# Patient Record
Sex: MALE | Race: WHITE | Employment: OTHER | URBAN - NONMETROPOLITAN AREA
[De-identification: names, ages, dates, MRNs, and addresses within clinical notes are randomized per-mention and may not be internally consistent; named-entity substitution may affect disease eponyms.]

---

## 2020-10-08 RX ORDER — BUMETANIDE 2 MG/1
2 TABLET ORAL DAILY
Status: ON HOLD | COMMUNITY
End: 2021-08-14 | Stop reason: SDUPTHER

## 2020-10-08 RX ORDER — ASPIRIN 81 MG/1
81 TABLET ORAL DAILY
COMMUNITY
End: 2020-10-21

## 2020-10-08 RX ORDER — PANTOPRAZOLE SODIUM 40 MG/1
40 TABLET, DELAYED RELEASE ORAL DAILY
COMMUNITY

## 2020-10-08 RX ORDER — METOPROLOL SUCCINATE 25 MG/1
25 TABLET, EXTENDED RELEASE ORAL DAILY
Status: ON HOLD | COMMUNITY
End: 2021-08-14 | Stop reason: HOSPADM

## 2020-10-08 RX ORDER — IBUPROFEN 400 MG/1
400 TABLET ORAL EVERY 6 HOURS PRN
Status: ON HOLD | COMMUNITY
End: 2021-08-15 | Stop reason: HOSPADM

## 2020-10-08 RX ORDER — TIOTROPIUM BROMIDE 18 UG/1
18 CAPSULE ORAL; RESPIRATORY (INHALATION) DAILY
COMMUNITY
End: 2021-08-25

## 2020-10-08 RX ORDER — BUDESONIDE AND FORMOTEROL FUMARATE DIHYDRATE 160; 4.5 UG/1; UG/1
2 AEROSOL RESPIRATORY (INHALATION) 2 TIMES DAILY
COMMUNITY

## 2020-10-08 RX ORDER — LORATADINE 10 MG/1
10 TABLET ORAL DAILY
COMMUNITY

## 2020-10-08 RX ORDER — POTASSIUM CHLORIDE 750 MG/1
10 TABLET, EXTENDED RELEASE ORAL 2 TIMES DAILY
Status: ON HOLD | COMMUNITY
End: 2021-08-15 | Stop reason: HOSPADM

## 2020-10-08 RX ORDER — MULTIVITAMIN,THERAPEUTIC
TABLET ORAL
COMMUNITY

## 2020-10-08 RX ORDER — CYANOCOBALAMIN (VITAMIN B-12) 500 MCG
TABLET ORAL
COMMUNITY

## 2020-10-08 RX ORDER — SENNA AND DOCUSATE SODIUM 50; 8.6 MG/1; MG/1
1 TABLET, FILM COATED ORAL DAILY
COMMUNITY

## 2020-10-08 RX ORDER — MAGNESIUM OXIDE 400 MG/1
400 TABLET ORAL DAILY
COMMUNITY
End: 2020-11-04 | Stop reason: SDUPTHER

## 2020-10-08 RX ORDER — NICOTINE 21 MG/24HR
1 PATCH, TRANSDERMAL 24 HOURS TRANSDERMAL EVERY 24 HOURS
COMMUNITY
End: 2020-10-21

## 2020-10-08 RX ORDER — GABAPENTIN 300 MG/1
300 CAPSULE ORAL 3 TIMES DAILY
COMMUNITY
End: 2020-10-21

## 2020-10-21 ENCOUNTER — TELEPHONE (OUTPATIENT)
Dept: PAIN MANAGEMENT | Age: 70
End: 2020-10-21

## 2020-10-21 ENCOUNTER — OFFICE VISIT (OUTPATIENT)
Dept: PAIN MANAGEMENT | Age: 70
End: 2020-10-21
Payer: MEDICARE

## 2020-10-21 VITALS
DIASTOLIC BLOOD PRESSURE: 68 MMHG | HEIGHT: 70 IN | BODY MASS INDEX: 29.92 KG/M2 | WEIGHT: 209 LBS | SYSTOLIC BLOOD PRESSURE: 100 MMHG | HEART RATE: 96 BPM

## 2020-10-21 PROBLEM — M79.2 NEUROPATHIC PAIN: Status: ACTIVE | Noted: 2020-10-21

## 2020-10-21 PROBLEM — M54.50 CHRONIC BILATERAL LOW BACK PAIN WITHOUT SCIATICA: Status: ACTIVE | Noted: 2020-10-21

## 2020-10-21 PROBLEM — G89.29 CHRONIC BILATERAL LOW BACK PAIN WITHOUT SCIATICA: Status: ACTIVE | Noted: 2020-10-21

## 2020-10-21 PROCEDURE — G8484 FLU IMMUNIZE NO ADMIN: HCPCS | Performed by: NURSE PRACTITIONER

## 2020-10-21 PROCEDURE — 4040F PNEUMOC VAC/ADMIN/RCVD: CPT | Performed by: NURSE PRACTITIONER

## 2020-10-21 PROCEDURE — 3017F COLORECTAL CA SCREEN DOC REV: CPT | Performed by: NURSE PRACTITIONER

## 2020-10-21 PROCEDURE — 4004F PT TOBACCO SCREEN RCVD TLK: CPT | Performed by: NURSE PRACTITIONER

## 2020-10-21 PROCEDURE — 99204 OFFICE O/P NEW MOD 45 MIN: CPT | Performed by: NURSE PRACTITIONER

## 2020-10-21 PROCEDURE — 1123F ACP DISCUSS/DSCN MKR DOCD: CPT | Performed by: NURSE PRACTITIONER

## 2020-10-21 PROCEDURE — G8417 CALC BMI ABV UP PARAM F/U: HCPCS | Performed by: NURSE PRACTITIONER

## 2020-10-21 PROCEDURE — G8427 DOCREV CUR MEDS BY ELIG CLIN: HCPCS | Performed by: NURSE PRACTITIONER

## 2020-10-21 RX ORDER — SPIRONOLACTONE 25 MG/1
25 TABLET ORAL DAILY
Status: ON HOLD | COMMUNITY
End: 2021-08-14 | Stop reason: SDUPTHER

## 2020-10-21 RX ORDER — LIDOCAINE 50 MG/G
OINTMENT TOPICAL
Qty: 240 G | Refills: 5 | Status: SHIPPED | OUTPATIENT
Start: 2020-10-21

## 2020-10-21 RX ORDER — PREGABALIN 75 MG/1
75 CAPSULE ORAL 2 TIMES DAILY
Qty: 60 CAPSULE | Refills: 0 | Status: SHIPPED | OUTPATIENT
Start: 2020-10-21 | End: 2020-11-04

## 2020-10-21 RX ORDER — ALBUTEROL SULFATE 90 UG/1
2 AEROSOL, METERED RESPIRATORY (INHALATION) EVERY 6 HOURS PRN
COMMUNITY

## 2020-10-21 RX ORDER — ACETAMINOPHEN 325 MG/1
650 TABLET ORAL EVERY 6 HOURS PRN
COMMUNITY

## 2020-10-21 RX ORDER — GABAPENTIN 600 MG/1
600 TABLET ORAL DAILY
Qty: 90 TABLET | Refills: 3 | Status: SHIPPED | OUTPATIENT
Start: 2020-10-21 | End: 2020-11-04

## 2020-10-21 SDOH — HEALTH STABILITY: MENTAL HEALTH: HOW OFTEN DO YOU HAVE A DRINK CONTAINING ALCOHOL?: 2-4 TIMES A MONTH

## 2020-10-21 SDOH — HEALTH STABILITY: MENTAL HEALTH: HOW MANY STANDARD DRINKS CONTAINING ALCOHOL DO YOU HAVE ON A TYPICAL DAY?: 1 OR 2

## 2020-10-21 ASSESSMENT — ENCOUNTER SYMPTOMS
CONSTIPATION: 0
BACK PAIN: 1
SHORTNESS OF BREATH: 1
DIARRHEA: 0
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
## 2020-10-21 NOTE — PATIENT INSTRUCTIONS
Pain Management Plan:  1. Start Lyrica twice daily, stop gabapentin. 2. Lidocaine ointment to feet as needed      Pain clinic phone numbers  Diego Zepeda  - Call [phone]. Please leave your name, a working phone number, date of birth, the name, dose, quantity, and last refill date of the prescription, and the pharmacy.  Appointment or General Questions - Call  [phone]. This is the direct line the  [facility] .  Amirah Askew You can also use [employer] [facility]. Is your MyChart Activated? How to dispose of unused pain medications safely:  · Flush all unused pain medications. This is the FDA's recommended way of disposing these medications. · All other medications can be disposed in the trash mixed in undesirable contents (used coffee grounds, used [employer], etc).

## 2020-10-23 ENCOUNTER — TELEPHONE (OUTPATIENT)
Dept: PAIN MANAGEMENT | Age: 70
End: 2020-10-23

## 2020-11-04 ENCOUNTER — OFFICE VISIT (OUTPATIENT)
Dept: PAIN MANAGEMENT | Age: 70
End: 2020-11-04
Payer: MEDICARE

## 2020-11-04 VITALS
HEIGHT: 70 IN | SYSTOLIC BLOOD PRESSURE: 110 MMHG | DIASTOLIC BLOOD PRESSURE: 64 MMHG | WEIGHT: 213 LBS | BODY MASS INDEX: 30.49 KG/M2

## 2020-11-04 PROBLEM — M51.36 DDD (DEGENERATIVE DISC DISEASE), LUMBAR: Status: ACTIVE | Noted: 2020-11-04

## 2020-11-04 PROBLEM — M47.817 LUMBOSACRAL SPONDYLOSIS WITHOUT MYELOPATHY: Status: ACTIVE | Noted: 2020-11-04

## 2020-11-04 PROCEDURE — 1123F ACP DISCUSS/DSCN MKR DOCD: CPT | Performed by: NURSE PRACTITIONER

## 2020-11-04 PROCEDURE — 99214 OFFICE O/P EST MOD 30 MIN: CPT | Performed by: NURSE PRACTITIONER

## 2020-11-04 PROCEDURE — G8427 DOCREV CUR MEDS BY ELIG CLIN: HCPCS | Performed by: NURSE PRACTITIONER

## 2020-11-04 PROCEDURE — 3017F COLORECTAL CA SCREEN DOC REV: CPT | Performed by: NURSE PRACTITIONER

## 2020-11-04 PROCEDURE — G8484 FLU IMMUNIZE NO ADMIN: HCPCS | Performed by: NURSE PRACTITIONER

## 2020-11-04 PROCEDURE — G8417 CALC BMI ABV UP PARAM F/U: HCPCS | Performed by: NURSE PRACTITIONER

## 2020-11-04 PROCEDURE — 4004F PT TOBACCO SCREEN RCVD TLK: CPT | Performed by: NURSE PRACTITIONER

## 2020-11-04 PROCEDURE — 4040F PNEUMOC VAC/ADMIN/RCVD: CPT | Performed by: NURSE PRACTITIONER

## 2020-11-04 RX ORDER — PREGABALIN 150 MG/1
150 CAPSULE ORAL 2 TIMES DAILY
Qty: 60 CAPSULE | Refills: 3 | Status: SHIPPED | OUTPATIENT
Start: 2020-11-04 | End: 2020-12-02 | Stop reason: DRUGHIGH

## 2020-11-04 ASSESSMENT — ENCOUNTER SYMPTOMS
DIARRHEA: 0
CONSTIPATION: 0
BACK PAIN: 1
SHORTNESS OF BREATH: 1

## 2020-11-04 NOTE — PROGRESS NOTES
Subjective:      Chief Complaint   Patient presents with    Foot Pain     feet, legs, hands have neuropathic pain    Back Pain     Patient ID: Amalia Ervin is a 69 y.o. male. Foot Pain    Associated symptoms include numbness. Back Pain   Associated symptoms include numbness and weakness (legs). Pertinent negatives include no chest pain. Follow up after Initial new patient consult    Neuropathic pain in feet. Chronic low back pain. Started Lyrica and lidocaine ointment, no significant pain relief. No side effects     Routine chiropractic care, also acupuncture    Pain Assessment  Location of Pain: Back  Location Modifiers: Left, Right(radiates down right leg)  Severity of Pain: 8  Quality of Pain: Aching  Duration of Pain: Persistent  Frequency of Pain: Constant  Aggravating Factors: Bending, Standing, Walking  Limiting Behavior: Yes  Relieving Factors: Rest  Are there other pain locations you wish to document?: No    Allergies   Allergen Reactions    Niacin And Related     Other      Nicotine patch - rash    Statins        Outpatient Medications Marked as Taking for the 11/4/20 encounter (Office Visit) with ALISON Brenner CNP   Medication Sig Dispense Refill    pregabalin (LYRICA) 150 MG capsule Take 1 capsule by mouth 2 times daily for 30 days. 60 capsule 3    acetaminophen (TYLENOL) 325 MG tablet Take 650 mg by mouth every 6 hours as needed for Pain      spironolactone (ALDACTONE) 25 MG tablet Take 25 mg by mouth daily      albuterol sulfate  (90 Base) MCG/ACT inhaler Inhale 2 puffs into the lungs every 6 hours as needed      lidocaine (XYLOCAINE) 5 % ointment Apply topically as needed.  240 g 5    Multiple Vitamins-Minerals (ONCOVITE) TABS Take by mouth      loratadine (CLARITIN) 10 MG tablet Take 10 mg by mouth daily      tiotropium (SPIRIVA HANDIHALER) 18 MCG inhalation capsule Inhale 18 mcg into the lungs daily      apixaban (ELIQUIS) 5 MG TABS tablet Take by mouth 2 times daily      potassium chloride (KLOR-CON M) 10 MEQ extended release tablet Take 10 mEq by mouth 2 times daily      CPAP Machine MISC by Does not apply route BiPAP      budesonide-formoterol (SYMBICORT) 160-4.5 MCG/ACT AERO Inhale 2 puffs into the lungs 2 times daily      bumetanide (BUMEX) 2 MG tablet Take 2 mg by mouth daily      magnesium hydroxide (MILK OF MAGNESIA) 400 MG/5ML suspension Take by mouth daily as needed for Constipation      ibuprofen (IBU) 400 MG tablet Take 400 mg by mouth every 6 hours as needed for Pain      metoprolol succinate (TOPROL XL) 25 MG extended release tablet Take 25 mg by mouth daily      Cyanocobalamin (VITAMIN B 12) 500 MCG TABS Take by mouth      pantoprazole (PROTONIX) 40 MG tablet Take 40 mg by mouth daily         Past Medical History:   Diagnosis Date    Agent orange exposure     Anxiety state     Cancer ([facility] [address]     bladder    [facility]) [facility]     Chronic obstructive pulmonary disease [facility]COPD) [facility]     Chronic post-traumatic stress disorder     Chronic respiratory failure with hypercapnia ([facility]     COPD exacerbation ([facility]     Coronary artery disease     Degenerative disc disease, lumbar     Depression     Essential hypertension     History of acute pancreatitis     Hyperglycemia     Hypokalemia     Lumbosacral disc disease     Obesity     Obstructive sleep apnea     Personal history of malignant neoplasm of bladder        Past Surgical History:   Procedure Laterality Date    BLADDER SURGERY      OTHER SURGICAL HISTORY      heart bypass with stents       Family History   Problem Relation Age of Onset    Emphysema Mother     Heart Disease Father     Heart Failure Father        Social History     Socioeconomic History    Marital status: Legally      Spouse name: None    Number of children: None    Years of education: None    Highest education level: None   Occupational History    None   Social Needs    Financial resource strain: None    Food insecurity     Worry: None     Inability: None    Transportation needs     Medical: None     Non-medical: None   Tobacco Use    Smoking status: Current Every Day Smoker     Packs/day: 1.00     Years: 54.00     Pack years: 54.00     Types: Cigarettes    Smokeless tobacco: Never Used   Substance and Sexual Activity    Alcohol use: Yes     Frequency: 2-4 times a month     Drinks per session: 1 or 2     Binge frequency: Never    Drug use: Never    Sexual activity: Not Currently   Lifestyle    Physical activity     Days per week: None     Minutes per session: None    Stress: None   Relationships    Social connections     Talks on phone: None     Gets together: None     Attends Rastafarian service: None     Active member of club or organization: None     Attends meetings of clubs or organizations: None     Relationship status: None    Intimate partner violence     Fear of current or ex partner: None     Emotionally abused: None     Physically abused: None     Forced sexual activity: None   Other Topics Concern    None   Social History Narrative    None     Review of Systems   Constitutional: Positive for activity change and fatigue. Respiratory: Positive for shortness of breath (hx COPD, smoker). Cardiovascular: Negative for chest pain. Gastrointestinal: Negative for constipation and diarrhea. Musculoskeletal: Positive for arthralgias, back pain and myalgias. Skin: Negative. Allergic/Immunologic: Negative for environmental allergies and food allergies. Neurological: Positive for weakness (legs) and numbness. Burning pain hands/feet   Psychiatric/Behavioral: Positive for sleep disturbance. Objective:   Physical Exam  Vitals signs reviewed. Constitutional:       General: He is awake. He is not in acute distress. Appearance: Normal appearance. He is well-developed and well-groomed. He is not ill-appearing, toxic-appearing or diaphoretic. Interventions: He is not intubated. HENT:      Head: Normocephalic and atraumatic. Right Ear: External ear normal.      Left Ear: External ear normal.      Nose: Nose normal.      Mouth/Throat:      Lips: Pink. Mouth: Mucous membranes are moist.   Eyes:      General: Lids are normal.   Cardiovascular:      Rate and Rhythm: Normal rate. Pulmonary:      Effort: Pulmonary effort is normal. No tachypnea, bradypnea, accessory muscle usage, prolonged expiration, respiratory distress or retractions. He is not intubated. Abdominal:      General: Abdomen is flat and protuberant. Palpations: Abdomen is soft. Musculoskeletal:      Lumbar back: He exhibits pain. Right lower le+ Pitting Edema present. Left lower le+ Pitting Edema present. Comments: Ambulates with single cane   Skin:     General: Skin is warm and dry. Capillary Refill: Capillary refill takes less than 2 seconds. Findings: No rash. Nails: There is no clubbing. Neurological:      General: No focal deficit present. Mental Status: He is alert and oriented to person, place, and time. Cranial Nerves: No cranial nerve deficit. Sensory: Sensory deficit (bilateral feet) present. Motor: No tremor. Gait: Gait abnormal (antalgic). Psychiatric:         Speech: Speech normal.         Behavior: Behavior is cooperative. Assessment:       Diagnosis Orders   1. Lumbosacral spondylosis without myelopathy  pregabalin (LYRICA) 150 MG capsule   2. DDD (degenerative disc disease), lumbar  pregabalin (LYRICA) 150 MG capsule   3. Neuropathic pain  pregabalin (LYRICA) 150 MG capsule   4. Chronic bilateral low back pain without sciatica  pregabalin (LYRICA) 150 MG capsule         Plan:      Increase Lyrica 150mg bid. If no relief, consider Nucynta.  Follow up one month        Bridgette Rangel APRN - CNP

## 2020-12-02 ENCOUNTER — OFFICE VISIT (OUTPATIENT)
Dept: PAIN MANAGEMENT | Age: 70
End: 2020-12-02
Payer: MEDICARE

## 2020-12-02 VITALS
BODY MASS INDEX: 30.49 KG/M2 | DIASTOLIC BLOOD PRESSURE: 64 MMHG | HEIGHT: 70 IN | WEIGHT: 213 LBS | SYSTOLIC BLOOD PRESSURE: 118 MMHG | RESPIRATION RATE: 18 BRPM

## 2020-12-02 PROCEDURE — 1123F ACP DISCUSS/DSCN MKR DOCD: CPT | Performed by: NURSE PRACTITIONER

## 2020-12-02 PROCEDURE — G8417 CALC BMI ABV UP PARAM F/U: HCPCS | Performed by: NURSE PRACTITIONER

## 2020-12-02 PROCEDURE — G8427 DOCREV CUR MEDS BY ELIG CLIN: HCPCS | Performed by: NURSE PRACTITIONER

## 2020-12-02 PROCEDURE — 3017F COLORECTAL CA SCREEN DOC REV: CPT | Performed by: NURSE PRACTITIONER

## 2020-12-02 PROCEDURE — 4004F PT TOBACCO SCREEN RCVD TLK: CPT | Performed by: NURSE PRACTITIONER

## 2020-12-02 PROCEDURE — G8484 FLU IMMUNIZE NO ADMIN: HCPCS | Performed by: NURSE PRACTITIONER

## 2020-12-02 PROCEDURE — 99214 OFFICE O/P EST MOD 30 MIN: CPT | Performed by: NURSE PRACTITIONER

## 2020-12-02 PROCEDURE — 4040F PNEUMOC VAC/ADMIN/RCVD: CPT | Performed by: NURSE PRACTITIONER

## 2020-12-02 RX ORDER — CELECOXIB 100 MG/1
100 CAPSULE ORAL 2 TIMES DAILY
Qty: 60 CAPSULE | Refills: 3 | Status: SHIPPED | OUTPATIENT
Start: 2020-12-02 | End: 2021-01-13 | Stop reason: SDUPTHER

## 2020-12-02 RX ORDER — PREGABALIN 225 MG/1
225 CAPSULE ORAL 2 TIMES DAILY
Qty: 60 CAPSULE | Refills: 1 | Status: SHIPPED | OUTPATIENT
Start: 2020-12-02 | End: 2021-01-13 | Stop reason: SDUPTHER

## 2020-12-02 RX ORDER — TIZANIDINE 2 MG/1
TABLET ORAL
Qty: 60 TABLET | Refills: 5 | Status: SHIPPED | OUTPATIENT
Start: 2020-12-02 | End: 2021-01-13 | Stop reason: SINTOL

## 2020-12-02 ASSESSMENT — ENCOUNTER SYMPTOMS
BACK PAIN: 1
DIARRHEA: 0
CONSTIPATION: 0
SHORTNESS OF BREATH: 1

## 2020-12-02 NOTE — PATIENT INSTRUCTIONS
INCREASE LYRICA 225MG TWICE A DAILY    START CELEBREX 100MG TWICE DAILY    ZANAFLEX 1-2 TABLETS AS NEEDED FOR PAIN

## 2020-12-02 NOTE — PROGRESS NOTES
Subjective:      Chief Complaint   Patient presents with    Back Pain     new pain, between the shoulder blades, has an up coing chiro appt    Lower Back Pain     all the way down to the feet     Patient ID: Charanjit Connolly is a 69 y.o. male. Foot Pain    Associated symptoms include numbness. Back Pain   Associated symptoms include numbness and weakness (legs). Pertinent negatives include no chest pain. Follow up after Initial new patient consult    Neuropathic pain in feet. Chronic low back pain-currently flared    Lyrica increased-minimal pain relief, no side effects. Sleep improved     Routine chiropractic care, also acupuncture    Pain Assessment  Location of Pain: Back  Location Modifiers: (lumbar down to the feet)  Severity of Pain: 7  Quality of Pain: Aching, Dull  Duration of Pain: Persistent  Frequency of Pain: Constant  Aggravating Factors: Bending, Stretching, Straightening, Squatting, Kneeling, Exercise, Standing, Walking, Stairs  Limiting Behavior: Some  Relieving Factors: (rolled up towel, chiropractor)    Allergies   Allergen Reactions    Niacin And Related     Other      Nicotine patch - rash    Statins        Outpatient Medications Marked as Taking for the 12/2/20 encounter (Office Visit) with ALISON Toussaint - CNP   Medication Sig Dispense Refill    pregabalin (LYRICA) 225 MG capsule Take 1 capsule by mouth 2 times daily for 30 days. 60 capsule 1    tiZANidine (ZANAFLEX) 2 MG tablet 1-2 tablets tid prn muscle spasm/pain 60 tablet 5    celecoxib (CELEBREX) 100 MG capsule Take 1 capsule by mouth 2 times daily 60 capsule 3    acetaminophen (TYLENOL) 325 MG tablet Take 650 mg by mouth every 6 hours as needed for Pain      spironolactone (ALDACTONE) 25 MG tablet Take 25 mg by mouth daily      albuterol sulfate  (90 Base) MCG/ACT inhaler Inhale 2 puffs into the lungs every 6 hours as needed      lidocaine (XYLOCAINE) 5 % ointment Apply topically as needed.  240 g 5    sennosides-docusate sodium (SENOKOT-S) 8.6-50 MG tablet Take 1 tablet by mouth daily      Multiple Vitamins-Minerals (ONCOVITE) TABS Take by mouth      loratadine (CLARITIN) 10 MG tablet Take 10 mg by mouth daily      tiotropium (SPIRIVA HANDIHALER) 18 MCG inhalation capsule Inhale 18 mcg into the lungs daily      apixaban (ELIQUIS) 5 MG TABS tablet Take by mouth 2 times daily      potassium chloride (KLOR-CON M) 10 MEQ extended release tablet Take 10 mEq by mouth 2 times daily      CPAP Machine MISC by Does not apply route BiPAP      budesonide-formoterol (SYMBICORT) 160-4.5 MCG/ACT AERO Inhale 2 puffs into the lungs 2 times daily      bumetanide (BUMEX) 2 MG tablet Take 2 mg by mouth daily      magnesium hydroxide (MILK OF MAGNESIA) 400 MG/5ML suspension Take by mouth daily as needed for Constipation      ibuprofen (IBU) 400 MG tablet Take 400 mg by mouth every 6 hours as needed for Pain      metoprolol succinate (TOPROL XL) 25 MG extended release tablet Take 25 mg by mouth daily      Cyanocobalamin (VITAMIN B 12) 500 MCG TABS Take by mouth      pantoprazole (PROTONIX) 40 MG tablet Take 40 mg by mouth daily         Past Medical History:   Diagnosis Date    Agent orange exposure     Anxiety state     Cancer ([facility] [address]     bladder    CHF (congestive heart failure) ([facility])     Chronic obstructive pulmonary disease (COPD) ([facility])     Chronic post-traumatic stress disorder     Chronic respiratory failure with hypercapnia ([facility]CC)     COPD exacerbation ([facility])     Coronary artery disease     Degenerative disc disease, lumbar     Depression     Essential hypertension     History of acute pancreatitis     Hyperglycemia     Hypokalemia     Lumbosacral disc disease     Obesity     Obstructive sleep apnea     Personal history of malignant neoplasm of bladder        Past Surgical History:   Procedure Laterality Date    BLADDER SURGERY      OTHER SURGICAL HISTORY      heart bypass with stents       Family History   Problem Relation Age of Onset    Emphysema Mother     Heart Disease Father     Heart Failure Father        Social History     Socioeconomic History    Marital status: Legally      Spouse name: None    Number of children: None    Years of education: None    Highest education level: None   Occupational History    None   Social Needs    Financial resource strain: None    Food insecurity     Worry: None     Inability: None    Transportation needs     Medical: None     Non-medical: None   Tobacco Use    Smoking status: Current Every Day Smoker     Packs/day: 1.00     Years: 54.00     Pack years: 54.00     Types: Cigarettes    Smokeless tobacco: Never Used   Substance and Sexual Activity    Alcohol use: Yes     Frequency: 2-4 times a month     Drinks per session: 1 or 2     Binge frequency: Never    Drug use: Never    Sexual activity: Not Currently   Lifestyle    Physical activity     Days per week: None     Minutes per session: None    Stress: None   Relationships    Social connections     Talks on phone: None     Gets together: None     Attends Sabianism service: None     Active member of club or organization: None     Attends meetings of clubs or organizations: None     Relationship status: None    Intimate partner violence     Fear of current or ex partner: None     Emotionally abused: None     Physically abused: None     Forced sexual activity: None   Other Topics Concern    None   Social History Narrative    None     Review of Systems   Constitutional: Positive for activity change and fatigue. Respiratory: Positive for shortness of breath (hx COPD, smoker). Cardiovascular: Negative for chest pain. Gastrointestinal: Negative for constipation and diarrhea. Musculoskeletal: Positive for arthralgias, back pain and myalgias. Skin: Negative. Allergic/Immunologic: Negative for environmental allergies and food allergies.    Neurological: Positive for weakness (legs) and numbness. Burning pain hands/feet   Psychiatric/Behavioral: Positive for sleep disturbance. Objective:   Physical Exam  Vitals signs reviewed. Constitutional:       General: He is awake. He is not in acute distress. Appearance: Normal appearance. He is well-developed and well-groomed. He is not ill-appearing, toxic-appearing or diaphoretic. Interventions: He is not intubated. HENT:      Head: Normocephalic and atraumatic. Right Ear: External ear normal.      Left Ear: External ear normal.      Nose: Nose normal.      Mouth/Throat:      Lips: Pink. Mouth: Mucous membranes are moist.   Eyes:      General: Lids are normal.   Cardiovascular:      Rate and Rhythm: Normal rate. Pulmonary:      Effort: Pulmonary effort is normal. No tachypnea, bradypnea, accessory muscle usage, prolonged expiration, respiratory distress or retractions. He is not intubated. Abdominal:      General: Abdomen is flat and protuberant. Palpations: Abdomen is soft. Musculoskeletal:      Lumbar back: He exhibits pain. Right lower le+ Pitting Edema present. Left lower le+ Pitting Edema present. Comments: Ambulates with single cane   Skin:     General: Skin is warm and dry. Capillary Refill: Capillary refill takes less than 2 seconds. Findings: No rash. Nails: There is no clubbing. Neurological:      General: No focal deficit present. Mental Status: He is alert and oriented to person, place, and time. Cranial Nerves: No cranial nerve deficit. Sensory: Sensory deficit (bilateral feet) present. Motor: No tremor. Gait: Gait abnormal (antalgic). Psychiatric:         Speech: Speech normal.         Behavior: Behavior is cooperative. Assessment:       Diagnosis Orders   1. Encounter for drug screening  Urine Drug Screen   2. Neuropathic pain  pregabalin (LYRICA) 225 MG capsule   3.  Chronic bilateral low back pain without sciatica pregabalin (LYRICA) 225 MG capsule   4.  Encounter for long-term opiate analgesic use  Urine Drug Screen         Plan:      Increase Lyrica 225mg bid  Celebrex 100mg bid  Zanaflex 2-4mg tid prn  Consider Percocet  Follow up one month        Gilmar Reyna, APRN - CNP

## 2020-12-06 LAB
6-ACETYLMORPHINE, UR: NORMAL
AMPHETAMINE SCREEN, URINE: NORMAL
BARBITURATE SCREEN, URINE: NORMAL
BENZODIAZEPINE SCREEN, URINE: NORMAL
CANNABINOID SCREEN URINE: NORMAL
COCAINE METABOLITE, URINE: NORMAL
CREATININE URINE: NORMAL
EDDP, URINE: NORMAL
ETHANOL URINE: NORMAL
MDMA URINE: NORMAL
METHADONE SCREEN, URINE: NORMAL
METHAMPHETAMINE, URINE: NORMAL
OPIATES, URINE: NORMAL
OXYCODONE: NORMAL
PCP: NORMAL
PH, URINE: NORMAL
PHENCYCLIDINE, URINE: NORMAL
PROPOXYPHENE, URINE: NORMAL
TRICYCLIC ANTIDEPRESSANTS, UR: NORMAL

## 2020-12-14 ENCOUNTER — TELEPHONE (OUTPATIENT)
Dept: PAIN MANAGEMENT | Age: 70
End: 2020-12-14

## 2020-12-15 NOTE — TELEPHONE ENCOUNTER
Patient called and stated that he had recently had xrays done that were ordered by Dr Bismark La. He was wondering if you could take a look at them and come up with a game plan for him.   Pt is coming in for another appointment on 1.13.21

## 2020-12-15 NOTE — TELEPHONE ENCOUNTER
It should have been in my chart, but it is not pulling up.   Writer called [facility] and requested results to be faxed

## 2020-12-21 NOTE — TELEPHONE ENCOUNTER
Pt called back to follow up on this request. He says he called 12/14 and hasn't heard anything back and needs some relief.  Please call the pt so he knows what we can do for his pain

## 2020-12-21 NOTE — TELEPHONE ENCOUNTER
Patient would like the MRI order placed and for it to be faxed to [facility]

## 2021-01-13 ENCOUNTER — OFFICE VISIT (OUTPATIENT)
Dept: PAIN MANAGEMENT | Age: 71
End: 2021-01-13
Payer: MEDICARE

## 2021-01-13 ENCOUNTER — TELEPHONE (OUTPATIENT)
Dept: PAIN MANAGEMENT | Age: 71
End: 2021-01-13

## 2021-01-13 VITALS — DIASTOLIC BLOOD PRESSURE: 84 MMHG | SYSTOLIC BLOOD PRESSURE: 136 MMHG

## 2021-01-13 DIAGNOSIS — M54.06 PANNICULITIS INVOLVING LUMBAR REGION: ICD-10-CM

## 2021-01-13 DIAGNOSIS — M47.816 LUMBAR FACET JOINT SYNDROME: Primary | ICD-10-CM

## 2021-01-13 DIAGNOSIS — M47.816 LUMBAR FACET JOINT SYNDROME: ICD-10-CM

## 2021-01-13 DIAGNOSIS — M54.50 CHRONIC BILATERAL LOW BACK PAIN WITHOUT SCIATICA: ICD-10-CM

## 2021-01-13 DIAGNOSIS — G89.29 CHRONIC BILATERAL LOW BACK PAIN WITHOUT SCIATICA: ICD-10-CM

## 2021-01-13 DIAGNOSIS — M51.36 DDD (DEGENERATIVE DISC DISEASE), LUMBAR: ICD-10-CM

## 2021-01-13 DIAGNOSIS — M79.2 NEUROPATHIC PAIN: Primary | ICD-10-CM

## 2021-01-13 PROCEDURE — 3017F COLORECTAL CA SCREEN DOC REV: CPT | Performed by: NURSE PRACTITIONER

## 2021-01-13 PROCEDURE — 99214 OFFICE O/P EST MOD 30 MIN: CPT | Performed by: NURSE PRACTITIONER

## 2021-01-13 PROCEDURE — G8417 CALC BMI ABV UP PARAM F/U: HCPCS | Performed by: NURSE PRACTITIONER

## 2021-01-13 PROCEDURE — G8427 DOCREV CUR MEDS BY ELIG CLIN: HCPCS | Performed by: NURSE PRACTITIONER

## 2021-01-13 PROCEDURE — 1123F ACP DISCUSS/DSCN MKR DOCD: CPT | Performed by: NURSE PRACTITIONER

## 2021-01-13 PROCEDURE — 4040F PNEUMOC VAC/ADMIN/RCVD: CPT | Performed by: NURSE PRACTITIONER

## 2021-01-13 PROCEDURE — G8484 FLU IMMUNIZE NO ADMIN: HCPCS | Performed by: NURSE PRACTITIONER

## 2021-01-13 PROCEDURE — 4004F PT TOBACCO SCREEN RCVD TLK: CPT | Performed by: NURSE PRACTITIONER

## 2021-01-13 RX ORDER — CELECOXIB 100 MG/1
100 CAPSULE ORAL 2 TIMES DAILY
Qty: 60 CAPSULE | Refills: 5 | Status: SHIPPED | OUTPATIENT
Start: 2021-01-13 | End: 2021-01-14 | Stop reason: SDUPTHER

## 2021-01-13 RX ORDER — MAGNESIUM OXIDE 400 MG/1
400 TABLET ORAL DAILY
COMMUNITY

## 2021-01-13 RX ORDER — DILTIAZEM HYDROCHLORIDE 180 MG/1
180 CAPSULE, EXTENDED RELEASE ORAL DAILY
Status: ON HOLD | COMMUNITY
Start: 2020-11-05 | End: 2021-08-14 | Stop reason: HOSPADM

## 2021-01-13 RX ORDER — CYCLOBENZAPRINE HCL 10 MG
10 TABLET ORAL 2 TIMES DAILY PRN
Qty: 60 TABLET | Refills: 5 | Status: SHIPPED | OUTPATIENT
Start: 2021-01-13 | End: 2021-01-14 | Stop reason: SDUPTHER

## 2021-01-13 RX ORDER — PREGABALIN 225 MG/1
225 CAPSULE ORAL 2 TIMES DAILY
Qty: 60 CAPSULE | Refills: 1 | Status: SHIPPED | OUTPATIENT
Start: 2021-01-13 | End: 2021-01-14 | Stop reason: SDUPTHER

## 2021-01-13 ASSESSMENT — ENCOUNTER SYMPTOMS
SHORTNESS OF BREATH: 1
DIARRHEA: 0
CONSTIPATION: 0
BACK PAIN: 1

## 2021-01-13 NOTE — TELEPHONE ENCOUNTER
While scheduling procedure patient does want to have sedation however he is afraid that one tab is not going to be enough. Could you send in 2 tabs of valium, or something different for him?

## 2021-01-13 NOTE — PATIENT INSTRUCTIONS
[facility]  [address], [place]    PROCEDURE INSTRUCTIONS FOR PAIN MANAGEMENT PROCEDURES    You are scheduled to see Dr. Bunny Frank to undergo the following procedure:       Procedure Date:   Feb 10, 2021 (Wed)  Arrival Time: You will receive a call from [facility] to inform you of your procedure time    Enter the facility through the ER doors and take the elevator to the 2nd floor and check in at same day surgery. 1. Stop the following medications prior to the procedure:     Stop blood thinners as directed before the injection, with permission from your cardiologist or primary care physician. We will send a letter to them requesting permission to hold the blood thinners. 2.  Take all routine medications unless otherwise instructed. Ok to take vitamins and antiinflammatory medications    3. EATING & DRINKING:  YOUR PROCEDURE MAY REQUIRE ANESTHESIA, NO FOOD OR LIQUIDS FOR 8 HOURS PRIOR TO THE PROCEDURE    4. If you are allergic to contrast or iodine, you must take benadryl and prednisone prior to the injection to prevent an allergic reaction. Follow the directions on the prescription for the times to take the medication. 5.  Wear simple loose clothing, which can be easily changed. 6.  Leave jewelry (including rings) and other valuables at home. 7.  Make arrangements for a family member or friend to drive you to the surgery center. Your ride must stay in the hospital while you are having the injection done. The sedation may affect your judgment following the procedure and driving a vehicle within 24 hours after the sedation could be dangerous. 8.  You will be asked to sign several forms prior to surgery; patients under the age of 25 must have a parent or legal guardian sign the permit to be able to do the procedure. 9.  You must have finished any antibiotic prescribed for recent infections. If required, please take pre-procedure antibiotic or other pre-procedure medications as instructed. 10. Bring inhalers and pain medications with you to your procedure. 11. Bring your MRI/CT films if they were done outside of the [facility]. 12. If you should develop a cold, sore throat, cough, fever or other new indication of illness or infection, or are started on antibiotics within 2 weeks of the scheduled procedure, please notify the [facility] office as early as possible at [phone].

## 2021-01-13 NOTE — PROGRESS NOTES
Subjective:      Chief Complaint   Patient presents with    Back Pain     1 month follow up    Medication Management     zanaflex makes him really sleepy     Patient ID: Bernie Olszewski is a 66 y.o. male. Back Pain  Associated symptoms include numbness and weakness (legs). Pertinent negatives include no chest pain. Foot Pain   Associated symptoms include numbness. Here today for routine pain clinic recheck. Neuropathic pain in feet. Chronic low back pain-currently flared. Evaluated in ER. Lyrica increased-providing additional pain relief, no side effects. Sleep improved     Routine chiropractic care, also acupuncture    Pain Assessment  Location of Pain: Back  Severity of Pain: 6  Quality of Pain: Sharp  Duration of Pain: Persistent  Frequency of Pain: Constant  Aggravating Factors: Bending, Stretching, Straightening, Exercise, Kneeling, Squatting, Standing, Walking(getting up in the am; )  Limiting Behavior: Some  Relieving Factors: Heat(massager, )    Allergies   Allergen Reactions    Niacin And Related     Other      Nicotine patch - rash    Statins        Outpatient Medications Marked as Taking for the 1/13/21 encounter (Office Visit) with ALISON Curtis - CNP   Medication Sig Dispense Refill    dilTIAZem (TIAZAC) 180 MG extended release capsule Take 180 mg by mouth daily      NONFORMULARY Take 1 tablet by mouth daily ON COR vitamin - to help keep bladder cancer away      pregabalin (LYRICA) 225 MG capsule Take 1 capsule by mouth 2 times daily for 30 days.  60 capsule 1    tiZANidine (ZANAFLEX) 2 MG tablet 1-2 tablets tid prn muscle spasm/pain 60 tablet 5    celecoxib (CELEBREX) 100 MG capsule Take 1 capsule by mouth 2 times daily 60 capsule 3    acetaminophen (TYLENOL) 325 MG tablet Take 650 mg by mouth every 6 hours as needed for Pain      spironolactone (ALDACTONE) 25 MG tablet Take 25 mg by mouth daily      albuterol sulfate  (90 Base) MCG/ACT inhaler Inhale 2 puffs into Procedure Laterality Date    BLADDER SURGERY      OTHER SURGICAL HISTORY      heart bypass with stents       Family History   Problem Relation Age of Onset    Emphysema Mother     Heart Disease Father     Heart Failure Father        Social History     Socioeconomic History    Marital status: Legally      Spouse name: None    Number of children: None    Years of education: None    Highest education level: None   Occupational History    None   Social Needs    Financial resource strain: None    Food insecurity     Worry: None     Inability: None    Transportation needs     Medical: None     Non-medical: None   Tobacco Use    Smoking status: Current Every Day Smoker     Packs/day: 1.00     Years: 54.00     Pack years: 54.00     Types: Cigarettes    Smokeless tobacco: Never Used   Substance and Sexual Activity    Alcohol use: Yes     Frequency: 2-4 times a month     Drinks per session: 1 or 2     Binge frequency: Never    Drug use: Never    Sexual activity: Not Currently   Lifestyle    Physical activity     Days per week: None     Minutes per session: None    Stress: None   Relationships    Social connections     Talks on phone: None     Gets together: None     Attends [employer] service: None     Active member of club or organization: None     Attends meetings of clubs or organizations: None     Relationship status: None    Intimate partner violence     Fear of current or ex partner: None     Emotionally abused: None     Physically abused: None     Forced sexual activity: None   Other Topics Concern    None   Social History Narrative    None     Review of Systems   Constitutional: Positive for activity change and fatigue. Respiratory: Positive for shortness of breath (hx COPD, smoker). Cardiovascular: Negative for chest pain. Gastrointestinal: Negative for constipation and diarrhea. Musculoskeletal: Positive for arthralgias, back pain and myalgias. Skin: Negative. Allergic/Immunologic: Negative for environmental allergies and food allergies. Neurological: Positive for weakness (legs) and numbness. Burning pain hands/feet   Psychiatric/Behavioral: Positive for sleep disturbance. Objective:   Physical Exam  Vitals signs reviewed. Constitutional:       General: He is awake. He is not in acute distress. Appearance: Normal appearance. He is well-developed and well-groomed. He is not ill-appearing, toxic-appearing or diaphoretic. Interventions: He is not intubated. HENT:      Head: Normocephalic and atraumatic. Right Ear: External ear normal.      Left Ear: External ear normal.      Nose: Nose normal.      Mouth/Throat:      Lips: Pink. Mouth: Mucous membranes are moist.   Eyes:      General: Lids are normal.   Cardiovascular:      Rate and Rhythm: Normal rate. Pulmonary:      Effort: Pulmonary effort is normal. No tachypnea, bradypnea, accessory muscle usage, prolonged expiration, respiratory distress or retractions. He is not intubated. Abdominal:      General: Abdomen is flat and protuberant. Palpations: Abdomen is soft. Musculoskeletal:      Lumbar back: He exhibits pain. Right lower le+ Pitting Edema present. Left lower le+ Pitting Edema present.       Comments: MRI LUMBAR SPINE        HISTORY:  Low back pain and radiculopathy A.  Weakness in legs.     History of bladder cancer           Study:  MRI lumbar spine without contrast dated 2020.  3471 hours           TECHNIQUE: Sagittal and axial T1 and T2 images were obtained in the     lumbar spine without IV contrast.           Findings:  At T11-12 through L1-2, degenerative disc changes are present with     small broad-based posterior disc protrusion but no significant compromise of     the canal or foramina.  The conus medullaris is not compromised           At L2-3, reactive endplate changes are present along with a hemangioma in the     L2 vertebral body.           Broad-based posterior disc protrusion is present along with endplate     osteophytes but no critical compromise of the canal or foramina.           An abdominal aortic aneurysm is present at the L4 level measuring     approximately 3.4 cm in diameter.           At L4-5, degenerative disc space narrowing is present with endplate     osteophytes and a small broad-based posterior and posterior lateral disc     protrusion.  These changes result in moderate right lateral recess and right     foraminal narrowing but the canal is not critically compromised.  Facet     hypertrophic changes are present           At L5-S1, degenerative disc space narrowing is present with a broad-based     posterior and right posterior lateral disc protrusion along with facet and     ligamentum flavum changes.  The right foramen is moderately narrowed.  The     canal remains broad.           IMPRESSION:     Chronic multilevel fairly severe degenerative disc and facet changes as     described above.           Broad-based right posterior lateral disc protrusion L4-5 resulting in     narrowing of the right lateral recess and right proximal foramen.           Degenerative disc changes at L5-S1 along with degenerative facet changes     resulting in moderate bilateral foraminal stenosis right greater than left           Less significant degenerative changes at other levels           Abdominal aortic aneurysm at the level         Skin:     General: Skin is warm and dry. Capillary Refill: Capillary refill takes less than 2 seconds. Findings: No rash. Nails: There is no clubbing. Neurological:      General: No focal deficit present. Mental Status: He is alert and oriented to person, place, and time. Cranial Nerves: No cranial nerve deficit. Sensory: Sensory deficit (bilateral feet) present. Motor: No tremor. Gait: Gait abnormal (antalgic).    Psychiatric:         Speech: Speech normal. Behavior: Behavior is cooperative. Assessment:       Diagnosis Orders   1. Neuropathic pain  pregabalin (LYRICA) 225 MG capsule   2. Chronic bilateral low back pain without sciatica  pregabalin (LYRICA) 225 MG capsule   3. DDD (degenerative disc disease), lumbar     4. Lumbar facet joint syndrome     5. Panniculitis involving lumbar region           Plan:       Chronic pain diagnoses such as   1. Neuropathic pain    2. Chronic bilateral low back pain without sciatica    3. DDD (degenerative disc disease), lumbar    4. Lumbar facet joint syndrome    5. Panniculitis involving lumbar region     controlled on current medication regime, wll continue current pain medications to improve quality of life and function. Lumbar MRI reviewed, Schedule lumbar facet injections L4-L5 and L5-S1    The patient was counseled at length about the risks of brady Covid-19 during their perioperative period and any recovery window from their procedure. The patient was made aware that brady Covid-19  may worsen their prognosis for recovering from their procedure  and lend to a higher morbidity and/or mortality risk. All material risks, benefits, and reasonable alternatives including postponing the procedure were discussed. The patient does wish to proceed with the procedure at this time.               Joie Koyanagi, APRN - CNP

## 2021-01-13 NOTE — PROGRESS NOTES
Lyrica Rx faxed to [facility] [place] with last 4 digits of SSN and \"Attn: Breana Coombs, nurse and Dr. Toñito Davis" per pt's instructions.

## 2021-01-14 ENCOUNTER — TELEPHONE (OUTPATIENT)
Dept: PAIN MANAGEMENT | Age: 71
End: 2021-01-14

## 2021-01-14 DIAGNOSIS — M79.2 NEUROPATHIC PAIN: ICD-10-CM

## 2021-01-14 DIAGNOSIS — M54.50 CHRONIC BILATERAL LOW BACK PAIN WITHOUT SCIATICA: ICD-10-CM

## 2021-01-14 DIAGNOSIS — G89.29 CHRONIC BILATERAL LOW BACK PAIN WITHOUT SCIATICA: ICD-10-CM

## 2021-01-14 RX ORDER — CELECOXIB 100 MG/1
100 CAPSULE ORAL 2 TIMES DAILY
Qty: 60 CAPSULE | Refills: 2 | Status: SHIPPED | OUTPATIENT
Start: 2021-01-14 | End: 2021-06-03

## 2021-01-14 RX ORDER — CYCLOBENZAPRINE HCL 10 MG
10 TABLET ORAL 2 TIMES DAILY PRN
Qty: 60 TABLET | Refills: 2 | Status: SHIPPED | OUTPATIENT
Start: 2021-01-14 | End: 2021-02-13

## 2021-01-14 RX ORDER — PREGABALIN 225 MG/1
225 CAPSULE ORAL 2 TIMES DAILY
Qty: 60 CAPSULE | Refills: 2 | Status: SHIPPED | OUTPATIENT
Start: 2021-01-14 | End: 2021-05-07

## 2021-01-14 NOTE — TELEPHONE ENCOUNTER
The patient stated that he thinks he has had valium in the past for short term but is not sure. He stated that he has never had a reaction to it, the only medication that he cannot take will are statins. When asked, the patient stated that he does not believe that he has ever taken any anxiety medications as a long term script.

## 2021-01-14 NOTE — TELEPHONE ENCOUNTER
The patient stated that his medications that have been sent to the [facility] will not come to him for about 2 months and requested his medications be sent to his local pharmacy.     Last Appt:   1/13/2021  Next Appt:   Visit date not found  Med verified in [employer]

## 2021-01-15 RX ORDER — DIAZEPAM 5 MG/1
TABLET ORAL
Qty: 2 TABLET | Refills: 0 | Status: SHIPPED | OUTPATIENT
Start: 2021-01-15 | End: 2021-01-28

## 2021-01-20 ENCOUNTER — TELEPHONE (OUTPATIENT)
Dept: PAIN MANAGEMENT | Age: 71
End: 2021-01-20

## 2021-01-20 NOTE — TELEPHONE ENCOUNTER
Spoke with the patient and explained Ana's note.      He stated understanding and will call [facility], because he should already have a script waiting there per his med list.

## 2021-01-20 NOTE — TELEPHONE ENCOUNTER
Last Appt:  Visit date not found  Next Appt:   Visit date not found  Med verified in Epic    [facility] because the interaction with his other medication is too great?  Pt wanted to discuss

## 2021-01-20 NOTE — TELEPHONE ENCOUNTER
Pt called stating that he needs a refill of his gabapentin 600mg sent to [facility] in [facility] because he is out and stated that the [address] fill it. Pt also stated that he is still taking the pregabalin and Flexeril but no the Celebrex as he was told by the [place] that it was too dangerous to take with his Eliquis. Pt was confused and stated that he couldn't remember if he was supposed to still take the gabapentin along with the Lyrica.      Last Appt:  1/13/21 Patricia Mckee   Next Appt:   Visit date not found  Med verified in Epic

## 2021-02-10 ENCOUNTER — PROCEDURE VISIT (OUTPATIENT)
Dept: PAIN MANAGEMENT | Age: 71
End: 2021-02-10
Payer: MEDICARE

## 2021-02-10 DIAGNOSIS — M47.816 LUMBAR FACET JOINT SYNDROME: Primary | ICD-10-CM

## 2021-02-10 DIAGNOSIS — M47.817 LUMBOSACRAL SPONDYLOSIS WITHOUT MYELOPATHY: ICD-10-CM

## 2021-02-10 PROCEDURE — 64494 INJ PARAVERT F JNT L/S 2 LEV: CPT | Performed by: PHYSICAL MEDICINE & REHABILITATION

## 2021-02-10 PROCEDURE — 64493 INJ PARAVERT F JNT L/S 1 LEV: CPT | Performed by: PHYSICAL MEDICINE & REHABILITATION

## 2021-02-10 NOTE — PROGRESS NOTES
ZYGAPOPHYSEAL JOINT INJECTION    2/10/21    Surgeon: Monet Blanco MD    Pre-operative Diagnosis:   Patient Active Problem List   Diagnosis Code    Neuropathic pain M79.2    Chronic bilateral low back pain without sciatica M54.5, G89.29    Lumbosacral spondylosis without myelopathy M47.817    DDD (degenerative disc disease), lumbar M51.36    Lumbar facet joint syndrome M47.816    Panniculitis involving lumbar region M54.06       Post-operative Diagnosis: Same    INDICATION:  Previous treatment and examination findings are noted in the H&P and previous clinic notes.  Bilateral L4-5 5-S1 facet joint injections have been requested for diagnostic and therapeutic reasons. Conservative treatment was ineffective i.e.: ice, NSAIDS, rest, narcotic medication, chiropractic care, physical therapy and message therapy. Patient is unable to perform the following ADL's: ambulating, grooming, sitting and standing                        Last [place] films: 2020      EXAMINATION:   BL4-5 5-S1 facet joint injections with arthrography. CONSENT:  Written consent was obtained from the patient on preprinted consent form after explaining the procedure, indications, potential complications and outcomes.  Alternative treatments were also discussed. DISCUSSION:  The patient was sterilely prepped and draped in the usual fashion in the prone position.  Time out\" was verified for correct patient, side, level and procedure. SEDATION:  No conscious sedation was performed during the procedure.  The patient remained awake and conversed throughout the procedure.  The patient underwent pulse oximetry and blood pressure monitoring independently by a trained observer, as well as by a physician. PROCEDURE[de-identified]  Under image-intensifier control, a standard technique was employed, a 22 gauge needle x 5 inch spinal needle was guided successfully to cannulate the B L4-5 5-S1 zygapophyseal joint via a posterior lateral approach. Instillation of .5 mL of Omnipaque 240 contrast medium demonstrated contiguous flow into the joint and the joint capsule. No vascular spread was noted. Digital subtraction was not employed to evaluate for vascular spread.] The patient was monitored for any untoward reaction to contrast medium before proceeding with procedure #2. Dexamethasone (Decadron 10 mg/mL) was injected into each joint. A total of 4 joints were injected. PROVOCATION: The patient did not report pain reproduction in a concordant distribution upon capsular distention of the lumbar facet joints from the contrast injection. ARTHROGRAPHY: The lumbar facet arthrogram revealed contrast in the joint space in the superior and inferior recesses; no contrast extravasation. The patient tolerated the procedure well and without complications and was noted to be in stable condition prior to discharge from the procedure center with discharge instructions. IMPRESSIONS:  1. lumbar facet arthrogram is abnormal: . .  2. lumbar facet joint injection negative for provocation of the patient's pain symptoms. EBL: no blood loss    SPECIMEN: none    RECOMMENDATIONS:  1. Complete and return Post-Procedure Pain and Activity Diary.   2. Contact the [address] for symptom exacerbation, fever or unusual symptoms. 3. Post-procedure care according to verbal and written discharge instructions  4. Continue with PT as per MD directions. 5. Consider diagnositc MBB if there is > 80% pain relief and improved activity scores. SPINE FLUOROSCOPIC IMAGE INTERPRETATION    EXAMINATION:  AP, Lateral, and Oblique views. FLUORO TIME: 23 seconds    DISCUSSION:  Spot views of the spine reveal normal alignment and segmentation. Spinal needles are positioned in the lumbar facet joint. Contrast outlines the joint space and reveals . Orrie Ortiz Visualized spine reveals disc space . Orrie Ortiz Soft tissues reveal no abnormalities. IMPRESSION: lumbar facet arthrogram shows satisfactory needle placement and contrast dispersal.    Electronically signed by Joanne Doss MD on 2/10/2021 at 2:48 PM.

## 2021-05-06 DIAGNOSIS — M54.50 CHRONIC BILATERAL LOW BACK PAIN WITHOUT SCIATICA: ICD-10-CM

## 2021-05-06 DIAGNOSIS — M79.2 NEUROPATHIC PAIN: ICD-10-CM

## 2021-05-06 DIAGNOSIS — G89.29 CHRONIC BILATERAL LOW BACK PAIN WITHOUT SCIATICA: ICD-10-CM

## 2021-05-07 ENCOUNTER — TELEPHONE (OUTPATIENT)
Dept: PAIN MANAGEMENT | Age: 71
End: 2021-05-07

## 2021-05-07 DIAGNOSIS — M79.2 NEUROPATHIC PAIN: ICD-10-CM

## 2021-05-07 DIAGNOSIS — G89.29 CHRONIC BILATERAL LOW BACK PAIN WITHOUT SCIATICA: ICD-10-CM

## 2021-05-07 DIAGNOSIS — M54.50 CHRONIC BILATERAL LOW BACK PAIN WITHOUT SCIATICA: ICD-10-CM

## 2021-05-07 RX ORDER — PREGABALIN 225 MG/1
225 CAPSULE ORAL 2 TIMES DAILY
Qty: 60 CAPSULE | Refills: 3 | Status: ON HOLD | OUTPATIENT
Start: 2021-05-07 | End: 2021-08-15 | Stop reason: HOSPADM

## 2021-05-07 RX ORDER — PREGABALIN 225 MG/1
CAPSULE ORAL
Qty: 60 CAPSULE | Refills: 3 | Status: SHIPPED | OUTPATIENT
Start: 2021-05-07 | End: 2021-05-07 | Stop reason: SDUPTHER

## 2021-05-07 NOTE — TELEPHONE ENCOUNTER
Pt notified of paper rx ready for . Pt stated he wants it still to be sent electronically when able to be as he is 40 miles away.      Last Appt:  1/13/21 Papi Junes  Next Appt:   Visit date not found  Med verified in Epic

## 2021-05-28 ENCOUNTER — APPOINTMENT (OUTPATIENT)
Dept: GENERAL RADIOLOGY | Age: 71
DRG: 291 | End: 2021-05-28
Attending: INTERNAL MEDICINE
Payer: MEDICARE

## 2021-05-28 ENCOUNTER — HOSPITAL ENCOUNTER (INPATIENT)
Age: 71
LOS: 4 days | Discharge: HOME HEALTH | DRG: 291 | End: 2021-06-01
Attending: INTERNAL MEDICINE | Admitting: INTERNAL MEDICINE
Payer: MEDICARE

## 2021-05-28 DIAGNOSIS — I27.29 PULMONARY HYPERTENSION WITH CHRONIC COR PULMONALE (HCC): ICD-10-CM

## 2021-05-28 DIAGNOSIS — J44.9 STAGE 4 VERY SEVERE COPD BY GOLD CLASSIFICATION (HCC): Primary | ICD-10-CM

## 2021-05-28 DIAGNOSIS — E11.65 UNCONTROLLED TYPE 2 DIABETES MELLITUS WITH HYPERGLYCEMIA (HCC): ICD-10-CM

## 2021-05-28 PROBLEM — I47.20 VENTRICULAR TACHYCARDIA: Status: ACTIVE | Noted: 2021-05-28

## 2021-05-28 LAB
ALBUMIN SERPL-MCNC: 3 G/DL (ref 3.5–5.2)
ALBUMIN/GLOBULIN RATIO: 0.9 (ref 1–2.5)
ALP BLD-CCNC: 178 U/L (ref 40–129)
ALT SERPL-CCNC: 47 U/L (ref 5–41)
ANION GAP SERPL CALCULATED.3IONS-SCNC: 15 MMOL/L (ref 9–17)
AST SERPL-CCNC: 43 U/L
BILIRUB SERPL-MCNC: 1.97 MG/DL (ref 0.3–1.2)
BILIRUBIN DIRECT: 1.51 MG/DL
BILIRUBIN, INDIRECT: 0.46 MG/DL (ref 0–1)
BNP INTERPRETATION: ABNORMAL
BUN BLDV-MCNC: 39 MG/DL (ref 8–23)
BUN/CREAT BLD: ABNORMAL (ref 9–20)
CALCIUM IONIZED: 1.01 MMOL/L (ref 1.13–1.33)
CALCIUM SERPL-MCNC: 8.6 MG/DL (ref 8.6–10.4)
CHLORIDE BLD-SCNC: 76 MMOL/L (ref 98–107)
CO2: 37 MMOL/L (ref 20–31)
CREAT SERPL-MCNC: 0.98 MG/DL (ref 0.7–1.2)
EKG ATRIAL RATE: 227 BPM
EKG Q-T INTERVAL: 352 MS
EKG QRS DURATION: 110 MS
EKG QTC CALCULATION (BAZETT): 415 MS
EKG R AXIS: 110 DEGREES
EKG T AXIS: -20 DEGREES
EKG VENTRICULAR RATE: 84 BPM
GFR AFRICAN AMERICAN: >60 ML/MIN
GFR NON-AFRICAN AMERICAN: >60 ML/MIN
GFR SERPL CREATININE-BSD FRML MDRD: ABNORMAL ML/MIN/{1.73_M2}
GFR SERPL CREATININE-BSD FRML MDRD: ABNORMAL ML/MIN/{1.73_M2}
GLOBULIN: ABNORMAL G/DL (ref 1.5–3.8)
GLUCOSE BLD-MCNC: 231 MG/DL (ref 75–110)
GLUCOSE BLD-MCNC: 236 MG/DL (ref 75–110)
GLUCOSE BLD-MCNC: 276 MG/DL (ref 75–110)
GLUCOSE BLD-MCNC: 281 MG/DL (ref 75–110)
GLUCOSE BLD-MCNC: 320 MG/DL (ref 70–99)
HCT VFR BLD CALC: 39.3 % (ref 40.7–50.3)
HEMOGLOBIN: 13.1 G/DL (ref 13–17)
MAGNESIUM: 2.3 MG/DL (ref 1.6–2.6)
MRSA, DNA, NASAL: NORMAL
PHOSPHORUS: 3.3 MG/DL (ref 2.5–4.5)
POTASSIUM SERPL-SCNC: 2.9 MMOL/L (ref 3.7–5.3)
PRO-BNP: 1945 PG/ML
SODIUM BLD-SCNC: 128 MMOL/L (ref 135–144)
SPECIMEN DESCRIPTION: NORMAL
TOTAL PROTEIN: 6.3 G/DL (ref 6.4–8.3)
TROPONIN INTERP: ABNORMAL
TROPONIN T: ABNORMAL NG/ML
TROPONIN, HIGH SENSITIVITY: 37 NG/L (ref 0–22)
TROPONIN, HIGH SENSITIVITY: 45 NG/L (ref 0–22)
TROPONIN, HIGH SENSITIVITY: 52 NG/L (ref 0–22)
TSH SERPL DL<=0.05 MIU/L-ACNC: 0.53 MIU/L (ref 0.3–5)

## 2021-05-28 PROCEDURE — 6360000002 HC RX W HCPCS: Performed by: STUDENT IN AN ORGANIZED HEALTH CARE EDUCATION/TRAINING PROGRAM

## 2021-05-28 PROCEDURE — 99291 CRITICAL CARE FIRST HOUR: CPT | Performed by: INTERNAL MEDICINE

## 2021-05-28 PROCEDURE — 6370000000 HC RX 637 (ALT 250 FOR IP): Performed by: STUDENT IN AN ORGANIZED HEALTH CARE EDUCATION/TRAINING PROGRAM

## 2021-05-28 PROCEDURE — 82330 ASSAY OF CALCIUM: CPT

## 2021-05-28 PROCEDURE — 2580000003 HC RX 258: Performed by: STUDENT IN AN ORGANIZED HEALTH CARE EDUCATION/TRAINING PROGRAM

## 2021-05-28 PROCEDURE — 2700000000 HC OXYGEN THERAPY PER DAY

## 2021-05-28 PROCEDURE — 82947 ASSAY GLUCOSE BLOOD QUANT: CPT

## 2021-05-28 PROCEDURE — 85014 HEMATOCRIT: CPT

## 2021-05-28 PROCEDURE — 6360000002 HC RX W HCPCS: Performed by: INTERNAL MEDICINE

## 2021-05-28 PROCEDURE — 85018 HEMOGLOBIN: CPT

## 2021-05-28 PROCEDURE — 84443 ASSAY THYROID STIM HORMONE: CPT

## 2021-05-28 PROCEDURE — 93010 ELECTROCARDIOGRAM REPORT: CPT | Performed by: INTERNAL MEDICINE

## 2021-05-28 PROCEDURE — 87641 MR-STAPH DNA AMP PROBE: CPT

## 2021-05-28 PROCEDURE — 2500000003 HC RX 250 WO HCPCS: Performed by: STUDENT IN AN ORGANIZED HEALTH CARE EDUCATION/TRAINING PROGRAM

## 2021-05-28 PROCEDURE — 2000000000 HC ICU R&B

## 2021-05-28 PROCEDURE — 84100 ASSAY OF PHOSPHORUS: CPT

## 2021-05-28 PROCEDURE — 83735 ASSAY OF MAGNESIUM: CPT

## 2021-05-28 PROCEDURE — 36415 COLL VENOUS BLD VENIPUNCTURE: CPT

## 2021-05-28 PROCEDURE — 80048 BASIC METABOLIC PNL TOTAL CA: CPT

## 2021-05-28 PROCEDURE — 94761 N-INVAS EAR/PLS OXIMETRY MLT: CPT

## 2021-05-28 PROCEDURE — 84484 ASSAY OF TROPONIN QUANT: CPT

## 2021-05-28 PROCEDURE — 71045 X-RAY EXAM CHEST 1 VIEW: CPT

## 2021-05-28 PROCEDURE — 94660 CPAP INITIATION&MGMT: CPT

## 2021-05-28 PROCEDURE — 80076 HEPATIC FUNCTION PANEL: CPT

## 2021-05-28 PROCEDURE — 93005 ELECTROCARDIOGRAM TRACING: CPT | Performed by: STUDENT IN AN ORGANIZED HEALTH CARE EDUCATION/TRAINING PROGRAM

## 2021-05-28 PROCEDURE — 94640 AIRWAY INHALATION TREATMENT: CPT

## 2021-05-28 PROCEDURE — 83880 ASSAY OF NATRIURETIC PEPTIDE: CPT

## 2021-05-28 RX ORDER — PANTOPRAZOLE SODIUM 40 MG/1
40 TABLET, DELAYED RELEASE ORAL DAILY
Status: DISCONTINUED | OUTPATIENT
Start: 2021-05-28 | End: 2021-06-01 | Stop reason: HOSPADM

## 2021-05-28 RX ORDER — POLYETHYLENE GLYCOL 3350 17 G/17G
17 POWDER, FOR SOLUTION ORAL DAILY PRN
Status: DISCONTINUED | OUTPATIENT
Start: 2021-05-28 | End: 2021-06-01 | Stop reason: HOSPADM

## 2021-05-28 RX ORDER — DEXTROSE MONOHYDRATE 25 G/50ML
12.5 INJECTION, SOLUTION INTRAVENOUS PRN
Status: DISCONTINUED | OUTPATIENT
Start: 2021-05-28 | End: 2021-06-01 | Stop reason: HOSPADM

## 2021-05-28 RX ORDER — SODIUM CHLORIDE 0.9 % (FLUSH) 0.9 %
5-40 SYRINGE (ML) INJECTION EVERY 12 HOURS SCHEDULED
Status: DISCONTINUED | OUTPATIENT
Start: 2021-05-28 | End: 2021-06-01 | Stop reason: HOSPADM

## 2021-05-28 RX ORDER — METHYLPREDNISOLONE SODIUM SUCCINATE 40 MG/ML
40 INJECTION, POWDER, LYOPHILIZED, FOR SOLUTION INTRAMUSCULAR; INTRAVENOUS EVERY 8 HOURS
Status: DISCONTINUED | OUTPATIENT
Start: 2021-05-28 | End: 2021-05-29

## 2021-05-28 RX ORDER — DILTIAZEM HYDROCHLORIDE 180 MG/1
180 CAPSULE, COATED, EXTENDED RELEASE ORAL DAILY
Status: DISCONTINUED | OUTPATIENT
Start: 2021-05-28 | End: 2021-06-01 | Stop reason: HOSPADM

## 2021-05-28 RX ORDER — MAGNESIUM SULFATE 1 G/100ML
1000 INJECTION INTRAVENOUS PRN
Status: DISCONTINUED | OUTPATIENT
Start: 2021-05-28 | End: 2021-06-01 | Stop reason: HOSPADM

## 2021-05-28 RX ORDER — SPIRONOLACTONE 25 MG/1
25 TABLET ORAL DAILY
Status: DISCONTINUED | OUTPATIENT
Start: 2021-05-28 | End: 2021-06-01 | Stop reason: HOSPADM

## 2021-05-28 RX ORDER — FAMOTIDINE 20 MG/1
20 TABLET, FILM COATED ORAL 2 TIMES DAILY
Status: DISCONTINUED | OUTPATIENT
Start: 2021-05-28 | End: 2021-05-28

## 2021-05-28 RX ORDER — PREGABALIN 75 MG/1
225 CAPSULE ORAL 2 TIMES DAILY
Status: DISCONTINUED | OUTPATIENT
Start: 2021-05-28 | End: 2021-06-01 | Stop reason: HOSPADM

## 2021-05-28 RX ORDER — IPRATROPIUM BROMIDE AND ALBUTEROL SULFATE 2.5; .5 MG/3ML; MG/3ML
1 SOLUTION RESPIRATORY (INHALATION)
Status: DISCONTINUED | OUTPATIENT
Start: 2021-05-28 | End: 2021-06-01 | Stop reason: HOSPADM

## 2021-05-28 RX ORDER — POTASSIUM CHLORIDE 7.45 MG/ML
40 INJECTION INTRAVENOUS ONCE
Status: COMPLETED | OUTPATIENT
Start: 2021-05-28 | End: 2021-05-28

## 2021-05-28 RX ORDER — ACETAMINOPHEN 650 MG/1
650 SUPPOSITORY RECTAL EVERY 6 HOURS PRN
Status: DISCONTINUED | OUTPATIENT
Start: 2021-05-28 | End: 2021-06-01 | Stop reason: HOSPADM

## 2021-05-28 RX ORDER — POTASSIUM CHLORIDE 20 MEQ/1
40 TABLET, EXTENDED RELEASE ORAL PRN
Status: DISCONTINUED | OUTPATIENT
Start: 2021-05-28 | End: 2021-06-01 | Stop reason: HOSPADM

## 2021-05-28 RX ORDER — SODIUM CHLORIDE 0.9 % (FLUSH) 0.9 %
5-40 SYRINGE (ML) INJECTION PRN
Status: DISCONTINUED | OUTPATIENT
Start: 2021-05-28 | End: 2021-06-01 | Stop reason: HOSPADM

## 2021-05-28 RX ORDER — NICOTINE POLACRILEX 4 MG
15 LOZENGE BUCCAL PRN
Status: DISCONTINUED | OUTPATIENT
Start: 2021-05-28 | End: 2021-06-01 | Stop reason: HOSPADM

## 2021-05-28 RX ORDER — ONDANSETRON 2 MG/ML
4 INJECTION INTRAMUSCULAR; INTRAVENOUS EVERY 6 HOURS PRN
Status: DISCONTINUED | OUTPATIENT
Start: 2021-05-28 | End: 2021-06-01 | Stop reason: HOSPADM

## 2021-05-28 RX ORDER — DEXTROSE MONOHYDRATE 50 MG/ML
100 INJECTION, SOLUTION INTRAVENOUS PRN
Status: DISCONTINUED | OUTPATIENT
Start: 2021-05-28 | End: 2021-06-01 | Stop reason: HOSPADM

## 2021-05-28 RX ORDER — BUMETANIDE 1 MG/1
2 TABLET ORAL DAILY
Status: DISCONTINUED | OUTPATIENT
Start: 2021-05-28 | End: 2021-05-28

## 2021-05-28 RX ORDER — FUROSEMIDE 10 MG/ML
40 INJECTION INTRAMUSCULAR; INTRAVENOUS DAILY
Status: DISCONTINUED | OUTPATIENT
Start: 2021-05-28 | End: 2021-05-29

## 2021-05-28 RX ORDER — POTASSIUM CHLORIDE 7.45 MG/ML
10 INJECTION INTRAVENOUS PRN
Status: DISCONTINUED | OUTPATIENT
Start: 2021-05-28 | End: 2021-06-01 | Stop reason: HOSPADM

## 2021-05-28 RX ORDER — METOPROLOL SUCCINATE 25 MG/1
25 TABLET, EXTENDED RELEASE ORAL DAILY
Status: DISCONTINUED | OUTPATIENT
Start: 2021-05-28 | End: 2021-05-29

## 2021-05-28 RX ORDER — FUROSEMIDE 10 MG/ML
40 INJECTION INTRAMUSCULAR; INTRAVENOUS ONCE
Status: COMPLETED | OUTPATIENT
Start: 2021-05-28 | End: 2021-05-28

## 2021-05-28 RX ORDER — MAGNESIUM SULFATE 1 G/100ML
1000 INJECTION INTRAVENOUS ONCE
Status: COMPLETED | OUTPATIENT
Start: 2021-05-28 | End: 2021-05-28

## 2021-05-28 RX ORDER — PROMETHAZINE HYDROCHLORIDE 12.5 MG/1
12.5 TABLET ORAL EVERY 6 HOURS PRN
Status: DISCONTINUED | OUTPATIENT
Start: 2021-05-28 | End: 2021-06-01 | Stop reason: HOSPADM

## 2021-05-28 RX ORDER — CALCIUM GLUCONATE 20 MG/ML
1000 INJECTION, SOLUTION INTRAVENOUS ONCE
Status: COMPLETED | OUTPATIENT
Start: 2021-05-28 | End: 2021-05-28

## 2021-05-28 RX ORDER — BUDESONIDE AND FORMOTEROL FUMARATE DIHYDRATE 160; 4.5 UG/1; UG/1
2 AEROSOL RESPIRATORY (INHALATION) 2 TIMES DAILY
Status: DISCONTINUED | OUTPATIENT
Start: 2021-05-28 | End: 2021-06-01 | Stop reason: HOSPADM

## 2021-05-28 RX ORDER — SODIUM CHLORIDE 9 MG/ML
25 INJECTION, SOLUTION INTRAVENOUS PRN
Status: DISCONTINUED | OUTPATIENT
Start: 2021-05-28 | End: 2021-06-01 | Stop reason: HOSPADM

## 2021-05-28 RX ORDER — ACETAMINOPHEN 325 MG/1
650 TABLET ORAL EVERY 6 HOURS PRN
Status: DISCONTINUED | OUTPATIENT
Start: 2021-05-28 | End: 2021-06-01 | Stop reason: HOSPADM

## 2021-05-28 RX ORDER — SENNA AND DOCUSATE SODIUM 50; 8.6 MG/1; MG/1
1 TABLET, FILM COATED ORAL DAILY
Status: DISCONTINUED | OUTPATIENT
Start: 2021-05-28 | End: 2021-06-01 | Stop reason: HOSPADM

## 2021-05-28 RX ADMIN — PREGABALIN 225 MG: 75 CAPSULE ORAL at 07:51

## 2021-05-28 RX ADMIN — SODIUM CHLORIDE, PRESERVATIVE FREE 10 ML: 5 INJECTION INTRAVENOUS at 21:02

## 2021-05-28 RX ADMIN — DILTIAZEM HYDROCHLORIDE 180 MG: 180 CAPSULE, COATED, EXTENDED RELEASE ORAL at 07:52

## 2021-05-28 RX ADMIN — AZITHROMYCIN MONOHYDRATE 250 MG: 500 INJECTION, POWDER, LYOPHILIZED, FOR SOLUTION INTRAVENOUS at 12:26

## 2021-05-28 RX ADMIN — FUROSEMIDE 40 MG: 10 INJECTION, SOLUTION INTRAMUSCULAR; INTRAVENOUS at 08:11

## 2021-05-28 RX ADMIN — IPRATROPIUM BROMIDE AND ALBUTEROL SULFATE 1 AMPULE: .5; 3 SOLUTION RESPIRATORY (INHALATION) at 15:43

## 2021-05-28 RX ADMIN — POTASSIUM CHLORIDE 40 MEQ: 7.46 INJECTION, SOLUTION INTRAVENOUS at 06:48

## 2021-05-28 RX ADMIN — METHYLPREDNISOLONE SODIUM SUCCINATE 40 MG: 40 INJECTION, POWDER, FOR SOLUTION INTRAMUSCULAR; INTRAVENOUS at 16:17

## 2021-05-28 RX ADMIN — METHYLPREDNISOLONE SODIUM SUCCINATE 40 MG: 40 INJECTION, POWDER, FOR SOLUTION INTRAMUSCULAR; INTRAVENOUS at 09:32

## 2021-05-28 RX ADMIN — MAGNESIUM SULFATE HEPTAHYDRATE 1000 MG: 1 INJECTION, SOLUTION INTRAVENOUS at 11:09

## 2021-05-28 RX ADMIN — PANTOPRAZOLE SODIUM 40 MG: 40 TABLET, DELAYED RELEASE ORAL at 07:51

## 2021-05-28 RX ADMIN — POTASSIUM BICARBONATE 40 MEQ: 782 TABLET, EFFERVESCENT ORAL at 09:33

## 2021-05-28 RX ADMIN — IPRATROPIUM BROMIDE AND ALBUTEROL SULFATE 1 AMPULE: .5; 3 SOLUTION RESPIRATORY (INHALATION) at 19:41

## 2021-05-28 RX ADMIN — INSULIN LISPRO 4 UNITS: 100 INJECTION, SOLUTION INTRAVENOUS; SUBCUTANEOUS at 08:20

## 2021-05-28 RX ADMIN — POTASSIUM BICARBONATE 40 MEQ: 782 TABLET, EFFERVESCENT ORAL at 11:16

## 2021-05-28 RX ADMIN — IPRATROPIUM BROMIDE AND ALBUTEROL SULFATE 1 AMPULE: .5; 3 SOLUTION RESPIRATORY (INHALATION) at 11:27

## 2021-05-28 RX ADMIN — IPRATROPIUM BROMIDE AND ALBUTEROL SULFATE 1 AMPULE: .5; 3 SOLUTION RESPIRATORY (INHALATION) at 08:25

## 2021-05-28 RX ADMIN — APIXABAN 5 MG: 5 TABLET, FILM COATED ORAL at 07:52

## 2021-05-28 RX ADMIN — CALCIUM GLUCONATE 1000 MG: 20 INJECTION, SOLUTION INTRAVENOUS at 09:32

## 2021-05-28 RX ADMIN — BUDESONIDE AND FORMOTEROL FUMARATE DIHYDRATE 2 PUFF: 160; 4.5 AEROSOL RESPIRATORY (INHALATION) at 08:25

## 2021-05-28 RX ADMIN — BUDESONIDE AND FORMOTEROL FUMARATE DIHYDRATE 2 PUFF: 160; 4.5 AEROSOL RESPIRATORY (INHALATION) at 19:41

## 2021-05-28 RX ADMIN — DOCUSATE SODIUM 50MG AND SENNOSIDES 8.6MG 1 TABLET: 8.6; 5 TABLET, FILM COATED ORAL at 21:02

## 2021-05-28 RX ADMIN — CEFTRIAXONE SODIUM 1000 MG: 1 INJECTION, POWDER, FOR SOLUTION INTRAMUSCULAR; INTRAVENOUS at 13:55

## 2021-05-28 RX ADMIN — SPIRONOLACTONE 25 MG: 25 TABLET ORAL at 07:50

## 2021-05-28 RX ADMIN — METOPROLOL SUCCINATE 25 MG: 25 TABLET, FILM COATED, EXTENDED RELEASE ORAL at 07:52

## 2021-05-28 RX ADMIN — SODIUM CHLORIDE, PRESERVATIVE FREE 10 ML: 5 INJECTION INTRAVENOUS at 08:11

## 2021-05-28 RX ADMIN — INSULIN LISPRO 4 UNITS: 100 INJECTION, SOLUTION INTRAVENOUS; SUBCUTANEOUS at 16:14

## 2021-05-28 RX ADMIN — APIXABAN 5 MG: 5 TABLET, FILM COATED ORAL at 21:02

## 2021-05-28 RX ADMIN — AMIODARONE HYDROCHLORIDE 0.5 MG/MIN: 50 INJECTION, SOLUTION INTRAVENOUS at 06:49

## 2021-05-28 RX ADMIN — PREGABALIN 225 MG: 75 CAPSULE ORAL at 21:02

## 2021-05-28 RX ADMIN — INSULIN LISPRO 3 UNITS: 100 INJECTION, SOLUTION INTRAVENOUS; SUBCUTANEOUS at 21:14

## 2021-05-28 RX ADMIN — INSULIN LISPRO 6 UNITS: 100 INJECTION, SOLUTION INTRAVENOUS; SUBCUTANEOUS at 12:19

## 2021-05-28 RX ADMIN — FUROSEMIDE 40 MG: 10 INJECTION, SOLUTION INTRAMUSCULAR; INTRAVENOUS at 15:57

## 2021-05-28 ASSESSMENT — PAIN SCALES - GENERAL
PAINLEVEL_OUTOF10: 3
PAINLEVEL_OUTOF10: 0

## 2021-05-28 ASSESSMENT — PAIN DESCRIPTION - DESCRIPTORS: DESCRIPTORS: ACHING

## 2021-05-28 ASSESSMENT — ENCOUNTER SYMPTOMS
COUGH: 1
PHOTOPHOBIA: 0
SHORTNESS OF BREATH: 1
BACK PAIN: 0
NAUSEA: 0
CONSTIPATION: 0
DIARRHEA: 0
VOMITING: 0
WHEEZING: 1
ABDOMINAL PAIN: 0

## 2021-05-28 ASSESSMENT — PAIN DESCRIPTION - ORIENTATION: ORIENTATION: LOWER

## 2021-05-28 ASSESSMENT — PULMONARY FUNCTION TESTS
PIF_VALUE: 16
PIF_VALUE: 14
PIF_VALUE: 16

## 2021-05-28 ASSESSMENT — PAIN DESCRIPTION - FREQUENCY: FREQUENCY: CONTINUOUS

## 2021-05-28 ASSESSMENT — PAIN DESCRIPTION - LOCATION: LOCATION: BACK

## 2021-05-28 NOTE — PROGRESS NOTES
CPAP Machine MISC by Does not apply route BiPAP    Historical Provider, MD   budesonide-formoterol (SYMBICORT) 160-4.5 MCG/ACT AERO Inhale 2 puffs into the lungs 2 times daily    Historical Provider, MD   bumetanide (BUMEX) 2 MG tablet Take 2 mg by mouth daily    Historical Provider, MD   magnesium hydroxide (MILK OF MAGNESIA) 400 MG/5ML suspension Take by mouth daily as needed for Constipation    Historical Provider, MD   ibuprofen (IBU) 400 MG tablet Take 400 mg by mouth every 6 hours as needed for Pain    Historical Provider, MD   metoprolol succinate (TOPROL XL) 25 MG extended release tablet Take 25 mg by mouth daily    Historical Provider, MD   Cyanocobalamin (VITAMIN B 12) 500 MCG TABS Take by mouth    Historical Provider, MD   pantoprazole (PROTONIX) 40 MG tablet Take 40 mg by mouth daily    Historical Provider, MD   .  Assessment   See previous assessment note    RR 22  Breath Sounds: Diminished/crackles    · Bronchodilator assessment at level  3  · [x]    Bronchodilator Assessment  BRONCHODILATOR ASSESSMENT SCORE  Score 0 1 2 3 4 5   Breath Sounds   []  Patient Baseline []  No Wheeze good aeration []  Faint, scattered wheezing, good aeration [x]  Expiratory Wheezing and or moderately diminished []  Insp/Exp wheeze and/or very diminished []  Insp/Exp and/ or marked distress   Respiratory Rate   []  Patient Baseline []  Less than 20 []  Less than 20 [x]  20-25 []  Greater than 25 []  Greater than 25   Peak flow % of Pred or PB [x]  NA   []  Greater than 90%  []  81-90% []  71-80% []  Less than or equal to 70%  or unable to perform []  Unable due to Respiratory Distress   Dyspnea re []  Patient Baseline []  No SOB []  No SOB [x]  SOB on exertion []  SOB min activity []  At rest/acute   e FEV% Predicted       [x]  NA []  Above 69%  []  Unable []  Above 60-69%  []  Unable []  Above 50-59%  []  Unable []  Above 35-49%  []  Unable []  Less than 35%  []  Unable           []  Hyperinflation Assessment  Score 1 2 3 CXR and Breath Sounds   []  Clear []  No atelectasis  Basilar aeration []  Atelectasis or absent basilar breath sounds   Incentive Spirometry Volume  (Per IBW)   []  Greater than or equal to 15ml/Kg []  less than 15ml/Kg []  less than 15ml/Kg   Surgery within last 2 weeks []  None or general   []  Abdominal or thoracic surgery  []  Abdominal or thoracic   Chronic Pulmonary Historyre []  No []  Yes []  Yes     []  Secretion Management Assessment  Score 1 2 3   Bilateral Breath Sounds   []  Occasional Rhonchi []  Scattered Rhonchi []  Course Rhonchi and/or poor aeration   Sputum    []  Small amount of thin secretions []  Moderate amount of viscous secretions []  Copius, Viscious Yellow/ Secretions   CXR as reported by physician []  clear  []  Unavailable []  Infiltrates and/or consolidation  []  Unavailable []  Mucus Plugging and or lobar consolidation  []  Unavailable   Cough []  Strong, productive cough []  Weak productive cough []  No cough or weak non-productive cough   Arno Press, RCP  5:38 AM                            FEMALE                                  MALE                            FEV1 Predicted Normal Values                        FEV1 Predicted Normal Values          Age                                     Height in Feet and Inches       Age                                     Height in Feet and Inches       4' 11\" 5' 1\" 5' 3\" 5' 5\" 5' 7\" 5' 9\" 5' 11\" 6' 1\"  4' 11\" 5' 1\" 5' 3\" 5' 5\" 5' 7\" 5' 9\" 5' 11\" 6' 1\"   42 - 45 2.49 2.66 2.84 3.03 3.22 3.42 3.62 3.83 42 - 45 2.82 3.03 3.26 3.49 3.72 3.96 4.22 4.47   46 - 49 2.40 2.57 2.76 2.94 3.14 3.33 3.54 3.75 46 - 49 2.70 2.92 3.14 3.37 3.61 3.85 4.10 4.36   50 - 53 2.31 2.48 2.66 2.85 3.04 3.24 3.45 3.66 50 - 53 2.58 2.80 3.02 3.25 3.49 3.73 3.98 4.24   54 - 57 2.21 2.38 2.57 2.75 2.95 3.14 3.35 3.56 54 - 57 2.46 2.67 2.89 3.12 3.36 3.60 3.85 4.11   58 - 61 2.10 2.28 2.46 2.65 2.84 3.04 3.24 3.45 58 - 61 2.32 2.54 2.76 2.99 3.23 3.47 3.72 3.98   62 - 65 1. 99 2.17 2.35 2.54 2.73 2.93 3.13 3.34 62 - 65 2.19 2.40 2.62 2.85 3.09 3.33 3.58 3.84   66 - 69 1.88 2.05 2.23 2.42 2.61 2.81 3.02 3.23 66 - 69 2.04 2.26 2.48 2.71 2.95 3.19 3.44 3.70   70+ 1.82 1.99 2.17 2.36 2.55 2.75 2.95 3.16 70+ 1.97 2.19 2.41 2.64 2.87 3.12 3.37 3.62       Predicted Peak Expiratory Flow Rate                                       Height (in)  Female       Height (in) Male           Age 64 62 64 63 57 71 78 74 Age            21 344 357 372 387 402 417 432 446  60 62 64 66 68 70 72 74 76   25 337 352 366 381 396 411 426 441 25 447 476 505 533 562 591 619 648 677   30 329 344 359 374 389 404 419 434 30 437 466 494 523 552 580 609 638 667   35 322 337 351 366 381 396 411 426 35 426 455 484 512 541 570 598 627 657   40 314 329 344 359 374 389 404 419 40 416 445 473 502 531 559 588 617 647   45 307 322 336 351 366 381 396 411 45 405 434 463 491 520 549 577 606 636   50 299 314 329 344 359 374 389 404 50 395 424 452 481 510 538 567 596 625   55 292 307 321 336 351 366 381 396 55 384 413 442 470 499 528 556 585 615   60 284 299 314 329 344 359 374 389 60 374 403 431 460 489 517 546 575 605   65 277 292 306 321 336 351 366 381 65 363 392 421 449 478 507 535 564 594   70 269 284 299 314 329 344 359 374 70 353 382 410 439 468 496 525 554 583   75 261 274 289 305 319 334 348 364 75 344 372 400 429 458 487 515 544 573   80 253 266 282 296 312 327 342 356 80 335 362 390 419 448 476 [phone]

## 2021-05-28 NOTE — PROGRESS NOTES
Patient arrived to unit accompanied by [employer], connected to cardiac monitor placed on 4L nasal cannula all VSS.

## 2021-05-28 NOTE — H&P
Critical Care - History and Physical Examination    Patient's name:  Jazlyn Fortune  Medical Record Number: [id number]  Patient's account/billing number: [de-identified]  Patient's YOB: 1950  Age: 66 y.o. Date of Admission: 5/28/2021  4:09 AM  Reason of ICU admission: Vtach, resp distress  Date of History and Physical Examination: 5/28/2021    Primary Care Physician: Aline Centeno  Attending Physician: Dr. Brandon Kuhn    Code Status: Full Code    Chief complaint: No chief complaint on file. Reason for ICU admission: Vtach, resp distress    History Of Present Illness:   History was obtained from chart review and the patient. Jazlyn Fortune is a 66 y.o.  male with a PMHx that includes Afib (on eliquis), CAD (s/p CABG), copd (on home O2), HTN, high cholesterol, bladder CA, presenting to the ED as a transfer from [facility] ER for episode of Vtach and respiratory distress. Admitted a week ago to hospital for dyspnea, treated with steroids, bronchodilator and diuretic. Worsening dyspnea for the past 2 days. No improvement with breathing treatments. Increased O2 requirements. No associated chest pain or palpitations. Significant pedal edema despite reported compliance with diuretic (bumex). Was also taking prednisone taper at home. At [facility] ED pt found to have leukocytosis 26k. Aflutter with RVR. Aerosol treatment administered and supported with supplemental O2. Mild LAKESHA. , CXR concerning for LLL pneumonia and patient started on ceftriaxone and azithromycin. Blood cultures drawn. Did have a 43second run of Vtach and was subsequently given mag, amiodarone bolus and started on amiodarone gtt. EKG showing no acute ST changes. Upon arrival, pateint complaining of mild shortness of breath. Demonstrating conversational dyspnea. O2 sats WNL with supplemental O2 via NC. No other complaints or concerns.      Past Medical History:        Diagnosis Date    Agent orange exposure     Anxiety state     Cancer [facility])     bladder    [facility]congestive [facility] failure) [facility]     Chronic obstructive pulmonary disease (COPD) (HCC)     Chronic post-traumatic stress disorder     Chronic respiratory failure with hypercapnia (HCC)     COPD exacerbation ([facility])     Coronary artery disease     Degenerative disc disease, lumbar     Depression     Essential hypertension     History of acute pancreatitis     Hyperglycemia     Hypokalemia     Lumbosacral disc disease     Obesity     Obstructive sleep apnea     Personal history of malignant neoplasm of bladder        Past Surgical History:        Procedure Laterality Date    BLADDER SURGERY      OTHER SURGICAL HISTORY      heart bypass with stents    OTHER SURGICAL HISTORY Bilateral 02/10/2021    Bilateral L4-5, L5-S1 Facet Block Injection [facility] PAIN MANAGEMENT PROCEDURE Bilateral 02/10/2021    Facet block injection L4-5, L5-S1, bilateral       Allergies: Allergies   Allergen Reactions    Niacin And Related     Other      Nicotine patch - rash    Statins          Home Medications:   Prior to Admission medications    Medication Sig Start Date End Date Taking? Authorizing Provider   pregabalin (LYRICA) 225 MG capsule Take 1 capsule by mouth 2 times daily for 30 days.  5/7/21 6/6/21  Marcos Morales MD   celecoxib (CELEBREX) 100 MG capsule Take 1 capsule by mouth 2 times daily 1/14/21   ALISON Gamino - CNP   dilTIAZem [facility]) 180 MG extended release capsule Take 180 mg by mouth daily 11/5/20   Historical Provider, MD   magnesium oxide (MAG-OX) 400 MG tablet Take 400 mg by mouth daily    Historical Provider, MD   NONFORMULARY Take 1 tablet by mouth daily ON COR vitamin - to help keep bladder cancer away    Historical Provider, MD   acetaminophen (TYLENOL) 325 MG tablet Take 650 mg by mouth every 6 hours as needed for Pain    Historical Provider, MD   spironolactone (ALDACTONE) 25 MG tablet Take 25 mg by mouth reports no history of drug use. OCCUPATION:      Family History:       Problem Relation Age of Onset    Emphysema Mother     Heart Disease Father     Heart Failure Father        REVIEW OF SYSTEMS (ROS):  Review of Systems   Constitutional: Negative for chills and fever. Eyes: Negative for photophobia. Respiratory: Positive for cough, shortness of breath and wheezing. Cardiovascular: Positive for leg swelling. Negative for chest pain and palpitations. Gastrointestinal: Negative for abdominal pain, constipation, diarrhea, nausea and vomiting. Genitourinary: Negative for dysuria and hematuria. Musculoskeletal: Negative for back pain and myalgias. Skin: Negative for rash. Neurological: Negative for dizziness, weakness, numbness and headaches. Physical Exam:    Vitals: BP (!) 108/59   Pulse 78   Temp 98.6 °F (37 °C) (Oral)   Resp 21   Ht 5' 10\" (1.778 m)   Wt 209 lb 7 oz (95 kg)   SpO2 94%   BMI 30.05 kg/m²     Body weight:   Wt Readings from Last 3 Encounters:   05/28/21 209 lb 7 oz (95 kg)   12/02/20 213 lb (96.6 kg)   11/04/20 213 lb (96.6 kg)       Body Mass Index : Body mass index is 30.05 kg/m².       PHYSICAL EXAMINATION :  Constitutional: Obese, WDWN, NAD  EENT: PERRLA, EOMI, sclera clear, anicteric, moist mucous membranes, oropharynx clear, no lesions  Neck: Supple, symmetrical, trachea midline  Respiratory: diffuse wheezing bilaterally (mild-moderate), crackles at the bases, conversational dyspnea, supplemental O2 via NC (3-4L0  Cardiovascular: irregularly irregular, no murmur noted, and 2+ distal pulses in all 4 extremities   Abdomen: soft, nontender, nondistended, no masses or organomegaly  Neurological: Aox3, no focal deficits, moves all 4 extremities spontaneously and purposefully   Extremities:  3+ pitting edema in LEs bilaterally, no calf tenderness, peripheral pulses normal, no clubbing or cyanosis    Laboratory findings:-  CBC: No results for input(s): WBC, HGB, PLT in the last 72 hours. BMP:    Recent Labs     05/28/21  0508   *   K 2.9*   CL 76*   CO2 37*   BUN 39*   CREATININE 0.98   GLUCOSE 320*     S. Calcium:  Recent Labs     05/28/21  0508   CALCIUM 8.6     S. Ionized Calcium:No results for input(s): IONCA in the last 72 hours. S. Magnesium:  Recent Labs     05/28/21  0508   MG 2.3     S. Phosphorus:  Recent Labs     05/28/21  0508   PHOS 3.3     S. Glucose:No results for input(s): POCGLU in the last 72 hours. Glycosylated hemoglobin A1C: No results for input(s): LABA1C in the last 72 hours. INR: No results for input(s): INR in the last 72 hours. Hepatic functions:   Recent Labs     05/28/21  0508   ALKPHOS 178*   ALT 47*   AST 43*   PROT 6.3*   BILITOT 1.97*   BILIDIR 1.51*   LABALBU 3.0*     Pancreatic functions:No results for input(s): LACTA, AMYLASE in the last 72 hours. S. Lactic Acid: No results for input(s): LACTA in the last 72 hours. Cardiac enzymes:No results for input(s): CKTOTAL, CKMB, CKMBINDEX, TROPONINI in the last 72 hours. BNP:No results for input(s): BNP in the last 72 hours. Lipid profile: No results for input(s): CHOL, TRIG, HDL, LDLCALC in the last 72 hours.     Invalid input(s): LDL  Blood Gases: No results found for: PH, PCO2, PO2, HCO3, O2SAT  Thyroid functions: No results found for: TSH     Microbiology:  Cultures during this admission:   Blood cultures:                 [] None drawn      [] Negative             []  Positive (Details:  )  Urine Culture:                   [] None drawn      [] Negative             []  Positive (Details:  )  Sputum Culture:               [] None drawn       [] Negative             []  Positive (Details:  )   Endotracheal aspirate:     [] None drawn       [] Negative             []  Positive (Details:  )   -----------------------------------------------------------------  Radiological reports:    CXR:  XR CHEST PORTABLE    (Results Pending)      Electrocardiogram: EKG: Atrial flutter with variable block    Last Echocardiogram findings: EF 55-60%    Assessment and Plan     Patient Active Problem List   Diagnosis    Neuropathic pain    Chronic bilateral low back pain without sciatica    Lumbosacral spondylosis without myelopathy    DDD (degenerative disc disease), lumbar    Lumbar facet joint syndrome    Panniculitis involving lumbar region    Ventricular tachycardia ([facility] [address]       Additional assessment:  Claudia Norton is a 66 y.o. male who intially presented on 2021 for No chief complaint on file.       PLAN/MEDICAL DECISION MAKING:  Neurologic:   · Neuro checks per protocol  · Sedation: none  · Pain control: tylenol PRN  Cardiovascular:  · HR:70-90  · MAP: 70-80  · MAP goal >65  · Hx of CAD s/p CABG  · Continue ASA  · Trop 52, could be elevated due to RVR or Vtach, will continue to trend  · BNP 1945  · Hx of Afib/Aflutter  · Continue elequis  · Ventricular Tachycardia  · Given amiodarone bolus and started on amio gtt  · Cardiology consult  · EKG and Echo findings as mentioned above  Pulmonary:  · Maintain oxygen sats >92%  · Pulmonary toilet  · BiPAP PRN  · Hx of COPD  · PRN dueoneb  · Solumedrol 40mg q8  · Most recent CXR as mentioned above  · LLL pneumonia  · Started on Rocephin and azithromycin at outlying facility, will discuss covering for MRSA and pseudomonas given pt's recent admission  GI/Nutrition  · Glycolax PRN  · Ulcer Prophylaxis: PPI  · Diet:DIET CLEAR LIQUID;   · Advance diet as toleated; ok to eat when not on BiPAP  Renal/Fluid/Electrolyte  · IV Fluids: Hold IV fluids  · I/O: In: 220 [P.O.:220]  · Out: 1250 [Urine:1250]  · On Bumex 2mg at home; will start 40mg lasix and increase if diuresing well  · BUN/Cr: 39/0.89  · Hyponatremia 128, will monitor  · Hypokalemia, 2.9 > 40mEq PO now and 40mEq in 3h  · Monitor electrolytes, replace PRN   ID  · Leukocytosis at outlying facility, could be due to pneumonia but likely due to recent steroid use  · Tmax: Temp (24hrs), Av.6 °F (37 °C), Min:98.6 °F (37 °C), Max:98.6 °F (37 °C)  · LLL pneumonia  · On azithromycin and rocephin, consider HAP coverage  · Blood cultures from outlying facility pending  Hematology:  · CBC pending  Endocrine:   glucose controlled - most recent BGL is   Recent Labs     05/28/21  0508   GLUCOSE 320*   · elevated glucose, started MISS  DVT Prophylaxis  · Continue eliquis  Discharge Needs:  PT and OT      CODE STATUS: Full Code    DISPOSITION:  [x] To remain ICU   [] OK for out of ICU from [address] [place] [address]  Emergency Medicine Resident, PGY2  Critical Care Service   05/28/21 7:47 AM

## 2021-05-28 NOTE — PROGRESS NOTES
Respiratory eval completed, patient admits to a history of COPD, he admits to taking medications at home for this. Patient does wear a home CPAP machine. Patient does wear oxygen at home (used to be 2-2.5L recently increased to 3L). Breath sounds are diminished with crackles in the bases, patient appears to need the RT aerosol protocol.

## 2021-05-28 NOTE — CONSULTS
[facility]    Consult              Today's Date: 5/28/2021  Patient Name: Joseph Muñoz  Date of admission: 5/28/2021  4:09 AM  Patient's age: 66 y. o., 1950  Admission Dx: Ventricular tachycardia ([address] [I47.2]    Reason for Consult:  ?VT    Requesting Physician: Lala Pena MD    CHIEF COMPLAINT:  Shortness of breath      History Obtained From:  patient, electronic medical record    HISTORY OF PRESENT ILLNESS:    Joseph Muñoz 66 y.o. male presented to [facility] with progressive shortness of breath. He complains of ongoing progressive shortness of breath for few days. He was treated with steroids bronchodilators and diuretics with out significant improvement. He was found to have Atrial flutter at presentation and CXR concerning for LLL pneumonia. He has h/o CAD - CABGx3 2005, paroxysmal Afib and persistent Flutter based on documentation from cardiology note 4/2021 - follows with Dr. Mali Hoskins. He is on cardizem 180 and eliquis for anticoagulation. Past Medical History:   has a past medical history of Agent orange exposure, Anxiety state, Cancer ([address] CHF (congestive heart failure) ([address] Chronic obstructive pulmonary disease (COPD) ([address] Chronic post-traumatic stress disorder, Chronic respiratory failure with hypercapnia ([address] COPD exacerbation ([address] Coronary artery disease, Degenerative disc disease, lumbar, Depression, Essential hypertension, History of acute pancreatitis, Hyperglycemia, Hypokalemia, Lumbosacral disc disease, Obesity, Obstructive sleep apnea, and Personal history of malignant neoplasm of bladder. Past Surgical History:   has a past surgical history that includes Bladder surgery; other surgical history; other surgical history (Bilateral, 02/10/2021); and Pain management procedure (Bilateral, 02/10/2021). Home Medications:    Prior to Admission medications    Medication Sig Start Date End Date Taking?  Authorizing Provider   pregabalin (LYRICA) 225 MG capsule Take 1 capsule by mouth 2 times daily for 30 days. 5/7/21 6/6/21  Sina Klein MD   celecoxib (CELEBREX) 100 MG capsule Take 1 capsule by mouth 2 times daily 1/14/21   ALISON Carrasco - CNP   dilTIAZem [facility]) 180 MG extended release capsule Take 180 mg by mouth daily 11/5/20   Historical Provider, MD   magnesium oxide (MAG-OX) 400 MG tablet Take 400 mg by mouth daily    Historical Provider, MD   NONFORMULARY Take 1 tablet by mouth daily ON COR vitamin - to help keep bladder cancer away    Historical Provider, MD   acetaminophen (TYLENOL) 325 MG tablet Take 650 mg by mouth every 6 hours as needed for Pain    Historical Provider, MD   spironolactone (ALDACTONE) 25 MG tablet Take 25 mg by mouth daily    Historical Provider, MD   albuterol sulfate  (90 Base) MCG/ACT inhaler Inhale 2 puffs into the lungs every 6 hours as needed    Historical Provider, MD   lidocaine (XYLOCAINE) 5 % ointment Apply topically as needed.  10/21/20   ALISON Carrasco CNP   sennosides-docusate sodium (SENOKOT-S) 8.6-50 MG tablet Take 1 tablet by mouth daily    Historical Provider, MD   Multiple Vitamins-Minerals (ONCOVITE) TABS Take by mouth    Historical Provider, MD   loratadine (CLARITIN) 10 MG tablet Take 10 mg by mouth daily    Historical Provider, MD   tiotropium (SPIRIVA HANDIHALER) 18 MCG inhalation capsule Inhale 18 mcg into the lungs daily    Historical Provider, MD   apixaban (ELIQUIS) 5 MG TABS tablet Take by mouth 2 times daily    Historical Provider, MD   potassium chloride (KLOR-CON M) 10 MEQ extended release tablet Take 10 mEq by mouth 2 times daily    Historical Provider, MD   CPAP Machine MISC by Does not apply route BiPAP    Historical Provider, MD   budesonide-formoterol (SYMBICORT) 160-4.5 MCG/ACT AERO Inhale 2 puffs into the lungs 2 times daily    Historical Provider, MD   bumetanide (BUMEX) 2 MG tablet Take 2 mg by mouth daily    Historical Provider, MD   magnesium hydroxide (MILK OF MAGNESIA) 400 MG/5ML suspension Take by mouth daily as needed for Constipation    Historical Provider, MD   ibuprofen (IBU) 400 MG tablet Take 400 mg by mouth every 6 hours as needed for Pain    Historical Provider, MD   metoprolol succinate (TOPROL XL) 25 MG extended release tablet Take 25 mg by mouth daily    Historical Provider, MD   Cyanocobalamin (VITAMIN B 12) 500 MCG TABS Take by mouth    Historical Provider, MD   pantoprazole (PROTONIX) 40 MG tablet Take 40 mg by mouth daily    Historical Provider, MD      Current Facility-Administered Medications: sodium chloride flush 0.9 % injection 5-40 mL, 5-40 mL, Intravenous, 2 times per day  sodium chloride flush 0.9 % injection 5-40 mL, 5-40 mL, Intravenous, PRN  0.9 % sodium chloride infusion, 25 mL, Intravenous, PRN  promethazine (PHENERGAN) tablet 12.5 mg, 12.5 mg, Oral, Q6H PRN **OR** ondansetron (ZOFRAN) injection 4 mg, 4 mg, Intravenous, Q6H PRN  polyethylene glycol (GLYCOLAX) packet 17 g, 17 g, Oral, Daily PRN  acetaminophen (TYLENOL) tablet 650 mg, 650 mg, Oral, Q6H PRN **OR** acetaminophen (TYLENOL) suppository 650 mg, 650 mg, Rectal, Q6H PRN  ipratropium-albuterol (DUONEB) nebulizer solution 1 ampule, 1 ampule, Inhalation, Q4H WA  apixaban (ELIQUIS) tablet 5 mg, 5 mg, Oral, BID  budesonide-formoterol (SYMBICORT) 160-4.5 MCG/ACT inhaler 2 puff, 2 puff, Inhalation, BID  dilTIAZem (CARDIZEM CD) extended release capsule 180 mg, 180 mg, Oral, Daily  metoprolol succinate (TOPROL XL) extended release tablet 25 mg, 25 mg, Oral, Daily  pantoprazole (PROTONIX) tablet 40 mg, 40 mg, Oral, Daily  pregabalin (LYRICA) capsule 225 mg, 225 mg, Oral, BID  spironolactone (ALDACTONE) tablet 25 mg, 25 mg, Oral, Daily  amiodarone (CORDARONE) 450 mg in dextrose 5 % 250 mL infusion, 0.5 mg/min, Intravenous, Continuous  potassium bicarb-citric acid (EFFER-K) effervescent tablet 40 mEq, 40 mEq, Oral, Daily  insulin lispro (HUMALOG) injection vial 0-12 Units, 0-12 Units, Subcutaneous, TID WC  insulin lispro (HUMALOG) injection vial 0-6 Units, 0-6 Units, Subcutaneous, Nightly  glucose (GLUTOSE) 40 % oral gel 15 g, 15 g, Oral, PRN  dextrose 50 % IV solution, 12.5 g, Intravenous, PRN  glucagon (rDNA) injection 1 mg, 1 mg, Intramuscular, PRN  dextrose 5 % solution, 100 mL/hr, Intravenous, PRN  methylPREDNISolone sodium (SOLU-MEDROL) injection 40 mg, 40 mg, Intravenous, Q8H  cefTRIAXone (ROCEPHIN) 1000 mg IVPB in 50 mL D5W minibag, 1,000 mg, Intravenous, Q24H    Allergies:  Niacin and related, Other, and Statins    Social History:   reports that he has been smoking cigarettes. He has a 54.00 pack-year smoking history. He has never used smokeless tobacco. He reports current alcohol use. He reports that he does not use drugs. Family History: family history includes Emphysema in his mother; Heart Disease in his father; Heart Failure in his father. No h/o sudden cardiac death. No for premature CAD    REVIEW OF SYSTEMS:    · Constitutional: there has been no unanticipated weight loss. There's been No change in energy level, No change in activity level. · Eyes: No visual changes or diplopia. No scleral icterus. · ENT: No Headaches  · Cardiovascular: + dyspnea on exertion, LE edema  · Respiratory: + shortness of breath and cough   · Gastrointestinal: No abdominal pain. No change in bowel or bladder habits. · Genitourinary: No dysuria, trouble voiding, or hematuria. · Musculoskeletal:  No gait disturbance, No weakness or joint complaints. · Integumentary: No rash or pruritis. · Neurological: No headache, diplopia, change in muscle strength, numbness or tingling. No change in gait, balance, coordination, mood, affect, memory, mentation, behavior. · Psychiatric: No anxiety, or depression. · Endocrine: No temperature intolerance. No excessive thirst, fluid intake, or urination. No tremor.   · Hematologic/Lymphatic: No abnormal bruising or bleeding, blood clots or swollen lymph nodes.  · Allergic/Immunologic: No nasal congestion or hives. PHYSICAL EXAM:      BP (!) 108/59   Pulse 78   Temp 98.6 °F (37 °C) (Oral)   Resp 18   Ht 5' 10\" (1.778 m)   Wt 209 lb 7 oz (95 kg)   SpO2 99%   BMI 30.05 kg/m²    Constitutional and General Appearance: moderate distress  HEENT:  no cervical lymphadenopathy. No masses palpable. Normal oral mucosa  Respiratory:  · Tachypnea, b/l crackles   Cardiovascular:  · Heart tones are crisp and normal. regular S1 and S2.+ JVD  · The carotid upstroke is normal in amplitude and contour without delay or bruit  Abdomen:   · No masses or tenderness  · Bowel sounds present  Extremities:  · + LE edema - knees  ·     DATA:    Diagnostics:      EKG:   Typical Atrial flutter with variable conduction, incomplete RBBB    ECHO:   5/17/2021   Left Ventricle : Left ventricular systolic function is grossly        normal. No regional wall motion abnormalities noted. LVEF is 55-60%.      Transmitral Doppler flow pattern suggests grade II diastolic      dysfunction      Mitral Valve : Trace to mild mitral regurgitation.     LA- mild dilated     Holter 5/2021  Patient remained in atrial fibrillation throughout the recording with   average heart rate of 75 beats per minute suggestive adequate rate control   in atrial flutter.  Minimum heart was 40 beats per minute and maximum   heart of 128 beats per minute.       There were 117 (0.1%) wide complex beats which is 2 triplets and 10   couplets. Longest RR interval was 2.3 seconds. Patient reported 2 diary entries with symptoms of shortness of breath at   the time patient was in atrial flutter with a heart rate of 68 and 76   respectively. Labs:     CBC:   Recent Labs     05/28/21  0508   HGB 13.1   HCT 39.3*     BMP:   Recent Labs     05/28/21  0508   *   K 2.9*   CO2 37*   BUN 39*   CREATININE 0.98   LABGLOM >60   GLUCOSE 320*     BNP: No results for input(s): BNP in the last 72 hours.   PT/INR: No results for input(s): PROTIME, INR in the last 72 hours. APTT:No results for input(s): APTT in the last 72 hours. CARDIAC ENZYMES:No results for input(s): CKTOTAL, CKMB, CKMBINDEX, TROPONINI in the last 72 hours. FASTING LIPID PANEL:No results found for: HDL, LDLDIRECT, LDLCALC, TRIG  LIVER PROFILE:  Recent Labs     05/28/21  0508   AST 43*   ALT 47*   LABALBU 3.0*           Patient Active Problem List   Diagnosis    Neuropathic pain    Chronic bilateral low back pain without sciatica    Lumbosacral spondylosis without myelopathy    DDD (degenerative disc disease), lumbar    Lumbar facet joint syndrome    Panniculitis involving lumbar region    Ventricular tachycardia (HCC)     IMPRESSION:    1. Persistent Atrial Flutter  2. Paroxysmal Afib   3. ? Episode of VT - rhythm strips reviewed from [facility] consistent with Atrial flutter with aberrant conduction with underlying known incomplete RBBB  4. Recent Holter showed persistent Afib with rate controlled 5/2021  5. Preserved LV systolic function 1/2359  6. CAD - h/o CABG 2005  7. Acute on chronic Diastolic Heart failure  8. Mild Trop elevation - likely from demand ischemia in setting of CHF and pnuemonia  9. COPD  10. Possible LLL pneumonia  11. HTN  12. Hypo K    RECOMMENDATIONS:  1. Recommend stop amiodarone  2. Restart Cardizem 180 mg daily  3. Replete electrolytes   4. Continue IV diuresis with lasix, transition to home bumex once acute CHF improves  5. Management of pneumonia per ICU team  6. Restart eliquis for anticoagulation     Will discuss with rounding attending Dr. Carie Askew for final recommendations. Crystal Marvin MD  Cardiology Fellow      Attending Physician Statement  I have discussed the care of Heath Sanchez, including pertinent history and exam findings,  with the cardiology fellow/resident.  I have seen and examined the patient and the key elements of all parts of the encounter have been performed by me    Acute on chronic hypoxic

## 2021-05-28 NOTE — PROGRESS NOTES
Dr. Delilah Anderson at bedside to assess. Updated patient has bilateral wheezes and crackles in the bases, SOB on 4L nasal cannula. Orders received for bi-pap.

## 2021-05-28 NOTE — PLAN OF CARE
BRONCHOSPASM/BRONCHOCONSTRICTION     [x]         IMPROVE AERATION/BREATH SOUNDS  [x]   ADMINISTER BRONCHODILATOR THERAPY AS APPROPRIATE  [x]   ASSESS BREATH SOUNDS  [x]   IMPLEMENT AEROSOL/MDI PROTOCOL  [x]   PATIENT EDUCATION AS NEEDED   NONINVASIVE VENTILATION    PROVIDE OPTIMAL VENTILATION/ACCEPTABLE SPO2   IMPLEMENT NONINVASIVE VENTILATION PROTOCOL   MAINTAIN ACCEPTABLE SPO2   ASSESS SKIN INTEGRITY/BREAKDOWN SCORE   PATIENT EDUCATION AS NEEDED   BIPAP AS NEEDED       PROVIDE ADEQUATE OXYGENATION WITH ACCEPTABLE SP02/ABG'S    [x]  IDENTIFY APPROPRIATE OXYGEN THERAPY  [x]   MONITOR SP02/ABG'S AS NEEDED   [x]   PATIENT EDUCATION AS NEEDED

## 2021-05-28 NOTE — PROGRESS NOTES
Updated Dr. Laith Jha on mag of 1.4 and low urine outputs. No new orders at this time, physician states will replace mag on day shift.

## 2021-05-29 LAB
-: NORMAL
ABSOLUTE EOS #: 0 K/UL (ref 0–0.4)
ABSOLUTE EOS #: 0 K/UL (ref 0–0.4)
ABSOLUTE IMMATURE GRANULOCYTE: 0 K/UL (ref 0–0.3)
ABSOLUTE IMMATURE GRANULOCYTE: 0.11 K/UL (ref 0–0.3)
ABSOLUTE LYMPH #: 0.1 K/UL (ref 1–4.8)
ABSOLUTE LYMPH #: 0.54 K/UL (ref 1–4.8)
ABSOLUTE MONO #: 0.52 K/UL (ref 0.1–0.8)
ABSOLUTE MONO #: 0.54 K/UL (ref 0.1–0.8)
AMORPHOUS: NORMAL
ANION GAP SERPL CALCULATED.3IONS-SCNC: 11 MMOL/L (ref 9–17)
ANION GAP SERPL CALCULATED.3IONS-SCNC: 7 MMOL/L (ref 9–17)
BACTERIA: NORMAL
BASOPHILS # BLD: 0 % (ref 0–2)
BASOPHILS # BLD: 0 % (ref 0–2)
BASOPHILS ABSOLUTE: 0 K/UL (ref 0–0.2)
BASOPHILS ABSOLUTE: 0 K/UL (ref 0–0.2)
BILIRUBIN URINE: NEGATIVE
BUN BLDV-MCNC: 32 MG/DL (ref 8–23)
BUN BLDV-MCNC: 37 MG/DL (ref 8–23)
BUN/CREAT BLD: ABNORMAL (ref 9–20)
BUN/CREAT BLD: ABNORMAL (ref 9–20)
CALCIUM IONIZED: 0.94 MMOL/L (ref 1.13–1.33)
CALCIUM SERPL-MCNC: 8.7 MG/DL (ref 8.6–10.4)
CALCIUM SERPL-MCNC: 8.9 MG/DL (ref 8.6–10.4)
CASTS UA: NORMAL /LPF (ref 0–8)
CHLORIDE BLD-SCNC: 73 MMOL/L (ref 98–107)
CHLORIDE BLD-SCNC: 73 MMOL/L (ref 98–107)
CO2: 38 MMOL/L (ref 20–31)
CO2: 39 MMOL/L (ref 20–31)
COLOR: YELLOW
COMMENT UA: ABNORMAL
CORTISOL COLLECTION INFO: NORMAL
CORTISOL: 8.7 UG/DL (ref 2.7–18.4)
CREAT SERPL-MCNC: 0.67 MG/DL (ref 0.7–1.2)
CREAT SERPL-MCNC: 0.85 MG/DL (ref 0.7–1.2)
CRYSTALS, UA: NORMAL /HPF
DIFFERENTIAL TYPE: ABNORMAL
DIFFERENTIAL TYPE: ABNORMAL
EOSINOPHILS RELATIVE PERCENT: 0 % (ref 1–4)
EOSINOPHILS RELATIVE PERCENT: 0 % (ref 1–4)
EPITHELIAL CELLS UA: NORMAL /HPF (ref 0–5)
GFR AFRICAN AMERICAN: >60 ML/MIN
GFR AFRICAN AMERICAN: >60 ML/MIN
GFR NON-AFRICAN AMERICAN: >60 ML/MIN
GFR NON-AFRICAN AMERICAN: >60 ML/MIN
GFR SERPL CREATININE-BSD FRML MDRD: ABNORMAL ML/MIN/{1.73_M2}
GLUCOSE BLD-MCNC: 336 MG/DL (ref 70–99)
GLUCOSE BLD-MCNC: 344 MG/DL (ref 70–99)
GLUCOSE BLD-MCNC: 374 MG/DL (ref 75–110)
GLUCOSE BLD-MCNC: 397 MG/DL (ref 75–110)
GLUCOSE BLD-MCNC: 419 MG/DL (ref 75–110)
GLUCOSE BLD-MCNC: 429 MG/DL (ref 75–110)
GLUCOSE URINE: ABNORMAL
HCT VFR BLD CALC: 39.1 % (ref 40.7–50.3)
HCT VFR BLD CALC: 40.4 % (ref 40.7–50.3)
HEMOGLOBIN: 13.2 G/DL (ref 13–17)
HEMOGLOBIN: 13.6 G/DL (ref 13–17)
IMMATURE GRANULOCYTES: 0 %
IMMATURE GRANULOCYTES: 1 %
KETONES, URINE: NEGATIVE
LEUKOCYTE ESTERASE, URINE: NEGATIVE
LYMPHOCYTES # BLD: 1 % (ref 24–44)
LYMPHOCYTES # BLD: 5 % (ref 24–44)
MAGNESIUM: 2.6 MG/DL (ref 1.6–2.6)
MCH RBC QN AUTO: 32.8 PG (ref 25.2–33.5)
MCH RBC QN AUTO: 32.9 PG (ref 25.2–33.5)
MCHC RBC AUTO-ENTMCNC: 33.7 G/DL (ref 28.4–34.8)
MCHC RBC AUTO-ENTMCNC: 33.8 G/DL (ref 28.4–34.8)
MCV RBC AUTO: 97 FL (ref 82.6–102.9)
MCV RBC AUTO: 97.8 FL (ref 82.6–102.9)
MONOCYTES # BLD: 5 % (ref 1–7)
MONOCYTES # BLD: 5 % (ref 1–7)
MORPHOLOGY: NORMAL
MORPHOLOGY: NORMAL
MUCUS: NORMAL
NITRITE, URINE: NEGATIVE
NRBC AUTOMATED: 0 PER 100 WBC
NRBC AUTOMATED: 0 PER 100 WBC
OSMOLALITY URINE: 288 MOSM/KG (ref 80–1300)
OTHER OBSERVATIONS UA: NORMAL
PDW BLD-RTO: 13.1 % (ref 11.8–14.4)
PDW BLD-RTO: 13.1 % (ref 11.8–14.4)
PH UA: 6.5 (ref 5–8)
PHOSPHORUS: 3.3 MG/DL (ref 2.5–4.5)
PLATELET # BLD: 160 K/UL (ref 138–453)
PLATELET # BLD: 174 K/UL (ref 138–453)
PLATELET ESTIMATE: ABNORMAL
PLATELET ESTIMATE: ABNORMAL
PMV BLD AUTO: 11.6 FL (ref 8.1–13.5)
PMV BLD AUTO: 11.6 FL (ref 8.1–13.5)
POTASSIUM SERPL-SCNC: 3.2 MMOL/L (ref 3.7–5.3)
POTASSIUM SERPL-SCNC: 3.7 MMOL/L (ref 3.7–5.3)
PROTEIN UA: ABNORMAL
RBC # BLD: 4.03 M/UL (ref 4.21–5.77)
RBC # BLD: 4.13 M/UL (ref 4.21–5.77)
RBC # BLD: ABNORMAL 10*6/UL
RBC # BLD: ABNORMAL 10*6/UL
RBC UA: NORMAL /HPF (ref 0–4)
RENAL EPITHELIAL, UA: NORMAL /HPF
SEG NEUTROPHILS: 89 % (ref 36–66)
SEG NEUTROPHILS: 94 % (ref 36–66)
SEGMENTED NEUTROPHILS ABSOLUTE COUNT: 9.61 K/UL (ref 1.8–7.7)
SEGMENTED NEUTROPHILS ABSOLUTE COUNT: 9.78 K/UL (ref 1.8–7.7)
SODIUM BLD-SCNC: 119 MMOL/L (ref 135–144)
SODIUM BLD-SCNC: 122 MMOL/L (ref 135–144)
SODIUM BLD-SCNC: 122 MMOL/L (ref 135–144)
SODIUM BLD-SCNC: 126 MMOL/L (ref 135–144)
SODIUM,UR: <20 MMOL/L
SPECIFIC GRAVITY UA: 1.01 (ref 1–1.03)
TRICHOMONAS: NORMAL
TROPONIN INTERP: ABNORMAL
TROPONIN T: ABNORMAL NG/ML
TROPONIN, HIGH SENSITIVITY: 32 NG/L (ref 0–22)
TURBIDITY: CLEAR
URIC ACID: 8.2 MG/DL (ref 3.4–7)
URINE HGB: NEGATIVE
UROBILINOGEN, URINE: NORMAL
WBC # BLD: 10.4 K/UL (ref 3.5–11.3)
WBC # BLD: 10.8 K/UL (ref 3.5–11.3)
WBC # BLD: ABNORMAL 10*3/UL
WBC # BLD: ABNORMAL 10*3/UL
WBC UA: NORMAL /HPF (ref 0–5)
YEAST: NORMAL

## 2021-05-29 PROCEDURE — 99291 CRITICAL CARE FIRST HOUR: CPT | Performed by: INTERNAL MEDICINE

## 2021-05-29 PROCEDURE — 6370000000 HC RX 637 (ALT 250 FOR IP): Performed by: STUDENT IN AN ORGANIZED HEALTH CARE EDUCATION/TRAINING PROGRAM

## 2021-05-29 PROCEDURE — 2580000003 HC RX 258: Performed by: STUDENT IN AN ORGANIZED HEALTH CARE EDUCATION/TRAINING PROGRAM

## 2021-05-29 PROCEDURE — 84100 ASSAY OF PHOSPHORUS: CPT

## 2021-05-29 PROCEDURE — 82330 ASSAY OF CALCIUM: CPT

## 2021-05-29 PROCEDURE — 82533 TOTAL CORTISOL: CPT

## 2021-05-29 PROCEDURE — 82947 ASSAY GLUCOSE BLOOD QUANT: CPT

## 2021-05-29 PROCEDURE — 6360000002 HC RX W HCPCS: Performed by: STUDENT IN AN ORGANIZED HEALTH CARE EDUCATION/TRAINING PROGRAM

## 2021-05-29 PROCEDURE — 83036 HEMOGLOBIN GLYCOSYLATED A1C: CPT

## 2021-05-29 PROCEDURE — 84300 ASSAY OF URINE SODIUM: CPT

## 2021-05-29 PROCEDURE — 87086 URINE CULTURE/COLONY COUNT: CPT

## 2021-05-29 PROCEDURE — 81001 URINALYSIS AUTO W/SCOPE: CPT

## 2021-05-29 PROCEDURE — 83735 ASSAY OF MAGNESIUM: CPT

## 2021-05-29 PROCEDURE — 94761 N-INVAS EAR/PLS OXIMETRY MLT: CPT

## 2021-05-29 PROCEDURE — 85025 COMPLETE CBC W/AUTO DIFF WBC: CPT

## 2021-05-29 PROCEDURE — 2500000003 HC RX 250 WO HCPCS: Performed by: STUDENT IN AN ORGANIZED HEALTH CARE EDUCATION/TRAINING PROGRAM

## 2021-05-29 PROCEDURE — 6360000002 HC RX W HCPCS: Performed by: INTERNAL MEDICINE

## 2021-05-29 PROCEDURE — 84550 ASSAY OF BLOOD/URIC ACID: CPT

## 2021-05-29 PROCEDURE — 80048 BASIC METABOLIC PNL TOTAL CA: CPT

## 2021-05-29 PROCEDURE — 36415 COLL VENOUS BLD VENIPUNCTURE: CPT

## 2021-05-29 PROCEDURE — 94640 AIRWAY INHALATION TREATMENT: CPT

## 2021-05-29 PROCEDURE — 2700000000 HC OXYGEN THERAPY PER DAY

## 2021-05-29 PROCEDURE — 83935 ASSAY OF URINE OSMOLALITY: CPT

## 2021-05-29 PROCEDURE — 87040 BLOOD CULTURE FOR BACTERIA: CPT

## 2021-05-29 PROCEDURE — 2060000000 HC ICU INTERMEDIATE R&B

## 2021-05-29 PROCEDURE — 84295 ASSAY OF SERUM SODIUM: CPT

## 2021-05-29 PROCEDURE — 94660 CPAP INITIATION&MGMT: CPT

## 2021-05-29 RX ORDER — PREDNISONE 20 MG/1
40 TABLET ORAL DAILY
Status: DISCONTINUED | OUTPATIENT
Start: 2021-05-30 | End: 2021-06-01 | Stop reason: HOSPADM

## 2021-05-29 RX ORDER — INSULIN GLARGINE 100 [IU]/ML
15 INJECTION, SOLUTION SUBCUTANEOUS DAILY
Status: DISCONTINUED | OUTPATIENT
Start: 2021-05-30 | End: 2021-05-29

## 2021-05-29 RX ORDER — SODIUM CHLORIDE 9 MG/ML
INJECTION, SOLUTION INTRAVENOUS CONTINUOUS
Status: DISCONTINUED | OUTPATIENT
Start: 2021-05-29 | End: 2021-05-29

## 2021-05-29 RX ORDER — INSULIN GLARGINE 100 [IU]/ML
10 INJECTION, SOLUTION SUBCUTANEOUS ONCE
Status: COMPLETED | OUTPATIENT
Start: 2021-05-29 | End: 2021-05-29

## 2021-05-29 RX ORDER — GUAIFENESIN DEXTROMETHORPHAN HYDROBROMIDE ORAL SOLUTION 10; 100 MG/5ML; MG/5ML
10 SOLUTION ORAL EVERY 4 HOURS PRN
Status: DISCONTINUED | OUTPATIENT
Start: 2021-05-29 | End: 2021-06-01 | Stop reason: HOSPADM

## 2021-05-29 RX ORDER — INSULIN GLARGINE 100 [IU]/ML
15 INJECTION, SOLUTION SUBCUTANEOUS NIGHTLY
Status: DISCONTINUED | OUTPATIENT
Start: 2021-05-29 | End: 2021-05-30

## 2021-05-29 RX ORDER — AZITHROMYCIN 250 MG/1
250 TABLET, FILM COATED ORAL DAILY
Status: DISCONTINUED | OUTPATIENT
Start: 2021-05-29 | End: 2021-05-31

## 2021-05-29 RX ORDER — FUROSEMIDE 10 MG/ML
20 INJECTION INTRAMUSCULAR; INTRAVENOUS DAILY
Status: DISCONTINUED | OUTPATIENT
Start: 2021-05-30 | End: 2021-05-30

## 2021-05-29 RX ORDER — INSULIN GLARGINE 100 [IU]/ML
10 INJECTION, SOLUTION SUBCUTANEOUS NIGHTLY
Status: DISCONTINUED | OUTPATIENT
Start: 2021-05-29 | End: 2021-05-29

## 2021-05-29 RX ADMIN — SODIUM CHLORIDE, PRESERVATIVE FREE 10 ML: 5 INJECTION INTRAVENOUS at 20:50

## 2021-05-29 RX ADMIN — FUROSEMIDE 40 MG: 10 INJECTION, SOLUTION INTRAMUSCULAR; INTRAVENOUS at 09:45

## 2021-05-29 RX ADMIN — DILTIAZEM HYDROCHLORIDE 180 MG: 180 CAPSULE, COATED, EXTENDED RELEASE ORAL at 09:28

## 2021-05-29 RX ADMIN — METHYLPREDNISOLONE SODIUM SUCCINATE 40 MG: 40 INJECTION, POWDER, FOR SOLUTION INTRAMUSCULAR; INTRAVENOUS at 09:27

## 2021-05-29 RX ADMIN — INSULIN GLARGINE 10 UNITS: 100 INJECTION, SOLUTION SUBCUTANEOUS at 01:44

## 2021-05-29 RX ADMIN — IPRATROPIUM BROMIDE AND ALBUTEROL SULFATE 1 AMPULE: .5; 3 SOLUTION RESPIRATORY (INHALATION) at 21:13

## 2021-05-29 RX ADMIN — CALCIUM CHLORIDE 1000 MG: 100 INJECTION, SOLUTION INTRAVENOUS; INTRAVENTRICULAR at 09:24

## 2021-05-29 RX ADMIN — APIXABAN 5 MG: 5 TABLET, FILM COATED ORAL at 20:50

## 2021-05-29 RX ADMIN — INSULIN LISPRO 10 UNITS: 100 INJECTION, SOLUTION INTRAVENOUS; SUBCUTANEOUS at 12:32

## 2021-05-29 RX ADMIN — PREGABALIN 225 MG: 75 CAPSULE ORAL at 09:28

## 2021-05-29 RX ADMIN — CEFTRIAXONE SODIUM 1000 MG: 1 INJECTION, POWDER, FOR SOLUTION INTRAMUSCULAR; INTRAVENOUS at 12:27

## 2021-05-29 RX ADMIN — SODIUM CHLORIDE, PRESERVATIVE FREE 10 ML: 5 INJECTION INTRAVENOUS at 09:28

## 2021-05-29 RX ADMIN — IPRATROPIUM BROMIDE AND ALBUTEROL SULFATE 1 AMPULE: .5; 3 SOLUTION RESPIRATORY (INHALATION) at 08:15

## 2021-05-29 RX ADMIN — IPRATROPIUM BROMIDE AND ALBUTEROL SULFATE 1 AMPULE: .5; 3 SOLUTION RESPIRATORY (INHALATION) at 15:25

## 2021-05-29 RX ADMIN — APIXABAN 5 MG: 5 TABLET, FILM COATED ORAL at 09:28

## 2021-05-29 RX ADMIN — BUDESONIDE AND FORMOTEROL FUMARATE DIHYDRATE 2 PUFF: 160; 4.5 AEROSOL RESPIRATORY (INHALATION) at 21:18

## 2021-05-29 RX ADMIN — INSULIN LISPRO 12 UNITS: 100 INJECTION, SOLUTION INTRAVENOUS; SUBCUTANEOUS at 17:31

## 2021-05-29 RX ADMIN — BUDESONIDE AND FORMOTEROL FUMARATE DIHYDRATE 2 PUFF: 160; 4.5 AEROSOL RESPIRATORY (INHALATION) at 08:19

## 2021-05-29 RX ADMIN — INSULIN LISPRO 6 UNITS: 100 INJECTION, SOLUTION INTRAVENOUS; SUBCUTANEOUS at 20:51

## 2021-05-29 RX ADMIN — PREGABALIN 225 MG: 75 CAPSULE ORAL at 20:50

## 2021-05-29 RX ADMIN — POTASSIUM CHLORIDE 40 MEQ: 1500 TABLET, EXTENDED RELEASE ORAL at 09:24

## 2021-05-29 RX ADMIN — INSULIN LISPRO 10 UNITS: 100 INJECTION, SOLUTION INTRAVENOUS; SUBCUTANEOUS at 09:25

## 2021-05-29 RX ADMIN — SODIUM CHLORIDE: 9 INJECTION, SOLUTION INTRAVENOUS at 01:00

## 2021-05-29 RX ADMIN — INSULIN GLARGINE 10 UNITS: 100 INJECTION, SOLUTION SUBCUTANEOUS at 09:36

## 2021-05-29 RX ADMIN — AZITHROMYCIN 250 MG: 250 TABLET, FILM COATED ORAL at 09:36

## 2021-05-29 RX ADMIN — METHYLPREDNISOLONE SODIUM SUCCINATE 40 MG: 40 INJECTION, POWDER, FOR SOLUTION INTRAMUSCULAR; INTRAVENOUS at 01:00

## 2021-05-29 RX ADMIN — INSULIN GLARGINE 15 UNITS: 100 INJECTION, SOLUTION SUBCUTANEOUS at 20:54

## 2021-05-29 RX ADMIN — PANTOPRAZOLE SODIUM 40 MG: 40 TABLET, DELAYED RELEASE ORAL at 09:28

## 2021-05-29 RX ADMIN — IPRATROPIUM BROMIDE AND ALBUTEROL SULFATE 1 AMPULE: .5; 3 SOLUTION RESPIRATORY (INHALATION) at 12:36

## 2021-05-29 RX ADMIN — SPIRONOLACTONE 25 MG: 25 TABLET ORAL at 09:28

## 2021-05-29 RX ADMIN — DOCUSATE SODIUM 50MG AND SENNOSIDES 8.6MG 1 TABLET: 8.6; 5 TABLET, FILM COATED ORAL at 09:28

## 2021-05-29 ASSESSMENT — PULMONARY FUNCTION TESTS: PIF_VALUE: 16

## 2021-05-29 ASSESSMENT — PAIN SCALES - GENERAL
PAINLEVEL_OUTOF10: 0

## 2021-05-29 NOTE — FLOWSHEET NOTE
05/29/21 1900   Provider Notification   Reason for Communication Evaluate   Provider Name Lou 218   Provider Notification Resident   Method of Communication Call   Response Other (Comment)   Notification Time 1900     Repeat blood glucose 419. States to give the night time sliding scale dose now and then lantus as scheduled.

## 2021-05-29 NOTE — PROGRESS NOTES
[facility]   Progress Note                   Date:   5/29/2021  Patient name: Spencer Paulino  Date of admission:  5/28/2021  4:09 AM  MRN:   [id number]  YOB: 1950  PCP: Army Benitez    Reason for Admission: Acute on chronic respiratory failure with hypoxia    Subjective:       Patient on high flow NC. Hemodynamically stable with low normal BP. Rate controlled. Urine output 3150 mL per 24 hours. Labs reviewed. K3.7, corrected sodium 128. Medications:   Scheduled Meds:   insulin glargine  10 Units Subcutaneous Nightly    sodium chloride flush  5-40 mL Intravenous 2 times per day    ipratropium-albuterol  1 ampule Inhalation Q4H WA    apixaban  5 mg Oral BID    budesonide-formoterol  2 puff Inhalation BID    dilTIAZem  180 mg Oral Daily    [Held by provider] metoprolol succinate  25 mg Oral Daily    pantoprazole  40 mg Oral Daily    pregabalin  225 mg Oral BID    spironolactone  25 mg Oral Daily    insulin lispro  0-12 Units Subcutaneous TID WC    insulin lispro  0-6 Units Subcutaneous Nightly    methylPREDNISolone  40 mg Intravenous Q8H    cefTRIAXone (ROCEPHIN) IV  1,000 mg Intravenous Q24H    furosemide  40 mg Intravenous Daily    sennosides-docusate sodium  1 tablet Oral Daily    nicotine  1 patch Transdermal Daily       Continuous Infusions:   sodium chloride 75 mL/hr at 05/29/21 0100    sodium chloride      dextrose         CBC:   Recent Labs     05/28/21  0508 05/29/21  0135   WBC  --  10.4   HGB 13.1 13.2   PLT  --  160     BMP:    Recent Labs     05/28/21  0508 05/28/21  2309   * 122*   K 2.9* 3.7   CL 76* 73*   CO2 37* 38*   BUN 39* 37*   CREATININE 0.98 0.85   GLUCOSE 320* 336*     Hepatic:   Recent Labs     05/28/21  0508   AST 43*   ALT 47*   BILITOT 1.97*   ALKPHOS 178*     Troponin: No results for input(s): TROPONINI in the last 72 hours. BNP: No results for input(s): BNP in the last 72 hours.   Lipids: No results for input(s): CHOL, 5/2021 showed persistent A. fib with rate controlled. 4. ?  Episode of VT. No evidence on rhythm strips from [facility]. 5. Preserved LV function on 5/2021  6. CAD s/p CABG 2005. 7. Severe COPD with acute exacerbation. 8. Left lower lobe pneumonia  9. Acute on chronic diastolic CHF. 10. Mildly elevated troponins-likely from demand ischemia in setting of COPD exacerbation and pneumonia  11. Hypokalemia. Resolved. 12. Hyponatremia  13. Hypertension          Plan of Treatment:   1. Continue Cardizem 180 mg daily. Continue to hold BB. 2. Continue Eliquis 5 mg twice daily  3. Continue IV diuresis. Monitor I's and O's, daily weights, renal function. 4. Replace electrolytes as needed. Keep K>4 and Mg >2.  5. Other management per Critical care. Discussed with patient and nursing. Lashonda Dewitt MD   PGY-3 Resident  Internal Medicine  [facility]  5/29/2021  5:52 AM    Attending note,   The patient was seen and examined, agree with above, does have SOB, due to COPD and pneumonia. Treat per primary service. Afib/Flutter is rate controlled and on Eliquis. Will reduce Lasix to 20 IV once daily. Consider nephrology consult for hyponatremia.

## 2021-05-29 NOTE — PROGRESS NOTES
[facility] Ventricular tachycardia ([address] [I47.2] . Vitals:    05/29/21 1600   BP: 116/63   Pulse: 69   Resp: 21   Temp:    SpO2: 93%   . Patients home meds are   Prior to Admission medications    Medication Sig Start Date End Date Taking? Authorizing Provider   pregabalin (LYRICA) 225 MG capsule Take 1 capsule by mouth 2 times daily for 30 days. 5/7/21 6/6/21  Karen Dalal MD   celecoxib (CELEBREX) 100 MG capsule Take 1 capsule by mouth 2 times daily 1/14/21   ALISON Smith - CNP   dilTIAZem [facility]) 180 MG extended release capsule Take 180 mg by mouth daily 11/5/20   Historical Provider, MD   magnesium oxide (MAG-OX) 400 MG tablet Take 400 mg by mouth daily    Historical Provider, MD   NONFORMULARY Take 1 tablet by mouth daily ON COR vitamin - to help keep bladder cancer away    Historical Provider, MD   acetaminophen (TYLENOL) 325 MG tablet Take 650 mg by mouth every 6 hours as needed for Pain    Historical Provider, MD   spironolactone (ALDACTONE) 25 MG tablet Take 25 mg by mouth daily    Historical Provider, MD   albuterol sulfate  (90 Base) MCG/ACT inhaler Inhale 2 puffs into the lungs every 6 hours as needed    Historical Provider, MD   lidocaine (XYLOCAINE) 5 % ointment Apply topically as needed.  10/21/20   ALISON Smith CNP   sennosides-docusate sodium (SENOKOT-S) 8.6-50 MG tablet Take 1 tablet by mouth daily    Historical Provider, MD   Multiple Vitamins-Minerals (ONCOVITE) TABS Take by mouth    Historical Provider, MD   loratadine (CLARITIN) 10 MG tablet Take 10 mg by mouth daily    Historical Provider, MD   tiotropium (SPIRIVA HANDIHALER) 18 MCG inhalation capsule Inhale 18 mcg into the lungs daily    Historical Provider, MD   apixaban (ELIQUIS) 5 MG TABS tablet Take by mouth 2 times daily    Historical Provider, MD   potassium chloride (KLOR-CON M) 10 MEQ extended release tablet Take 10 mEq by mouth 2 times daily    Historical Provider, MD CPAP Machine MISC by Does not apply route BiPAP    Historical Provider, MD   budesonide-formoterol (SYMBICORT) 160-4.5 MCG/ACT AERO Inhale 2 puffs into the lungs 2 times daily    Historical Provider, MD   bumetanide (BUMEX) 2 MG tablet Take 2 mg by mouth daily    Historical Provider, MD   magnesium hydroxide (MILK OF MAGNESIA) 400 MG/5ML suspension Take by mouth daily as needed for Constipation    Historical Provider, MD   ibuprofen (IBU) 400 MG tablet Take 400 mg by mouth every 6 hours as needed for Pain    Historical Provider, MD   metoprolol succinate (TOPROL XL) 25 MG extended release tablet Take 25 mg by mouth daily    Historical Provider, MD   Cyanocobalamin (VITAMIN B 12) 500 MCG TABS Take by mouth    Historical Provider, MD   pantoprazole (PROTONIX) 40 MG tablet Take 40 mg by mouth daily    Historical Provider, MD   .       Assessment: Patient has RODNEY and severe COPD. Wearing HFNC on low settings. Refuses BiPAP at night for RODNEY. Responds well to treatments. Continue current treatment schedule.         RR 21  Breath Sounds: Diminished      · Bronchodilator assessment at level  3  · Hyperinflation assessment at level   · Secretion Management assessment at level    ·   · [x]    Bronchodilator Assessment  BRONCHODILATOR ASSESSMENT SCORE  Score 0 1 2 3 4 5   Breath Sounds   []  Patient Baseline []  No Wheeze good aeration []  Faint, scattered wheezing, good aeration [x]  Expiratory Wheezing and or moderately diminished []  Insp/Exp wheeze and/or very diminished []  Insp/Exp and/ or marked distress   Respiratory Rate   []  Patient Baseline []  Less than 20 []  Less than 20 [x]  20-25 []  Greater than 25 []  Greater than 25   Peak flow % of Pred or PB [x]  NA   []  Greater than 90%  []  81-90% []  71-80% []  Less than or equal to 70%  or unable to perform []  Unable due to Respiratory Distress   Dyspnea re []  Patient Baseline []  No SOB []  No SOB [x]  SOB on exertion []  SOB min activity []  At rest/acute   e FEV% Predicted       [x]  NA []  Above 69%  []  Unable []  Above 60-69%  []  Unable []  Above 50-59%  []  Unable []  Above 35-49%  []  Unable []  Less than 35%  []  Unable                 []  Hyperinflation Assessment  Score 1 2 3   CXR and Breath Sounds   []  Clear []  No atelectasis  Basilar aeration []  Atelectasis or absent basilar breath sounds   Incentive Spirometry Volume  (Per IBW)   []  Greater than or equal to 15ml/Kg []  less than 15ml/Kg []  less than 15ml/Kg   Surgery within last 2 weeks []  None or general   []  Abdominal or thoracic surgery  []  Abdominal or thoracic   Chronic Pulmonary Historyre []  No []  Yes []  Yes     []  Secretion Management Assessment  Score 1 2 3   Bilateral Breath Sounds   []  Occasional Rhonchi []  Scattered Rhonchi []  Course Rhonchi and/or poor aeration   Sputum    []  Small amount of thin secretions []  Moderate amount of viscous secretions []  Copius, Viscious Yellow/ Secretions   CXR as reported by physician []  clear  []  Unavailable []  Infiltrates and/or consolidation  []  Unavailable []  Mucus Plugging and or lobar consolidation  []  Unavailable   Cough []  Strong, productive cough []  Weak productive cough []  No cough or weak non-productive cough   Mejia Brewster RCP  4:46 PM

## 2021-05-29 NOTE — PLAN OF CARE
Problem: Skin Integrity:  Goal: Will show no infection signs and symptoms  Description: Will show no infection signs and symptoms  Outcome: Met This Shift  Goal: Absence of new skin breakdown  Description: Absence of new skin breakdown  Outcome: Met This Shift     Problem: Falls - Risk of:  Goal: Will remain free from falls  Description: Will remain free from falls  Outcome: Met This Shift  Goal: Absence of physical injury  Description: Absence of physical injury  Outcome: Met This Shift     Problem: Cardiac Output - Decreased:  Goal: Hemodynamic stability will improve  Description: Hemodynamic stability will improve  Outcome: Met This Shift     Problem: Skin Integrity - Impaired:  Goal: Will show no infection signs and symptoms  Description: Will show no infection signs and symptoms  Outcome: Met This Shift  Goal: Absence of new skin breakdown  Description: Absence of new skin breakdown  Outcome: Met This Shift     Problem: Tissue Perfusion - Cardiopulmonary, Altered:  Goal: Absence of angina  Description: Absence of angina  Outcome: Met This Shift     Problem: Tissue Perfusion - Cardiopulmonary, Altered:  Goal: Hemodynamic stability will improve  Description: Hemodynamic stability will improve  Outcome: Ongoing     Problem: Fluid Volume - Imbalance:  Goal: Absence of imbalanced fluid volume signs and symptoms  Description: Absence of imbalanced fluid volume signs and symptoms  Outcome: Not Met This Shift     Problem: Gas Exchange - Impaired:  Goal: Levels of oxygenation will improve  Description: Levels of oxygenation will improve  Outcome: Not Met This Shift     Problem: Serum Glucose Level - Abnormal:  Goal: Ability to maintain appropriate glucose levels will improve to within specified parameters  Description: Ability to maintain appropriate glucose levels will improve to within specified parameters  Outcome: Not Met This Shift

## 2021-05-29 NOTE — FLOWSHEET NOTE
05/29/21 1753   Provider Notification   Reason for Communication Evaluate   Provider Name Erasto Hawkins    Provider Notification Resident   Method of Communication Face to face   Response At bedside   Notification Time [phone]     Informed of delay in obtaining sodium and informed of glucose 429, covered with 12 units of humalog. Lantus orders changed. States to repeat glucose in one hour and call with results.

## 2021-05-29 NOTE — FLOWSHEET NOTE
05/29/21 1000   Provider Notification   Reason for Communication Evaluate   Provider Name Sylvia Tolliver   Provider Notification Physician   Method of Communication Face to face   Response At bedside   Notification Time 1000     Rounding at bedside. Informed left pupil noted to be larger than right, not previously documented, neurological status remains intact and unchanged.

## 2021-05-29 NOTE — PROGRESS NOTES
lb 2.3 oz (92.6 kg)   12/02/20 213 lb (96.6 kg)     Body mass index is 29.29 kg/m².         PHYSICAL EXAMINATION:    General appearance - alert, well appearing, and in no distress  Mental status - alert, oriented to person, place, and time  Eyes - pupils equal and reactive, extraocular eye movements intact  Ears - bilateral TM's and external ear canals normal  Nose - normal and patent, no erythema, discharge or polyps  Mouth - mucous membranes moist, pharynx normal without lesions  Neck - supple, no significant adenopathy  Chest -diminished breath sounds, no wheezing or rhonchi  Heart -irregularly irregular pulse  Abdomen - soft, nontender, nondistended, no masses or organomegaly  Neurological - alert, oriented, normal speech, no focal findings or movement disorder noted}  Extremities -2+ pedal edema  Skin - normal coloration and turgor, no rashes, no suspicious skin lesions noted      Any additional physical findings:    MEDICATIONS:    Scheduled Meds:   calcium chloride IVPB  1,000 mg Intravenous Once    insulin glargine  10 Units Subcutaneous Once    sodium chloride flush  5-40 mL Intravenous 2 times per day    ipratropium-albuterol  1 ampule Inhalation Q4H WA    apixaban  5 mg Oral BID    budesonide-formoterol  2 puff Inhalation BID    dilTIAZem  180 mg Oral Daily    metoprolol succinate  25 mg Oral Daily    pantoprazole  40 mg Oral Daily    pregabalin  225 mg Oral BID    spironolactone  25 mg Oral Daily    insulin lispro  0-12 Units Subcutaneous TID WC    insulin lispro  0-6 Units Subcutaneous Nightly    methylPREDNISolone  40 mg Intravenous Q8H    cefTRIAXone (ROCEPHIN) IV  1,000 mg Intravenous Q24H    furosemide  40 mg Intravenous Daily    sennosides-docusate sodium  1 tablet Oral Daily    nicotine  1 patch Transdermal Daily     Continuous Infusions:   sodium chloride      dextrose       PRN Meds:   sodium chloride flush, 5-40 mL, PRN  sodium chloride, 25 mL, PRN  promethazine, 12.5 mg, Q6H PRN   Or  ondansetron, 4 mg, Q6H PRN  polyethylene glycol, 17 g, Daily PRN  acetaminophen, 650 mg, Q6H PRN   Or  acetaminophen, 650 mg, Q6H PRN  glucose, 15 g, PRN  dextrose, 12.5 g, PRN  glucagon (rDNA), 1 mg, PRN  dextrose, 100 mL/hr, PRN  potassium chloride, 40 mEq, PRN   Or  potassium alternative oral replacement, 40 mEq, PRN   Or  potassium chloride, 10 mEq, PRN  magnesium sulfate, 1,000 mg, PRN          VENT SETTINGS (Comprehensive) (if applicable):  Vent Information  Skin Assessment: Clean, dry, & intact  Vent Type: Servo i  Vent Mode: NIV/PC  Pressure Ordered: 6  Rate Set: 12 bmp  FiO2 : 40 %  SpO2: 99 %  SpO2/FiO2 ratio: 271.43  PEEP/CPAP: 8  I Time/ I Time %: 0.95 s  Humidification Source: Heated wire  Humidification Temp: 33  Humidification Temp Measured: 33  Mask Type: Full face mask  Mask Size: Medium  Additional Respiratory  Assessments  Pulse: 81  Resp: 17  SpO2: 99 %  Humidification Source: Heated wire  Humidification Temp: 33  Oral Care: Teeth brushed, Mouthwash    ABGs:     Laboratory findings:    Complete Blood Count:   Recent Labs     05/28/21  0508 05/29/21  0135 05/29/21  0554   WBC  --  10.4 10.8   HGB 13.1 13.2 13.6   HCT 39.3* 39.1* 40.4*   PLT  --  160 174        Last 3 Blood Glucose:   Recent Labs     05/28/21  0508 05/28/21  2309 05/29/21  0554   GLUCOSE 320* 336* 344*        PT/INR:  No results found for: PROTIME, INR  PTT:  No results found for: APTT, PTT    Comprehensive Metabolic Profile:   Recent Labs     05/28/21  0508 05/28/21  2309 05/29/21  0554   * 122* 119*   K 2.9* 3.7 3.2*   CL 76* 73* 73*   CO2 37* 38* 39*   BUN 39* 37* 32*   CREATININE 0.98 0.85 0.67*   GLUCOSE 320* 336* 344*   CALCIUM 8.6 8.7 8.9   PROT 6.3*  --   --    LABALBU 3.0*  --   --    BILITOT 1.97*  --   --    ALKPHOS 178*  --   --    AST 43*  --   --    ALT 47*  --   --       Magnesium:   Lab Results   Component Value Date    MG 2.6 05/29/2021     Phosphorus:   Lab Results   Component Value Date    PHOS 3.3 05/29/2021     Ionized Calcium:   Lab Results   Component Value Date    CAION 0.94 05/29/2021        Urinalysis:     Troponin: No results for input(s): TROPONINI in the last 72 hours. Microbiology:    Cultures during this admission:     Blood cultures:                 [] None drawn      [] Negative             []  Positive (Details:  )  Urine Culture:                   [] None drawn      [] Negative             []  Positive (Details:  )  Sputum Culture:               [] None drawn       [] Negative             []  Positive (Details:  )   Endotracheal aspirate:     [] None drawn       [] Negative             []  Positive (Details:  )     Other pertinent Labs:       Radiology/Imaging:     Chest Xray (5/29/2021):    ASSESSMENT:     · Principal Problem:  ·   Acute on chronic respiratory failure with hypoxia and hypercapnia (HCC)  · Active Problems:  ·   Ventricular tachycardia ([address]  ·   Pneumonia of left lower lobe due to infectious organism  ·   PAF (paroxysmal atrial fibrillation) ([address]  ·   Pulmonary hypertension with chronic cor pulmonale (HCC)  ·   RODNEY treated with BiPAP  ·   Smoking greater than 40 pack years  ·   Class 1 obesity due to excess calories with serious comorbidity and body mass index (BMI) of 30.0 to 30.9 in adult  ·   Stage 4 very severe COPD by GOLD classification ([address]  · Resolved Problems:  ·   * No resolved hospital problems. *  ·   ·         PLAN:     1. Neurologic:   · Neuro intact  · Neuro checks per protocol    2. Cardiovascular:  · Hemodynamically stable  · Continue Cardizem and Eliquis for A. fib  · IV diuretics    3. Pulmonary:  · Maintain oxygen sats >92%  · Pulmonary toilet  · Wean off oxygen as tolerated  · Continue bronchodilators  · Continue Rocephin and azithromycin for 5 days total for COPD exacerbation  · Continue Solu-Medrol for today, switch to prednisone tomorrow    4. GI/Nutrition  · Continue cardiac diet  5.  Renal/Fluid/Electrolyte  · Replace electrolytes as needed  · Hyponatremiamultifactorialhypervolemic due to [facility] will give Lasix  · Pseudohyponatremia component also noted likely due to hyperglycemia. · We will give insulin, sodium checks every 4 hours. · If no improvementwe will consider nephrology consult  · Also follow-up on cortisol and uric acid levels. 6. ID  · Continue ceftriaxone and azithromycin for total 5 days for COPD exacerbation  7. Hematology:  · Stable  · Eliquis for A. fib  8. Endocrine:   · Blood sugars elevated secondary to steroids  · We will give Lantus 10 units now and monitor  · Patient does not use any insulin at home. 9. DVT Prophylaxis  · Already on Eliquis      Patient is hemodynamically stable and will be transferred out of the ICU today.       Beny Alves MD          Critical care resident,  Department of Internal Medicine/ Critical care  [facility])             5/29/2021, 8:44 AM

## 2021-05-30 ENCOUNTER — APPOINTMENT (OUTPATIENT)
Dept: GENERAL RADIOLOGY | Age: 71
DRG: 291 | End: 2021-05-30
Attending: INTERNAL MEDICINE
Payer: MEDICARE

## 2021-05-30 PROBLEM — E11.65 UNCONTROLLED TYPE 2 DIABETES MELLITUS WITH HYPERGLYCEMIA (HCC): Status: ACTIVE | Noted: 2021-05-30

## 2021-05-30 LAB
-: NORMAL
ALBUMIN SERPL-MCNC: 2.8 G/DL (ref 3.5–5.2)
ALBUMIN/GLOBULIN RATIO: 0.8 (ref 1–2.5)
ALP BLD-CCNC: 229 U/L (ref 40–129)
ALT SERPL-CCNC: 49 U/L (ref 5–41)
ANION GAP SERPL CALCULATED.3IONS-SCNC: 8 MMOL/L (ref 9–17)
AST SERPL-CCNC: 29 U/L
BILIRUB SERPL-MCNC: 0.69 MG/DL (ref 0.3–1.2)
BILIRUBIN DIRECT: 0.42 MG/DL
BILIRUBIN, INDIRECT: 0.27 MG/DL (ref 0–1)
BUN BLDV-MCNC: 35 MG/DL (ref 8–23)
BUN/CREAT BLD: ABNORMAL (ref 9–20)
CALCIUM IONIZED: 1.1 MMOL/L (ref 1.13–1.33)
CALCIUM SERPL-MCNC: 9.3 MG/DL (ref 8.6–10.4)
CHLORIDE BLD-SCNC: 78 MMOL/L (ref 98–107)
CO2: 39 MMOL/L (ref 20–31)
CREAT SERPL-MCNC: 0.77 MG/DL (ref 0.7–1.2)
CULTURE: NO GROWTH
ESTIMATED AVERAGE GLUCOSE: 197 MG/DL
GFR AFRICAN AMERICAN: >60 ML/MIN
GFR NON-AFRICAN AMERICAN: >60 ML/MIN
GFR SERPL CREATININE-BSD FRML MDRD: ABNORMAL ML/MIN/{1.73_M2}
GFR SERPL CREATININE-BSD FRML MDRD: ABNORMAL ML/MIN/{1.73_M2}
GLOBULIN: ABNORMAL G/DL (ref 1.5–3.8)
GLUCOSE BLD-MCNC: 286 MG/DL (ref 75–110)
GLUCOSE BLD-MCNC: 330 MG/DL (ref 75–110)
GLUCOSE BLD-MCNC: 351 MG/DL (ref 70–99)
GLUCOSE BLD-MCNC: 351 MG/DL (ref 75–110)
GLUCOSE BLD-MCNC: 368 MG/DL (ref 75–110)
GLUCOSE BLD-MCNC: 475 MG/DL (ref 75–110)
HBA1C MFR BLD: 8.5 % (ref 4–6)
Lab: NORMAL
MAGNESIUM: 2.1 MG/DL (ref 1.6–2.6)
PHOSPHORUS: 3.3 MG/DL (ref 2.5–4.5)
POTASSIUM SERPL-SCNC: 3.7 MMOL/L (ref 3.7–5.3)
REASON FOR REJECTION: NORMAL
SODIUM BLD-SCNC: 125 MMOL/L (ref 135–144)
SODIUM BLD-SCNC: 126 MMOL/L (ref 135–144)
SODIUM BLD-SCNC: 129 MMOL/L (ref 135–144)
SPECIMEN DESCRIPTION: NORMAL
TOTAL PROTEIN: 6.4 G/DL (ref 6.4–8.3)
ZZ NTE CLEAN UP: ORDERED TEST: NORMAL
ZZ NTE WITH NAME CLEAN UP: SPECIMEN SOURCE: NORMAL

## 2021-05-30 PROCEDURE — 94640 AIRWAY INHALATION TREATMENT: CPT

## 2021-05-30 PROCEDURE — 6370000000 HC RX 637 (ALT 250 FOR IP): Performed by: STUDENT IN AN ORGANIZED HEALTH CARE EDUCATION/TRAINING PROGRAM

## 2021-05-30 PROCEDURE — 82330 ASSAY OF CALCIUM: CPT

## 2021-05-30 PROCEDURE — 83735 ASSAY OF MAGNESIUM: CPT

## 2021-05-30 PROCEDURE — 2700000000 HC OXYGEN THERAPY PER DAY

## 2021-05-30 PROCEDURE — 2580000003 HC RX 258: Performed by: STUDENT IN AN ORGANIZED HEALTH CARE EDUCATION/TRAINING PROGRAM

## 2021-05-30 PROCEDURE — 6360000002 HC RX W HCPCS: Performed by: STUDENT IN AN ORGANIZED HEALTH CARE EDUCATION/TRAINING PROGRAM

## 2021-05-30 PROCEDURE — 36415 COLL VENOUS BLD VENIPUNCTURE: CPT

## 2021-05-30 PROCEDURE — 80048 BASIC METABOLIC PNL TOTAL CA: CPT

## 2021-05-30 PROCEDURE — 2060000000 HC ICU INTERMEDIATE R&B

## 2021-05-30 PROCEDURE — 94660 CPAP INITIATION&MGMT: CPT

## 2021-05-30 PROCEDURE — 80076 HEPATIC FUNCTION PANEL: CPT

## 2021-05-30 PROCEDURE — 84295 ASSAY OF SERUM SODIUM: CPT

## 2021-05-30 PROCEDURE — 99233 SBSQ HOSP IP/OBS HIGH 50: CPT | Performed by: INTERNAL MEDICINE

## 2021-05-30 PROCEDURE — 71045 X-RAY EXAM CHEST 1 VIEW: CPT

## 2021-05-30 PROCEDURE — 84100 ASSAY OF PHOSPHORUS: CPT

## 2021-05-30 PROCEDURE — 99232 SBSQ HOSP IP/OBS MODERATE 35: CPT | Performed by: INTERNAL MEDICINE

## 2021-05-30 PROCEDURE — 82947 ASSAY GLUCOSE BLOOD QUANT: CPT

## 2021-05-30 RX ORDER — INSULIN GLARGINE 100 [IU]/ML
15 INJECTION, SOLUTION SUBCUTANEOUS NIGHTLY
Status: DISCONTINUED | OUTPATIENT
Start: 2021-05-30 | End: 2021-05-30

## 2021-05-30 RX ORDER — FUROSEMIDE 10 MG/ML
40 INJECTION INTRAMUSCULAR; INTRAVENOUS ONCE
Status: DISCONTINUED | OUTPATIENT
Start: 2021-05-30 | End: 2021-05-30

## 2021-05-30 RX ORDER — FUROSEMIDE 10 MG/ML
20 INJECTION INTRAMUSCULAR; INTRAVENOUS DAILY
Status: DISCONTINUED | OUTPATIENT
Start: 2021-05-31 | End: 2021-05-30

## 2021-05-30 RX ORDER — INSULIN GLARGINE 100 [IU]/ML
15 INJECTION, SOLUTION SUBCUTANEOUS ONCE
Status: COMPLETED | OUTPATIENT
Start: 2021-05-30 | End: 2021-05-30

## 2021-05-30 RX ORDER — BUMETANIDE 1 MG/1
2 TABLET ORAL DAILY
Status: DISCONTINUED | OUTPATIENT
Start: 2021-05-31 | End: 2021-06-01 | Stop reason: HOSPADM

## 2021-05-30 RX ORDER — FUROSEMIDE 10 MG/ML
40 INJECTION INTRAMUSCULAR; INTRAVENOUS DAILY
Status: COMPLETED | OUTPATIENT
Start: 2021-05-30 | End: 2021-05-30

## 2021-05-30 RX ORDER — INSULIN GLARGINE 100 [IU]/ML
20 INJECTION, SOLUTION SUBCUTANEOUS DAILY
Status: DISCONTINUED | OUTPATIENT
Start: 2021-05-30 | End: 2021-05-30

## 2021-05-30 RX ORDER — INSULIN GLARGINE 100 [IU]/ML
5 INJECTION, SOLUTION SUBCUTANEOUS ONCE
Status: COMPLETED | OUTPATIENT
Start: 2021-05-30 | End: 2021-05-30

## 2021-05-30 RX ORDER — INSULIN GLARGINE 100 [IU]/ML
35 INJECTION, SOLUTION SUBCUTANEOUS NIGHTLY
Status: DISCONTINUED | OUTPATIENT
Start: 2021-05-30 | End: 2021-05-30

## 2021-05-30 RX ORDER — INSULIN GLARGINE 100 [IU]/ML
40 INJECTION, SOLUTION SUBCUTANEOUS NIGHTLY
Status: DISCONTINUED | OUTPATIENT
Start: 2021-05-30 | End: 2021-05-31

## 2021-05-30 RX ADMIN — PREGABALIN 225 MG: 75 CAPSULE ORAL at 09:17

## 2021-05-30 RX ADMIN — INSULIN LISPRO 5 UNITS: 100 INJECTION, SOLUTION INTRAVENOUS; SUBCUTANEOUS at 08:18

## 2021-05-30 RX ADMIN — INSULIN GLARGINE 40 UNITS: 100 INJECTION, SOLUTION SUBCUTANEOUS at 20:25

## 2021-05-30 RX ADMIN — DOCUSATE SODIUM 50MG AND SENNOSIDES 8.6MG 1 TABLET: 8.6; 5 TABLET, FILM COATED ORAL at 09:17

## 2021-05-30 RX ADMIN — IPRATROPIUM BROMIDE AND ALBUTEROL SULFATE 1 AMPULE: .5; 3 SOLUTION RESPIRATORY (INHALATION) at 19:19

## 2021-05-30 RX ADMIN — BUDESONIDE AND FORMOTEROL FUMARATE DIHYDRATE 2 PUFF: 160; 4.5 AEROSOL RESPIRATORY (INHALATION) at 08:25

## 2021-05-30 RX ADMIN — INSULIN LISPRO 5 UNITS: 100 INJECTION, SOLUTION INTRAVENOUS; SUBCUTANEOUS at 16:34

## 2021-05-30 RX ADMIN — INSULIN GLARGINE 5 UNITS: 100 INJECTION, SOLUTION SUBCUTANEOUS at 04:01

## 2021-05-30 RX ADMIN — INSULIN LISPRO 9 UNITS: 100 INJECTION, SOLUTION INTRAVENOUS; SUBCUTANEOUS at 16:33

## 2021-05-30 RX ADMIN — INSULIN LISPRO 12 UNITS: 100 INJECTION, SOLUTION INTRAVENOUS; SUBCUTANEOUS at 12:01

## 2021-05-30 RX ADMIN — INSULIN LISPRO 7 UNITS: 100 INJECTION, SOLUTION INTRAVENOUS; SUBCUTANEOUS at 20:24

## 2021-05-30 RX ADMIN — PREDNISONE 40 MG: 20 TABLET ORAL at 09:17

## 2021-05-30 RX ADMIN — PREGABALIN 225 MG: 75 CAPSULE ORAL at 19:59

## 2021-05-30 RX ADMIN — AZITHROMYCIN 250 MG: 250 TABLET, FILM COATED ORAL at 09:17

## 2021-05-30 RX ADMIN — SODIUM CHLORIDE, PRESERVATIVE FREE 10 ML: 5 INJECTION INTRAVENOUS at 19:59

## 2021-05-30 RX ADMIN — SODIUM CHLORIDE, PRESERVATIVE FREE 10 ML: 5 INJECTION INTRAVENOUS at 09:29

## 2021-05-30 RX ADMIN — BUDESONIDE AND FORMOTEROL FUMARATE DIHYDRATE 2 PUFF: 160; 4.5 AEROSOL RESPIRATORY (INHALATION) at 19:19

## 2021-05-30 RX ADMIN — APIXABAN 5 MG: 5 TABLET, FILM COATED ORAL at 19:59

## 2021-05-30 RX ADMIN — APIXABAN 5 MG: 5 TABLET, FILM COATED ORAL at 09:17

## 2021-05-30 RX ADMIN — CEFTRIAXONE SODIUM 1000 MG: 1 INJECTION, POWDER, FOR SOLUTION INTRAMUSCULAR; INTRAVENOUS at 12:03

## 2021-05-30 RX ADMIN — INSULIN LISPRO 8 UNITS: 100 INJECTION, SOLUTION INTRAVENOUS; SUBCUTANEOUS at 08:18

## 2021-05-30 RX ADMIN — FUROSEMIDE 40 MG: 10 INJECTION, SOLUTION INTRAMUSCULAR; INTRAVENOUS at 09:18

## 2021-05-30 RX ADMIN — INSULIN GLARGINE 15 UNITS: 100 INJECTION, SOLUTION SUBCUTANEOUS at 16:33

## 2021-05-30 RX ADMIN — IPRATROPIUM BROMIDE AND ALBUTEROL SULFATE 1 AMPULE: .5; 3 SOLUTION RESPIRATORY (INHALATION) at 08:25

## 2021-05-30 RX ADMIN — IPRATROPIUM BROMIDE AND ALBUTEROL SULFATE 1 AMPULE: .5; 3 SOLUTION RESPIRATORY (INHALATION) at 12:33

## 2021-05-30 RX ADMIN — SPIRONOLACTONE 25 MG: 25 TABLET ORAL at 09:17

## 2021-05-30 RX ADMIN — IPRATROPIUM BROMIDE AND ALBUTEROL SULFATE 1 AMPULE: .5; 3 SOLUTION RESPIRATORY (INHALATION) at 16:02

## 2021-05-30 RX ADMIN — INSULIN GLARGINE 15 UNITS: 100 INJECTION, SOLUTION SUBCUTANEOUS at 09:18

## 2021-05-30 RX ADMIN — PANTOPRAZOLE SODIUM 40 MG: 40 TABLET, DELAYED RELEASE ORAL at 09:17

## 2021-05-30 RX ADMIN — DILTIAZEM HYDROCHLORIDE 180 MG: 180 CAPSULE, COATED, EXTENDED RELEASE ORAL at 09:17

## 2021-05-30 RX ADMIN — INSULIN LISPRO 5 UNITS: 100 INJECTION, SOLUTION INTRAVENOUS; SUBCUTANEOUS at 12:01

## 2021-05-30 ASSESSMENT — ENCOUNTER SYMPTOMS: TACHYPNEA: 1

## 2021-05-30 ASSESSMENT — PAIN SCALES - GENERAL
PAINLEVEL_OUTOF10: 0

## 2021-05-30 NOTE — PROGRESS NOTES
Critical care team - Resident sign-out to medicine service      Date and time: 5/29/2021 8:52 PM  Patient's name:  Devon Culp Record Number: [id number]  Patient's account/billing number: [de-identified]  Patient's YOB: 1950  Age: 66 y.o. Date of Admission: 5/28/2021  4:09 AM  Length of stay during current admission: 1    Primary Care Physician: Larry Zepeda    Code Status: Full Code    Mode of physician to physician communication:        [x] Via telephone   [] In person     Date and time of sign-out: 5/29/2021 8:52 PM    Accepting Internal Medicine resident: Dr. Benjy Snyder    Accepting Medicine team: IM Team 02    Accepting team's attending: Dr. Theo Fall    Patient's current ICU Bed:  107     Patient's assigned bed on floor:  0060        [] Med-Surg Monitored [x] Step-down       [] Psychiatry ICU       [] Psych floor     Reason for ICU admission:     Acute resp failure    ICU course summary:     59-year-old male presenting to [facility] progressive shortness of breath. Patient has history of stage IV COPD chronic dyspnea and chronic respiratory failure. Initially treated with IV steroids, bronchodilators and diuretics with any significant improvement. Chest x-ray showing possible left lower lobe pneumonia and atrial flutter on EKG. Started on Rocephin and Zithromax, also had 43-second run of V. tach but no strips available. Given magnesium bolus and started on amiodarone. Transferred to [facility] and put on an NIV. Cardiology was consulted for possible V. tach with rhythm strips from [facility] consistent with a flutter with aberrant conduction with underlying known incomplete RBBB. Cardiology recommended IV diuresis with Lasix and transition to home Bumex once CHF improved.     Patient condition improved over the course of admission as transfer out with appropriate signout to medicine team.      Procedures during patient's ICU stay:     None    Current Vitals: /70   Pulse 96   Temp 97.8 °F (36.6 °C) (Oral)   Resp 22   Ht 5' 10\" (1.778 m)   Wt 204 lb 2.3 oz (92.6 kg)   SpO2 95%   BMI 29.29 kg/m²       Cultures:     Blood cultures:                 [] None drawn      [] Negative             []  Positive (Details:  )  Urine Culture:                   [] None drawn      [] Negative             []  Positive (Details:  )  Sputum Culture:               [] None drawn       [] Negative             []  Positive (Details:  )   Endotracheal aspirate:     [] None drawn       [] Negative             []  Positive (Details:  )       Consults:     1. Cardiology    Assessment:     Patient Active Problem List    Diagnosis Date Noted    Ventricular [address]cardia [address] 05/28/2021    Acute on chronic respiratory failure with hypoxia and hypercapnia (HCC)     Pneumonia of left lower lobe due to infectious organism     PAF (paroxysmal atrial fibrillation) (HCC)     Pulmonary hypertension with chronic cor pulmonale ([address]     RODNEY treated with BiPAP     Smoking greater than 40 pack years     Class 1 obesity due to excess calories with serious comorbidity and body mass index (BMI) of 30.0 to 30.9 in adult     Stage 4 very severe COPD by GOLD classification ([address]     Lumbar facet joint syndrome 01/13/2021    Panniculitis involving lumbar region 01/13/2021    Lumbosacral spondylosis without myelopathy 11/04/2020    DDD (degenerative disc disease), lumbar 11/04/2020    Neuropathic pain 10/21/2020    Chronic bilateral low back pain without sciatica 10/21/2020         Recommended Follow-up:     1. Acute on chronic respiratory failure secondary to COPD and CHF exacerbation with atrial flutter: Patient improved with IV diuresis and bronchodilator treatment. Amnio bolused and started on Cardizem as per cardiology. Above mentioned assessment and plan was discussed by me with the admitting medicine resident.  The medicine team assigned to the patient by medicine admitting resident will be following up the patient from now onwards on the floor.        Bailee Carcamo MD       Internal Medicine Resident PGY-3  5/29/2021, 9:00 PM

## 2021-05-30 NOTE — PROGRESS NOTES
[facility]ZI CHOI, [facility]. Ventricular tachycardia ([address] [I47.2] . Vitals:    05/30/21 0828   BP:    Pulse:    Resp: 20   Temp:    SpO2: 100%   . Patients home meds are   Prior to Admission medications    Medication Sig Start Date End Date Taking? Authorizing Provider   pregabalin (LYRICA) 225 MG capsule Take 1 capsule by mouth 2 times daily for 30 days. 5/7/21 6/6/21  Jolanta Hurtado MD   celecoxib (CELEBREX) 100 MG capsule Take 1 capsule by mouth 2 times daily 1/14/21   ALISON Fox - WYATT   dilTIAZem [facility]) 180 MG extended release capsule Take 180 mg by mouth daily 11/5/20   Historical Provider, MD   magnesium oxide (MAG-OX) 400 MG tablet Take 400 mg by mouth daily    Historical Provider, MD   NONFORMULARY Take 1 tablet by mouth daily ON COR vitamin - to help keep bladder cancer away    Historical Provider, MD   acetaminophen (TYLENOL) 325 MG tablet Take 650 mg by mouth every 6 hours as needed for Pain    Historical Provider, MD   spironolactone (ALDACTONE) 25 MG tablet Take 25 mg by mouth daily    Historical Provider, MD   albuterol sulfate  (90 Base) MCG/ACT inhaler Inhale 2 puffs into the lungs every 6 hours as needed    Historical Provider, MD   lidocaine (XYLOCAINE) 5 % ointment Apply topically as needed.  10/21/20   ALISON Fox CNP   sennosides-docusate sodium (SENOKOT-S) 8.6-50 MG tablet Take 1 tablet by mouth daily    Historical Provider, MD   Multiple Vitamins-Minerals (ONCOVITE) TABS Take by mouth    Historical Provider, MD   loratadine (CLARITIN) 10 MG tablet Take 10 mg by mouth daily    Historical Provider, MD   tiotropium (SPIRIVA HANDIHALER) 18 MCG inhalation capsule Inhale 18 mcg into the lungs daily    Historical Provider, MD   apixaban (ELIQUIS) 5 MG TABS tablet Take by mouth 2 times daily    Historical Provider, MD   potassium chloride (KLOR-CON M) 10 MEQ extended release tablet Take 10 mEq by mouth 2 times daily    Historical Provider, MD   CPAP Machine MISC by Does not apply route BiPAP    Historical Provider, MD   budesonide-formoterol (SYMBICORT) 160-4.5 MCG/ACT AERO Inhale 2 puffs into the lungs 2 times daily    Historical Provider, MD   bumetanide (BUMEX) 2 MG tablet Take 2 mg by mouth daily    Historical Provider, MD   magnesium hydroxide (MILK OF MAGNESIA) 400 MG/5ML suspension Take by mouth daily as needed for Constipation    Historical Provider, MD   ibuprofen (IBU) 400 MG tablet Take 400 mg by mouth every 6 hours as needed for Pain    Historical Provider, MD   metoprolol succinate (TOPROL XL) 25 MG extended release tablet Take 25 mg by mouth daily    Historical Provider, MD   Cyanocobalamin (VITAMIN B 12) 500 MCG TABS Take by mouth    Historical Provider, MD   pantoprazole (PROTONIX) 40 MG tablet Take 40 mg by mouth daily    Historical Provider, MD   .  Recent Surgical History: None = 0     Assessment     Peak Flow (asthma only)      RR 20  Breath Sounds: clear crackles at bases      · Bronchodilator assessment at level  3  · Hyperinflation assessment at level   · Secretion Management assessment at level    ·   · []    Bronchodilator Assessment  BRONCHODILATOR ASSESSMENT SCORE  Score 0 1 2 3 4 5   Breath Sounds   []  Patient Baseline []  No Wheeze good aeration [x]  Faint, scattered wheezing, good aeration []  Expiratory Wheezing and or moderately diminished []  Insp/Exp wheeze and/or very diminished []  Insp/Exp and/ or marked distress   Respiratory Rate   []  Patient Baseline []  Less than 20 []  Less than 20 [x]  20-25 []  Greater than 25 []  Greater than 25   Peak flow % of Pred or PB []  NA   []  Greater than 90%  []  81-90% []  71-80% []  Less than or equal to 70%  or unable to perform []  Unable due to Respiratory Distress   Dyspnea re []  Patient Baseline []  No SOB []  No SOB [x]  SOB on exertion []  SOB min activity []  At rest/acute   e FEV% Predicted       []  NA []  Above 69%  []  Unable []  Above 60-69%  []  Unable []  Above 50-59% []  Unable []  Above 35-49%  []  Unable []  Less than 35%  []  Unable                 []  Hyperinflation Assessment  Score 1 2 3   CXR and Breath Sounds   []  Clear []  No atelectasis  Basilar aeration []  Atelectasis or absent basilar breath sounds   Incentive Spirometry Volume  (Per IBW)   []  Greater than or equal to 15ml/Kg []  less than 15ml/Kg []  less than 15ml/Kg   Surgery within last 2 weeks []  None or general   []  Abdominal or thoracic surgery  []  Abdominal or thoracic   Chronic Pulmonary Historyre []  No []  Yes []  Yes     []  Secretion Management Assessment  Score 1 2 3   Bilateral Breath Sounds   []  Occasional Rhonchi []  Scattered Rhonchi []  Course Rhonchi and/or poor aeration   Sputum    []  Small amount of thin secretions []  Moderate amount of viscous secretions []  Copius, Viscious Yellow/ Secretions   CXR as reported by physician []  clear  []  Unavailable []  Infiltrates and/or consolidation  []  Unavailable []  Mucus Plugging and or lobar consolidation  []  Unavailable   Cough []  Strong, productive cough []  Weak productive cough []  No cough or weak non-productive cough   SUZI CHOI, [facility]  8:29 AM                            FEMALE                                  MALE                            FEV1 Predicted Normal Values                        FEV1 Predicted Normal Values          Age                                     Height in Feet and Inches       Age                                     Height in Feet and Inches       4' 11\" 5' 1\" 5' 3\" 5' 5\" 5' 7\" 5' 9\" 5' 11\" 6' 1\"  4' 11\" 5' 1\" 5' 3\" 5' 5\" 5' 7\" 5' 9\" 5' 11\" 6' 1\"   42 - 45 2.49 2.66 2.84 3.03 3.22 3.42 3.62 3.83 42 - 45 2.82 3.03 3.26 3.49 3.72 3.96 4.22 4.47   46 - 49 2.40 2.57 2.76 2.94 3.14 3.33 3.54 3.75 46 - 49 2.70 2.92 3.14 3.37 3.61 3.85 4.10 4.36   50 - 53 2.31 2.48 2.66 2.85 3.04 3.24 3.45 3.66 50 - 53 2.58 2.80 3.02 3.25 3.49 3.73 3.98 4.24   54 - 57 2.21 2.38 2.57 2.75 2.95 3.14 3.35 3.56 54 - 57 2.46 2.67 2.89 3. 12 3.36 3.60 3.85 4.11   58 - 61 2.10 2.28 2.46 2.65 2.84 3.04 3.24 3.45 58 - 61 2.32 2.54 2.76 2.99 3.23 3.47 3.72 3.98   62 - 65 1.99 2.17 2.35 2.54 2.73 2.93 3.13 3.34 62 - 65 2.19 2.40 2.62 2.85 3.09 3.33 3.58 3.84   66 - 69 1.88 2.05 2.23 2.42 2.61 2.81 3.02 3.23 66 - 69 2.04 2.26 2.48 2.71 2.95 3.19 3.44 3.70   70+ 1.82 1.99 2.17 2.36 2.55 2.75 2.95 3.16 70+ 1.97 2.19 2.41 2.64 2.87 3.12 3.37 3.62             Predicted Peak Expiratory Flow Rate                                       Height (in)  Female       Height (in) Male           Age 64 63 56 61 58 73 78 74 Age            21 344 357 372 387 402 417 432 446  60 62 64 66 68 70 72 74 76   25 337 352 366 381 396 411 426 441 25 447 476 [phone] [phone]   30 329 344 359 374 389 404 419 434 30 437 466 494 523 552 [phone] 667   35 322 337 351 366 381 396 411 426 35 426 455 484 512 541 [phone]   40 314 329 344 359 374 389 404 419 40 416 445 473 [phone] 617 647   45 307 322 336 351 366 381 396 411 45 405 434 463 491 520 549 577 606 636   50 299 314 329 344 359 374 389 404 50 395 424 452 481 510 538 567 596 625   55 292 307 321 336 351 366 381 396 55 384 413 442 470 499 528 556 585 615   60 284 299 314 329 344 359 374 389 60 374 403 431 460 489 517 546 575 605   65 277 292 306 321 336 351 366 381 65 363 392 421 449 478 507 535 564 594   70 269 284 299 314 329 344 359 374 70 353 382 410 439 468 496 525 554 583   75 261 274 289 305 319 334 348 364 75 344 372 400 429 458 487 515 544 573   80 253 266 282 296 312 327 342 356 80 335 362 390 419 448 476 [phone]

## 2021-05-30 NOTE — PROGRESS NOTES
Pulmonary Progress Note  [facility]      Patient - Golden Barrera,  Age - 66 y.o.    - 1950      Room Number - 6499/0129-77   MRN -  [id number]   Acct # - [de-identified]  Date of Admission -  2021  4:09 AM    SUBJECTIVE  Conversational dyspnea is some better. Patient believes that he is close to baseline level. Denies sputum production. Short of breath at rest and with minimal exertion. Not febrile. Remains on oxygen 6 L a minute. Apparently uses 4-5 L at home. Follows with Dr. Isha Barrera, [facility], at [facility]. States that he was scheduled for low-dose screening CT scan of the chest although his most recent CT will suffice. OBJECTIVE    VITALS    height is 5' 10\" (1.778 m) and weight is 199 lb (90.3 kg). His axillary temperature is 97.9 °F (36.6 °C). His blood pressure is 117/77 and his pulse is 96. His respiration is 20 and oxygen saturation is 95%. Temperature Range: Temp: 97.9 °F (36.6 °C) Temp  Av.9 °F (36.6 °C)  Min: 97.5 °F (36.4 °C)  Max: 98.1 °F (36.7 °C)  BP Range:  Systolic (75RBB), KCX:518 , Min:116 , XMY:235     Diastolic (00MUT), PUI:28, Min:57, Max:86    Pulse Range: Pulse  Av.6  Min: 69  Max: 98  Respiration Range: Resp  Av.2  Min: 16  Max: 23  Current Pulse Ox[de-identified]  SpO2: 95 %  24HR Pulse Ox Range:  SpO2  Av.1 %  Min: 91 %  Max: 100 %  Oxygen Amount and Delivery:  O2 Flow Rate (L/min): 6 L/min      Wt Readings from Last 3 Encounters:   21 199 lb (90.3 kg)   20 213 lb (96.6 kg)   20 213 lb (96.6 kg)       I/O (24 Hours)    Intake/Output Summary (Last 24 hours) at 2021 1456  Last data filed at 2021 1453  Gross per 24 hour   Intake 800 ml   Output 2575 ml   Net -1775 ml     EXAM  General Appearance  Alert, Oriented, and Cooperative = 0constantoriented to person, place, time, and general circumstances  HEENT - Normal, Head is normocephalic, atraumatic.    Neck - Supple, symmetrical, at 30 degreesNo cervical, supraclavicular or axillary lymphadenopathy noted. Lungs - positive findings: AP diameter of chest increased. Thoracic expansion and diaphragmatic excursion diminished. BS diminished and expiratory phase prolonged. No dullness to percussion or tenderness to palpation.    Cardiovascular - Cor RRR and No murmurs                                                                            \"}  Abdomen - soft, nontender, no HSM, no guarding, no rebound, no masses  Neurologic - There are no focal motor or sensory deficits  Skin - No bruising or bleeding  Extremities - No cyanosis, clubbing Trace and none    MEDS   insulin lispro  5 Units Subcutaneous TID WC    insulin lispro  0-18 Units Subcutaneous TID WC    insulin lispro  0-9 Units Subcutaneous Nightly    insulin glargine  40 Units Subcutaneous Nightly    insulin glargine  15 Units Subcutaneous Once    [START ON 5/31/2021] bumetanide  2 mg Oral Daily    azithromycin  250 mg Oral Daily    predniSONE  40 mg Oral Daily    sodium chloride flush  5-40 mL Intravenous 2 times per day    ipratropium-albuterol  1 ampule Inhalation Q4H WA    apixaban  5 mg Oral BID    budesonide-formoterol  2 puff Inhalation BID    dilTIAZem  180 mg Oral Daily    pantoprazole  40 mg Oral Daily    pregabalin  225 mg Oral BID    spironolactone  25 mg Oral Daily    cefTRIAXone (ROCEPHIN) IV  1,000 mg Intravenous Q24H    sennosides-docusate sodium  1 tablet Oral Daily    nicotine  1 patch Transdermal Daily      sodium chloride      dextrose       dextromethorphan-guaiFENesin, sodium chloride flush, sodium chloride, promethazine **OR** ondansetron, polyethylene glycol, acetaminophen **OR** acetaminophen, glucose, dextrose, glucagon (rDNA), dextrose, potassium chloride **OR** potassium alternative oral replacement **OR** potassium chloride, magnesium sulfate    LABS  CBC   Recent Labs     05/29/21  0554   WBC 10.8   HGB 13.6   HCT 40.4*   MCV 97.8    ASSESSMENT  Patient Active Problem List   Diagnosis    Neuropathic pain    Chronic bilateral low back pain without sciatica    Lumbosacral spondylosis without myelopathy    DDD (degenerative disc disease), lumbar    Lumbar facet joint syndrome    Panniculitis involving lumbar region    Ventricular tachycardia (HCC)    Acute on chronic respiratory failure with hypoxia and hypercapnia (HCC)    Pneumonia of left lower lobe due to infectious organism    PAF (paroxysmal atrial fibrillation) (HCC)    Pulmonary hypertension with chronic cor pulmonale (HCC)    RODNEY treated with BiPAP    Smoking greater than 40 pack years    Class 1 obesity due to excess calories with serious comorbidity and body mass index (BMI) of 30.0 to 30.9 in adult    Stage 4 very severe COPD by GOLD classification ([address]    Hyponatremia    Uncontrolled type 2 diabetes mellitus with hyperglycemia ([address]       PLAN  1. Wean oxygen as tolerated. Keep O2 sat 90-92%  2. Complete smoking cessation. All attempts at treating his lung disease are doomed to failure without this critical step. 3. Strongly encouraged pulmonary rehab program at [facility]. Improves quality of life. 4. Prednisone 40 mg daily for 5 days then discontinue. Does not use prednisone chronically. 5. Switch to oral antibiotics to complete 7 days. 6. Home oxygen. 7. Continue bronchodilators including LABA/LAMA/ICS. 8. Continue BiPAP per home routine. 9. Okay for discharge in next 24-48 hours. Recommend he follow-up with Dr. Saul Albert for pulmonary care.         Electronically signed by Rony Logan DO on 5/30/2021 at 2:56 PM

## 2021-05-30 NOTE — PROGRESS NOTES
[facility]  Internal Medicine Teaching Residency Program  Inpatient Daily Progress Note  ______________________________________________________________________________    Patient: Eli Johnston  YOB: 1950   EOZ:[id number]    Acct: [de-identified]     Room: [address] [place] [address]  Admit date: 5/28/2021  Today's date: 05/30/21  Number of days in the hospital: 2    SUBJECTIVE   Admitting Diagnosis: Acute on chronic respiratory failure with hypoxia and hypercapnia (HCC)  CC: Respiratory distress and ventricular tachycardia    Pt examined at bedside. Chart & results reviewed. No acute events overnight  Blood sugars are elevated,  Wean down to 6 L nasal cannula  No new complaints  We will titrate Lasix dosage and Lantus for controlling your blood sugars      ROS:  Constitutional:  negative for chills, fevers, sweats  Respiratory:  negative for cough, dyspnea on exertion, hemoptysis, shortness of breath, wheezing  Cardiovascular:  negative for chest pain, chest pressure/discomfort, lower extremity edema, palpitations  Gastrointestinal:  negative for abdominal pain, constipation, diarrhea, nausea, vomiting  Neurological:  negative for dizziness, headache  BRIEF HISTORY      Patient is a 71-year-old male brought in to [place] [address] as a transfer from [facility] ER after an episode of V. tach and respiratory distress. He was admitted in the hospital for a week due to dyspnea and was treated with IV steroids bronchodilator and diuretic therapy. Patient had worsening dyspnea for 2 days without any improvement with increased oxygen demands. Patient had 43-second run of V. tach and was subsequently given magnesium, amiodarone bolus and was started on drip.   Patient was then transferred to [place] [address] for further monitoring.     In [place] [address] ICU patient was found to be in respiratory distress, Chest x-ray showed left lower lobe infiltrate     Initially treated for A. fib with RVR and he is V. tach episode at [facility] ER most likely deemed to be aberrant conduction with given history of atrial fibrillation as per cardiology and recommends treatment with IV diuretics and Lasix and transition to home Bumex once CHF improved.       Patient was treated for acute exacerbation of COPD and community-acquired pneumonia with IV ceftriaxone and azithromycin. Patient was found to be having hyponatremia with sodium of 119 improved to 122--> 125 today. Patient blood glucose was running high while he was in ICU likely secondary to being on  oral steroids        ROS : Negative for any chest pain, headache, dizziness, abdominal pain, bowel and bladder problems, weight loss     Social history :   Smoking :        Current everyday smoker 1 pack daily                          Alcohol :          Patient denies current alcohol intake               Recreational drug : Denies any recreational drug abuse    OBJECTIVE     Vital Signs:  /77   Pulse 96   Temp 97.9 °F (36.6 °C) (Axillary)   Resp 20   Ht 5' 10\" (1.778 m)   Wt 199 lb (90.3 kg)   SpO2 95%   BMI 28.55 kg/m²     Temp (24hrs), Av.9 °F (36.6 °C), Min:97.5 °F (36.4 °C), Max:98.1 °F (36.7 °C)    In: 350   Out: 1650 [Urine:1650]    Physical Exam:    Constitutional: This is a well developed, well nourished, 25-29.9 - Overweight 66y.o. year old male who is alert, oriented, cooperative and in no apparent distress. Head:normocephalic and atraumatic. EENT:  PERRLA. No conjunctival injections. Septum was midline, mucosa was without erythema, exudates or cobblestoning. No thrush was noted. Neck: Supple without thyromegaly. No elevated JVP. Trachea was midline. Respiratory: Chest was symmetrical without dullness to percussion. Breath sounds bilaterally were clear to auscultation. There were no wheezes, rhonchi or rales. There is no intercostal retraction or use of accessory muscles. No egophony noted.    Cardiovascular: Regular without murmur, clicks, gallops or rubs. Abdomen: Slightly rounded and soft without organomegaly. No rebound, rigidity or guarding was appreciated. Lymphatic: No lymphadenopathy. Musculoskeletal: Normal curvature of the spine. No gross muscle weakness. Extremities:  No lower extremity edema, ulcerations, tenderness, varicosities or erythema. Muscle size, tone and strength are normal.  No involuntary movements are noted. Skin:  Warm and dry. Good color, turgor and pigmentation. No lesions or scars.   No cyanosis or clubbing  Neurological/Psychiatric: The patient's general behavior, level of consciousness, thought content and emotional status is normal.        Medications:  Scheduled Medications:    insulin glargine  35 Units Subcutaneous Nightly    insulin lispro  5 Units Subcutaneous TID WC    furosemide  40 mg Intravenous Once    azithromycin  250 mg Oral Daily    predniSONE  40 mg Oral Daily    sodium chloride flush  5-40 mL Intravenous 2 times per day    ipratropium-albuterol  1 ampule Inhalation Q4H WA    apixaban  5 mg Oral BID    budesonide-formoterol  2 puff Inhalation BID    dilTIAZem  180 mg Oral Daily    pantoprazole  40 mg Oral Daily    pregabalin  225 mg Oral BID    spironolactone  25 mg Oral Daily    insulin lispro  0-12 Units Subcutaneous TID WC    insulin lispro  0-6 Units Subcutaneous Nightly    cefTRIAXone (ROCEPHIN) IV  1,000 mg Intravenous Q24H    sennosides-docusate sodium  1 tablet Oral Daily    nicotine  1 patch Transdermal Daily     Continuous Infusions:    sodium chloride      dextrose       PRN Medicationsdextromethorphan-guaiFENesin, 10 mL, Q4H PRN  sodium chloride flush, 5-40 mL, PRN  sodium chloride, 25 mL, PRN  promethazine, 12.5 mg, Q6H PRN   Or  ondansetron, 4 mg, Q6H PRN  polyethylene glycol, 17 g, Daily PRN  acetaminophen, 650 mg, Q6H PRN   Or  acetaminophen, 650 mg, Q6H PRN  glucose, 15 g, PRN  dextrose, 12.5 g, PRN  glucagon (rDNA), 1 mg, PRN  dextrose, 100 mL/hr, PRN  potassium chloride, 40 mEq, PRN   Or  potassium alternative oral replacement, 40 mEq, PRN   Or  potassium chloride, 10 mEq, PRN  magnesium sulfate, 1,000 mg, PRN        Diagnostic Labs:  CBC:   Recent Labs     05/28/21  0508 05/29/21  0135 05/29/21  0554   WBC  --  10.4 10.8   RBC  --  4.03* 4.13*   HGB 13.1 13.2 13.6   HCT 39.3* 39.1* 40.4*   MCV  --  97.0 97.8   RDW  --  13.1 13.1   PLT  --  160 174     BMP:   Recent Labs     05/28/21  0508 05/28/21  2309 05/29/21  0554 05/29/21  2140 05/30/21  0038 05/30/21  0448   * 122* 119* 126* 126* 125*   K 2.9* 3.7 3.2*  --   --  3.7   CL 76* 73* 73*  --   --  78*   CO2 37* 38* 39*  --   --  39*   PHOS 3.3  --  3.3  --   --  3.3   BUN 39* 37* 32*  --   --  35*   CREATININE 0.98 0.85 0.67*  --   --  0.77     BNP: No results for input(s): BNP in the last 72 hours. PT/INR: No results for input(s): PROTIME, INR in the last 72 hours. APTT: No results for input(s): APTT in the last 72 hours. CARDIAC ENZYMES: No results for input(s): CKMB, CKMBINDEX, TROPONINI in the last 72 hours. Invalid input(s): CKTOTAL;3  FASTING LIPID PANEL:No results found for: CHOL, HDL, TRIG  LIVER PROFILE:   Recent Labs     05/28/21  0508   AST 43*   ALT 47*   BILIDIR 1.51*   BILITOT 1.97*   ALKPHOS 178*      MICROBIOLOGY:   Lab Results   Component Value Date/Time    CULTURE NO GROWTH 1 DAY 05/29/2021 01:35 AM       Imaging:    XR CHEST PORTABLE    Result Date: 5/30/2021  1. Stable pleuroparenchymal changes on the left. While some of this may represent acute infiltrate, the appearance is more suggestive of underlying chronic pleural thickening with atelectasis/scarring. Correlation with any clinical findings of pneumonia recommended, and comparison with any old prior chest radiographs would be helpful 2.  Probable pulmonary venous hypertension with no evidence of pulmonary edema     XR CHEST PORTABLE    Result Date: 5/28/2021  Opacities in the mid and lower left lung field may represent developing infiltrate related to infection or inflammatory process. ASSESSMENT & PLAN     ASSESSMENT / PLAN:     Principal Problem:    Acute on chronic respiratory failure with hypoxia and hypercapnia (HCC)  Active Problems:    Ventricular tachycardia (HCC)    Pneumonia of left lower lobe due to infectious organism    PAF (paroxysmal atrial fibrillation) (HCC)    Pulmonary hypertension with chronic cor pulmonale (HCC)    RODNEY treated with BiPAP    Smoking greater than 40 pack years    Class 1 obesity due to excess calories with serious comorbidity and body mass index (BMI) of 30.0 to 30.9 in adult    Stage 4 very severe COPD by GOLD classification ([facility]    Hyponatremia    Uncontrolled type 2 diabetes mellitus with hyperglycemia ([facility]  Resolved Problems:    * No resolved hospital problems. *    Acute on chronic hypoxic respiratory failure likely secondary to CHF exacerbation, atrial flutter/COPD-continue with  nasal cannula oxygen, wean as tolerated to nasal cannula home dose 2 L oxygen. Continue with oral prednisone 40 mg for 5 doses, continue with Symbicort, DuoNeb     Paroxysmal atrial fibrillation-rate controlled with diltiazem 180 mg, anticoagulated with Eliquis 5 mg.     Community-acquired pneumonia-continue with IV Rocephin 1 g every 24 for 5 days, azithromycin 250 mg oral daily for 4 days, follow-up on blood cultures, monitor CBC, taper steroids.     Diastolic congestive heart failure-last echo 5/17/2021 showing EF of 55 to 60% with mild mitral regurgitation-continue with IV furosemide  and Aldactone 25 mg oral, input output monitoring.   Follow-up on 2D echo     Hyponatremia likely secondary to fluid congestion-follow-up on electrolyte panel every 4, sodium currently is at 125, fluid restriction to 1200 mL, urine studies negative for SIADH.     Obstructive sleep apnea-we will continue home BiPAP/CPAP.     Chronic smoking-advised patient to stop smoking, nicotine patch 7 mg / 24 hours

## 2021-05-30 NOTE — PLAN OF CARE
Problem: Skin Integrity:  Goal: Will show no infection signs and symptoms  Description: Will show no infection signs and symptoms  5/30/2021 0438 by Sana Rodriguez RN  Outcome: Met This Shift  5/29/2021 1702 by Parker Guadarrama RN  Outcome: Met This Shift  Goal: Absence of new skin breakdown  Description: Absence of new skin breakdown  5/30/2021 0438 by Sana Rodriguez RN  Outcome: Met This Shift  5/29/2021 1702 by Parker Guadarrama RN  Outcome: Met This Shift     Problem: Falls - Risk of:  Goal: Will remain free from falls  Description: Will remain free from falls  5/30/2021 0438 by Sana Rodriguez RN  Outcome: Met This Shift  5/29/2021 1702 by Parker Guadarrama RN  Outcome: Met This Shift  Goal: Absence of physical injury  Description: Absence of physical injury  5/30/2021 0438 by Sana Rodriguez RN  Outcome: Met This Shift  5/29/2021 1702 by Parker Guadarrama RN  Outcome: Met This Shift     Problem: Cardiac Output - Decreased:  Goal: Hemodynamic stability will improve  Description: Hemodynamic stability will improve  5/30/2021 0438 by Sana Rodriguez RN  Outcome: Met This Shift  5/29/2021 1702 by Parker Guadarrama RN  Outcome: Met This Shift     Problem: Fluid Volume - Imbalance:  Goal: Absence of imbalanced fluid volume signs and symptoms  Description: Absence of imbalanced fluid volume signs and symptoms  5/30/2021 0438 by Sana Rodriguez RN  Outcome: Met This Shift  5/29/2021 1702 by Parker Guadarrama RN  Outcome: Not Met This Shift     Problem: Gas Exchange - Impaired:  Goal: Levels of oxygenation will improve  Description: Levels of oxygenation will improve  5/30/2021 0438 by Sana Rodriguez RN  Outcome: Met This Shift  5/29/2021 1702 by Parker Guadarrama RN  Outcome: Not Met This Shift     Problem: Serum Glucose Level - Abnormal:  Goal: Ability to maintain appropriate glucose levels will improve to within specified parameters  Description: Ability to maintain appropriate glucose levels will improve to within specified parameters  5/30/2021 0438 by Liz Bethea RN  Outcome: Met This Shift  5/29/2021 1702 by Joel Cerna RN  Outcome: Not Met This Shift     Problem: Skin Integrity - Impaired:  Goal: Will show no infection signs and symptoms  Description: Will show no infection signs and symptoms  5/30/2021 0438 by Liz Bethea RN  Outcome: Met This Shift  5/29/2021 1702 by Joel Cerna RN  Outcome: Met This Shift  Goal: Absence of new skin breakdown  Description: Absence of new skin breakdown  5/30/2021 0438 by Liz Bethea RN  Outcome: Met This Shift  5/29/2021 1702 by Joel Cerna RN  Outcome: Met This Shift     Problem: Tissue Perfusion - Cardiopulmonary, Altered:  Goal: Absence of angina  Description: Absence of angina  5/30/2021 0438 by Liz Bethea RN  Outcome: Met This Shift  5/29/2021 1702 by Joel Cerna RN  Outcome: Met This Shift  Goal: Hemodynamic stability will improve  Description: Hemodynamic stability will improve  5/30/2021 0438 by Liz Bethea RN  Outcome: Met This Shift  5/29/2021 1702 by Joel Cerna RN  Outcome: Ongoing

## 2021-05-30 NOTE — PROGRESS NOTES
[facility]   Progress Note                   Date:   5/30/2021  Patient name: Brodie Rodas  Date of admission:  5/28/2021  4:09 AM  MRN:   [id number]  YOB: 1950  PCP: Erlinda Anderson    Reason for Admission: Ventricular tachycardia ([address] [I47.2]    Subjective:       Clinical Changes / Abnormalities: Pt seen and examined in room. Pt continues to be sob. No VT overnight.   Pt denies any CP       Medications:   Scheduled Meds:   azithromycin  250 mg Oral Daily    predniSONE  40 mg Oral Daily    furosemide  20 mg Intravenous Daily    insulin glargine  15 Units Subcutaneous Nightly    sodium chloride flush  5-40 mL Intravenous 2 times per day    ipratropium-albuterol  1 ampule Inhalation Q4H WA    apixaban  5 mg Oral BID    budesonide-formoterol  2 puff Inhalation BID    dilTIAZem  180 mg Oral Daily    pantoprazole  40 mg Oral Daily    pregabalin  225 mg Oral BID    spironolactone  25 mg Oral Daily    insulin lispro  0-12 Units Subcutaneous TID WC    insulin lispro  0-6 Units Subcutaneous Nightly    cefTRIAXone (ROCEPHIN) IV  1,000 mg Intravenous Q24H    sennosides-docusate sodium  1 tablet Oral Daily    nicotine  1 patch Transdermal Daily     Continuous Infusions:   sodium chloride      dextrose       CBC:   Recent Labs     05/28/21  0508 05/29/21  0135 05/29/21  0554   WBC  --  10.4 10.8   HGB 13.1 13.2 13.6   PLT  --  160 174     BMP:    Recent Labs     05/28/21  0508 05/28/21  2309 05/29/21  0554 05/29/21  1738 05/29/21  2140 05/30/21  0038   * 122* 119* 122* 126* 126*   K 2.9* 3.7 3.2*  --   --   --    CL 76* 73* 73*  --   --   --    CO2 37* 38* 39*  --   --   --    BUN 39* 37* 32*  --   --   --    CREATININE 0.98 0.85 0.67*  --   --   --    GLUCOSE 320* 336* 344*  --   --   --      Hepatic:   Recent Labs     05/28/21  0508   AST 43*   ALT 47*   BILITOT 1.97*   ALKPHOS 178*     Troponin:   Recent Labs     05/28/21  1036 05/28/21  1626 05/28/21  4291 TROPHS 45* 37* 32*     BNP: No results for input(s): BNP in the last 72 hours. Lipids: No results for input(s): CHOL, HDL in the last 72 hours. Invalid input(s): LDLCALCU  INR: No results for input(s): INR in the last 72 hours. EKG:   Typical Atrial flutter with variable conduction, incomplete RBBB     ECHO:   5/17/2021   Left Ventricle : Left ventricular systolic function is grossly        normal. No regional wall motion abnormalities noted. LVEF is 55-60%.      Transmitral Doppler flow pattern suggests grade II diastolic      dysfunction      Mitral Valve : Trace to mild mitral regurgitation.     LA- mild dilated      Holter 5/2021  Patient remained in atrial fibrillation throughout the recording with   average heart rate of 75 beats per minute suggestive adequate rate control   in atrial flutter.  Minimum heart was 40 beats per minute and maximum   heart of 128 beats per minute.       There were 117 (0.1%) wide complex beats which is 2 triplets and 10   couplets. Longest RR interval was 2.3 seconds. Patient reported 2 diary entries with symptoms of shortness of breath at   the time patient was in atrial flutter with a heart rate of 68 and 76   respectively.    Objective:   Vitals: /77   Pulse 85   Temp 97.8 °F (36.6 °C) (Oral)   Resp 18   Ht 5' 10\" (1.778 m)   Wt 199 lb (90.3 kg)   SpO2 97%   BMI 28.55 kg/m²   General appearance: alert and cooperative with exam  HEENT: Head: Normocephalic, no lesions, without obvious abnormality. Neck: no JVD, trachea midline, no adenopathy  Lungs: Crackles in bases  to auscultation on 02 per NC   Heart: Regular rate and rhythm, s1/s2 auscultated, no murmurs  Abdomen: soft, non-tender, bowel sounds active  Extremities: + 1 bilateral  edema  Neurologic: not done        Assessment / Acute Cardiac Problems:   1. Acute on chronic respiratory failure with hypoxia due to severe COPD with exacerbation. 2. Persistent atrial flutter. 3. Paroxysmal A. fib. Recent Holter from 5/2021 showed persistent A. fib with rate controlled. 4. ?  Episode of VT. No evidence on rhythm strips from [facility]. 5. Preserved LV function on 5/2021  6. CAD s/p CABG 2005. 7. Severe COPD with acute exacerbation. 8. Left lower lobe pneumonia  9. Acute on chronic diastolic CHF. 10. Mildly elevated troponins-likely from demand ischemia in setting of COPD exacerbation and pneumonia  11. Hypokalemia. Resolved. 12. Hyponatremia  13. Hypertension    Patient Active Problem List:     Neuropathic pain     Chronic bilateral low back pain without sciatica     Lumbosacral spondylosis without myelopathy     DDD (degenerative disc disease), lumbar     Lumbar facet joint syndrome     Panniculitis involving lumbar region     Ventricular tachycardia (HCC)     Acute on chronic respiratory failure with hypoxia and hypercapnia (HCC)     Pneumonia of left lower lobe due to infectious organism     PAF (paroxysmal atrial fibrillation) ([facility] [address]     Pulmonary hypertension with chronic cor pulmonale (HCC)     RODNEY treated with BiPAP     Smoking greater than 40 pack years     Class 1 obesity due to excess calories with serious comorbidity and body mass index (BMI) of 30.0 to 30.9 in adult     Stage 4 very severe COPD by GOLD classification ([facility] [address]     Hyponatremia      Plan of Treatment:   1. Afib. Continue Cardizem and eliquis. BB on hold. Continue Eliquis   2. CHF. Remains fluid overload. Continue IV diuresis.     3. Keep K> 4 and Mag > 2     Electronically signed by ALISON Jacobson CNP on 5/30/2021 at 4:58 AM  [address]  [phone]

## 2021-05-30 NOTE — PROGRESS NOTES
ICU PATIENT TRANSFER NOTE        Patient:  Eli Johnston  YOB: 1950    MRN: [id number]     Acct: [de-identified]     Admit date: 5/28/2021    Code Status:-Full code  Reason for ICU Admission:-   Acute on chronic hypoxic respiratory failure    SUPPORT DEVICES: [] Ventilator [] BIPAP  [] Nasal Cannula [] Room Air, high flow nasal cannula    Consultations:- [x] Cardiology [] Nephrology  [] Hemo onco  [] GI                               [] ID [] ENT  [] Rheum [] Endo   []Physiotherapy                                 Others:-    NUTRITION:  [] NPO [] Tube Feeding (Specify: ) [] TPN  [x] PO    Central Lines:- [] No   [] Yes           If yes - Days/Date of Insertion       Pt seen and Chart reviewed. ICU COURSE :-      Chief Complaint : Respiratory distress and ventricular tachycardia           Significant Past Medical History :     · Stage IV COPD on 2 L of home oxygen  · Paroxysmal atrial fibrillation  · Essential hypertension  · Coronary artery disease with stents placed in the past  · Congestive heart failure  · RODNEY  · Chronic smoker  · Bladder cancer    HPI: Patient is a 71-year-old male brought in to [place] [address] as a transfer from [facility] ER after an episode of V. tach and respiratory distress. He was admitted in the hospital for a week due to dyspnea and was treated with IV steroids bronchodilator and diuretic therapy. Patient had worsening dyspnea for 2 days without any improvement with increased oxygen demands. Patient had 43-second run of V. tach and was subsequently given magnesium, amiodarone bolus and was started on drip. Patient was then transferred to [place] [address] for further monitoring.     In [place] [address] ICU patient was found to be in respiratory distress, Chest x-ray showed left lower lobe infiltrate    Initially treated for A. fib with RVR and he is V. tach episode at [facility] ER most likely deemed to be aberrant conduction with given history of atrial fibrillation as per cardiology and recommends treatment with IV diuretics and Lasix and transition to home Bumex once CHF improved. Patient was treated for acute exacerbation of COPD and community-acquired pneumonia with IV ceftriaxone and azithromycin. Patient was found to be having hyponatremia with sodium of 119 improved to 122--> 125 today. Patient blood glucose was running high while he was in ICU likely secondary to being on  oral steroids      ROS : Negative for any chest pain, headache, dizziness, abdominal pain, bowel and bladder problems, weight loss    Social history :   Smoking : Current everyday smoker 1 pack daily     Alcohol :  Patient denies current alcohol intake    Recreational drug : Denies any recreational drug abuse     Physical Exam:  Vitals: /77   Pulse 85   Temp 97.8 °F (36.6 °C) (Oral)   Resp 18   Ht 5' 10\" (1.778 m)   Wt 199 lb (90.3 kg)   SpO2 97%   BMI 28.55 kg/m²   24 hour intake/output:    Intake/Output Summary (Last 24 hours) at 5/30/2021 0624  Last data filed at 5/30/2021 0256  Gross per 24 hour   Intake 566 ml   Output 2625 ml   Net -2059 ml     Last 3 weights: Wt Readings from Last 3 Encounters:   05/29/21 199 lb (90.3 kg)   12/02/20 213 lb (96.6 kg)   11/04/20 213 lb (96.6 kg)       General appearance: alert and cooperative with exam, high flow nasal cannula oxygen. HEENT: Head: Normocephalic, no lesions, without obvious abnormality.   Neck: no adenopathy, no carotid bruit, no JVD, supple, symmetrical, trachea midline and thyroid not enlarged, symmetric, no tenderness/mass/nodules  Lungs: diminished breath sounds All over, rales bilaterally and wheezes Apical lobes  Heart: irregularly irregular rhythm and S1, S2 normal  Abdomen: soft, non-tender; bowel sounds normal; no masses,  no organomegaly  Extremities: edema Bilateral lower extremities 2+ and extremities normal, atraumatic, no cyanosis or edema  Neurologic: Mental status: Alert, oriented, thought content appropriate    Medications:Current Inpatient  Scheduled Meds:   azithromycin  250 mg Oral Daily    predniSONE  40 mg Oral Daily    furosemide  20 mg Intravenous Daily    insulin glargine  15 Units Subcutaneous Nightly    sodium chloride flush  5-40 mL Intravenous 2 times per day    ipratropium-albuterol  1 ampule Inhalation Q4H WA    apixaban  5 mg Oral BID    budesonide-formoterol  2 puff Inhalation BID    dilTIAZem  180 mg Oral Daily    pantoprazole  40 mg Oral Daily    pregabalin  225 mg Oral BID    spironolactone  25 mg Oral Daily    insulin lispro  0-12 Units Subcutaneous TID     insulin lispro  0-6 Units Subcutaneous Nightly    cefTRIAXone (ROCEPHIN) IV  1,000 mg Intravenous Q24H    sennosides-docusate sodium  1 tablet Oral Daily    nicotine  1 patch Transdermal Daily     Continuous Infusions:   sodium chloride      dextrose       PRN Meds:dextromethorphan-guaiFENesin, sodium chloride flush, sodium chloride, promethazine **OR** ondansetron, polyethylene glycol, acetaminophen **OR** acetaminophen, glucose, dextrose, glucagon (rDNA), dextrose, potassium chloride **OR** potassium alternative oral replacement **OR** potassium chloride, magnesium sulfate    Objective:    CBC:   Recent Labs     05/28/21  0508 05/29/21  0135 05/29/21  0554   WBC  --  10.4 10.8   HGB 13.1 13.2 13.6   PLT  --  160 174     BMP:    Recent Labs     05/28/21  2309 05/29/21  0554 05/29/21  2140 05/30/21  0038 05/30/21  0448   * 119* 126* 126* 125*   K 3.7 3.2*  --   --  3.7   CL 73* 73*  --   --  78*   CO2 38* 39*  --   --  39*   BUN 37* 32*  --   --  35*   CREATININE 0.85 0.67*  --   --  0.77   GLUCOSE 336* 344*  --   --  351*     Calcium:  Recent Labs     05/30/21  0448   CALCIUM 9.3     Ionized Calcium:No results for input(s): IONCA in the last 72 hours.   Magnesium:  Recent Labs     05/30/21  0448   MG 2.1     Phosphorus:  Recent Labs     05/30/21  0448   PHOS 3.3     BNP:No results for input(s): BNP in the last 72 hours. Glucose:  Recent Labs     05/29/21  1712 05/29/21  1830 05/30/21  0250   POCGLU 429* 419* 351*     HgbA1C: No results for input(s): LABA1C in the last 72 hours. INR: No results for input(s): INR in the last 72 hours. Hepatic:   Recent Labs     05/28/21  0508   ALKPHOS 178*   ALT 47*   AST 43*   PROT 6.3*   BILITOT 1.97*   BILIDIR 1.51*   LABALBU 3.0*     Amylase and Lipase:No results for input(s): LACTA, AMYLASE in the last 72 hours. Lactic Acid: No results for input(s): LACTA in the last 72 hours. CARDIAC ENZYMES:No results for input(s): CKTOTAL, CKMB, CKMBINDEX, TROPONINI in the last 72 hours. BNP: No results for input(s): BNP in the last 72 hours. Lipids: No results for input(s): CHOL, TRIG, HDL, LDLCALC in the last 72 hours. Invalid input(s): LDL  ABGs: No results found for: PH, PCO2, PO2, HCO3, O2SAT  Thyroid:   Lab Results   Component Value Date    TSH 0.53 05/28/2021      Urinalysis:   Recent Labs     05/29/21  0111   BACTERIA NOT REPORTED   COLORU YELLOW   PHUR 6.5   PROTEINU TRACE*   RBCUA 0 TO 2   SPECGRAV 1.012   BILIRUBINUR NEGATIVE   NITRU NEGATIVE   WBCUA None   LEUKOCYTESUR NEGATIVE   GLUCOSEU 3+*       CULTURES:     Blood and urine cultures negative for now      EKG: Atrial flutter with variable AV block, right bundle branch block. ECHO: not obtained. Stress Test: not obtained. Cardiac Angiography: not obtained.       Current Rehabilitation Assessments:  PHYSICAL THERAPY: Consulted pending final recommendations  OCCUPATIONAL THERAPY: Consulted pending final recommendations  SPEECH: Not on board    Assessment: & Plan:     Principal Problem:    Acute on chronic respiratory failure with hypoxia and hypercapnia (HCC)  Active Problems:    Ventricular tachycardia (HCC)    Pneumonia of left lower lobe due to infectious organism    PAF (paroxysmal atrial fibrillation) (HCC)    Pulmonary hypertension with chronic cor pulmonale (HCC)    RODNEY treated with BiPAP    Smoking greater than 40 pack years    Class 1 obesity due to excess calories with serious comorbidity and body mass index (BMI) of 30.0 to 30.9 in adult    Stage 4 very severe COPD by GOLD classification ([facility] [address]    Hyponatremia  Resolved Problems:    * No resolved hospital problems. *    Acute on chronic hypoxic respiratory failure likely secondary to CHF exacerbation, atrial flutter/COPD-continue with high flow nasal cannula oxygen, wean as tolerated to nasal cannula home dose 2 L oxygen. Continue with oral prednisone 40 mg for 5 doses, continue with Symbicort, DuoNeb    Paroxysmal atrial fibrillation-rate controlled with diltiazem 180 mg, anticoagulated with Eliquis 5 mg. Community-acquired pneumonia-continue with IV Rocephin 1 g every 24 for 5 days, azithromycin 250 mg oral daily for 4 days, follow-up on blood cultures, monitor CBC, taper steroids. Diastolic congestive heart failure-last echo 5/17/2021 showing EF of 55 to 60% with mild mitral regurgitation-continue with IV furosemide 25 mg and Aldactone 25 mg oral, input output monitoring. Hyponatremia likely secondary to fluid congestion-follow-up on electrolyte panel every 4, sodium currently is at 125, fluid restriction to 1200 mL, urine studies negative for SIADH    Obstructive sleep apnea-we will continue home BiPAP/CPAP    Chronic smoking-advised patient to stop smoking, nicotine patch 7 mg / 24 hours    Type 2 diabetes mellitus-blood glucose uncontrolled, patient received total of 68 units of Lantus in the last 24 hours, patient also received intermittent doses of Lantus overnight. We will continue him on 35 units of Lantus nightly along with 5 units premeal and a one-time dose of 20 units of Lantus this morning, medium dose sliding scale .   Will adjust Lantus accordingly based on blood glucose as the patient is on oral steroids Follow-up on repeat HbA1c, patient has diabetic neuropathy currently continued on Lyrica to 225      DVT prophylaxis Eliquis  PT OT consulted   to help with discharging the patient      Martell Ritter MD  Internal Medicine Resident, [facility];  [place], [place]  5/30/2021, 6:59 AM

## 2021-05-30 NOTE — CARE COORDINATION
Case Management Initial Discharge Plan  Giovanna Adams,             Met with:patient to discuss discharge plans. Information verified: address, contacts, phone number, , insurance Yes    Emergency Contact/Next of Kin name & number: Valeria Bill [phone]    PCP: Enrique Anand  Date of last visit: 1mo    Insurance Provider: Medicare/Health Plan [employer]    Discharge Planning    Living Arrangements:  Alone   Support Systems:  Family Members, [phone] has 2 stories  3 stairs to climb to get into front door, 14 stairs to climb to reach second floor  Location of bedroom/bathroom in home 1st    Patient able to perform ADL's:Independent    Current Services (outpatient & in home) none  DME equipment: home O2,bipap,nebulizer,walker,cane,shower chair  DME provider: [address] [place] [address]    Receiving oral anticoagulation therapy? Yes    If indicated:   Physician managing anticoagulation treatment: PCP  Where does patient obtain lab work for ATC treatment? N/A-eliquis      Potential Assistance Needed:  Home Care    Patient agreeable to home care: No  [place] of choice provided:  n/a    Prior SNF/Rehab Placement and Facility: [facility]  Agreeable to SNF/Rehab: No  [place] of choice provided: n/a     Evaluation: no    Expected Discharge date:  21    Patient expects to be discharged to:  home  Follow Up Appointment: Best Day/ Time:      Transportation provider: ex-wife Dru Bains  Transportation arrangements needed for discharge: No    Readmission Risk              Risk of Unplanned Readmission:  14             Does patient have a readmission risk score greater than 14?: No  If yes, follow-up appointment must be made within 7 days of discharge. Goals of Care: Heart in NSR      Discharge Plan: Home witgh ex-wife support. He has had home care in the past and doesn't want any.           Electronically signed by Wing Fry RN on 21 at 10:17 AM EDT

## 2021-05-30 NOTE — PROGRESS NOTES
Senior Note    Patient with past medical history of   - Chronic hypoxic respiratory failure secondary to COPD, pulm onary artery hypertension and  CHF  - RODNEY, on BiPAP  At night at home  - Paroxysmal atrial fibrillation, on cardizem 180 mg daily, toprol XL 25 mg daily and eliquis 5 mg BID (Recent hoter monitor s/o persistent atrial fibrillation)  - Stage IV COPD  - Pulmonary hypertension with cor pulmonale  - Diastolic CHF, as per last ECHO in 5/2021, EF 55-60%, grade II DD on bumex and aldactone  - Type II DM, unknown to the patient, HbA1c 7.2 in 2019, not on any medications at home     Patient was transferred from [facility] due to concern for acute on chronic hypoxic respiratory failure with runs of V. Tach, strips not available. Chest Xray was obtained that was suggestive of left lower lobe infiltrate and was started on I.v. antibiotics for the same. He was also treated fro acute exacerbation of COPD. Cardiology was also consulted due to concern for V tach, thought to be likely aberrant conduction in the context of chronic atrial fibrillation. He was diuresed with I.v. lasix, currently on 20 mg, plan to transition to home dose of bumex. ASSESSMENT AND PLAN :     Principal Problem:    Acute on chronic respiratory failure with hypoxia and hypercapnia (HCC)  Active Problems:    Ventricular tachycardia (HCC)    Pneumonia of left lower lobe due to infectious organism    PAF (paroxysmal atrial fibrillation) (HCC)    Pulmonary hypertension with chronic cor pulmonale (HCC)    RODNEY treated with BiPAP    Smoking greater than 40 pack years    Class 1 obesity due to excess calories with serious comorbidity and body mass index (BMI) of 30.0 to 30.9 in adult    Stage 4 very severe COPD by GOLD classification ([address]    Hyponatremia    Uncontrolled type 2 diabetes mellitus with hyperglycemia ([address]  Resolved Problems:    * No resolved hospital problems. *    1.  Acute on chronic hypoxic and hypercapnic respiratory failure secondary to COPD exacerbation, pneumonia and CHF exacerbation   - Continue rocephin and azithromycin for now, day 3 today. - Continue prednisone 40 mg daily  - Continue lasix 20 mg I.v. daily for now, transition to home dose of bumex. Continue aldactone 25 mg daily. - Continue hiflow during the day and BiPAP at night and PRN, wean down hiflow as tolerated, follow up Pulmonology recommendations. 2. Persistent atrial fibrillation with RVR  - Continue cardizem 180 mg daily, continue to hold BB.   - Continue eliquis for anticoagulation. 3. Hyperglycemia, no recent HbA1c, HbA1c in 2019 7.2, not on any medications  - Received 25 U lantus yesterday along with approx 40 U on the sliding scale, will give 20 U this am and start 35 U nightly along with 5 U premeal.   - Hypoglycemia protocol, POCT glucose checks. 4. Hyponatremia  - Hypervolemic, urine studies reviewed, likely secondary to volume overload. - Will give 2 doses of lasix 40 mg iv today, will continue to evaluate closely. Continue lasix 40 mg I.v. from tomorrow which is equivalent to his home dose of bumex 2 mg daily.  - Monitor Na q4.      5. Hypokalemia, hypocalcemia  - Replace as needed. 6. Transamnitis  - Likely secondary to congestive hepatopathy, follow up repeat LFTs today and liver US.      Vijay Luis MD      Department of Internal Medicine  [facility]         5/30/2021, 7:40 AM

## 2021-05-30 NOTE — FLOWSHEET NOTE
Report given to [facility], pt transported on monitor to room 3007. Belongings with pt include home O2 machine and cane.    Electronically signed by Josefina Keyes RN on 5/29/2021 at 10:39 PM

## 2021-05-31 LAB
ALLEN TEST: ABNORMAL
ANION GAP SERPL CALCULATED.3IONS-SCNC: 7 MMOL/L (ref 9–17)
BUN BLDV-MCNC: 29 MG/DL (ref 8–23)
BUN/CREAT BLD: ABNORMAL (ref 9–20)
CALCIUM IONIZED: 1.12 MMOL/L (ref 1.13–1.33)
CALCIUM SERPL-MCNC: 9 MG/DL (ref 8.6–10.4)
CARBOXYHEMOGLOBIN: 1.8 % (ref 0–5)
CHLORIDE BLD-SCNC: 82 MMOL/L (ref 98–107)
CO2: 43 MMOL/L (ref 20–31)
CREAT SERPL-MCNC: 0.51 MG/DL (ref 0.7–1.2)
FIO2: ABNORMAL
GFR AFRICAN AMERICAN: >60 ML/MIN
GFR NON-AFRICAN AMERICAN: >60 ML/MIN
GFR SERPL CREATININE-BSD FRML MDRD: ABNORMAL ML/MIN/{1.73_M2}
GFR SERPL CREATININE-BSD FRML MDRD: ABNORMAL ML/MIN/{1.73_M2}
GLUCOSE BLD-MCNC: 164 MG/DL (ref 70–99)
GLUCOSE BLD-MCNC: 184 MG/DL (ref 75–110)
GLUCOSE BLD-MCNC: 233 MG/DL (ref 75–110)
GLUCOSE BLD-MCNC: 286 MG/DL (ref 75–110)
GLUCOSE BLD-MCNC: 71 MG/DL (ref 75–110)
HCO3 VENOUS: 44.3 MMOL/L (ref 24–30)
MAGNESIUM: 2.2 MG/DL (ref 1.6–2.6)
METHEMOGLOBIN: ABNORMAL % (ref 0–1.5)
MODE: ABNORMAL
NEGATIVE BASE EXCESS, VEN: ABNORMAL MMOL/L (ref 0–2)
NOTIFICATION TIME: ABNORMAL
NOTIFICATION: ABNORMAL
O2 DEVICE/FLOW/%: ABNORMAL
O2 SAT, VEN: 98.6 % (ref 60–85)
OXYHEMOGLOBIN: ABNORMAL % (ref 95–98)
PATIENT TEMP: 37
PCO2, VEN, TEMP ADJ: ABNORMAL MMHG (ref 39–55)
PCO2, VEN: 69.6 (ref 39–55)
PEEP/CPAP: ABNORMAL
PH VENOUS: 7.42 (ref 7.32–7.42)
PH, VEN, TEMP ADJ: ABNORMAL (ref 7.32–7.42)
PHOSPHORUS: 2.4 MG/DL (ref 2.5–4.5)
PO2, VEN, TEMP ADJ: ABNORMAL MMHG (ref 30–50)
PO2, VEN: 124 (ref 30–50)
POSITIVE BASE EXCESS, VEN: 16.1 MMOL/L (ref 0–2)
POTASSIUM SERPL-SCNC: 3.6 MMOL/L (ref 3.7–5.3)
PSV: ABNORMAL
PT. POSITION: ABNORMAL
RESPIRATORY RATE: ABNORMAL
SAMPLE SITE: ABNORMAL
SET RATE: ABNORMAL
SODIUM BLD-SCNC: 132 MMOL/L (ref 135–144)
TEXT FOR RESPIRATORY: ABNORMAL
TOTAL HB: ABNORMAL G/DL (ref 12–16)
TOTAL RATE: ABNORMAL
VT: ABNORMAL

## 2021-05-31 PROCEDURE — 82330 ASSAY OF CALCIUM: CPT

## 2021-05-31 PROCEDURE — 99232 SBSQ HOSP IP/OBS MODERATE 35: CPT | Performed by: INTERNAL MEDICINE

## 2021-05-31 PROCEDURE — 94640 AIRWAY INHALATION TREATMENT: CPT

## 2021-05-31 PROCEDURE — 2700000000 HC OXYGEN THERAPY PER DAY

## 2021-05-31 PROCEDURE — 6370000000 HC RX 637 (ALT 250 FOR IP): Performed by: STUDENT IN AN ORGANIZED HEALTH CARE EDUCATION/TRAINING PROGRAM

## 2021-05-31 PROCEDURE — 80048 BASIC METABOLIC PNL TOTAL CA: CPT

## 2021-05-31 PROCEDURE — 82805 BLOOD GASES W/O2 SATURATION: CPT

## 2021-05-31 PROCEDURE — 2580000003 HC RX 258: Performed by: STUDENT IN AN ORGANIZED HEALTH CARE EDUCATION/TRAINING PROGRAM

## 2021-05-31 PROCEDURE — 83735 ASSAY OF MAGNESIUM: CPT

## 2021-05-31 PROCEDURE — 36415 COLL VENOUS BLD VENIPUNCTURE: CPT

## 2021-05-31 PROCEDURE — 2060000000 HC ICU INTERMEDIATE R&B

## 2021-05-31 PROCEDURE — 94761 N-INVAS EAR/PLS OXIMETRY MLT: CPT

## 2021-05-31 PROCEDURE — 84100 ASSAY OF PHOSPHORUS: CPT

## 2021-05-31 PROCEDURE — 6370000000 HC RX 637 (ALT 250 FOR IP): Performed by: NURSE PRACTITIONER

## 2021-05-31 PROCEDURE — 82947 ASSAY GLUCOSE BLOOD QUANT: CPT

## 2021-05-31 PROCEDURE — 6360000002 HC RX W HCPCS: Performed by: STUDENT IN AN ORGANIZED HEALTH CARE EDUCATION/TRAINING PROGRAM

## 2021-05-31 RX ORDER — POTASSIUM CHLORIDE 20 MEQ/1
40 TABLET, EXTENDED RELEASE ORAL ONCE
Status: COMPLETED | OUTPATIENT
Start: 2021-05-31 | End: 2021-05-31

## 2021-05-31 RX ORDER — INSULIN GLARGINE 100 [IU]/ML
20 INJECTION, SOLUTION SUBCUTANEOUS ONCE
Status: COMPLETED | OUTPATIENT
Start: 2021-05-31 | End: 2021-05-31

## 2021-05-31 RX ORDER — INSULIN GLARGINE 100 [IU]/ML
15 INJECTION, SOLUTION SUBCUTANEOUS ONCE
Status: DISCONTINUED | OUTPATIENT
Start: 2021-05-31 | End: 2021-06-01

## 2021-05-31 RX ORDER — INSULIN GLARGINE 100 [IU]/ML
60 INJECTION, SOLUTION SUBCUTANEOUS NIGHTLY
Status: DISCONTINUED | OUTPATIENT
Start: 2021-05-31 | End: 2021-05-31

## 2021-05-31 RX ORDER — LEVOFLOXACIN 500 MG/1
500 TABLET, FILM COATED ORAL DAILY
Status: DISCONTINUED | OUTPATIENT
Start: 2021-06-01 | End: 2021-06-01 | Stop reason: HOSPADM

## 2021-05-31 RX ORDER — INSULIN GLARGINE 100 [IU]/ML
20 INJECTION, SOLUTION SUBCUTANEOUS NIGHTLY
Status: DISCONTINUED | OUTPATIENT
Start: 2021-05-31 | End: 2021-05-31

## 2021-05-31 RX ORDER — INSULIN GLARGINE 100 [IU]/ML
45 INJECTION, SOLUTION SUBCUTANEOUS NIGHTLY
Status: DISCONTINUED | OUTPATIENT
Start: 2021-05-31 | End: 2021-06-01 | Stop reason: HOSPADM

## 2021-05-31 RX ADMIN — SODIUM CHLORIDE, PRESERVATIVE FREE 10 ML: 5 INJECTION INTRAVENOUS at 20:20

## 2021-05-31 RX ADMIN — PREGABALIN 225 MG: 75 CAPSULE ORAL at 08:00

## 2021-05-31 RX ADMIN — APIXABAN 5 MG: 5 TABLET, FILM COATED ORAL at 08:00

## 2021-05-31 RX ADMIN — INSULIN LISPRO 5 UNITS: 100 INJECTION, SOLUTION INTRAVENOUS; SUBCUTANEOUS at 07:42

## 2021-05-31 RX ADMIN — AZITHROMYCIN 250 MG: 250 TABLET, FILM COATED ORAL at 08:00

## 2021-05-31 RX ADMIN — IPRATROPIUM BROMIDE AND ALBUTEROL SULFATE 1 AMPULE: .5; 3 SOLUTION RESPIRATORY (INHALATION) at 13:11

## 2021-05-31 RX ADMIN — INSULIN LISPRO 10 UNITS: 100 INJECTION, SOLUTION INTRAVENOUS; SUBCUTANEOUS at 16:09

## 2021-05-31 RX ADMIN — CEFTRIAXONE SODIUM 1000 MG: 1 INJECTION, POWDER, FOR SOLUTION INTRAMUSCULAR; INTRAVENOUS at 11:59

## 2021-05-31 RX ADMIN — SODIUM CHLORIDE, PRESERVATIVE FREE 10 ML: 5 INJECTION INTRAVENOUS at 08:01

## 2021-05-31 RX ADMIN — DILTIAZEM HYDROCHLORIDE 180 MG: 180 CAPSULE, COATED, EXTENDED RELEASE ORAL at 08:00

## 2021-05-31 RX ADMIN — METOPROLOL TARTRATE 25 MG: 25 TABLET ORAL at 20:20

## 2021-05-31 RX ADMIN — BUDESONIDE AND FORMOTEROL FUMARATE DIHYDRATE 2 PUFF: 160; 4.5 AEROSOL RESPIRATORY (INHALATION) at 08:18

## 2021-05-31 RX ADMIN — PREDNISONE 40 MG: 20 TABLET ORAL at 08:00

## 2021-05-31 RX ADMIN — INSULIN LISPRO 6 UNITS: 100 INJECTION, SOLUTION INTRAVENOUS; SUBCUTANEOUS at 07:41

## 2021-05-31 RX ADMIN — INSULIN LISPRO 10 UNITS: 100 INJECTION, SOLUTION INTRAVENOUS; SUBCUTANEOUS at 11:58

## 2021-05-31 RX ADMIN — ACETAMINOPHEN 650 MG: 325 TABLET ORAL at 20:20

## 2021-05-31 RX ADMIN — APIXABAN 5 MG: 5 TABLET, FILM COATED ORAL at 20:20

## 2021-05-31 RX ADMIN — INSULIN LISPRO 3 UNITS: 100 INJECTION, SOLUTION INTRAVENOUS; SUBCUTANEOUS at 16:09

## 2021-05-31 RX ADMIN — POTASSIUM CHLORIDE 40 MEQ: 1500 TABLET, EXTENDED RELEASE ORAL at 10:34

## 2021-05-31 RX ADMIN — IPRATROPIUM BROMIDE AND ALBUTEROL SULFATE 1 AMPULE: .5; 3 SOLUTION RESPIRATORY (INHALATION) at 08:19

## 2021-05-31 RX ADMIN — DOCUSATE SODIUM 50MG AND SENNOSIDES 8.6MG 1 TABLET: 8.6; 5 TABLET, FILM COATED ORAL at 08:00

## 2021-05-31 RX ADMIN — DIPHENHYDRAMINE HYDROCHLORIDE 5 ML: 12.5 LIQUID ORAL at 18:20

## 2021-05-31 RX ADMIN — PREGABALIN 225 MG: 75 CAPSULE ORAL at 20:20

## 2021-05-31 RX ADMIN — PANTOPRAZOLE SODIUM 40 MG: 40 TABLET, DELAYED RELEASE ORAL at 08:00

## 2021-05-31 RX ADMIN — INSULIN LISPRO 9 UNITS: 100 INJECTION, SOLUTION INTRAVENOUS; SUBCUTANEOUS at 11:57

## 2021-05-31 RX ADMIN — INSULIN GLARGINE 20 UNITS: 100 INJECTION, SOLUTION SUBCUTANEOUS at 10:33

## 2021-05-31 RX ADMIN — METOPROLOL TARTRATE 25 MG: 25 TABLET ORAL at 11:58

## 2021-05-31 RX ADMIN — SPIRONOLACTONE 25 MG: 25 TABLET ORAL at 08:00

## 2021-05-31 RX ADMIN — IPRATROPIUM BROMIDE AND ALBUTEROL SULFATE 1 AMPULE: .5; 3 SOLUTION RESPIRATORY (INHALATION) at 16:30

## 2021-05-31 RX ADMIN — IPRATROPIUM BROMIDE AND ALBUTEROL SULFATE 1 AMPULE: .5; 3 SOLUTION RESPIRATORY (INHALATION) at 21:16

## 2021-05-31 RX ADMIN — BUMETANIDE 2 MG: 1 TABLET ORAL at 08:00

## 2021-05-31 ASSESSMENT — PAIN SCALES - GENERAL
PAINLEVEL_OUTOF10: 0
PAINLEVEL_OUTOF10: 4
PAINLEVEL_OUTOF10: 3
PAINLEVEL_OUTOF10: 0

## 2021-05-31 NOTE — PROGRESS NOTES
[facility]   Progress Note                   Date:   5/31/2021  Patient name: Sofya Ordaz  Date of admission:  5/28/2021  4:09 AM  MRN:   [id number]  YOB: 1950  PCP: Marquita Mclean    Reason for Admission: Ventricular tachycardia ([facility] [address] [I47.2]    Subjective:       Clinical Changes / Abnormalities: Pt seen and examined in room. Pt continues to be sob, on bipap currently  No VT overnight.   Pt denies any CP     -6.5 L since admission   Medications:   Scheduled Meds:   potassium chloride  40 mEq Oral Once    insulin glargine  60 Units Subcutaneous Nightly    insulin glargine  20 Units Subcutaneous Once    insulin lispro  10 Units Subcutaneous TID WC    insulin lispro  0-18 Units Subcutaneous TID WC    insulin lispro  0-9 Units Subcutaneous Nightly    bumetanide  2 mg Oral Daily    azithromycin  250 mg Oral Daily    predniSONE  40 mg Oral Daily    sodium chloride flush  5-40 mL Intravenous 2 times per day    ipratropium-albuterol  1 ampule Inhalation Q4H WA    apixaban  5 mg Oral BID    budesonide-formoterol  2 puff Inhalation BID    dilTIAZem  180 mg Oral Daily    pantoprazole  40 mg Oral Daily    pregabalin  225 mg Oral BID    spironolactone  25 mg Oral Daily    cefTRIAXone (ROCEPHIN) IV  1,000 mg Intravenous Q24H    sennosides-docusate sodium  1 tablet Oral Daily    nicotine  1 patch Transdermal Daily     Continuous Infusions:   sodium chloride      dextrose       CBC:   Recent Labs     05/29/21  0135 05/29/21  0554   WBC 10.4 10.8   HGB 13.2 13.6    174     BMP:    Recent Labs     05/29/21  0554 05/30/21  0448 05/30/21  1325 05/31/21  0628   * 125* 129* 132*   K 3.2* 3.7  --  3.6*   CL 73* 78*  --  82*   CO2 39* 39*  --  43*   BUN 32* 35*  --  29*   CREATININE 0.67* 0.77  --  0.51*   GLUCOSE 344* 351*  --  164*     Hepatic:   Recent Labs     05/30/21  1325   AST 29   ALT 49*   BILITOT 0.69   ALKPHOS 229*     Troponin:   Recent Labs     05/28/21 Persistent atrial flutter. 3. Paroxysmal A. fib. Recent Holter from 5/2021 showed persistent A. fib with rate controlled. 4. ?  Episode of VT. No evidence on rhythm strips from [facility]. 5. Preserved LV function on 5/2021  6. CAD s/p CABG 2005. 7. Severe COPD with acute exacerbation. 8. Left lower lobe pneumonia  9. Acute on chronic diastolic CHF. 10. Mildly elevated troponins-likely from demand ischemia in setting of COPD exacerbation and pneumonia  11. Hypokalemia. Resolved. 12. Hyponatremia  13. Hypertension    Patient Active Problem List:     Neuropathic pain     Chronic bilateral low back pain without sciatica     Lumbosacral spondylosis without myelopathy     DDD (degenerative disc disease), lumbar     Lumbar facet joint syndrome     Panniculitis involving lumbar region     Ventricular tachycardia (HCC)     Acute on chronic respiratory failure with hypoxia and hypercapnia (HCC)     Pneumonia of left lower lobe due to infectious organism     PAF (paroxysmal atrial fibrillation) ([address]     Pulmonary hypertension with chronic cor pulmonale (HCC)     RODNEY treated with BiPAP     Smoking greater than 40 pack years     Class 1 obesity due to excess calories with serious comorbidity and body mass index (BMI) of 30.0 to 30.9 in adult     Stage 4 very severe COPD by GOLD classification ([address]     Hyponatremia      Plan of Treatment:   1. Afib. Continue Cardizem and eliquis. Will restart low dose BB Continue Eliquis   2. CHF. Remains fluid overload. Continue IV diuresis.     3. Keep K> 4 and Mag > 2     Electronically signed by ALISON Vega CNP on 5/31/2021 at 10:93 [id number] [facility].  [phone]

## 2021-05-31 NOTE — PROGRESS NOTES
[facility] Ventricular tachycardia ([address] [I47.2] . Vitals:    05/31/21 1559   BP: 106/75   Pulse: 65   Resp: 22   Temp: 98.3 °F (36.8 °C)   SpO2: 96%   . Patients home meds are   Prior to Admission medications    Medication Sig Start Date End Date Taking? Authorizing Provider   pregabalin (LYRICA) 225 MG capsule Take 1 capsule by mouth 2 times daily for 30 days. 5/7/21 6/6/21  Nisha Garcia MD   celecoxib (CELEBREX) 100 MG capsule Take 1 capsule by mouth 2 times daily 1/14/21   ALISON Clemens CNP   dilTIAZem [facility]) 180 MG extended release capsule Take 180 mg by mouth daily 11/5/20   Historical Provider, MD   magnesium oxide (MAG-OX) 400 MG tablet Take 400 mg by mouth daily    Historical Provider, MD   NONFORMULARY Take 1 tablet by mouth daily ON COR vitamin - to help keep bladder cancer away    Historical Provider, MD   acetaminophen (TYLENOL) 325 MG tablet Take 650 mg by mouth every 6 hours as needed for Pain    Historical Provider, MD   spironolactone (ALDACTONE) 25 MG tablet Take 25 mg by mouth daily    Historical Provider, MD   albuterol sulfate  (90 Base) MCG/ACT inhaler Inhale 2 puffs into the lungs every 6 hours as needed    Historical Provider, MD   lidocaine (XYLOCAINE) 5 % ointment Apply topically as needed.  10/21/20   ALISON Clemens CNP   sennosides-docusate sodium (SENOKOT-S) 8.6-50 MG tablet Take 1 tablet by mouth daily    Historical Provider, MD   Multiple Vitamins-Minerals (ONCOVITE) TABS Take by mouth    Historical Provider, MD   loratadine (CLARITIN) 10 MG tablet Take 10 mg by mouth daily    Historical Provider, MD   tiotropium (SPIRIVA HANDIHALER) 18 MCG inhalation capsule Inhale 18 mcg into the lungs daily    Historical Provider, MD   apixaban (ELIQUIS) 5 MG TABS tablet Take by mouth 2 times daily    Historical Provider, MD   potassium chloride (KLOR-CON M) 10 MEQ extended release tablet Take 10 mEq by mouth 2 times daily Historical Provider, MD   CPAP Machine MISC by Does not apply route BiPAP    Historical Provider, MD   budesonide-formoterol (SYMBICORT) 160-4.5 MCG/ACT AERO Inhale 2 puffs into the lungs 2 times daily    Historical Provider, MD   bumetanide (BUMEX) 2 MG tablet Take 2 mg by mouth daily    Historical Provider, MD   magnesium hydroxide (MILK OF MAGNESIA) 400 MG/5ML suspension Take by mouth daily as needed for Constipation    Historical Provider, MD   ibuprofen (IBU) 400 MG tablet Take 400 mg by mouth every 6 hours as needed for Pain    Historical Provider, MD   metoprolol succinate (TOPROL XL) 25 MG extended release tablet Take 25 mg by mouth daily    Historical Provider, MD   Cyanocobalamin (VITAMIN B 12) 500 MCG TABS Take by mouth    Historical Provider, MD   pantoprazole (PROTONIX) 40 MG tablet Take 40 mg by mouth daily    Historical Provider, MD   .      Assessment: Continue current treatment schedule.        RR 22  Breath Sounds: Diminished      · Bronchodilator assessment at level  3  · Hyperinflation assessment at level   · Secretion Management assessment at level    ·   · [x]    Bronchodilator Assessment  BRONCHODILATOR ASSESSMENT SCORE  Score 0 1 2 3 4 5   Breath Sounds   []  Patient Baseline []  No Wheeze good aeration []  Faint, scattered wheezing, good aeration [x]  Expiratory Wheezing and or moderately diminished []  Insp/Exp wheeze and/or very diminished []  Insp/Exp and/ or marked distress   Respiratory Rate   []  Patient Baseline []  Less than 20 []  Less than 20 [x]  20-25 []  Greater than 25 []  Greater than 25   Peak flow % of Pred or PB [x]  NA   []  Greater than 90%  []  81-90% []  71-80% []  Less than or equal to 70%  or unable to perform []  Unable due to Respiratory Distress   Dyspnea re []  Patient Baseline []  No SOB []  No SOB [x]  SOB on exertion []  SOB min activity []  At rest/acute   e FEV% Predicted       [x]  NA []  Above 69%  []  Unable []  Above 60-69%  []  Unable []  Above 50-59%  [] Unable []  Above 35-49%  []  Unable []  Less than 35%  []  Unable                 []  Hyperinflation Assessment  Score 1 2 3   CXR and Breath Sounds   []  Clear []  No atelectasis  Basilar aeration []  Atelectasis or absent basilar breath sounds   Incentive Spirometry Volume  (Per IBW)   []  Greater than or equal to 15ml/Kg []  less than 15ml/Kg []  less than 15ml/Kg   Surgery within last 2 weeks []  None or general   []  Abdominal or thoracic surgery  []  Abdominal or thoracic   Chronic Pulmonary Historyre []  No []  Yes []  Yes     []  Secretion Management Assessment  Score 1 2 3   Bilateral Breath Sounds   []  Occasional Rhonchi []  Scattered Rhonchi []  Course Rhonchi and/or poor aeration   Sputum    []  Small amount of thin secretions []  Moderate amount of viscous secretions []  Copius, Viscious Yellow/ Secretions   CXR as reported by physician []  clear  []  Unavailable []  Infiltrates and/or consolidation  []  Unavailable []  Mucus Plugging and or lobar consolidation  []  Unavailable   Cough []  Strong, productive cough []  Weak productive cough []  No cough or weak non-productive cough   Mejia Brewster RCP  4:35 PM

## 2021-05-31 NOTE — PROGRESS NOTES
Pulmonary Progress Note  [facility]      Patient - Joseph Muñoz,  Age - 66 y.o.    - 1950      Room Number - 4637/0045-72   N -  [id number]   Acct # - [de-identified]  Date of Admission -  2021  4:09 AM    SUBJECTIVE  Conversational dyspnea is some better. Patient believes that he is close to baseline level. Denies sputum production. Short of breath at rest and with minimal exertion. Not febrile. Remains on oxygen 6 L a minute. Apparently uses 4-5 L at home. Used BiPAP only a short time last night. Anxious to go home and \"get back into my own routine. \"  Remains on nicotine replacement therapy. Active smoker. Son at bedside. OBJECTIVE    VITALS    height is 5' 10\" (1.778 m) and weight is 195 lb 1.6 oz (88.5 kg). His oral temperature is 98.3 °F (36.8 °C). His blood pressure is 106/75 and his pulse is 65. His respiration is 22 and oxygen saturation is 96%. Temperature Range: Temp: 98.3 °F (36.8 °C) Temp  Av.8 °F (36.6 °C)  Min: 97.4 °F (36.3 °C)  Max: 98.3 °F (36.8 °C)  BP Range:  Systolic (85OAT), VNZ:760 , Min:101 , QVV:074     Diastolic (11TRI), EAR:64, Min:73, Max:89    Pulse Range: Pulse  Av.6  Min: 62  Max: 110  Respiration Range: Resp  Av.4  Min: 18  Max: 26  Current Pulse Ox[de-identified]  SpO2: 96 %  24HR Pulse Ox Range:  SpO2  Av.4 %  Min: 96 %  Max: 98 %  Oxygen Amount and Delivery:  O2 Flow Rate (L/min): 3 L/min      Wt Readings from Last 3 Encounters:   21 195 lb 1.6 oz (88.5 kg)   20 213 lb (96.6 kg)   20 213 lb (96.6 kg)       I/O (24 Hours)    Intake/Output Summary (Last 24 hours) at 2021 1742  Last data filed at 2021 1204  Gross per 24 hour   Intake 570 ml   Output 2975 ml   Net -2405 ml     EXAM  General Appearance  Alert, Oriented, and Cooperative = 0constantoriented to person, place, time, and general circumstances. conversational dyspnea. HEENT - Normal, Head is normocephalic, atraumatic.    Neck - Supple, symmetrical, 0628   *   K 3.6*   CL 82*   CO2 43*   BUN 29*   CREATININE 0.51*   GLUCOSE 164*   MG 2.2   PHOS 2.4*     No results found for: PH, PCO2, PO2, HCO3, O2SAT  Lab Results   Component Value Date    MODE NOT REPORTED 05/31/2021     LIVER PROFILE   Recent Labs     05/30/21  1325   AST 29   ALT 49*   BILIDIR 0.42*   BILITOT 0.69   ALKPHOS 229*     INR No results for input(s): INR in the last 72 hours. PTT No results found for: APTT  CBC:   Lab Results   Component Value Date    WBC 10.8 05/29/2021    RBC 4.13 05/29/2021    HGB 13.6 05/29/2021    HCT 40.4 05/29/2021    MCV 97.8 05/29/2021    MCH 32.9 05/29/2021    MCHC 33.7 05/29/2021    RDW 13.1 05/29/2021     05/29/2021    MPV 11.6 05/29/2021     BMP:    Lab Results   Component Value Date     05/31/2021    K 3.6 05/31/2021    CL 82 05/31/2021    CO2 43 05/31/2021    BUN 29 05/31/2021    LABALBU 2.8 05/30/2021    CREATININE 0.51 05/31/2021    CALCIUM 9.0 05/31/2021    GFRAA >60 05/31/2021    LABGLOM >60 05/31/2021    GLUCOSE 164 05/31/2021     ABG:  No results found for: PHART, PH, FUC2FNY, PCO2, PO2ART, PO2, PGX4DTB, HCO3, BEART, BE, THGBART, THB, WDJ2MQO, I8GZBTVF, O2SAT    CULTURES  sputum    RADIOLOGY  XR CHEST PORTABLE    Result Date: 5/30/2021  1. Stable pleuroparenchymal changes on the left. While some of this may represent acute infiltrate, the appearance is more suggestive of underlying chronic pleural thickening with atelectasis/scarring. Correlation with any clinical findings of pneumonia recommended, and comparison with any old prior chest radiographs would be helpful 2. Probable pulmonary venous hypertension with no evidence of pulmonary edema     XR CHEST PORTABLE    Result Date: 5/28/2021  Opacities in the mid and lower left lung field may represent developing infiltrate related to infection or inflammatory process.       (See actual reports for details)    ASSESSMENT  Patient Active Problem List   Diagnosis    Neuropathic pain    Chronic bilateral low back pain without sciatica    Lumbosacral spondylosis without myelopathy    DDD (degenerative disc disease), lumbar    Lumbar facet joint syndrome    Panniculitis involving lumbar region    Ventricular tachycardia (HCC)    Acute on chronic respiratory failure with hypoxia and hypercapnia (HCC)    Pneumonia of left lower lobe due to infectious organism    PAF (paroxysmal atrial fibrillation) (HCC)    Pulmonary hypertension with chronic cor pulmonale (HCC)    RODNEY treated with BiPAP    Smoking greater than 40 pack years    Class 1 obesity due to excess calories with serious comorbidity and body mass index (BMI) of 30.0 to 30.9 in adult    Stage 4 very severe COPD by GOLD classification ([facility] [address]    Hyponatremia    Uncontrolled type 2 diabetes mellitus with hyperglycemia ([facility] [address]       PLAN  1. Wean oxygen as tolerated. Keep O2 sat 90-92%  2. Complete smoking cessation. All attempts at treating his lung disease are doomed to failure without this critical step. 3. Strongly encouraged pulmonary rehab program at [facility]. Improves quality of life. 4. Prednisone 40 mg daily for 5 days then discontinue. Does not use prednisone chronically. 5. Switch to oral antibiotics to complete 7 days. 6. Home oxygen. 7. Continue bronchodilators including LABA/LAMA/ICS. 8. Continue BiPAP per home routine. 9. End-stage disease with limited treatment options. Age precludes transplant. Recommend palliative care for symptom control. Probably a good candidate for hospice as well.         Electronically signed by Meghan Khan DO on 5/31/2021 at 5:42 PM

## 2021-05-31 NOTE — PROGRESS NOTES
therapy. Patient had worsening dyspnea for 2 days without any improvement with increased oxygen demands. Patient had 43-second run of V. tach and was subsequently given magnesium, amiodarone bolus and was started on drip. Patient was then transferred to [place] [address] for further monitoring.     In [place] [address] ICU patient was found to be in respiratory distress, Chest x-ray showed left lower lobe infiltrate     Initially treated for A. fib with RVR and he is V. tach episode at [facility] most likely deemed to be aberrant conduction with given history of atrial fibrillation as per cardiology and recommends treatment with IV diuretics and Lasix and transition to home Bumex once CHF improved.       Patient was treated for acute exacerbation of COPD and community-acquired pneumonia with IV ceftriaxone and azithromycin. Patient was found to be having hyponatremia with sodium of 119 improved to 122--> 125 today. Patient blood glucose was running high while he was in ICU likely secondary to being on  oral steroids        ROS : Negative for any chest pain, headache, dizziness, abdominal pain, bowel and bladder problems, weight loss     Social history :   Smoking :        Current everyday smoker 1 pack daily                          Alcohol :          Patient denies current alcohol intake               Recreational drug : Denies any recreational drug abuse    OBJECTIVE     Vital Signs:  /73   Pulse 68   Temp 97.9 °F (36.6 °C) (Oral)   Resp 23   Ht 5' 10\" (1.778 m)   Wt 195 lb 1.6 oz (88.5 kg)   SpO2 98%   BMI 27.99 kg/m²     Temp (24hrs), Av.8 °F (36.6 °C), Min:97.5 °F (36.4 °C), Max:98.1 °F (36.7 °C)    In: 570   Out: 1725 [Urine:1725]    Physical Exam:    Constitutional: This is a well developed, well nourished, 25-29.9 - Overweight 66y.o. year old male who is alert, oriented, cooperative and in no apparent distress. Head:normocephalic and atraumatic. EENT:  PERRLA.   No conjunctival injections. Septum was midline, mucosa was without erythema, exudates or cobblestoning. No thrush was noted. Neck: Supple without thyromegaly. No elevated JVP. Trachea was midline. Respiratory: Chest was symmetrical without dullness to percussion. Breath sounds bilaterally were clear to auscultation. There were no wheezes, rhonchi or rales. There is no intercostal retraction or use of accessory muscles. No egophony noted. Cardiovascular: Regular without murmur, clicks, gallops or rubs. Abdomen: Slightly rounded and soft without organomegaly. No rebound, rigidity or guarding was appreciated. Lymphatic: No lymphadenopathy. Musculoskeletal: Normal curvature of the spine. No gross muscle weakness. Extremities:  No lower extremity edema, ulcerations, tenderness, varicosities or erythema. Muscle size, tone and strength are normal.  No involuntary movements are noted. Skin:  Warm and dry. Good color, turgor and pigmentation. No lesions or scars.   No cyanosis or clubbing  Neurological/Psychiatric: The patient's general behavior, level of consciousness, thought content and emotional status is normal.        Medications:  Scheduled Medications:    potassium chloride  40 mEq Oral Once    insulin glargine  60 Units Subcutaneous Nightly    insulin glargine  20 Units Subcutaneous Once    insulin lispro  5 Units Subcutaneous TID WC    insulin lispro  0-18 Units Subcutaneous TID WC    insulin lispro  0-9 Units Subcutaneous Nightly    bumetanide  2 mg Oral Daily    azithromycin  250 mg Oral Daily    predniSONE  40 mg Oral Daily    sodium chloride flush  5-40 mL Intravenous 2 times per day    ipratropium-albuterol  1 ampule Inhalation Q4H WA    apixaban  5 mg Oral BID    budesonide-formoterol  2 puff Inhalation BID    dilTIAZem  180 mg Oral Daily    pantoprazole  40 mg Oral Daily    pregabalin  225 mg Oral BID    spironolactone  25 mg Oral Daily    cefTRIAXone (ROCEPHIN) IV  1,000 mg Intravenous Q24H    sennosides-docusate sodium  1 tablet Oral Daily    nicotine  1 patch Transdermal Daily     Continuous Infusions:    sodium chloride      dextrose       PRN Medicationsdextromethorphan-guaiFENesin, 10 mL, Q4H PRN  sodium chloride flush, 5-40 mL, PRN  sodium chloride, 25 mL, PRN  promethazine, 12.5 mg, Q6H PRN   Or  ondansetron, 4 mg, Q6H PRN  polyethylene glycol, 17 g, Daily PRN  acetaminophen, 650 mg, Q6H PRN   Or  acetaminophen, 650 mg, Q6H PRN  glucose, 15 g, PRN  dextrose, 12.5 g, PRN  glucagon (rDNA), 1 mg, PRN  dextrose, 100 mL/hr, PRN  potassium chloride, 40 mEq, PRN   Or  potassium alternative oral replacement, 40 mEq, PRN   Or  potassium chloride, 10 mEq, PRN  magnesium sulfate, 1,000 mg, PRN        Diagnostic Labs:  CBC:   Recent Labs     05/29/21  0135 05/29/21  0554   WBC 10.4 10.8   RBC 4.03* 4.13*   HGB 13.2 13.6   HCT 39.1* 40.4*   MCV 97.0 97.8   RDW 13.1 13.1    174     BMP:   Recent Labs     05/29/21  0554 05/30/21  0448 05/30/21  1325 05/31/21  0628   * 125* 129* 132*   K 3.2* 3.7  --  3.6*   CL 73* 78*  --  82*   CO2 39* 39*  --  43*   PHOS 3.3 3.3  --  2.4*   BUN 32* 35*  --  29*   CREATININE 0.67* 0.77  --  0.51*     BNP: No results for input(s): BNP in the last 72 hours. PT/INR: No results for input(s): PROTIME, INR in the last 72 hours. APTT: No results for input(s): APTT in the last 72 hours. CARDIAC ENZYMES: No results for input(s): CKMB, CKMBINDEX, TROPONINI in the last 72 hours. Invalid input(s): CKTOTAL;3  FASTING LIPID PANEL:No results found for: CHOL, HDL, TRIG  LIVER PROFILE:   Recent Labs     05/30/21  1325   AST 29   ALT 49*   BILIDIR 0.42*   BILITOT 0.69   ALKPHOS 229*      MICROBIOLOGY:   Lab Results   Component Value Date/Time    CULTURE NO GROWTH 2 DAYS 05/29/2021 01:35 AM       Imaging:    XR CHEST PORTABLE    Result Date: 5/30/2021  1. Stable pleuroparenchymal changes on the left.   While some of this may represent acute infiltrate, the appearance is more suggestive of underlying chronic pleural thickening with atelectasis/scarring. Correlation with any clinical findings of pneumonia recommended, and comparison with any old prior chest radiographs would be helpful 2. Probable pulmonary venous hypertension with no evidence of pulmonary edema     XR CHEST PORTABLE    Result Date: 5/28/2021  Opacities in the mid and lower left lung field may represent developing infiltrate related to infection or inflammatory process. ASSESSMENT & PLAN     ASSESSMENT / PLAN:     Principal Problem:    Acute on chronic respiratory failure with hypoxia and hypercapnia (HCC)  Active Problems:    Ventricular tachycardia (HCC)    Pneumonia of left lower lobe due to infectious organism    PAF (paroxysmal atrial fibrillation) (HCC)    Pulmonary hypertension with chronic cor pulmonale (HCC)    RODNEY treated with BiPAP    Smoking greater than 40 pack years    Class 1 obesity due to excess calories with serious comorbidity and body mass index (BMI) of 30.0 to 30.9 in adult    Stage 4 very severe COPD by GOLD classification ([facility] [address]    Hyponatremia    Uncontrolled type 2 diabetes mellitus with hyperglycemia ([address]  Resolved Problems:    * No resolved hospital problems. *    1. Acute on chronic hypoxic and hypercapnic respiratory failure secondary to COPD exacerbation, pneumonia and CHF exacerbation   - D/C rocephin and azithromycin for now, switch to levaquin to complete total 7 doses   - Continue prednisone 40 mg daily until tomorrow, total 5 days as per Pulm recommendations. - Continue bumex 2 mg daily, Continue aldactone 25 mg daily. - Continue BiPAP for now, wean down hiflow as tolerated, follow up Pulmonology recommendations.      2. Persistent atrial fibrillation with RVR  - Continue cardizem 180 mg daily, continue to hold BB.   - Continue eliquis for anticoagulation.      3.  Hyperglycemia, no recent HbA1c, HbA1c in 2019 7.2, not on any medications  - Lantus 20 units for now, 60 units nightly, HbA1c 8.5, will d/c on metformin as steroids will be discontinued and follow up with PCP for the same. .  - Diabetes education.    - Hypoglycemia protocol, POCT glucose checks.      4. Hyponatremia - improved  - Hypervolemic, urine studies reviewed, likely secondary to volume overload. - Follow up BMP.      5. Hypokalemia, hypocalcemia  - Replace as needed.      6. Transamnitis  - Likely secondary to congestive hepatopathy, follow up repeat LFTs today and liver US.      DVT prophylaxis Eliquis.     PT OT consulted   to help with discharging the patient - plan home.        Nga Gallegos MD      Department of Internal Medicine  [facility]         5/31/2021, 12:31 PM

## 2021-06-01 VITALS
WEIGHT: 195.17 LBS | BODY MASS INDEX: 27.94 KG/M2 | OXYGEN SATURATION: 92 % | HEIGHT: 70 IN | RESPIRATION RATE: 26 BRPM | SYSTOLIC BLOOD PRESSURE: 102 MMHG | HEART RATE: 68 BPM | DIASTOLIC BLOOD PRESSURE: 63 MMHG | TEMPERATURE: 97.9 F

## 2021-06-01 LAB
ABSOLUTE EOS #: 0.06 K/UL (ref 0–0.44)
ABSOLUTE IMMATURE GRANULOCYTE: 0.4 K/UL (ref 0–0.3)
ABSOLUTE LYMPH #: 1.41 K/UL (ref 1.1–3.7)
ABSOLUTE MONO #: 0.87 K/UL (ref 0.1–1.2)
ALLEN TEST: ABNORMAL
ANION GAP SERPL CALCULATED.3IONS-SCNC: 5 MMOL/L (ref 9–17)
BASOPHILS # BLD: 1 % (ref 0–2)
BASOPHILS ABSOLUTE: 0.11 K/UL (ref 0–0.2)
BUN BLDV-MCNC: 30 MG/DL (ref 8–23)
BUN/CREAT BLD: ABNORMAL (ref 9–20)
CALCIUM IONIZED: 1.12 MMOL/L (ref 1.13–1.33)
CALCIUM SERPL-MCNC: 8.9 MG/DL (ref 8.6–10.4)
CARBOXYHEMOGLOBIN: 2 % (ref 0–5)
CHLORIDE BLD-SCNC: 84 MMOL/L (ref 98–107)
CO2: 44 MMOL/L (ref 20–31)
CREAT SERPL-MCNC: 0.72 MG/DL (ref 0.7–1.2)
DIFFERENTIAL TYPE: ABNORMAL
EOSINOPHILS RELATIVE PERCENT: 1 % (ref 1–4)
FIO2: ABNORMAL
GFR AFRICAN AMERICAN: >60 ML/MIN
GFR NON-AFRICAN AMERICAN: >60 ML/MIN
GFR SERPL CREATININE-BSD FRML MDRD: ABNORMAL ML/MIN/{1.73_M2}
GFR SERPL CREATININE-BSD FRML MDRD: ABNORMAL ML/MIN/{1.73_M2}
GLUCOSE BLD-MCNC: 140 MG/DL (ref 75–110)
GLUCOSE BLD-MCNC: 148 MG/DL (ref 70–99)
GLUCOSE BLD-MCNC: 157 MG/DL (ref 75–110)
GLUCOSE BLD-MCNC: 235 MG/DL (ref 75–110)
GLUCOSE BLD-MCNC: 53 MG/DL (ref 75–110)
HCO3 VENOUS: 47.7 MMOL/L (ref 24–30)
HCT VFR BLD CALC: 42.4 % (ref 40.7–50.3)
HEMOGLOBIN: 13.4 G/DL (ref 13–17)
IMMATURE GRANULOCYTES: 3 %
LYMPHOCYTES # BLD: 11 % (ref 24–43)
MAGNESIUM: 2.2 MG/DL (ref 1.6–2.6)
MCH RBC QN AUTO: 32.4 PG (ref 25.2–33.5)
MCHC RBC AUTO-ENTMCNC: 31.6 G/DL (ref 28.4–34.8)
MCV RBC AUTO: 102.4 FL (ref 82.6–102.9)
METHEMOGLOBIN: ABNORMAL % (ref 0–1.5)
MODE: ABNORMAL
MONOCYTES # BLD: 7 % (ref 3–12)
NEGATIVE BASE EXCESS, VEN: ABNORMAL MMOL/L (ref 0–2)
NOTIFICATION TIME: ABNORMAL
NOTIFICATION: ABNORMAL
NRBC AUTOMATED: 0 PER 100 WBC
O2 DEVICE/FLOW/%: ABNORMAL
O2 SAT, VEN: 56.9 % (ref 60–85)
OXYHEMOGLOBIN: ABNORMAL % (ref 95–98)
PATIENT TEMP: 37
PCO2, VEN, TEMP ADJ: ABNORMAL MMHG (ref 39–55)
PCO2, VEN: 89 (ref 39–55)
PDW BLD-RTO: 13.8 % (ref 11.8–14.4)
PEEP/CPAP: ABNORMAL
PH VENOUS: 7.35 (ref 7.32–7.42)
PH, VEN, TEMP ADJ: ABNORMAL (ref 7.32–7.42)
PHOSPHORUS: 2.6 MG/DL (ref 2.5–4.5)
PLATELET # BLD: 232 K/UL (ref 138–453)
PLATELET ESTIMATE: ABNORMAL
PMV BLD AUTO: 11.7 FL (ref 8.1–13.5)
PO2, VEN, TEMP ADJ: ABNORMAL MMHG (ref 30–50)
PO2, VEN: 34.3 (ref 30–50)
POSITIVE BASE EXCESS, VEN: 17.2 MMOL/L (ref 0–2)
POTASSIUM SERPL-SCNC: 4.7 MMOL/L (ref 3.7–5.3)
PSV: ABNORMAL
PT. POSITION: ABNORMAL
RBC # BLD: 4.14 M/UL (ref 4.21–5.77)
RBC # BLD: ABNORMAL 10*6/UL
RESPIRATORY RATE: ABNORMAL
SAMPLE SITE: ABNORMAL
SEG NEUTROPHILS: 78 % (ref 36–65)
SEGMENTED NEUTROPHILS ABSOLUTE COUNT: 9.96 K/UL (ref 1.5–8.1)
SET RATE: ABNORMAL
SODIUM BLD-SCNC: 133 MMOL/L (ref 135–144)
TEXT FOR RESPIRATORY: ABNORMAL
TOTAL HB: ABNORMAL G/DL (ref 12–16)
TOTAL RATE: ABNORMAL
VT: ABNORMAL
WBC # BLD: 12.8 K/UL (ref 3.5–11.3)
WBC # BLD: ABNORMAL 10*3/UL

## 2021-06-01 PROCEDURE — 94660 CPAP INITIATION&MGMT: CPT

## 2021-06-01 PROCEDURE — 80048 BASIC METABOLIC PNL TOTAL CA: CPT

## 2021-06-01 PROCEDURE — 2580000003 HC RX 258: Performed by: STUDENT IN AN ORGANIZED HEALTH CARE EDUCATION/TRAINING PROGRAM

## 2021-06-01 PROCEDURE — 6370000000 HC RX 637 (ALT 250 FOR IP): Performed by: STUDENT IN AN ORGANIZED HEALTH CARE EDUCATION/TRAINING PROGRAM

## 2021-06-01 PROCEDURE — 94664 DEMO&/EVAL PT USE INHALER: CPT

## 2021-06-01 PROCEDURE — 84100 ASSAY OF PHOSPHORUS: CPT

## 2021-06-01 PROCEDURE — G0108 DIAB MANAGE TRN  PER INDIV: HCPCS

## 2021-06-01 PROCEDURE — 83735 ASSAY OF MAGNESIUM: CPT

## 2021-06-01 PROCEDURE — 99239 HOSP IP/OBS DSCHRG MGMT >30: CPT | Performed by: INTERNAL MEDICINE

## 2021-06-01 PROCEDURE — 82330 ASSAY OF CALCIUM: CPT

## 2021-06-01 PROCEDURE — 36415 COLL VENOUS BLD VENIPUNCTURE: CPT

## 2021-06-01 PROCEDURE — 94640 AIRWAY INHALATION TREATMENT: CPT

## 2021-06-01 PROCEDURE — 6370000000 HC RX 637 (ALT 250 FOR IP): Performed by: NURSE PRACTITIONER

## 2021-06-01 PROCEDURE — 82805 BLOOD GASES W/O2 SATURATION: CPT

## 2021-06-01 PROCEDURE — 2700000000 HC OXYGEN THERAPY PER DAY

## 2021-06-01 PROCEDURE — 85025 COMPLETE CBC W/AUTO DIFF WBC: CPT

## 2021-06-01 RX ORDER — GUAIFENESIN DEXTROMETHORPHAN HYDROBROMIDE ORAL SOLUTION 10; 100 MG/5ML; MG/5ML
10 SOLUTION ORAL EVERY 4 HOURS PRN
Qty: 1 BOTTLE | Refills: 0 | Status: SHIPPED | OUTPATIENT
Start: 2021-06-01 | End: 2021-10-06

## 2021-06-01 RX ORDER — LEVOFLOXACIN 500 MG/1
500 TABLET, FILM COATED ORAL DAILY
Qty: 3 TABLET | Refills: 0 | Status: SHIPPED | OUTPATIENT
Start: 2021-06-02 | End: 2021-06-05

## 2021-06-01 RX ORDER — PREDNISONE 20 MG/1
40 TABLET ORAL DAILY
Qty: 2 TABLET | Refills: 0 | Status: SHIPPED | OUTPATIENT
Start: 2021-06-02 | End: 2021-06-03

## 2021-06-01 RX ADMIN — INSULIN LISPRO 10 UNITS: 100 INJECTION, SOLUTION INTRAVENOUS; SUBCUTANEOUS at 08:34

## 2021-06-01 RX ADMIN — PANTOPRAZOLE SODIUM 40 MG: 40 TABLET, DELAYED RELEASE ORAL at 08:18

## 2021-06-01 RX ADMIN — IPRATROPIUM BROMIDE AND ALBUTEROL SULFATE 1 AMPULE: .5; 3 SOLUTION RESPIRATORY (INHALATION) at 08:10

## 2021-06-01 RX ADMIN — LEVOFLOXACIN 500 MG: 500 TABLET, FILM COATED ORAL at 08:18

## 2021-06-01 RX ADMIN — PREDNISONE 40 MG: 20 TABLET ORAL at 08:18

## 2021-06-01 RX ADMIN — PREGABALIN 225 MG: 75 CAPSULE ORAL at 08:17

## 2021-06-01 RX ADMIN — DOCUSATE SODIUM 50MG AND SENNOSIDES 8.6MG 1 TABLET: 8.6; 5 TABLET, FILM COATED ORAL at 08:19

## 2021-06-01 RX ADMIN — METOPROLOL TARTRATE 25 MG: 25 TABLET ORAL at 08:17

## 2021-06-01 RX ADMIN — BUDESONIDE AND FORMOTEROL FUMARATE DIHYDRATE 2 PUFF: 160; 4.5 AEROSOL RESPIRATORY (INHALATION) at 08:10

## 2021-06-01 RX ADMIN — APIXABAN 5 MG: 5 TABLET, FILM COATED ORAL at 08:17

## 2021-06-01 RX ADMIN — ACETAMINOPHEN 650 MG: 325 TABLET ORAL at 12:43

## 2021-06-01 RX ADMIN — SODIUM CHLORIDE, PRESERVATIVE FREE 10 ML: 5 INJECTION INTRAVENOUS at 08:18

## 2021-06-01 RX ADMIN — SPIRONOLACTONE 25 MG: 25 TABLET ORAL at 08:18

## 2021-06-01 RX ADMIN — BUMETANIDE 2 MG: 1 TABLET ORAL at 08:18

## 2021-06-01 RX ADMIN — DILTIAZEM HYDROCHLORIDE 180 MG: 180 CAPSULE, COATED, EXTENDED RELEASE ORAL at 08:18

## 2021-06-01 RX ADMIN — DIPHENHYDRAMINE HYDROCHLORIDE 5 ML: 12.5 LIQUID ORAL at 12:34

## 2021-06-01 RX ADMIN — IPRATROPIUM BROMIDE AND ALBUTEROL SULFATE 1 AMPULE: .5; 3 SOLUTION RESPIRATORY (INHALATION) at 11:27

## 2021-06-01 ASSESSMENT — PAIN SCALES - GENERAL
PAINLEVEL_OUTOF10: 0
PAINLEVEL_OUTOF10: 1
PAINLEVEL_OUTOF10: 0
PAINLEVEL_OUTOF10: 3

## 2021-06-01 NOTE — DISCHARGE INSTR - COC
Continuity of Care Form    Patient Name: Wendy Hurley   :  1950  MRN:  [id number]    [facility] date:  2021  Discharge date:  21    Code Status Order: Full Code   Advance Directives:   [facility] Documentation       Date/Time Healthcare Directive Type of Healthcare Directive Copy in [address] Agent's Name Healthcare Agent's Phone Number    21 [phone]  No, patient does not have an advance directive for healthcare treatment -- -- -- -- --            Admitting Physician:  Leigh Garcia MD  PCP: Dionne Bhatia    Discharging Nurse: [facility] Unit/Room#: 3007/3007-01  Discharging Unit Phone Number: [phone]    Emergency Contact:   Extended Emergency Contact Information  Primary Emergency Contact: [facility] Phone: [phone]  Mobile Phone: [phone]  Relation: Child  Secondary Emergency Contact: Elainafarzana Sher  Mobile Phone: [phone]  Relation: Other    Past Surgical History:  Past Surgical History:   Procedure Laterality Date    BLADDER SURGERY      OTHER SURGICAL HISTORY      heart bypass with stents    OTHER SURGICAL HISTORY Bilateral 02/10/2021    Bilateral L4-5, L5-S1 Facet Block Injection [facility] PAIN MANAGEMENT PROCEDURE Bilateral 02/10/2021    Facet block injection L4-5, L5-S1, bilateral       Immunization History:   Immunization History   Administered Date(s) Administered    Influenza Virus Vaccine 2018, 10/02/2019    Influenza, High Dose (Fluzone 65 yrs and older) 2017, 2017    Pneumococcal Polysaccharide ([id number]) 10/02/2019    Tdap (Boostrix, Adacel) 1990, 2017       Active Problems:  Patient Active Problem List   Diagnosis Code    Neuropathic pain M79.2    Chronic bilateral low back pain without sciatica M54.5, G89.29    Lumbosacral spondylosis without myelopathy M47.817    DDD (degenerative disc disease), lumbar M51.36    Lumbar facet joint syndrome M47.816    Panniculitis involving lumbar region M54.06    Ventricular tachycardia (HCC) I47.2    Acute on chronic respiratory failure with hypoxia and hypercapnia (HCC) J96.21, J96.22    Pneumonia of left lower lobe due to infectious organism J18.9    PAF (paroxysmal atrial fibrillation) (HCC) I48.0    Pulmonary hypertension with chronic cor pulmonale (HCC) I27.29    RODNEY treated with BiPAP G47.33    Smoking greater than 40 pack years F17.210    Class 1 obesity due to excess calories with serious comorbidity and body mass index (BMI) of 30.0 to 30.9 in adult E66.09, Z68.30    Stage 4 very severe COPD by GOLD classification ([facility] [address] J44.9    Hyponatremia E87.1    Uncontrolled type 2 diabetes mellitus with hyperglycemia ([facility]) E11.65       Isolation/Infection:   Isolation            No Isolation          Patient Infection Status       None to display            Nurse Assessment:  Last Vital Signs: /63   Pulse 68   Temp 97.9 °F (36.6 °C) (Oral)   Resp 26   Ht 5' 10\" (1.778 m)   Wt 195 lb 2.8 oz (88.5 kg)   SpO2 92%   BMI 28.00 kg/m²     Last documented pain score (0-10 scale): Pain Level: 0  Last Weight:   Wt Readings from Last 1 Encounters:   06/01/21 195 lb 2.8 oz (88.5 kg)     Mental Status:  oriented and alert    IV Access:  - None    Nursing Mobility/ADLs:  Walking   Assisted  Transfer  Independent  Bathing  Assisted  Dressing  Independent  Toileting  Independent  Feeding  Independent  Med Admin  Independent  Med Delivery   whole    Wound Care Documentation and Therapy:        Elimination:  Continence:   · Bowel: Yes  · Bladder: Yes  Urinary Catheter: None   Colostomy/Ileostomy/Ileal Conduit: No       Date of Last BM: 5/31    Intake/Output Summary (Last 24 hours) at 6/1/2021 1611  Last data filed at 6/1/2021 1111  Gross per 24 hour   Intake 600 ml   Output 2125 ml   Net -1525 ml     I/O last 3 completed shifts:   In: 600 [P.O.:600]  Out: 2125 [Urine:2125]    Safety Concerns: At Risk for Falls    Impairments/Disabilities:      None    Nutrition Therapy:  Current Nutrition Therapy:   - Oral Diet:  General    Routes of Feeding: Oral  Liquids: No Restrictions  Daily Fluid Restriction: yes - amount 1200 ml  Last Modified Barium Swallow with Video (Video Swallowing Test): not done    Treatments at the Time of Hospital Discharge:   Respiratory Treatments: home oxygen, breathing treatments  Oxygen Therapy:  is on oxygen at 3 L/min per nasal cannula. Ventilator:    - No ventilator support    Rehab Therapies: Physical Therapy and Occupational Therapy  Weight Bearing Status/Restrictions: No weight bearing restirctions  Other Medical Equipment (for information only, NOT a DME order):  walker  Other Treatments:     Patient's personal belongings (please select all that are sent with patient):  Glasses    RN SIGNATURE:  Electronically signed by Bea Hsu RN on 6/1/21 at 5:16 PM EDT    CASE MANAGEMENT/SOCIAL WORK SECTION    Inpatient Status Date: ***    Readmission Risk Assessment Score:  Readmission Risk              Risk of Unplanned Readmission:  15           Discharging to Facility/ Agency   · Name: [place] [address]         Phone: [phone]       Fax: [phone]        ·     Dialysis Facility (if applicable)   · Name:  · Address:  · Dialysis Schedule:  · Phone:  · Fax:    / signature: Electronically signed by Nisha Saavedra RN on 6/1/21 at 4:12 PM EDT    PHYSICIAN SECTION    Prognosis: Fair    Condition at Discharge: Stable    Rehab Potential (if transferring to Rehab): Fair    Recommended Labs or Other Treatments After Discharge:   Complete smoking cessation. Enroll with pulmonary rehab program at [facility]. Improves quality of life. Prednisone 40 mg daily for 1 more day then stop. Levaquin daily for 3 more days, then stop. Continue to use BiPAP at night and supplemental oxygen at home. Continue home bronchodilators. Follow up with Palliative care after discharge. Follow up with PCP for post hospitalization follow up. Follow up with your Pulmonologist Dr. Mundo Sr after discharge for further recommendations. Follow up with your PCP and Cardiologist for post hospitalization follow up. Start taking metformin 1000 mg BID, follow up with PCP for follow up for your diabetes. PT/OT. Physician Certification: I certify the above information and transfer of Shana Nicole  is necessary for the continuing treatment of the diagnosis listed and that he requires [address] for greater 30 days.      Update Admission H&P: No change in H&P    PHYSICIAN SIGNATURE:  Electronically signed by Reginald Singh MD on 6/1/21 at 4:14 PM EDT

## 2021-06-01 NOTE — CARE COORDINATION
Transitional Planning:    Spoke with Octavio Day RN. Pt wants to go home, has bipap and o2 at home already. Will discuss the need for [facility] with him and will get an agency of his choice to send referral to. Met with pt, he stated that he wants referrals sent to [facility], [facility], and [address]. Referals sent. Spoke with the pt's Sara Arshad, who reported that both her and their son cannot pick the pt up until tomorrow. Pt stated he has a cane, shower chair, and walker at home already. - called [facility] with [facility], they are able to accept for [facility]. PS sent to Dr. Trey Arias to sign FRANKLIN. - pt reports that his Sarajourdan Arshad is coming to pick him up tonight and will have the money for copay for meds to beds. - meds to beds is closed, pt is ready for discharge and Manju Garcia is here to pick pt up. PS sent to resident with Dr. Trey Arias regarding sending scripts to [facility] instead of our pharmacy.     Discharge [facility]  Written by: Destin Randhawa RN    Patient Name: Geoffrey Coleman  Attending Provider: Carlo Meyer MD  Admit Date: 2021  4:09 AM  MRN: [id number]  Account: [de-identified]                     : 1950  Discharge Date: 21      Disposition: home with Edmar Quijano RN

## 2021-06-01 NOTE — PROGRESS NOTES
Pt given discharge instructions, med list and follow ups reviewed, pt and family denies any questions, home care notified of discharge, IVs removed, PT discharged to home.

## 2021-06-01 NOTE — PROGRESS NOTES
Home Oxygen Evaluation    Home Oxygen Evaluation completed. Patient is on 3 liters per minute via N/C . Resting SpO2 = 95%  Resting SpO2 on room air = 0%    SpO2 on room air with exercise = 0%  SpO2 on oxygen as above with exercise = 0%    Nocturnal Oximetry with patient on room air is recommended is SpO2 is between 89% and 95% (requires additional order). Fadia Garnett RCP  11:47 AM     Patient wears O2 at home know and has for at least 5 years.

## 2021-06-01 NOTE — PROGRESS NOTES
Physician Progress Note      Cintia La  CSN #:                  [id number]  :                       1950  ADMIT DATE:       2021 4:09 AM  DISCH DATE:  RESPONDING  PROVIDER #:        Jake Torres MD          QUERY TEXT:    Pt admitted with V tach. Pt noted to have dyspnea, home O2 use. If possible,   please document in the progress notes and discharge summary if you are   evaluating and/or treating any of the following: The medical record reflects the following:  Risk Factors: COPD, pneumonia  Clinical Indicators: pt with hx COPD and home O2 use-unsure home dose,   presents with dyspnea, initial sat 90-91 with desats 87-88, opacities on CXR,   no ABG's noted  Treatment: IV Solumedrol, antibiotics, nebs, O2 at 3L, CXR    Call if any questions. Thank you, Kolby Nina, [address] [place] [address]  Options provided:  -- Acute on chronic respiratory failure with hypoxia  -- Acute on chronic respiratory failure with hypercapnia  -- Chronic respiratory failure with hypoxia  -- Chronic respiratory failure with hypercapnia  -- Other - I will add my own diagnosis  -- Disagree - Not applicable / Not valid  -- Disagree - Clinically unable to determine / Unknown  -- Refer to Clinical Documentation Reviewer    PROVIDER RESPONSE TEXT:    This patient is in acute on chronic respiratory failure with hypercapnia.     Query created by: Sonia Hilliard on 2021 9:42 AM      Electronically signed by:  Jake Torres MD 2021 1:50 PM

## 2021-06-01 NOTE — PROGRESS NOTES
[facility]   Progress Note                   Date:   6/1/2021  Patient name: Silvestre Brand  Date of admission:  5/28/2021  4:09 AM  MRN:   [id number]  YOB: 1950  PCP: Ana Monk    Reason for Admission: Ventricular tachycardia ([facility] [address] [I47.2]    Subjective:       Clinical Changes / Abnormalities: Patient seen and examined in room after discussion with RN. Denies chest pain. Ongoing SOB on BiPap sitting in chair alternating with NC 3 Liters. Afib on tele rate controlled.      I/O  -1800 ml today and -8521 since admission   Medications:   Scheduled Meds:   insulin lispro  10 Units Subcutaneous TID WC    metoprolol tartrate  25 mg Oral BID    levoFLOXacin  500 mg Oral Daily    insulin glargine  45 Units Subcutaneous Nightly    insulin glargine  15 Units Subcutaneous Once    insulin lispro  0-18 Units Subcutaneous TID WC    insulin lispro  0-9 Units Subcutaneous Nightly    bumetanide  2 mg Oral Daily    predniSONE  40 mg Oral Daily    sodium chloride flush  5-40 mL Intravenous 2 times per day    ipratropium-albuterol  1 ampule Inhalation Q4H WA    apixaban  5 mg Oral BID    budesonide-formoterol  2 puff Inhalation BID    dilTIAZem  180 mg Oral Daily    pantoprazole  40 mg Oral Daily    pregabalin  225 mg Oral BID    spironolactone  25 mg Oral Daily    sennosides-docusate sodium  1 tablet Oral Daily    nicotine  1 patch Transdermal Daily     Continuous Infusions:   sodium chloride      dextrose       CBC:   Recent Labs     06/01/21  0545   WBC 12.8*   HGB 13.4        BMP:    Recent Labs     05/30/21  0448 05/30/21  1325 05/31/21  0628 06/01/21  0545   * 129* 132* 133*   K 3.7  --  3.6* 4.7   CL 78*  --  82* 84*   CO2 39*  --  43* 44*   BUN 35*  --  29* 30*   CREATININE 0.77  --  0.51* 0.72   GLUCOSE 351*  --  164* 148*     Hepatic:   Recent Labs     05/30/21  1325   AST 29   ALT 49*   BILITOT 0.69   ALKPHOS 229*     Troponin:   No results for input(s): TROPHS in the last 72 hours. BNP: No results for input(s): BNP in the last 72 hours. Lipids: No results for input(s): CHOL, HDL in the last 72 hours. Invalid input(s): LDLCALCU  INR: No results for input(s): INR in the last 72 hours. EKG:   Typical Atrial flutter with variable conduction, incomplete RBBB     ECHO:   5/17/2021   Left Ventricle : Left ventricular systolic function is grossly        normal. No regional wall motion abnormalities noted. LVEF is 55-60%.      Transmitral Doppler flow pattern suggests grade II diastolic      dysfunction      Mitral Valve : Trace to mild mitral regurgitation.     LA- mild dilated      Holter 5/2021  Patient remained in atrial fibrillation throughout the recording with   average heart rate of 75 beats per minute suggestive adequate rate control   in atrial flutter.  Minimum heart was 40 beats per minute and maximum   heart of 128 beats per minute.       There were 117 (0.1%) wide complex beats which is 2 triplets and 10   couplets. Longest RR interval was 2.3 seconds. Patient reported 2 diary entries with symptoms of shortness of breath at   the time patient was in atrial flutter with a heart rate of 68 and 76   respectively.        Objective:   Vitals: /63   Pulse 68   Temp 98.2 °F (36.8 °C) (Oral)   Resp 26   Ht 5' 10\" (1.778 m)   Wt 195 lb 2.8 oz (88.5 kg)   SpO2 96%   BMI 28.00 kg/m²   General appearance: alert and cooperative with exam  HEENT: Head: Normocephalic, no lesions, without obvious abnormality. Neck: no JVD, trachea midline, no adenopathy  Lungs: Diminished to auscultation on BiPap alternating with 3L NC   Heart: Irregular rate and rhythm, s1/s2 auscultated, no murmurs. A Fib  Abdomen: soft, non-tender, bowel sounds active  Extremities: +2 bilateral  edema  Neurologic: not done        Assessment / Acute Cardiac Problems:   1. Acute on chronic respiratory failure with hypoxia due to severe COPD with exacerbation.   2. Persistent atrial flutter. 3. Paroxysmal A. fib. Recent Holter from 5/2021 showed persistent A. fib with rate controlled. 4. ?  Episode of VT. No evidence on rhythm strips from [facility]. 5. Preserved LV function on 5/2021  6. CAD s/p CABG 2005. 7. Severe COPD with acute exacerbation. 8. Left lower lobe pneumonia  9. Acute on chronic diastolic CHF. 10. Mildly elevated troponins-likely from demand ischemia in setting of COPD exacerbation and pneumonia  11. Hypokalemia. Resolved. 12. Hyponatremia  13. Hypertension    Patient Active Problem List:     Neuropathic pain     Chronic bilateral low back pain without sciatica     Lumbosacral spondylosis without myelopathy     DDD (degenerative disc disease), lumbar     Lumbar facet joint syndrome     Panniculitis involving lumbar region     Ventricular tachycardia (HCC)     Acute on chronic respiratory failure with hypoxia and hypercapnia (HCC)     Pneumonia of left lower lobe due to infectious organism     PAF (paroxysmal atrial fibrillation) ([address]     Pulmonary hypertension with chronic cor pulmonale (HCC)     RODNEY treated with BiPAP     Smoking greater than 40 pack years     Class 1 obesity due to excess calories with serious comorbidity and body mass index (BMI) of 30.0 to 30.9 in adult     Stage 4 very severe COPD by GOLD classification ([facility] [address]     Hyponatremia      Plan of Treatment:   1. Afib. Continue BB, Cardizem and Eliquis. 2. CHF. Ongoing SOB. LVEF 55-60% G2DD. Continue PO Bumex, BB, and aldactone. Will order compression stockings. Patient refusing to wear compression stockings. Not on ACE/ARB  BP on the low side. 3. Dyspnea managed per Pulmonary. End-stage disease with limited treatment options per Pulmonary note. 4. Keep K> 4 and Mag > 2 stable  5.  Rest of care managed per Primary Team.     Electronically signed by ALISON Hyatt NP on 6/1/2021 at [address] [place] [address].  [phone]

## 2021-06-01 NOTE — PROGRESS NOTES
Meds-beds no longer available tonight, [facility]. -pt with history of htn, unsure of his home medications  -currently BP 120s/80s and pt is asymptomatic

## 2021-06-01 NOTE — PROGRESS NOTES
[facility]  Internal Medicine Teaching Residency Program  Inpatient Daily Progress Note  ______________________________________________________________________________    Patient: Kaya Brown  YOB: 1950   ODP:[id number]    Acct: [de-identified]     Room: [address] [place] [address]  Admit date: 5/28/2021  Today's date: 06/01/21  Number of days in the hospital: 4    SUBJECTIVE   Admitting Diagnosis: Acute on chronic respiratory failure with hypoxia and hypercapnia (HCC)  CC: Respiratory distress and ventricular tachycardia  Continue levofloxacin for 2 more doses   Hyponatremia improved   Patient on 3 L nasal cannula  Overnight   Glucose stable on 20 unit lantus     Ok to discharge from Pulmonology stand point   ROS:  Constitutional:  negative for chills, fevers, sweats  Respiratory:  negative for cough, dyspnea on exertion, hemoptysis, shortness of breath, wheezing  Cardiovascular:  negative for chest pain, chest pressure/discomfort, lower extremity edema, palpitations  Gastrointestinal:  negative for abdominal pain, constipation, diarrhea, nausea, vomiting  Neurological:  negative for dizziness, headache  BRIEF HISTORY      Patient is a 69-year-old male brought in to [place] [address] as a transfer from [facility] ER after an episode of V. tach and respiratory distress. He was admitted in the hospital for a week due to dyspnea and was treated with IV steroids bronchodilator and diuretic therapy. Patient had worsening dyspnea for 2 days without any improvement with increased oxygen demands. Patient had 43-second run of V. tach and was subsequently given magnesium, amiodarone bolus and was started on drip.   Patient was then transferred to [place] [address] for further monitoring.     In [place] [address] ICU patient was found to be in respiratory distress, Chest x-ray showed left lower lobe infiltrate     Initially treated for A. fib with RVR and he is V. tach episode at [facility] ER most likely deemed to be aberrant conduction with given history of atrial fibrillation as per cardiology and recommends treatment with IV diuretics and Lasix and transition to home Bumex once CHF improved.       Patient was treated for acute exacerbation of COPD and community-acquired pneumonia with IV ceftriaxone and azithromycin. Patient was found to be having hyponatremia with sodium of 119 improved to 122--> 125 today. Patient blood glucose was running high while he was in ICU likely secondary to being on  oral steroids        ROS : Negative for any chest pain, headache, dizziness, abdominal pain, bowel and bladder problems, weight loss     Social history :   Smoking :        Current everyday smoker 1 pack daily                          Alcohol :          Patient denies current alcohol intake               Recreational drug : Denies any recreational drug abuse    OBJECTIVE     Vital Signs:  /63   Pulse 68   Temp 98.2 °F (36.8 °C) (Oral)   Resp 26   Ht 5' 10\" (1.778 m)   Wt 195 lb 2.8 oz (88.5 kg)   SpO2 96%   BMI 28.00 kg/m²     Temp (24hrs), Av.2 °F (36.8 °C), Min:98 °F (36.7 °C), Max:98.3 °F (36.8 °C)    In: 600   Out: 2125 [Urine:2125]    Physical Exam:    Constitutional: This is a well developed, well nourished, 25-29.9 - Overweight 66y.o. year old male who is alert, oriented, cooperative and in no apparent distress. Head:normocephalic and atraumatic. EENT:  PERRLA. No conjunctival injections. Septum was midline, mucosa was without erythema, exudates or cobblestoning. No thrush was noted. Neck: Supple without thyromegaly. No elevated JVP. Trachea was midline. Respiratory: Chest was symmetrical without dullness to percussion. Breath sounds bilaterally were clear to auscultation. There were no wheezes, rhonchi or rales. There is no intercostal retraction or use of accessory muscles. No egophony noted. Cardiovascular: Regular without murmur, clicks, gallops or rubs.    Abdomen: Slightly rounded and soft without organomegaly. No rebound, rigidity or guarding was appreciated. Lymphatic: No lymphadenopathy. Musculoskeletal: Normal curvature of the spine. No gross muscle weakness. Extremities:  No lower extremity edema, ulcerations, tenderness, varicosities or erythema. Muscle size, tone and strength are normal.  No involuntary movements are noted. Skin:  Warm and dry. Good color, turgor and pigmentation. No lesions or scars.   No cyanosis or clubbing  Neurological/Psychiatric: The patient's general behavior, level of consciousness, thought content and emotional status is normal.        Medications:  Scheduled Medications:    metoprolol tartrate  25 mg Oral BID    levoFLOXacin  500 mg Oral Daily    [Held by provider] insulin glargine  45 Units Subcutaneous Nightly    insulin lispro  0-18 Units Subcutaneous TID WC    insulin lispro  0-9 Units Subcutaneous Nightly    bumetanide  2 mg Oral Daily    predniSONE  40 mg Oral Daily    sodium chloride flush  5-40 mL Intravenous 2 times per day    ipratropium-albuterol  1 ampule Inhalation Q4H WA    apixaban  5 mg Oral BID    budesonide-formoterol  2 puff Inhalation BID    dilTIAZem  180 mg Oral Daily    pantoprazole  40 mg Oral Daily    pregabalin  225 mg Oral BID    spironolactone  25 mg Oral Daily    sennosides-docusate sodium  1 tablet Oral Daily    nicotine  1 patch Transdermal Daily     Continuous Infusions:    sodium chloride      dextrose       PRN Medicationsmagic (miracle) mouthwash, 5 mL, 4x Daily PRN  dextromethorphan-guaiFENesin, 10 mL, Q4H PRN  sodium chloride flush, 5-40 mL, PRN  sodium chloride, 25 mL, PRN  promethazine, 12.5 mg, Q6H PRN   Or  ondansetron, 4 mg, Q6H PRN  polyethylene glycol, 17 g, Daily PRN  acetaminophen, 650 mg, Q6H PRN   Or  acetaminophen, 650 mg, Q6H PRN  glucose, 15 g, PRN  dextrose, 12.5 g, PRN  glucagon (rDNA), 1 mg, PRN  dextrose, 100 mL/hr, PRN  potassium chloride, 40 mEq, PRN   Or potassium alternative oral replacement, 40 mEq, PRN   Or  potassium chloride, 10 mEq, PRN  magnesium sulfate, 1,000 mg, PRN        Diagnostic Labs:  CBC:   Recent Labs     06/01/21  0545   WBC 12.8*   RBC 4.14*   HGB 13.4   HCT 42.4   .4   RDW 13.8        BMP:   Recent Labs     05/30/21  0448 05/30/21  1325 05/31/21  0628 06/01/21  0545   * 129* 132* 133*   K 3.7  --  3.6* 4.7   CL 78*  --  82* 84*   CO2 39*  --  43* 44*   PHOS 3.3  --  2.4* 2.6   BUN 35*  --  29* 30*   CREATININE 0.77  --  0.51* 0.72     BNP: No results for input(s): BNP in the last 72 hours. PT/INR: No results for input(s): PROTIME, INR in the last 72 hours. APTT: No results for input(s): APTT in the last 72 hours. CARDIAC ENZYMES: No results for input(s): CKMB, CKMBINDEX, TROPONINI in the last 72 hours. Invalid input(s): CKTOTAL;3  FASTING LIPID PANEL:No results found for: CHOL, HDL, TRIG  LIVER PROFILE:   Recent Labs     05/30/21  1325   AST 29   ALT 49*   BILIDIR 0.42*   BILITOT 0.69   ALKPHOS 229*      MICROBIOLOGY:   Lab Results   Component Value Date/Time    CULTURE NO GROWTH 3 DAYS 05/29/2021 01:35 AM       Imaging:    XR CHEST PORTABLE    Result Date: 5/30/2021  1. Stable pleuroparenchymal changes on the left. While some of this may represent acute infiltrate, the appearance is more suggestive of underlying chronic pleural thickening with atelectasis/scarring. Correlation with any clinical findings of pneumonia recommended, and comparison with any old prior chest radiographs would be helpful 2. Probable pulmonary venous hypertension with no evidence of pulmonary edema     XR CHEST PORTABLE    Result Date: 5/28/2021  Opacities in the mid and lower left lung field may represent developing infiltrate related to infection or inflammatory process.        ASSESSMENT & PLAN     ASSESSMENT / PLAN:     Principal Problem:    Acute on chronic respiratory failure with hypoxia and hypercapnia (HCC)  Active Problems: Ventricular tachycardia (HCC)    Pneumonia of left lower lobe due to infectious organism    PAF (paroxysmal atrial fibrillation) (HCC)    Pulmonary hypertension with chronic cor pulmonale (HCC)    RODNEY treated with BiPAP    Smoking greater than 40 pack years    Class 1 obesity due to excess calories with serious comorbidity and body mass index (BMI) of 30.0 to 30.9 in adult    Stage 4 very severe COPD by GOLD classification ([facility] [address]    Hyponatremia    Uncontrolled type 2 diabetes mellitus with hyperglycemia ([address]  Resolved Problems:    * No resolved hospital problems. *    1. Acute on chronic hypoxic and hypercapnic respiratory failure secondary to COPD exacerbation, pneumonia and CHF exacerbation   - D/C rocephin and azithromycin for now, switch to levaquin to complete total 7 doses   - stop prednisone   - Continue bumex 2 mg daily, Continue aldactone 25 mg daily.      2. Persistent atrial fibrillation with RVR  - patient on  cardizem 180 mg daily and BB.   - Continue eliquis for anticoagulation.      3. Hyperglycemia, no recent HbA1c, HbA1c in 2019 7.2, not on any medications  - Lantus 20 units for now  - Diabetes education.    - Hypoglycemia protocol, POCT glucose checks.      4. Hyponatremia - improved  - Hypervolemic, urine studies reviewed, likely secondary to volume overload. - Follow up BMP.      5. Hypokalemia, hypocalcemia  - Replace as needed.      6. Transamnitis  - Likely secondary to congestive hepatopathy.     DVT prophylaxis Eliquis.     PT OT consulted   to help with discharging the patient - plan home.      Continue levofloxacin for 2 more doses   Hyponatremia improved   Patient on 3 L nasal cannula  Overnight   Glucose stable on 20 unit lantus   Ok to discharge from Pulmonology stand point     [id number] [place] [address] MD Rosa Maria PGY1       Department of Internal Medicine  [facility]         6/1/2021, 2:51 PM

## 2021-06-01 NOTE — PLAN OF CARE
Problem: Skin Integrity:  Goal: Will show no infection signs and symptoms  Description: Will show no infection signs and symptoms  6/1/2021 1717 by Nikki Brambila RN  Outcome: Completed  6/1/2021 1608 by Nikki Brambila RN  Outcome: Ongoing  Goal: Absence of new skin breakdown  Description: Absence of new skin breakdown  6/1/2021 1717 by Nikki Brambila RN  Outcome: Completed  6/1/2021 1608 by Nikki Brambila RN  Outcome: Ongoing     Problem: Falls - Risk of:  Goal: Will remain free from falls  Description: Will remain free from falls  6/1/2021 1717 by Nikki Brambila RN  Outcome: Completed  6/1/2021 1608 by Nikki Brambila RN  Outcome: Ongoing  Goal: Absence of physical injury  Description: Absence of physical injury  6/1/2021 1717 by Nikki Brambila RN  Outcome: Completed  6/1/2021 1608 by Nikki Brambila RN  Outcome: Ongoing     Problem: Cardiac Output - Decreased:  Goal: Hemodynamic stability will improve  Description: Hemodynamic stability will improve  6/1/2021 1717 by Nikki Brambila RN  Outcome: Completed  6/1/2021 1608 by Nikki Brambila RN  Outcome: Ongoing     Problem: Fluid Volume - Imbalance:  Goal: Absence of imbalanced fluid volume signs and symptoms  Description: Absence of imbalanced fluid volume signs and symptoms  6/1/2021 1717 by Nikki Brambila RN  Outcome: Completed  6/1/2021 1608 by Nikki Brambila RN  Outcome: Ongoing     Problem: Gas Exchange - Impaired:  Goal: Levels of oxygenation will improve  Description: Levels of oxygenation will improve  6/1/2021 1717 by Nikki Brambila RN  Outcome: Completed  6/1/2021 1608 by Nikki Brambila RN  Outcome: Ongoing  6/1/2021 1016 by Hollie Felty, RCP  Outcome: Ongoing     Problem: Serum Glucose Level - Abnormal:  Goal: Ability to maintain appropriate glucose levels will improve to within specified parameters  Description: Ability to maintain appropriate glucose levels will improve to within specified parameters  6/1/2021 1717 by Nikki Brambila RN  Outcome: Completed  6/1/2021 1608 by Juan Jimenes RN  Outcome: Ongoing     Problem: Skin Integrity - Impaired:  Goal: Will show no infection signs and symptoms  Description: Will show no infection signs and symptoms  6/1/2021 1717 by Juan Jimenes RN  Outcome: Completed  6/1/2021 1608 by Juan Jimenes RN  Outcome: Ongoing  Goal: Absence of new skin breakdown  Description: Absence of new skin breakdown  6/1/2021 1717 by Juan Jimenes RN  Outcome: Completed  6/1/2021 1608 by Juan Jimenes RN  Outcome: Ongoing     Problem: Tissue Perfusion - Cardiopulmonary, Altered:  Goal: Absence of angina  Description: Absence of angina  6/1/2021 1717 by Juan Jimenes RN  Outcome: Completed  6/1/2021 1608 by Juan Jimenes RN  Outcome: Ongoing  Goal: Hemodynamic stability will improve  Description: Hemodynamic stability will improve  6/1/2021 1717 by Juan Jimenes RN  Outcome: Completed  6/1/2021 1608 by Juan Jimenes RN  Outcome: Ongoing     Problem: Pain:  Goal: Pain level will decrease  Description: Pain level will decrease  6/1/2021 1717 by Juan Jimenes RN  Outcome: Completed  6/1/2021 1608 by Juan Jimenes RN  Outcome: Ongoing  Goal: Control of acute pain  Description: Control of acute pain  6/1/2021 1717 by Juan Jimenes RN  Outcome: Completed  6/1/2021 1608 by Juan Jimenes RN  Outcome: Ongoing  Goal: Control of chronic pain  Description: Control of chronic pain  6/1/2021 1717 by Juan Jimenes RN  Outcome: Completed  6/1/2021 1608 by Juan Jimenes RN  Outcome: Ongoing

## 2021-06-03 RX ORDER — CELECOXIB 100 MG/1
CAPSULE ORAL
Qty: 60 CAPSULE | Refills: 11 | Status: ON HOLD | OUTPATIENT
Start: 2021-06-03 | End: 2021-08-15 | Stop reason: HOSPADM

## 2021-06-03 NOTE — TELEPHONE ENCOUNTER
[employer] requested. Unable to reach the patient. He needs another appt.      Last Appt:  2/10/2021  Next Appt:  1/14/2021  Med verified in [place]

## 2021-06-04 LAB
CULTURE: NORMAL
CULTURE: NORMAL
Lab: NORMAL
Lab: NORMAL
SPECIMEN DESCRIPTION: NORMAL
SPECIMEN DESCRIPTION: NORMAL

## 2021-06-16 NOTE — DISCHARGE SUMMARY
[facility]     Department of Internal Medicine - Staff Internal Medicine Teaching Service    INPATIENT DISCHARGE SUMMARY      Patient Identification:  Jamie Coffman is a 66 y.o. male. :  1950  MRN: [id number]     Acct: [de-identified]   PCP: Mary Kumar Date:  2021  Discharge date and time: 2021  6:39 PM   Attending Provider: No att. providers found                                     [address] Problem Lists:  Principal Problem:    Acute on chronic respiratory failure with hypoxia and hypercapnia (HCC)  Active Problems:    Ventricular tachycardia ([address]    Pneumonia of left lower lobe due to infectious organism    PAF (paroxysmal atrial fibrillation) (HCC)    Pulmonary hypertension with chronic cor pulmonale (HCC)    RODNEY treated with BiPAP    Smoking greater than 40 pack years    Class 1 obesity due to excess calories with serious comorbidity and body mass index (BMI) of 30.0 to 30.9 in adult    Stage 4 very severe COPD by GOLD classification ([address]    Hyponatremia    Uncontrolled type 2 diabetes mellitus with hyperglycemia ([address]  Resolved Problems:    * No resolved hospital problems. *      HOSPITAL STAY     Brief Inpatient course:   Jamie Coffman is a 66 y.o. male  brought in to [facility] as a transfer from [facility] ER after an episode of V. tach and respiratory distress. Judy Mckenzie was admitted in the hospital for a week due to dyspnea and was treated with IV steroids bronchodilator and diuretic therapy.  Patient had worsening dyspnea for 2 days without any improvement with increased oxygen demands. Patient had 43-second run of V. tach and was subsequently given magnesium, amiodarone bolus and was started on drip.  Patient was then transferred to [facility] for further monitoring.     In [facility] ICU patient was found to be in respiratory distress, Chest x-ray showed left lower lobe infiltrate     Initially treated for A. fib with RVR and he is V. tach episode at [facility] ER most likely deemed to be aberrant conduction with given history of atrial fibrillation as per cardiology and recommends treatment with IV diuretics and Lasix and transition to home Bumex once CHF improved.       Patient was treated for acute exacerbation of COPD and community-acquired pneumonia with IV ceftriaxone and azithromycin. Patient was found to be having hyponatremia which improved .  Patient blood glucose was running high while he was in ICU likely secondary to being on  oral steroids and 2 doses of levaquin . Procedures/ Significant Interventions:    none    Consults:     Consults:     Final Specialist Recommendations/Findings:   IP CONSULT TO CARDIOLOGY  IP CONSULT TO DIABETES EDUCATOR  IP CONSULT TO HOME CARE NEEDS      Any Hospital Acquired Infections: none    Discharge Functional Status:  stable    DISCHARGE PLAN     Disposition: home with home care    Patient Instructions:   Discharge Medication List as of 6/1/2021  5:22 PM      START taking these medications    Details   predniSONE (DELTASONE) 20 MG tablet Take 2 tablets by mouth daily for 1 day, Disp-2 tablet, R-0Normal      dextromethorphan-guaiFENesin (ROBITUSSIN-DM)  MG/5ML syrup Take 10 mLs by mouth every 4 hours as needed for Cough, Disp-1 Bottle, R-0Normal      levoFLOXacin (LEVAQUIN) 500 MG tablet Take 1 tablet by mouth daily for 3 days, Disp-3 tablet, R-0Normal      metFORMIN (GLUCOPHAGE) 1000 MG tablet Take 1 tablet by mouth 2 times daily (with meals), Disp-180 tablet, R-0Normal         CONTINUE these medications which have NOT CHANGED    Details   pregabalin (LYRICA) 225 MG capsule Take 1 capsule by mouth 2 times daily for 30 days. , Disp-60 capsule, R-3Print      celecoxib (CELEBREX) 100 MG capsule Take 1 capsule by mouth 2 times daily, Disp-60 capsule, R-2Can have 90 day if appropriate for insuranceNormal      dilTIAZem (TIAZAC) 180 MG extended release capsule Take 180 mg by mouth dailyHistorical Med      magnesium oxide (MAG-OX) 400 MG tablet Take 400 mg by mouth dailyHistorical Med      NONFORMULARY Take 1 tablet by mouth daily ON COR vitamin - to help keep bladder cancer awayHistorical Med      acetaminophen (TYLENOL) 325 MG tablet Take 650 mg by mouth every 6 hours as needed for PainHistorical Med      spironolactone (ALDACTONE) 25 MG tablet Take 25 mg by mouth dailyHistorical Med      albuterol sulfate  (90 Base) MCG/ACT inhaler Inhale 2 puffs into the lungs every 6 hours as neededHistorical Med      lidocaine (XYLOCAINE) 5 % ointment Apply topically as needed. , Disp-240 g,R-5, Normal      sennosides-docusate sodium (SENOKOT-S) 8.6-50 MG tablet Take 1 tablet by mouth dailyHistorical Med      Multiple Vitamins-Minerals (ONCOVITE) TABS Take by mouthHistorical Med      loratadine (CLARITIN) 10 MG tablet Take 10 mg by mouth dailyHistorical Med      tiotropium (SPIRIVA HANDIHALER) 18 MCG inhalation capsule Inhale 18 mcg into the lungs dailyHistorical Med      apixaban (ELIQUIS) 5 MG TABS tablet Take by mouth 2 times dailyHistorical Med      potassium chloride (KLOR-CON M) 10 MEQ extended release tablet Take 10 mEq by mouth 2 times dailyHistorical Med      CPAP Machine MISC Historical MedBiPAP      budesonide-formoterol (SYMBICORT) 160-4.5 MCG/ACT AERO Inhale 2 puffs into the lungs 2 times dailyHistorical Med      bumetanide (BUMEX) 2 MG tablet Take 2 mg by mouth dailyHistorical Med      magnesium hydroxide (MILK OF MAGNESIA) 400 MG/5ML suspension Take by mouth daily as needed for ConstipationHistorical Med      ibuprofen (IBU) 400 MG tablet Take 400 mg by mouth every 6 hours as needed for PainHistorical Med      metoprolol succinate (TOPROL XL) 25 MG extended release tablet Take 25 mg by mouth dailyHistorical Med      Cyanocobalamin (VITAMIN B 12) 500 MCG TABS Take by mouthHistorical Med      pantoprazole (PROTONIX) 40 MG tablet Take 40 mg by mouth dailyHistorical Med Activity: up as tolerated    Diet: cardiac diet     Follow-up:    [facility]  [address] [place] [address] [place] [address]  [phone]    Schedule an appointment as soon as possible for a visit in 2 weeks  Post hospitalization follow up and for follow up of diabetes. Dr. Roth Apple an appointment as soon as possible for a visit in 2 weeks  Follow up with your Pulmonologist Dr. Jono Grewal for post hospitalization follow up. Dr. Dell Mcgrath - your Cardiologist    Schedule an appointment as soon as possible for a visit in 2 weeks  Post hospitalization follow up. Patient Instructions: given in pamphlet   Follow up labs:none  Follow up imaging: none    Note that over 30 minutes was spent in preparing discharge papers, discussing discharge with patient, medication review, etc.      Hezzie Phoenix, MD,   Internal Medicine Resident, PGY-1  [address], [place]  6/16/2021, 2:10 AM

## 2021-07-12 ENCOUNTER — APPOINTMENT (OUTPATIENT)
Dept: GENERAL RADIOLOGY | Age: 71
DRG: 871 | End: 2021-07-12
Attending: INTERNAL MEDICINE
Payer: MEDICARE

## 2021-07-12 ENCOUNTER — HOSPITAL ENCOUNTER (INPATIENT)
Age: 71
LOS: 8 days | Discharge: HOME HEALTH | DRG: 871 | End: 2021-07-20
Attending: INTERNAL MEDICINE | Admitting: INTERNAL MEDICINE
Payer: MEDICARE

## 2021-07-12 ENCOUNTER — APPOINTMENT (OUTPATIENT)
Dept: ULTRASOUND IMAGING | Age: 71
DRG: 871 | End: 2021-07-12
Attending: INTERNAL MEDICINE
Payer: MEDICARE

## 2021-07-12 PROBLEM — N17.9 AKI (ACUTE KIDNEY INJURY) (HCC): Status: ACTIVE | Noted: 2021-07-12

## 2021-07-12 PROBLEM — N17.9 ACUTE RENAL FAILURE (HCC): Status: ACTIVE | Noted: 2021-07-12

## 2021-07-12 PROBLEM — N17.9 ACUTE RENAL FAILURE (ARF) (HCC): Status: ACTIVE | Noted: 2021-07-12

## 2021-07-12 LAB
-: ABNORMAL
ALLEN TEST: ABNORMAL
AMORPHOUS: ABNORMAL
ANION GAP SERPL CALCULATED.3IONS-SCNC: 12 MMOL/L (ref 9–17)
ANION GAP SERPL CALCULATED.3IONS-SCNC: 15 MMOL/L (ref 9–17)
ANION GAP SERPL CALCULATED.3IONS-SCNC: 17 MMOL/L (ref 9–17)
BACTERIA: ABNORMAL
BILIRUBIN URINE: NEGATIVE
BUN BLDV-MCNC: 101 MG/DL (ref 8–23)
BUN BLDV-MCNC: 97 MG/DL (ref 8–23)
BUN BLDV-MCNC: 99 MG/DL (ref 8–23)
BUN/CREAT BLD: ABNORMAL (ref 9–20)
CALCIUM SERPL-MCNC: 8.5 MG/DL (ref 8.6–10.4)
CASTS UA: ABNORMAL /LPF (ref 0–2)
CASTS UA: ABNORMAL /LPF (ref 0–2)
CHLORIDE BLD-SCNC: 86 MMOL/L (ref 98–107)
CHLORIDE BLD-SCNC: 87 MMOL/L (ref 98–107)
CHLORIDE BLD-SCNC: 89 MMOL/L (ref 98–107)
CO2: 25 MMOL/L (ref 20–31)
CO2: 26 MMOL/L (ref 20–31)
CO2: 28 MMOL/L (ref 20–31)
COLOR: YELLOW
COMMENT UA: ABNORMAL
COMPLEMENT C3: 144 MG/DL (ref 90–180)
COMPLEMENT C4: 23 MG/DL (ref 10–40)
CREAT SERPL-MCNC: 2.94 MG/DL (ref 0.7–1.2)
CREAT SERPL-MCNC: 3.1 MG/DL (ref 0.7–1.2)
CREAT SERPL-MCNC: 3.27 MG/DL (ref 0.7–1.2)
CREATININE URINE: 42.8 MG/DL (ref 39–259)
CRYSTALS, UA: ABNORMAL /HPF
EPITHELIAL CELLS UA: ABNORMAL /HPF (ref 0–5)
FIO2: 5
FREE KAPPA/LAMBDA RATIO: 2.01 (ref 0.26–1.65)
GFR AFRICAN AMERICAN: 23 ML/MIN
GFR AFRICAN AMERICAN: 24 ML/MIN
GFR AFRICAN AMERICAN: 26 ML/MIN
GFR NON-AFRICAN AMERICAN: 19 ML/MIN
GFR NON-AFRICAN AMERICAN: 20 ML/MIN
GFR NON-AFRICAN AMERICAN: 21 ML/MIN
GFR SERPL CREATININE-BSD FRML MDRD: ABNORMAL ML/MIN/{1.73_M2}
GLUCOSE BLD-MCNC: 113 MG/DL (ref 74–100)
GLUCOSE BLD-MCNC: 162 MG/DL (ref 70–99)
GLUCOSE BLD-MCNC: 194 MG/DL (ref 75–110)
GLUCOSE BLD-MCNC: 204 MG/DL (ref 70–99)
GLUCOSE BLD-MCNC: 94 MG/DL (ref 70–99)
GLUCOSE URINE: NEGATIVE
HCT VFR BLD CALC: 34.3 % (ref 40.7–50.3)
HEMOGLOBIN: 11.4 G/DL (ref 13–17)
KAPPA FREE LIGHT CHAINS QNT: 4.22 MG/DL (ref 0.37–1.94)
KETONES, URINE: NEGATIVE
LACTIC ACID, SEPSIS WHOLE BLOOD: 0.9 MMOL/L (ref 0.5–1.9)
LACTIC ACID, SEPSIS WHOLE BLOOD: 1.4 MMOL/L (ref 0.5–1.9)
LACTIC ACID, SEPSIS: NORMAL MMOL/L (ref 0.5–1.9)
LACTIC ACID, SEPSIS: NORMAL MMOL/L (ref 0.5–1.9)
LAMBDA FREE LIGHT CHAINS QNT: 2.1 MG/DL (ref 0.57–2.63)
LEUKOCYTE ESTERASE, URINE: ABNORMAL
MCH RBC QN AUTO: 32.8 PG (ref 25.2–33.5)
MCHC RBC AUTO-ENTMCNC: 33.2 G/DL (ref 28.4–34.8)
MCV RBC AUTO: 98.6 FL (ref 82.6–102.9)
MODE: ABNORMAL
MUCUS: ABNORMAL
NEGATIVE BASE EXCESS, ART: ABNORMAL (ref 0–2)
NITRITE, URINE: NEGATIVE
NRBC AUTOMATED: 0 PER 100 WBC
O2 DEVICE/FLOW/%: ABNORMAL
OTHER OBSERVATIONS UA: ABNORMAL
PATIENT TEMP: ABNORMAL
PDW BLD-RTO: 15.6 % (ref 11.8–14.4)
PH UA: 7.5 (ref 5–8)
PLATELET # BLD: 270 K/UL (ref 138–453)
PMV BLD AUTO: 11.6 FL (ref 8.1–13.5)
POC HCO3: 29.8 MMOL/L (ref 21–28)
POC LACTIC ACID: 0.8 MMOL/L (ref 0.56–1.39)
POC O2 SATURATION: 92 % (ref 94–98)
POC PCO2 TEMP: ABNORMAL MM HG
POC PCO2: 57.8 MM HG (ref 35–48)
POC PH TEMP: ABNORMAL
POC PH: 7.32 (ref 7.35–7.45)
POC PO2 TEMP: ABNORMAL MM HG
POC PO2: 69.2 MM HG (ref 83–108)
POSITIVE BASE EXCESS, ART: 3 (ref 0–3)
POTASSIUM SERPL-SCNC: 5.5 MMOL/L (ref 3.7–5.3)
POTASSIUM SERPL-SCNC: 5.7 MMOL/L (ref 3.7–5.3)
POTASSIUM SERPL-SCNC: 6.2 MMOL/L (ref 3.7–5.3)
PROCALCITONIN: 0.55 NG/ML
PROTEIN UA: ABNORMAL
RBC # BLD: 3.48 M/UL (ref 4.21–5.77)
RBC UA: ABNORMAL /HPF (ref 0–2)
RENAL EPITHELIAL, UA: ABNORMAL /HPF
SAMPLE SITE: ABNORMAL
SODIUM BLD-SCNC: 127 MMOL/L (ref 135–144)
SODIUM BLD-SCNC: 129 MMOL/L (ref 135–144)
SODIUM BLD-SCNC: 129 MMOL/L (ref 135–144)
SODIUM,UR: <20 MMOL/L
SPECIFIC GRAVITY UA: 1.01 (ref 1–1.03)
TCO2 (CALC), ART: ABNORMAL MMOL/L (ref 22–29)
TOTAL PROTEIN, URINE: 52 MG/DL
TRICHOMONAS: ABNORMAL
TROPONIN INTERP: ABNORMAL
TROPONIN INTERP: ABNORMAL
TROPONIN T: ABNORMAL NG/ML
TROPONIN T: ABNORMAL NG/ML
TROPONIN, HIGH SENSITIVITY: 173 NG/L (ref 0–22)
TROPONIN, HIGH SENSITIVITY: 173 NG/L (ref 0–22)
TSH SERPL DL<=0.05 MIU/L-ACNC: 1.24 MIU/L (ref 0.3–5)
TURBIDITY: CLEAR
URINE HGB: ABNORMAL
UROBILINOGEN, URINE: NORMAL
WBC # BLD: 20 K/UL (ref 3.5–11.3)
WBC UA: ABNORMAL /HPF (ref 0–5)
YEAST: ABNORMAL

## 2021-07-12 PROCEDURE — 80074 ACUTE HEPATITIS PANEL: CPT

## 2021-07-12 PROCEDURE — 76770 US EXAM ABDO BACK WALL COMP: CPT

## 2021-07-12 PROCEDURE — 71045 X-RAY EXAM CHEST 1 VIEW: CPT

## 2021-07-12 PROCEDURE — 85027 COMPLETE CBC AUTOMATED: CPT

## 2021-07-12 PROCEDURE — 86160 COMPLEMENT ANTIGEN: CPT

## 2021-07-12 PROCEDURE — 80048 BASIC METABOLIC PNL TOTAL CA: CPT

## 2021-07-12 PROCEDURE — 82803 BLOOD GASES ANY COMBINATION: CPT

## 2021-07-12 PROCEDURE — 2580000003 HC RX 258: Performed by: EMERGENCY MEDICINE

## 2021-07-12 PROCEDURE — 37799 UNLISTED PX VASCULAR SURGERY: CPT

## 2021-07-12 PROCEDURE — 6370000000 HC RX 637 (ALT 250 FOR IP): Performed by: STUDENT IN AN ORGANIZED HEALTH CARE EDUCATION/TRAINING PROGRAM

## 2021-07-12 PROCEDURE — B543ZZA ULTRASONOGRAPHY OF RIGHT JUGULAR VEINS, GUIDANCE: ICD-10-PCS | Performed by: INTERNAL MEDICINE

## 2021-07-12 PROCEDURE — 6360000002 HC RX W HCPCS: Performed by: EMERGENCY MEDICINE

## 2021-07-12 PROCEDURE — 36415 COLL VENOUS BLD VENIPUNCTURE: CPT

## 2021-07-12 PROCEDURE — 6360000002 HC RX W HCPCS

## 2021-07-12 PROCEDURE — 86334 IMMUNOFIX E-PHORESIS SERUM: CPT

## 2021-07-12 PROCEDURE — 84484 ASSAY OF TROPONIN QUANT: CPT

## 2021-07-12 PROCEDURE — 05HM33Z INSERTION OF INFUSION DEVICE INTO RIGHT INTERNAL JUGULAR VEIN, PERCUTANEOUS APPROACH: ICD-10-PCS | Performed by: INTERNAL MEDICINE

## 2021-07-12 PROCEDURE — 93005 ELECTROCARDIOGRAM TRACING: CPT | Performed by: EMERGENCY MEDICINE

## 2021-07-12 PROCEDURE — 84156 ASSAY OF PROTEIN URINE: CPT

## 2021-07-12 PROCEDURE — 84165 PROTEIN E-PHORESIS SERUM: CPT

## 2021-07-12 PROCEDURE — 83605 ASSAY OF LACTIC ACID: CPT

## 2021-07-12 PROCEDURE — 81001 URINALYSIS AUTO W/SCOPE: CPT

## 2021-07-12 PROCEDURE — 84443 ASSAY THYROID STIM HORMONE: CPT

## 2021-07-12 PROCEDURE — 86038 ANTINUCLEAR ANTIBODIES: CPT

## 2021-07-12 PROCEDURE — 2580000003 HC RX 258: Performed by: STUDENT IN AN ORGANIZED HEALTH CARE EDUCATION/TRAINING PROGRAM

## 2021-07-12 PROCEDURE — 36620 INSERTION CATHETER ARTERY: CPT

## 2021-07-12 PROCEDURE — 99291 CRITICAL CARE FIRST HOUR: CPT | Performed by: INTERNAL MEDICINE

## 2021-07-12 PROCEDURE — 6360000002 HC RX W HCPCS: Performed by: INTERNAL MEDICINE

## 2021-07-12 PROCEDURE — 84145 PROCALCITONIN (PCT): CPT

## 2021-07-12 PROCEDURE — 83516 IMMUNOASSAY NONANTIBODY: CPT

## 2021-07-12 PROCEDURE — 2500000003 HC RX 250 WO HCPCS: Performed by: INTERNAL MEDICINE

## 2021-07-12 PROCEDURE — 2580000003 HC RX 258: Performed by: INTERNAL MEDICINE

## 2021-07-12 PROCEDURE — 94640 AIRWAY INHALATION TREATMENT: CPT

## 2021-07-12 PROCEDURE — 2500000003 HC RX 250 WO HCPCS: Performed by: EMERGENCY MEDICINE

## 2021-07-12 PROCEDURE — 84300 ASSAY OF URINE SODIUM: CPT

## 2021-07-12 PROCEDURE — 2500000003 HC RX 250 WO HCPCS: Performed by: STUDENT IN AN ORGANIZED HEALTH CARE EDUCATION/TRAINING PROGRAM

## 2021-07-12 PROCEDURE — 87040 BLOOD CULTURE FOR BACTERIA: CPT

## 2021-07-12 PROCEDURE — 2000000000 HC ICU R&B

## 2021-07-12 PROCEDURE — 84155 ASSAY OF PROTEIN SERUM: CPT

## 2021-07-12 PROCEDURE — 86225 DNA ANTIBODY NATIVE: CPT

## 2021-07-12 PROCEDURE — 6370000000 HC RX 637 (ALT 250 FOR IP): Performed by: EMERGENCY MEDICINE

## 2021-07-12 PROCEDURE — 6370000000 HC RX 637 (ALT 250 FOR IP): Performed by: INTERNAL MEDICINE

## 2021-07-12 PROCEDURE — 87641 MR-STAPH DNA AMP PROBE: CPT

## 2021-07-12 PROCEDURE — 82570 ASSAY OF URINE CREATININE: CPT

## 2021-07-12 PROCEDURE — 82947 ASSAY GLUCOSE BLOOD QUANT: CPT

## 2021-07-12 PROCEDURE — 83883 ASSAY NEPHELOMETRY NOT SPEC: CPT

## 2021-07-12 PROCEDURE — 93005 ELECTROCARDIOGRAM TRACING: CPT | Performed by: INTERNAL MEDICINE

## 2021-07-12 RX ORDER — 0.9 % SODIUM CHLORIDE 0.9 %
1000 INTRAVENOUS SOLUTION INTRAVENOUS ONCE
Status: DISCONTINUED | OUTPATIENT
Start: 2021-07-12 | End: 2021-07-20 | Stop reason: HOSPADM

## 2021-07-12 RX ORDER — SODIUM CHLORIDE 0.9 % (FLUSH) 0.9 %
5-40 SYRINGE (ML) INJECTION EVERY 12 HOURS SCHEDULED
Status: DISCONTINUED | OUTPATIENT
Start: 2021-07-12 | End: 2021-07-20 | Stop reason: HOSPADM

## 2021-07-12 RX ORDER — BUMETANIDE 0.25 MG/ML
1 INJECTION, SOLUTION INTRAMUSCULAR; INTRAVENOUS ONCE
Status: COMPLETED | OUTPATIENT
Start: 2021-07-12 | End: 2021-07-12

## 2021-07-12 RX ORDER — POTASSIUM CHLORIDE 29.8 MG/ML
20 INJECTION INTRAVENOUS PRN
Status: DISCONTINUED | OUTPATIENT
Start: 2021-07-12 | End: 2021-07-15

## 2021-07-12 RX ORDER — ONDANSETRON 2 MG/ML
4 INJECTION INTRAMUSCULAR; INTRAVENOUS EVERY 6 HOURS PRN
Status: DISCONTINUED | OUTPATIENT
Start: 2021-07-12 | End: 2021-07-20 | Stop reason: HOSPADM

## 2021-07-12 RX ORDER — DILTIAZEM HYDROCHLORIDE 180 MG/1
180 CAPSULE, COATED, EXTENDED RELEASE ORAL DAILY
Status: DISCONTINUED | OUTPATIENT
Start: 2021-07-12 | End: 2021-07-20 | Stop reason: HOSPADM

## 2021-07-12 RX ORDER — POLYETHYLENE GLYCOL 3350 17 G/17G
17 POWDER, FOR SOLUTION ORAL DAILY PRN
Status: DISCONTINUED | OUTPATIENT
Start: 2021-07-12 | End: 2021-07-20 | Stop reason: HOSPADM

## 2021-07-12 RX ORDER — DEXTROSE MONOHYDRATE 50 MG/ML
100 INJECTION, SOLUTION INTRAVENOUS PRN
Status: DISCONTINUED | OUTPATIENT
Start: 2021-07-12 | End: 2021-07-13 | Stop reason: SDUPTHER

## 2021-07-12 RX ORDER — ACETAMINOPHEN 650 MG/1
650 SUPPOSITORY RECTAL EVERY 6 HOURS PRN
Status: DISCONTINUED | OUTPATIENT
Start: 2021-07-12 | End: 2021-07-20 | Stop reason: HOSPADM

## 2021-07-12 RX ORDER — PANTOPRAZOLE SODIUM 40 MG/1
40 TABLET, DELAYED RELEASE ORAL DAILY
Status: DISCONTINUED | OUTPATIENT
Start: 2021-07-12 | End: 2021-07-14

## 2021-07-12 RX ORDER — QUETIAPINE FUMARATE 25 MG/1
25 TABLET, FILM COATED ORAL ONCE
Status: COMPLETED | OUTPATIENT
Start: 2021-07-12 | End: 2021-07-12

## 2021-07-12 RX ORDER — FENTANYL CITRATE 50 UG/ML
INJECTION, SOLUTION INTRAMUSCULAR; INTRAVENOUS
Status: COMPLETED
Start: 2021-07-12 | End: 2021-07-12

## 2021-07-12 RX ORDER — DEXTROSE MONOHYDRATE 25 G/50ML
25 INJECTION, SOLUTION INTRAVENOUS ONCE
Status: COMPLETED | OUTPATIENT
Start: 2021-07-12 | End: 2021-07-12

## 2021-07-12 RX ORDER — DEXTROSE MONOHYDRATE 25 G/50ML
12.5 INJECTION, SOLUTION INTRAVENOUS PRN
Status: DISCONTINUED | OUTPATIENT
Start: 2021-07-12 | End: 2021-07-13 | Stop reason: SDUPTHER

## 2021-07-12 RX ORDER — FENTANYL CITRATE 50 UG/ML
50 INJECTION, SOLUTION INTRAMUSCULAR; INTRAVENOUS ONCE
Status: COMPLETED | OUTPATIENT
Start: 2021-07-12 | End: 2021-07-12

## 2021-07-12 RX ORDER — ONDANSETRON 4 MG/1
4 TABLET, ORALLY DISINTEGRATING ORAL EVERY 8 HOURS PRN
Status: DISCONTINUED | OUTPATIENT
Start: 2021-07-12 | End: 2021-07-20 | Stop reason: HOSPADM

## 2021-07-12 RX ORDER — BUDESONIDE AND FORMOTEROL FUMARATE DIHYDRATE 160; 4.5 UG/1; UG/1
2 AEROSOL RESPIRATORY (INHALATION) 2 TIMES DAILY
Status: DISCONTINUED | OUTPATIENT
Start: 2021-07-12 | End: 2021-07-20 | Stop reason: HOSPADM

## 2021-07-12 RX ORDER — PREGABALIN 75 MG/1
225 CAPSULE ORAL 2 TIMES DAILY
Status: DISCONTINUED | OUTPATIENT
Start: 2021-07-12 | End: 2021-07-12

## 2021-07-12 RX ORDER — ACETAMINOPHEN 325 MG/1
650 TABLET ORAL EVERY 6 HOURS PRN
Status: DISCONTINUED | OUTPATIENT
Start: 2021-07-12 | End: 2021-07-20 | Stop reason: HOSPADM

## 2021-07-12 RX ORDER — NICOTINE POLACRILEX 4 MG
15 LOZENGE BUCCAL PRN
Status: DISCONTINUED | OUTPATIENT
Start: 2021-07-12 | End: 2021-07-13 | Stop reason: SDUPTHER

## 2021-07-12 RX ORDER — NOREPINEPHRINE BIT/0.9 % NACL 16MG/250ML
2-100 INFUSION BOTTLE (ML) INTRAVENOUS CONTINUOUS
Status: DISCONTINUED | OUTPATIENT
Start: 2021-07-12 | End: 2021-07-15

## 2021-07-12 RX ORDER — HEPARIN SODIUM 5000 [USP'U]/ML
5000 INJECTION, SOLUTION INTRAVENOUS; SUBCUTANEOUS EVERY 8 HOURS SCHEDULED
Status: DISCONTINUED | OUTPATIENT
Start: 2021-07-12 | End: 2021-07-20 | Stop reason: HOSPADM

## 2021-07-12 RX ORDER — SODIUM CHLORIDE 0.9 % (FLUSH) 0.9 %
5-40 SYRINGE (ML) INJECTION PRN
Status: DISCONTINUED | OUTPATIENT
Start: 2021-07-12 | End: 2021-07-20 | Stop reason: HOSPADM

## 2021-07-12 RX ORDER — SODIUM CHLORIDE 9 MG/ML
25 INJECTION, SOLUTION INTRAVENOUS PRN
Status: DISCONTINUED | OUTPATIENT
Start: 2021-07-12 | End: 2021-07-20 | Stop reason: HOSPADM

## 2021-07-12 RX ADMIN — BUMETANIDE 1 MG: 0.25 INJECTION INTRAMUSCULAR; INTRAVENOUS at 20:29

## 2021-07-12 RX ADMIN — DEXTROSE MONOHYDRATE 25 G: 25 INJECTION, SOLUTION INTRAVENOUS at 17:41

## 2021-07-12 RX ADMIN — FENTANYL CITRATE 50 MCG: 50 INJECTION, SOLUTION INTRAMUSCULAR; INTRAVENOUS at 15:46

## 2021-07-12 RX ADMIN — DEXTROSE MONOHYDRATE 25 G: 25 INJECTION, SOLUTION INTRAVENOUS at 22:26

## 2021-07-12 RX ADMIN — SODIUM ZIRCONIUM CYCLOSILICATE 10 G: 10 POWDER, FOR SUSPENSION ORAL at 18:16

## 2021-07-12 RX ADMIN — QUETIAPINE FUMARATE 25 MG: 25 TABLET ORAL at 20:30

## 2021-07-12 RX ADMIN — INSULIN HUMAN 10 UNITS: 100 INJECTION, SOLUTION PARENTERAL at 22:27

## 2021-07-12 RX ADMIN — VANCOMYCIN HYDROCHLORIDE 2000 MG: 1 INJECTION, POWDER, LYOPHILIZED, FOR SOLUTION INTRAVENOUS at 21:10

## 2021-07-12 RX ADMIN — BUDESONIDE AND FORMOTEROL FUMARATE DIHYDRATE 2 PUFF: 160; 4.5 AEROSOL RESPIRATORY (INHALATION) at 20:51

## 2021-07-12 RX ADMIN — HEPARIN SODIUM 5000 UNITS: 5000 INJECTION INTRAVENOUS; SUBCUTANEOUS at 20:30

## 2021-07-12 RX ADMIN — HYDROCORTISONE SODIUM SUCCINATE 100 MG: 100 INJECTION, POWDER, FOR SOLUTION INTRAMUSCULAR; INTRAVENOUS at 20:30

## 2021-07-12 RX ADMIN — CEFEPIME 2000 MG: 2 INJECTION, POWDER, FOR SOLUTION INTRAVENOUS at 20:29

## 2021-07-12 RX ADMIN — DILTIAZEM HYDROCHLORIDE 180 MG: 180 CAPSULE, COATED, EXTENDED RELEASE ORAL at 17:13

## 2021-07-12 RX ADMIN — CEFTRIAXONE SODIUM 1000 MG: 1 INJECTION, POWDER, FOR SOLUTION INTRAMUSCULAR; INTRAVENOUS at 17:40

## 2021-07-12 RX ADMIN — VASOPRESSIN 0.01 UNITS/MIN: 20 INJECTION INTRAVENOUS at 21:41

## 2021-07-12 RX ADMIN — PANTOPRAZOLE SODIUM 40 MG: 40 TABLET, DELAYED RELEASE ORAL at 17:16

## 2021-07-12 RX ADMIN — INSULIN HUMAN 10 UNITS: 100 INJECTION, SOLUTION PARENTERAL at 17:41

## 2021-07-12 RX ADMIN — BUMETANIDE 1 MG: 0.25 INJECTION INTRAMUSCULAR; INTRAVENOUS at 22:26

## 2021-07-12 RX ADMIN — SODIUM CHLORIDE, PRESERVATIVE FREE 10 ML: 5 INJECTION INTRAVENOUS at 21:00

## 2021-07-12 RX ADMIN — Medication 18 MCG/MIN: at 15:46

## 2021-07-12 ASSESSMENT — PAIN SCALES - GENERAL
PAINLEVEL_OUTOF10: 0

## 2021-07-12 NOTE — H&P
Critical Care - History and Physical Examination    Patient's name:  Edmund Crespo  Medical Record Number: [id number]  Patient's account/billing number: [de-identified]  Patient's YOB: 1950  Age: 66 y.o. Date of Admission: 7/12/2021  1:09 PM  Date of History and Physical Examination: 7/12/2021      Primary Care Physician: Marnie Campbell  Attending Physician:    Code Status: Prior    Chief complaint: Hypotension and LAKESHA    HISTORY OF PRESENT ILLNESS:   History was obtained from chart review and the patient. Edmund Crespo is a 66 y.o. male that was a transfer from [facility]. Patient does not know why he was sent to outside facility, was at a nursing home and was noted to be hypotensive. At outside facility patient was noted to have small leukocytosis, mild anemia, and new onset LAKESHA. Patient was transferred here for ICU management. Patient is not intubated. On my examination patient was asking questions appropriately, was alert and oriented to person and place. Patient had no complaints of chest pain, shortness of breath, abdominal pain, change in stool. Patient denies any recent feelings of sickness, any cough, fever, or other concerns of infection. Patient did report recent burning with urination couple days ago and urinary retention. Patient noted to have a Bajwa catheter placed. On arrival patient was noted to be hypotensive on Levophed 16 mcg/min. No other medications going. Did not know why he was here longline in the ICU discussed with his low blood pressure. Patient son does state the patient does have frequent low blood pressures but not requiring blood pressure medications.     Patient noted to have a history of neuropathic pain, CABG, COPD, type 2 diabetes, chronic cor pulmonale and pulmonary hypertension      Past Medical History:        Diagnosis Date    Agent orange exposure     Anxiety state     Cancer ([facility] [address]     bladder    CHF (congestive heart failure) ([facility] [address]     Chronic obstructive pulmonary disease (COPD) (HCC)     Chronic post-traumatic stress disorder     Chronic respiratory failure with hypercapnia (HCC)     COPD exacerbation (HCC)     Coronary artery disease     Degenerative disc disease, lumbar     Depression     Essential hypertension     History of acute pancreatitis     Hyperglycemia     Hypokalemia     Lumbosacral disc disease     Obesity     Obstructive sleep apnea     Personal history of malignant neoplasm of bladder        Past Surgical History:        Procedure Laterality Date    BLADDER SURGERY      OTHER SURGICAL HISTORY      heart bypass with stents    OTHER SURGICAL HISTORY Bilateral 02/10/2021    Bilateral L4-5, L5-S1 Facet Block Injection [facility] Dr Deepthi Foley PAIN MANAGEMENT PROCEDURE Bilateral 02/10/2021    Facet block injection L4-5, L5-S1, bilateral       Allergies: Allergies   Allergen Reactions    Niacin And Related     Other      Nicotine patch - rash    Statins          Home Meds:   Prior to Admission medications    Medication Sig Start Date End Date Taking? Authorizing Provider   celecoxib (CELEBREX) 100 MG capsule take 1 capsule by mouth twice a day 6/3/21   ALISON Whitney - CNP   dextromethorphan-guaiFENesin (ROBITUSSIN-DM)  MG/5ML syrup Take 10 mLs by mouth every 4 hours as needed for Cough 6/1/21   Morteza Tillman MD   metFORMIN (GLUCOPHAGE) 1000 MG tablet Take 1 tablet by mouth 2 times daily (with meals) 6/1/21   Morteza Tillman MD   pregabalin (LYRICA) 225 MG capsule Take 1 capsule by mouth 2 times daily for 30 days.  5/7/21 6/6/21  Lawrence Cheung MD   dilTIAZem MIDDLETON [facility]) 180 MG extended release capsule Take 180 mg by mouth daily 11/5/20   Historical Provider, MD   magnesium oxide (MAG-OX) 400 MG tablet Take 400 mg by mouth daily    Historical Provider, MD   NONFORMULARY Take 1 tablet by mouth daily ON COR vitamin - to help keep bladder cancer away    Historical Provider, MD acetaminophen (TYLENOL) 325 MG tablet Take 650 mg by mouth every 6 hours as needed for Pain    Historical Provider, MD   spironolactone (ALDACTONE) 25 MG tablet Take 25 mg by mouth daily    Historical Provider, MD   albuterol sulfate  (90 Base) MCG/ACT inhaler Inhale 2 puffs into the lungs every 6 hours as needed    Historical Provider, MD   lidocaine (XYLOCAINE) 5 % ointment Apply topically as needed.  10/21/20   Bridgette Rangel, APRN - CNP   sennosides-docusate sodium (SENOKOT-S) 8.6-50 MG tablet Take 1 tablet by mouth daily    Historical Provider, MD   Multiple Vitamins-Minerals (ONCOVITE) TABS Take by mouth    Historical Provider, MD   loratadine (CLARITIN) 10 MG tablet Take 10 mg by mouth daily    Historical Provider, MD   tiotropium (SPIRIVA HANDIHALER) 18 MCG inhalation capsule Inhale 18 mcg into the lungs daily    Historical Provider, MD   apixaban (ELIQUIS) 5 MG TABS tablet Take by mouth 2 times daily    Historical Provider, MD   potassium chloride (KLOR-CON M) 10 MEQ extended release tablet Take 10 mEq by mouth 2 times daily    Historical Provider, MD   CPAP Machine MISC by Does not apply route BiPAP    Historical Provider, MD   budesonide-formoterol (SYMBICORT) 160-4.5 MCG/ACT AERO Inhale 2 puffs into the lungs 2 times daily    Historical Provider, MD   bumetanide (BUMEX) 2 MG tablet Take 2 mg by mouth daily    Historical Provider, MD   magnesium hydroxide (MILK OF MAGNESIA) 400 MG/5ML suspension Take by mouth daily as needed for Constipation    Historical Provider, MD   ibuprofen (IBU) 400 MG tablet Take 400 mg by mouth every 6 hours as needed for Pain    Historical Provider, MD   metoprolol succinate (TOPROL XL) 25 MG extended release tablet Take 25 mg by mouth daily    Historical Provider, MD   Cyanocobalamin (VITAMIN B 12) 500 MCG TABS Take by mouth    Historical Provider, MD   pantoprazole (PROTONIX) 40 MG tablet Take 40 mg by mouth daily    Historical Provider, MD       Social History: TOBACCO:   reports that he has been smoking cigarettes. He has a 54.00 pack-year smoking history. He has never used smokeless tobacco.  ETOH:   reports current alcohol use. DRUGS:  reports no history of drug use. OCCUPATION:      Family History:       Problem Relation Age of Onset    Emphysema Mother     Heart Disease Father     Heart Failure Father            REVIEW OF SYSTEMS (ROS):  Review of Systems -   General ROS: negative  Psychological ROS: negative  Ophthalmic ROS: negative  ENT ROS: negative  Allergy and Immunology ROS: negative  Hematological and Lymphatic ROS: negative  Endocrine ROS: negative  Breast ROS: negative  Respiratory ROS: no cough, shortness of breath, or wheezing  Cardiovascular ROS: no chest pain or dyspnea on exertion  Gastrointestinal ROS:negative  Genito-Urinary ROS: negative  Musculoskeletal ROS: negative  Neurological ROS: negative  Dermatological ROS: negative      Physical Exam:    Vitals: BP (!) 98/44   Pulse 55   Temp 96.6 °F (35.9 °C) (Axillary)   Resp 16   SpO2 97%     Last Body weight:   Wt Readings from Last 3 Encounters:   06/01/21 195 lb 2.8 oz (88.5 kg)   12/02/20 213 lb (96.6 kg)   11/04/20 213 lb (96.6 kg)       Body Mass Index : There is no height or weight on file to calculate BMI. PHYSICAL EXAMINATION :  Constitutional: Appears well, no distress  EENT: PERRLA, EOMI, sclera clear, anicteric, oropharynx clear, no lesions, neck supple with midline trachea. Neck: Supple, symmetrical, trachea midline, no adenopathy, thyroid symmetric, no jvd skin normal  Respiratory: clear to auscultation, no wheezes or rales and unlabored breathing.  No intercostal tenderness  Cardiovascular: regular rate and rhythm, normal S1, S2, no murmur noted and 2+ pulses throughout  Abdomen: soft, nontender, nondistended, no masses or organomegaly  Extremities:  peripheral pulses normal, no pedal edema, no clubbing or cyanosis    Any additional physical findings:      Laboratory findings:-    CBC: No results for input(s): WBC, HGB, PLT in the last 72 hours. BMP:  No results for input(s): NA, K, CL, CO2, BUN, CREATININE, GLUCOSE in the last 72 hours. S. Calcium:No results for input(s): CALCIUM in the last 72 hours. S. Ionized Calcium:No results for input(s): IONCA in the last 72 hours. S. Magnesium:No results for input(s): MG in the last 72 hours. S. Phosphorus:No results for input(s): PHOS in the last 72 hours. S. Glucose:No results for input(s): POCGLU in the last 72 hours. Glycosylated hemoglobin A1C: No results for input(s): LABA1C in the last 72 hours. INR: No results for input(s): INR in the last 72 hours. Hepatic functions: No results for input(s): ALKPHOS, ALT, AST, PROT, BILITOT, BILIDIR, LABALBU in the last 72 hours. Pancreatic functions:No results for input(s): LACTA, AMYLASE in the last 72 hours. S. Lactic Acid: No results for input(s): LACTA in the last 72 hours. Cardiac enzymes:No results for input(s): CKTOTAL, CKMB, CKMBINDEX, TROPONINI in the last 72 hours. BNP:No results for input(s): BNP in the last 72 hours. Lipid profile: No results for input(s): CHOL, TRIG, HDL, LDLCALC in the last 72 hours.     Invalid input(s): LDL  Blood Gases: No results found for: PH, PCO2, PO2, HCO3, O2SAT  Thyroid functions:   Lab Results   Component Value Date    TSH 0.53 05/28/2021        Urinalysis:     Microbiology:    Cultures during this admission:     Blood cultures:                 [] None drawn      [] Negative             []  Positive (Details pending)  Urine Culture:                   [] None drawn      [] Negative             []  Positive (Details: Pending)  Sputum Culture:               [x] None drawn       [] Negative             []  Positive (Details:  )   Endotracheal aspirate:     [x] None drawn       [] Negative             []  Positive (Details:  )         -----------------------------------------------------------------  Radiological reports:    (See actual reports pending  -Blood cultures pending  -Afebrile  -Ceftriaxone 1 g every 24 hours            Edin Alfred DO, M.D.   Department of Internal Medicine/ Critical care  [facility])             7/12/2021, 2:38 PM

## 2021-07-12 NOTE — PROGRESS NOTES
Pharmacy Note  Vancomycin Consult    Betzy Trammell is a 66 y.o. male started on Vancomycin for aspiration pneumonia; consult received from Dr. Mau Hanna to manage therapy. Also receiving the following antibiotics: cefepime. Patient Active Problem List   Diagnosis    Neuropathic pain    Chronic bilateral low back pain without sciatica    Lumbosacral spondylosis without myelopathy    DDD (degenerative disc disease), lumbar    Lumbar facet joint syndrome    Panniculitis involving lumbar region    Ventricular tachycardia (HCC)    Acute on chronic respiratory failure with hypoxia and hypercapnia (HCC)    Pneumonia of left lower lobe due to infectious organism    PAF (paroxysmal atrial fibrillation) (HCC)    Pulmonary hypertension with chronic cor pulmonale (HCC)    RODNEY treated with BiPAP    Smoking greater than 40 pack years    Class 1 obesity due to excess calories with serious comorbidity and body mass index (BMI) of 30.0 to 30.9 in adult    Stage 4 very severe COPD by GOLD classification ([facility] [address]    Hyponatremia    Uncontrolled type 2 diabetes mellitus with hyperglycemia ([facility])    Acute renal failure ([facility])    LAKESHA (acute kidney injury) ([facility] [address]    Acute renal failure (ARF) ([facility] [address]     Allergies:  Niacin and related, Other, and Statins     Temp max: 98.1    Recent Labs     07/12/21  1525 07/12/21  1715   * 99*   CREATININE 3.27* 3.10*   WBC 20.0*  --        Intake/Output Summary (Last 24 hours) at 7/12/2021 1924  Last data filed at 7/12/2021 1800  Gross per 24 hour   Intake    Output 1000 ml   Net -1000 ml     Culture Date      Source                       Results  See micro    Ht Readings from Last 1 Encounters:   07/12/21 5' 10\" (1.778 m)        Wt Readings from Last 1 Encounters:   07/12/21 216 lb 14.9 oz (98.4 kg)       Body mass index is 31.13 kg/m². Estimated Creatinine Clearance: 25 mL/min (A) (based on SCr of 3.1 mg/dL (H)).     Goal Trough Level: 14-17 mcg/mL    Assessment/Plan:  Will initiate Vancomycin with a one time loading dose of 2000 mg x1, followed by dosing by levels. Timing of trough level will be determined based on culture results, renal function, and clinical response. Thank you for the consult. Will continue to follow.     Salud Craven PharmD, BCPS 7/12/2021 7:27 PM

## 2021-07-12 NOTE — PROCEDURES
PROCEDURE NOTE - CENTRAL VENOUS LINE PLACEMENT    PATIENT NAME: Marleny Levi RECORD NO. [id number]  DATE: 7/12/2021  ATTENDING PHYSICIAN: Dr. Gina Orozco DIAGNOSIS:  vascular access and hypovolemia  POSTOPERATIVE DIAGNOSIS:  Same  PROCEDURE PERFORMED:  Right Internal Jugular Vein Central Line Insertion  PERFORMING PHYSICIAN: Anthony Holden DO  ANESTHESIA:  Local utilizing 1% lidocaine  ESTIMATED BLOOD LOSS:  Less than 25 ml  COMPLICATIONS:  None immediately appreciated. DISCUSSION:  Milagros Muhammad is a 66y.o.-year-old male who requires central IV access vascular access and hypovolemia. The history and physical examination were reviewed and confirmed. CONSENT: The patient was and family members were counseled regarding the procedure, its indications, risks, potential complications and alternatives, and any questions were answered. Consent was obtained to proceed. PROCEDURE:  A timeout was initiated by the bedside nurse and was confirmed by those present. The patient was placed in a supine position. The skin overlying the Right Internal Jugular Vein was prepped with chlorhexadine and draped in sterile fashion. The skin was infiltrated with local anesthetic. The vessel and surrounding anatomy was visualized using ultrasound. Through the anesthetized region, the introducer needle was inserted into the internal jugular vein returning dark red non pulsatile blood. A guidewire was placed through the center of the needle with no resistance. Ultrasound confirmed presence of wire in the vein. A small incision made in the skin with a #11 scalpel blade. The dilator was inserted into the skin and vein over guidewire using Seldinger technique. The dilator was then removed and the 7 20cm catheter was placed in the vein over the guidewire using Seldinger technique. The guidewire was then removed and all ports aspirated and flushed appropriately.  The catheter then secured using silk suture and a temporary sterile dressing was applied. No immediate complication was evident. All sponge, instrument and needle counts were correct at the completion of the procedure. Postprocedural chest x-ray showed good position of the catheter with no evidence of hemothorax or pneumothorax. The patient tolerated the procedure well with no immediate complication evident.      Anthony Pisano,   5:02 PM, 7/12/21

## 2021-07-12 NOTE — CARE COORDINATION
Case Management Initial Discharge Plan  Roya Mccormick,             Met with:patient or family member patient and son to discuss discharge plans. Information verified: address, contacts, phone number, , insurance Yes  Insurance Provider: [address] [place] [address]    Emergency Contact/Next of Kin name & number: Georgina Sandoval, ex-wife [phone] and 2 children  Who are involved in patient's support system? Children and ex wife    PCP: Mami Michele  Date of last visit: not sure      Discharge Planning    Living Arrangements:  Other (Comment) (SNF)     Home has 2  Stories but they only use the 1st floor  4 stairs to climb to get into front door    Patient able to perform ADL's Son states independent at home    Current Services (outpatient & in home) none  DME equipment: home O2, bipap,nebulizer, shower chair,walker, glucometer  DME provider:     Is patient receiving oral anticoagulation therapy? Yes    If indicated:   Physician managing anticoagulation treatment:   Where does patient obtain lab work for ATC treatment? [facility]      Potential Assistance Needed:       Patient agreeable to home care: Yes-pt most likely willing to get [facility]  [place] of choice provided:  n/a    Prior SNF/Rehab Placement and Facility: Currently from [address] [place] [address] to SNF/Rehab: Yes  [place] of choice provided: yes     Evaluation: no    Expected Discharge date:       Patient expects to be discharged to:  home vs SNF    If home: is the family and/or caregiver wiling & able to provide support at home? yes  Who will be providing this support?  Ex wife and children*    Follow Up Appointment: Best Day/ Time:      Transportation provider: family or ambulence, depending on pt condition  Transportation arrangements needed for discharge: TBD    Readmission Risk              Risk of Unplanned Readmission:  15             Does patient have a readmission risk score greater than 14?: Yes  If yes, follow-up appointment must be made within 7 days of discharge.      Goals of Care: Improved renal status      Educated pt and son on transitional options, provided freedom of choice and are agreeable with plan      Discharge Plan: Back to [facility] vs home          Electronically signed by Kelsea Huerta RN on 7/12/21 at 6:07 PM EDT

## 2021-07-12 NOTE — PROGRESS NOTES
Bar[facility] Molina, [facility] Acute renal failure (ARF) ([facility] [address] [N17.9] . Vitals:    07/12/21 1500   BP:    Pulse: 67   Resp: 16   Temp:    SpO2: 99%   . Patients home meds are   Prior to Admission medications    Medication Sig Start Date End Date Taking? Authorizing Provider   celecoxib (CELEBREX) 100 MG capsule take 1 capsule by mouth twice a day 6/3/21   ALISON Baptiste CNP   dextromethorphan-guaiFENesin (ROBITUSSIN-DM)  MG/5ML syrup Take 10 mLs by mouth every 4 hours as needed for Cough 6/1/21   Dusty Sauer MD   metFORMIN (GLUCOPHAGE) 1000 MG tablet Take 1 tablet by mouth 2 times daily (with meals) 6/1/21   Dusty Sauer MD   pregabalin (LYRICA) 225 MG capsule Take 1 capsule by mouth 2 times daily for 30 days. 5/7/21 6/6/21  Carlo Carrington MD   dilTIAZem [facility]) 180 MG extended release capsule Take 180 mg by mouth daily 11/5/20   Historical Provider, MD   magnesium oxide (MAG-OX) 400 MG tablet Take 400 mg by mouth daily    Historical Provider, MD   NONFORMULARY Take 1 tablet by mouth daily ON COR vitamin - to help keep bladder cancer away    Historical Provider, MD   acetaminophen (TYLENOL) 325 MG tablet Take 650 mg by mouth every 6 hours as needed for Pain    Historical Provider, MD   spironolactone (ALDACTONE) 25 MG tablet Take 25 mg by mouth daily    Historical Provider, MD   albuterol sulfate  (90 Base) MCG/ACT inhaler Inhale 2 puffs into the lungs every 6 hours as needed    Historical Provider, MD   lidocaine (XYLOCAINE) 5 % ointment Apply topically as needed.  10/21/20   ALISON Baptiste CNP   sennosides-docusate sodium (SENOKOT-S) 8.6-50 MG tablet Take 1 tablet by mouth daily    Historical Provider, MD   Multiple Vitamins-Minerals (ONCOVITE) TABS Take by mouth    Historical Provider, MD   loratadine (CLARITIN) 10 MG tablet Take 10 mg by mouth daily    Historical Provider, MD   tiotropium (SPIRIVA HANDIHALER) 18 MCG inhalation capsule Inhale 18 mcg into the lungs daily    Historical Provider, MD   apixaban (ELIQUIS) 5 MG TABS tablet Take by mouth 2 times daily    Historical Provider, MD   potassium chloride (KLOR-CON M) 10 MEQ extended release tablet Take 10 mEq by mouth 2 times daily    Historical Provider, MD   CPAP Machine MISC by Does not apply route BiPAP    Historical Provider, MD   budesonide-formoterol (SYMBICORT) 160-4.5 MCG/ACT AERO Inhale 2 puffs into the lungs 2 times daily    Historical Provider, MD   bumetanide (BUMEX) 2 MG tablet Take 2 mg by mouth daily    Historical Provider, MD   magnesium hydroxide (MILK OF MAGNESIA) 400 MG/5ML suspension Take by mouth daily as needed for Constipation    Historical Provider, MD   ibuprofen (IBU) 400 MG tablet Take 400 mg by mouth every 6 hours as needed for Pain    Historical Provider, MD   metoprolol succinate (TOPROL XL) 25 MG extended release tablet Take 25 mg by mouth daily    Historical Provider, MD   Cyanocobalamin (VITAMIN B 12) 500 MCG TABS Take by mouth    Historical Provider, MD   pantoprazole (PROTONIX) 40 MG tablet Take 40 mg by mouth daily    Historical Provider, MD   .      Assessment hypotension/SYMBICORT home meds.        RR 18   Breath Sounds: clear       · Bronchodilator assessment at level  1      · [x]    Bronchodilator Assessment  BRONCHODILATOR ASSESSMENT SCORE  Score 0 1 2 3 4 5   Breath Sounds   []  Patient Baseline [x]  No Wheeze good aeration []  Faint, scattered wheezing, good aeration []  Expiratory Wheezing and or moderately diminished []  Insp/Exp wheeze and/or very diminished []  Insp/Exp and/ or marked distress   Respiratory Rate   []  Patient Baseline [x]  Less than 20 []  Less than 20 []  20-25 []  Greater than 25 []  Greater than 25   Peak flow % of Pred or PB [x]  NA   []  Greater than 90%  []  81-90% []  71-80% []  Less than or equal to 70%  or unable to perform []  Unable due to Respiratory Distress   Dyspnea re []  Patient Baseline [x]  No SOB []  No SOB []  SOB on exertion []  SOB min activity []  At rest/acute   e FEV% Predicted       [x]  NA []  Above 69%  []  Unable []  Above 60-69%  []  Unable []  Above 50-59%  []  Unable []  Above 35-49%  []  Unable []  Less than 35%  []  Unable              Jane Sands, [facility]  4:34 PM

## 2021-07-13 LAB
ABSOLUTE EOS #: 0 K/UL (ref 0–0.44)
ABSOLUTE IMMATURE GRANULOCYTE: 0.68 K/UL (ref 0–0.3)
ABSOLUTE LYMPH #: 0.68 K/UL (ref 1.1–3.7)
ABSOLUTE MONO #: 0.91 K/UL (ref 0.1–1.2)
ANION GAP SERPL CALCULATED.3IONS-SCNC: 14 MMOL/L (ref 9–17)
ANION GAP SERPL CALCULATED.3IONS-SCNC: 14 MMOL/L (ref 9–17)
BASOPHILS # BLD: 1 % (ref 0–2)
BASOPHILS ABSOLUTE: 0.23 K/UL (ref 0–0.2)
BUN BLDV-MCNC: 87 MG/DL (ref 8–23)
BUN BLDV-MCNC: 93 MG/DL (ref 8–23)
BUN/CREAT BLD: ABNORMAL (ref 9–20)
BUN/CREAT BLD: ABNORMAL (ref 9–20)
CALCIUM SERPL-MCNC: 8.3 MG/DL (ref 8.6–10.4)
CALCIUM SERPL-MCNC: 8.5 MG/DL (ref 8.6–10.4)
CHLORIDE BLD-SCNC: 87 MMOL/L (ref 98–107)
CHLORIDE BLD-SCNC: 89 MMOL/L (ref 98–107)
CO2: 26 MMOL/L (ref 20–31)
CO2: 28 MMOL/L (ref 20–31)
CREAT SERPL-MCNC: 2.17 MG/DL (ref 0.7–1.2)
CREAT SERPL-MCNC: 2.51 MG/DL (ref 0.7–1.2)
DIFFERENTIAL TYPE: ABNORMAL
EKG ATRIAL RATE: 113 BPM
EKG ATRIAL RATE: 220 BPM
EKG ATRIAL RATE: 45 BPM
EKG Q-T INTERVAL: 328 MS
EKG Q-T INTERVAL: 336 MS
EKG Q-T INTERVAL: 350 MS
EKG QRS DURATION: 100 MS
EKG QRS DURATION: 102 MS
EKG QRS DURATION: 102 MS
EKG QTC CALCULATION (BAZETT): 418 MS
EKG QTC CALCULATION (BAZETT): 454 MS
EKG QTC CALCULATION (BAZETT): 469 MS
EKG R AXIS: 109 DEGREES
EKG R AXIS: 109 DEGREES
EKG R AXIS: 110 DEGREES
EKG T AXIS: -23 DEGREES
EKG T AXIS: -34 DEGREES
EKG T AXIS: -41 DEGREES
EKG VENTRICULAR RATE: 110 BPM
EKG VENTRICULAR RATE: 123 BPM
EKG VENTRICULAR RATE: 86 BPM
EOSINOPHILS RELATIVE PERCENT: 0 % (ref 1–4)
ESTIMATED AVERAGE GLUCOSE: 183 MG/DL
GFR AFRICAN AMERICAN: 31 ML/MIN
GFR AFRICAN AMERICAN: 36 ML/MIN
GFR NON-AFRICAN AMERICAN: 25 ML/MIN
GFR NON-AFRICAN AMERICAN: 30 ML/MIN
GFR SERPL CREATININE-BSD FRML MDRD: ABNORMAL ML/MIN/{1.73_M2}
GLUCOSE BLD-MCNC: 136 MG/DL (ref 75–110)
GLUCOSE BLD-MCNC: 165 MG/DL (ref 75–110)
GLUCOSE BLD-MCNC: 174 MG/DL (ref 75–110)
GLUCOSE BLD-MCNC: 175 MG/DL (ref 75–110)
GLUCOSE BLD-MCNC: 202 MG/DL (ref 70–99)
GLUCOSE BLD-MCNC: 225 MG/DL (ref 70–99)
HAV IGM SER IA-ACNC: NONREACTIVE
HBA1C MFR BLD: 8 % (ref 4–6)
HCT VFR BLD CALC: 35.3 % (ref 40.7–50.3)
HEMOGLOBIN: 11.5 G/DL (ref 13–17)
HEPATITIS B CORE IGM ANTIBODY: NONREACTIVE
HEPATITIS B SURFACE ANTIGEN: NONREACTIVE
HEPATITIS C ANTIBODY: NONREACTIVE
IMMATURE GRANULOCYTES: 3 %
LYMPHOCYTES # BLD: 3 % (ref 24–43)
MCH RBC QN AUTO: 32.4 PG (ref 25.2–33.5)
MCHC RBC AUTO-ENTMCNC: 32.6 G/DL (ref 28.4–34.8)
MCV RBC AUTO: 99.4 FL (ref 82.6–102.9)
MONOCYTES # BLD: 4 % (ref 3–12)
MORPHOLOGY: ABNORMAL
MRSA, DNA, NASAL: NORMAL
NRBC AUTOMATED: 0 PER 100 WBC
PDW BLD-RTO: 15 % (ref 11.8–14.4)
PLATELET # BLD: 263 K/UL (ref 138–453)
PLATELET ESTIMATE: ABNORMAL
PMV BLD AUTO: 11.8 FL (ref 8.1–13.5)
POTASSIUM SERPL-SCNC: 5.3 MMOL/L (ref 3.7–5.3)
POTASSIUM SERPL-SCNC: 5.6 MMOL/L (ref 3.7–5.3)
RBC # BLD: 3.55 M/UL (ref 4.21–5.77)
RBC # BLD: ABNORMAL 10*6/UL
SEG NEUTROPHILS: 89 % (ref 36–65)
SEGMENTED NEUTROPHILS ABSOLUTE COUNT: 20.2 K/UL (ref 1.5–8.1)
SODIUM BLD-SCNC: 127 MMOL/L (ref 135–144)
SODIUM BLD-SCNC: 131 MMOL/L (ref 135–144)
SPECIMEN DESCRIPTION: NORMAL
WBC # BLD: 22.7 K/UL (ref 3.5–11.3)
WBC # BLD: ABNORMAL 10*3/UL

## 2021-07-13 PROCEDURE — 51702 INSERT TEMP BLADDER CATH: CPT

## 2021-07-13 PROCEDURE — 6370000000 HC RX 637 (ALT 250 FOR IP): Performed by: STUDENT IN AN ORGANIZED HEALTH CARE EDUCATION/TRAINING PROGRAM

## 2021-07-13 PROCEDURE — 6360000002 HC RX W HCPCS: Performed by: INTERNAL MEDICINE

## 2021-07-13 PROCEDURE — 37799 UNLISTED PX VASCULAR SURGERY: CPT

## 2021-07-13 PROCEDURE — 94761 N-INVAS EAR/PLS OXIMETRY MLT: CPT

## 2021-07-13 PROCEDURE — 2580000003 HC RX 258: Performed by: EMERGENCY MEDICINE

## 2021-07-13 PROCEDURE — 6360000002 HC RX W HCPCS

## 2021-07-13 PROCEDURE — 99221 1ST HOSP IP/OBS SF/LOW 40: CPT | Performed by: INTERNAL MEDICINE

## 2021-07-13 PROCEDURE — 2500000003 HC RX 250 WO HCPCS: Performed by: STUDENT IN AN ORGANIZED HEALTH CARE EDUCATION/TRAINING PROGRAM

## 2021-07-13 PROCEDURE — 2000000000 HC ICU R&B

## 2021-07-13 PROCEDURE — 99291 CRITICAL CARE FIRST HOUR: CPT | Performed by: INTERNAL MEDICINE

## 2021-07-13 PROCEDURE — 85025 COMPLETE CBC W/AUTO DIFF WBC: CPT

## 2021-07-13 PROCEDURE — 82947 ASSAY GLUCOSE BLOOD QUANT: CPT

## 2021-07-13 PROCEDURE — 80048 BASIC METABOLIC PNL TOTAL CA: CPT

## 2021-07-13 PROCEDURE — 6370000000 HC RX 637 (ALT 250 FOR IP)

## 2021-07-13 PROCEDURE — 2580000003 HC RX 258: Performed by: INTERNAL MEDICINE

## 2021-07-13 PROCEDURE — 2500000003 HC RX 250 WO HCPCS: Performed by: EMERGENCY MEDICINE

## 2021-07-13 PROCEDURE — 2500000003 HC RX 250 WO HCPCS

## 2021-07-13 PROCEDURE — 83036 HEMOGLOBIN GLYCOSYLATED A1C: CPT

## 2021-07-13 PROCEDURE — 2700000000 HC OXYGEN THERAPY PER DAY

## 2021-07-13 RX ORDER — MIDAZOLAM HYDROCHLORIDE 2 MG/2ML
2 INJECTION, SOLUTION INTRAMUSCULAR; INTRAVENOUS ONCE
Status: COMPLETED | OUTPATIENT
Start: 2021-07-13 | End: 2021-07-13

## 2021-07-13 RX ORDER — METOPROLOL SUCCINATE 25 MG/1
25 TABLET, EXTENDED RELEASE ORAL DAILY
Status: DISCONTINUED | OUTPATIENT
Start: 2021-07-13 | End: 2021-07-20 | Stop reason: HOSPADM

## 2021-07-13 RX ORDER — NICOTINE POLACRILEX 4 MG
15 LOZENGE BUCCAL PRN
Status: DISCONTINUED | OUTPATIENT
Start: 2021-07-13 | End: 2021-07-20 | Stop reason: HOSPADM

## 2021-07-13 RX ORDER — MIDAZOLAM HYDROCHLORIDE 2 MG/2ML
2 INJECTION, SOLUTION INTRAMUSCULAR; INTRAVENOUS ONCE
Status: CANCELLED | OUTPATIENT
Start: 2021-07-13 | End: 2021-07-13

## 2021-07-13 RX ORDER — METOPROLOL TARTRATE 5 MG/5ML
5 INJECTION INTRAVENOUS ONCE
Status: COMPLETED | OUTPATIENT
Start: 2021-07-13 | End: 2021-07-13

## 2021-07-13 RX ORDER — DEXMEDETOMIDINE HYDROCHLORIDE 4 UG/ML
.2-1.4 INJECTION, SOLUTION INTRAVENOUS CONTINUOUS
Status: DISCONTINUED | OUTPATIENT
Start: 2021-07-13 | End: 2021-07-15

## 2021-07-13 RX ORDER — QUETIAPINE FUMARATE 25 MG/1
12.5 TABLET, FILM COATED ORAL ONCE
Status: DISCONTINUED | OUTPATIENT
Start: 2021-07-13 | End: 2021-07-20 | Stop reason: HOSPADM

## 2021-07-13 RX ORDER — DEXTROSE MONOHYDRATE 25 G/50ML
12.5 INJECTION, SOLUTION INTRAVENOUS PRN
Status: DISCONTINUED | OUTPATIENT
Start: 2021-07-13 | End: 2021-07-20 | Stop reason: HOSPADM

## 2021-07-13 RX ORDER — METOPROLOL TARTRATE 5 MG/5ML
5 INJECTION INTRAVENOUS EVERY 6 HOURS
Status: DISCONTINUED | OUTPATIENT
Start: 2021-07-13 | End: 2021-07-20 | Stop reason: HOSPADM

## 2021-07-13 RX ORDER — INSULIN GLARGINE 100 [IU]/ML
0.25 INJECTION, SOLUTION SUBCUTANEOUS NIGHTLY
Status: DISCONTINUED | OUTPATIENT
Start: 2021-07-13 | End: 2021-07-20 | Stop reason: HOSPADM

## 2021-07-13 RX ORDER — METOPROLOL TARTRATE 5 MG/5ML
5 INJECTION INTRAVENOUS
Status: COMPLETED | OUTPATIENT
Start: 2021-07-13 | End: 2021-07-13

## 2021-07-13 RX ORDER — MIDAZOLAM HYDROCHLORIDE 1 MG/ML
INJECTION INTRAMUSCULAR; INTRAVENOUS
Status: COMPLETED
Start: 2021-07-13 | End: 2021-07-13

## 2021-07-13 RX ORDER — BUMETANIDE 0.25 MG/ML
1 INJECTION, SOLUTION INTRAMUSCULAR; INTRAVENOUS 2 TIMES DAILY
Status: DISCONTINUED | OUTPATIENT
Start: 2021-07-13 | End: 2021-07-20 | Stop reason: HOSPADM

## 2021-07-13 RX ORDER — METOPROLOL SUCCINATE 25 MG/1
25 TABLET, EXTENDED RELEASE ORAL 2 TIMES DAILY
Status: DISCONTINUED | OUTPATIENT
Start: 2021-07-13 | End: 2021-07-13

## 2021-07-13 RX ORDER — DEXTROSE MONOHYDRATE 50 MG/ML
100 INJECTION, SOLUTION INTRAVENOUS PRN
Status: DISCONTINUED | OUTPATIENT
Start: 2021-07-13 | End: 2021-07-20 | Stop reason: HOSPADM

## 2021-07-13 RX ADMIN — METOPROLOL TARTRATE 5 MG: 1 INJECTION, SOLUTION INTRAVENOUS at 09:16

## 2021-07-13 RX ADMIN — DEXMEDETOMIDINE HYDROCHLORIDE 1 MCG/KG/HR: 400 INJECTION INTRAVENOUS at 14:50

## 2021-07-13 RX ADMIN — DEXMEDETOMIDINE HYDROCHLORIDE 1 MCG/KG/HR: 400 INJECTION INTRAVENOUS at 19:08

## 2021-07-13 RX ADMIN — MIDAZOLAM HYDROCHLORIDE 2 MG: 1 INJECTION, SOLUTION INTRAMUSCULAR; INTRAVENOUS at 14:10

## 2021-07-13 RX ADMIN — DEXMEDETOMIDINE HYDROCHLORIDE 0.2 MCG/KG/HR: 400 INJECTION INTRAVENOUS at 09:04

## 2021-07-13 RX ADMIN — CEFEPIME 2000 MG: 2 INJECTION, POWDER, FOR SOLUTION INTRAVENOUS at 20:03

## 2021-07-13 RX ADMIN — Medication 32 MCG/MIN: at 02:30

## 2021-07-13 RX ADMIN — HEPARIN SODIUM 5000 UNITS: 5000 INJECTION INTRAVENOUS; SUBCUTANEOUS at 21:46

## 2021-07-13 RX ADMIN — HEPARIN SODIUM 5000 UNITS: 5000 INJECTION INTRAVENOUS; SUBCUTANEOUS at 15:04

## 2021-07-13 RX ADMIN — SODIUM CHLORIDE, PRESERVATIVE FREE 10 ML: 5 INJECTION INTRAVENOUS at 20:03

## 2021-07-13 RX ADMIN — HYDROCORTISONE SODIUM SUCCINATE 100 MG: 100 INJECTION, POWDER, FOR SOLUTION INTRAMUSCULAR; INTRAVENOUS at 03:27

## 2021-07-13 RX ADMIN — HYDROCORTISONE SODIUM SUCCINATE 100 MG: 100 INJECTION, POWDER, FOR SOLUTION INTRAMUSCULAR; INTRAVENOUS at 12:10

## 2021-07-13 RX ADMIN — HYDROCORTISONE SODIUM SUCCINATE 100 MG: 100 INJECTION, POWDER, FOR SOLUTION INTRAMUSCULAR; INTRAVENOUS at 20:03

## 2021-07-13 RX ADMIN — MIDAZOLAM HYDROCHLORIDE 2 MG: 2 INJECTION, SOLUTION INTRAMUSCULAR; INTRAVENOUS at 14:10

## 2021-07-13 RX ADMIN — DEXMEDETOMIDINE HYDROCHLORIDE 0.9 MCG/KG/HR: 400 INJECTION INTRAVENOUS at 23:16

## 2021-07-13 RX ADMIN — INSULIN LISPRO 1 UNITS: 100 INJECTION, SOLUTION INTRAVENOUS; SUBCUTANEOUS at 19:07

## 2021-07-13 RX ADMIN — HEPARIN SODIUM 5000 UNITS: 5000 INJECTION INTRAVENOUS; SUBCUTANEOUS at 06:06

## 2021-07-13 RX ADMIN — METOPROLOL TARTRATE 5 MG: 5 INJECTION, SOLUTION INTRAVENOUS at 00:25

## 2021-07-13 RX ADMIN — INSULIN GLARGINE 25 UNITS: 100 INJECTION, SOLUTION SUBCUTANEOUS at 20:03

## 2021-07-13 RX ADMIN — BUMETANIDE 1 MG: 0.25 INJECTION INTRAMUSCULAR; INTRAVENOUS at 09:14

## 2021-07-13 RX ADMIN — METOPROLOL SUCCINATE 25 MG: 25 TABLET, FILM COATED, EXTENDED RELEASE ORAL at 06:27

## 2021-07-13 RX ADMIN — SODIUM CHLORIDE, PRESERVATIVE FREE 10 ML: 5 INJECTION INTRAVENOUS at 09:16

## 2021-07-13 RX ADMIN — METOPROLOL TARTRATE 5 MG: 1 INJECTION, SOLUTION INTRAVENOUS at 03:26

## 2021-07-13 RX ADMIN — INSULIN LISPRO 1 UNITS: 100 INJECTION, SOLUTION INTRAVENOUS; SUBCUTANEOUS at 15:04

## 2021-07-13 ASSESSMENT — PAIN SCALES - GENERAL
PAINLEVEL_OUTOF10: 0

## 2021-07-13 ASSESSMENT — ENCOUNTER SYMPTOMS
RHINORRHEA: 0
PHOTOPHOBIA: 0
SORE THROAT: 0
BACK PAIN: 0
VOMITING: 0
WHEEZING: 0
SHORTNESS OF BREATH: 0
NAUSEA: 0

## 2021-07-13 NOTE — ED NOTES
SW was consulted by  for [address] room for patient's son. MAUREEN gave patient room 2 at [address].       PAL Condon  07/13/21 0606

## 2021-07-13 NOTE — FLOWSHEET NOTE
SPIRITUAL CARE DEPARTMENT - [address]  PROGRESS NOTE    Shift date: 7.12.2021  Shift day: Monday   Shift # 2    Room # 0128/0128-01   Name: Charanjit Connolly            Age: 66 y.o. Gender: male          [facility]: unknown   Place of [employer]: unknown    Referral: Routine Visit    Admit Date & Time: 7/12/2021  1:09 PM    PATIENT/EVENT DESCRIPTION:  Charanjit Connolly is a 66 y.o. male       SPIRITUAL ASSESSMENT/INTERVENTION:  Family bedside in recliner and planning on staying with patient if possible. Family member is from [facility]. Family member appears to be calm and coping.  mentioned Home Away From Home for family member if he is interested. Suggested that he mention it to his nurse in the morning.  contacted ED Social Work to see if any emergency rooms were still available. SPIRITUAL CARE FOLLOW-UP PLAN:  Chaplains will remain available to offer spiritual and emotional support as needed. Electronically signed by Ulices Holley on 7/13/2021 at [address] [place] [address] Road  [phone]       07/13/21 0107   Encounter Summary   Services provided to: Family   Referral/Consult From: [address] Visiting   ([phone])   Complexity of Encounter Moderate   Length of Encounter 15 minutes   Spiritual Assessment Completed Yes   Routine   Type Initial   Assessment Calm; Approachable;Coping   Intervention Active listening;Explored feelings, thoughts, concerns;Explored coping resources; Discussed illness/injury and it's impact   Outcome Expressed gratitude;Expressed feelings/needs/concerns;Engaged in conversation     Electronically signed by Ibis De La Garza on 7/13/2021 at 1:08 AM

## 2021-07-13 NOTE — PROGRESS NOTES
INTENSIVE CARE UNIT  Resident Physician Progress Note    Patient - Farhad Alejandre  Date of Admission -  7/12/2021  1:09 PM  Date of Evaluation -  7/13/2021  Room and Bed Number -  0128/0128-01   Hospital Day - 1      SUBJECTIVE:     OVERNIGHT EVENTS: No acute events overnight. Continues requirement for Levo, off Vaso. Per RN ,pt agitated last night and given Seroquel 25 but was no responding to verbal stimulus overnight. Pt then became agitated trying to roll out of bed this AM, so lap belt and mariah restraints for trying to roll out of bed and Precedex started. Unable to place NG/OG, so PO meds held. HOSPITAL COURSE: History was obtained from chart review and the patient. Farhad Alejandre is a 66 y.o. male, hx neuropathic pain, CABG, COPD, type 2 diabetes, chronic cor pulmonale, pulmonary hypertension, bladder cancer per pt daughter in 2014, and PNA LLL vs mass in May 2021 admitted as transfer from [facility] for unclear etiology hypotension, leukocytosis, mild anemia, and new onset LAKESHA. Patient does not know why he was sent to outside facility, was at a nursing home and was noted to be hypotensive. Pt not intubated at arrival.     On intial examination patient was asking questions appropriately, was alert and oriented to person and place. Patient had no complaints of chest pain, shortness of breath, abdominal pain, change in stool. Patient denies any recent feelings of sickness, any cough, fever, or other concerns of infection. Patient did report recent burning with urination couple days ago and urinary retention. Patient noted to have a Bajwa catheter placed. On arrival patient was noted to be hypotensive on Levophed 16 mcg/min. No other medications going. Did not know why he was herein the ICU discussed with his low blood pressure.   Patient son does state the patient does have frequent low blood pressures but not requiring blood pressure medications.     In ICU, pt found to have LLL infiltrate consistent with likely PNA, blood ctx negative at 6 hours, UTI with only small LE, blood and few bacteria. Agitated overnight 21 and started on Precedex 21. Not following commands and no NG/OG, so PO meds held. REVIEW OF SYSTEMS:  Review of Systems   Constitutional: Negative for chills and fever. HENT: Negative for rhinorrhea and sore throat. Eyes: Negative for photophobia. Respiratory: Negative for shortness of breath and wheezing. Cardiovascular: Negative for chest pain and palpitations. Gastrointestinal: Negative for nausea and vomiting. Genitourinary: Negative for dysuria and frequency. Musculoskeletal: Negative for back pain and neck pain. Skin: Negative for rash and wound. Neurological: Negative for seizures and headaches. All other systems reviewed and are negative.         OBJECTIVE:     VITAL SIGNS:  BP (!) 124/53   Pulse 86   Temp 97.7 °F (36.5 °C) (Axillary)   Resp 14   Ht 5' 10\" (1.778 m)   Wt 216 lb 14.9 oz (98.4 kg)   SpO2 90%   BMI 31.13 kg/m²   Tmax over 24 hours:  Temp (24hrs), Av.9 °F (36.6 °C), Min:97.2 °F (36.2 °C), Max:98.8 °F (37.1 °C)      Patient Vitals for the past 8 hrs:   BP Temp Temp src Pulse Resp SpO2   21 1315    86 14 90 %   21 1300    88 16 90 %   21 1245    92 17 93 %   21 1230    87 19 94 %   21 1215    93 12 93 %   21 1200 (!) 124/53 97.7 °F (36.5 °C) Axillary 85 11 92 %   21 1145    87 11 92 %   21 1130    87 11 91 %   21 1115    86 10 93 %   21 1100    85 12 93 %   21 1045    88 13 93 %   21 1030    91 14 93 %   21 1015    95 (!) 39 92 %   21 1000  97.9 °F (36.6 °C)  96 19 90 %   21 0945    97  91 %   21 0930    91  92 %   21 0915    115  91 %   21 0900    119 21 93 %   21 0845    116 20 96 %   21 0830    118  95 %   21 0815    118 (!) 60 92 % 07/13/21 0800 (!) 120/48 97.2 °F (36.2 °C) Axillary 109 20 96 %   07/13/21 0745    117  95 %   07/13/21 0730    117  90 %   07/13/21 0715    117 (!) 35 93 %   07/13/21 0700 (!) 153/109   117 22 93 %   07/13/21 0645    116 23 90 %   07/13/21 0630    114 15 94 %   07/13/21 0626 (!) 145/79   116           Intake/Output Summary (Last 24 hours) at 7/13/2021 1416  Last data filed at 7/13/2021 1300  Gross per 24 hour   Intake 1369.77 ml   Output 7045 ml   Net -5675.23 ml     Date 07/13/21 0000 - 07/13/21 2359   Shift 4853-5656 4209-0556 6535-0237 24 Hour Total   INTAKE   I.V.(mL/kg) 908.2(9.2) 284(2.9)  1192. 2(12.1)   Shift Total(mL/kg) 908.2(9.2) 284(2.9)  1192. 2(12.1)   OUTPUT   Urine(mL/kg/hr) 8681(9.8) 2370  5120   Shift Total(mL/kg) 1269(39.3) 6511(06.4)  8514(43)   Weight (kg) 98.4 98.4 98.4 98.4     Wt Readings from Last 3 Encounters:   07/12/21 216 lb 14.9 oz (98.4 kg)   06/01/21 195 lb 2.8 oz (88.5 kg)   12/02/20 213 lb (96.6 kg)     Body mass index is 31.13 kg/m². PHYSICAL EXAM:  Constitutional: Appears well, no distress, does not follow command  EENT: EOMI, sclera clear, anicteric, oropharynx clear, no lesions, neck supple with midline trachea. Neck: Supple, symmetrical, trachea midline, no adenopathy, thyroid symmetric, no jvd skin normal  Respiratory: clear to auscultation, no wheezes or rales and unlabored breathing.  No intercostal tenderness  Cardiovascular: regular rate and rhythm, normal S1, S2, no murmur noted and 2+ pulses throughout  Abdomen: soft, nontender, nondistended, no masses or organomegaly  Extremities:  peripheral pulses normal, no pedal edema, no clubbing or cyanosis      MEDICATIONS:  Scheduled Meds:   [Held by provider] bumetanide  1 mg Intravenous BID    [Held by provider] metoprolol succinate  25 mg Oral Daily    QUEtiapine  12.5 mg Oral Once    metoprolol  5 mg Intravenous Q6H    insulin glargine  0.25 Units/kg Subcutaneous Nightly    insulin lispro 5. 6* 5.3   CL 89* 87* 89*   CO2 28 26 28   BUN 97* 93* 87*   CREATININE 2.94* 2.51* 2.17*   GLUCOSE 94 202* 225*   CALCIUM 8.5* 8.3* 8.5*      Magnesium:   Lab Results   Component Value Date    MG 2.2 2021    MG 2.2 2021    MG 2.1 2021     Phosphorus:   Lab Results   Component Value Date    PHOS 2.6 2021    PHOS 2.4 2021    PHOS 3.3 2021     Ionized Calcium:   Lab Results   Component Value Date    CAION 1.12 2021    CAION 1.12 2021    CAION 1.10 2021        Urinalysis:   Lab Results   Component Value Date    NITRU NEGATIVE 2021    COLORU YELLOW 2021    PHUR 7.5 2021    WBCUA 5 TO 10 2021    RBCUA 20 TO 50 2021    MUCUS NOT REPORTED 2021    TRICHOMONAS NOT REPORTED 2021    YEAST NOT REPORTED 2021    BACTERIA FEW 2021    SPECGRAV 1.008 2021    LEUKOCYTESUR SMALL 2021    UROBILINOGEN Normal 2021    BILIRUBINUR NEGATIVE 2021    GLUCOSEU NEGATIVE 2021    KETUA NEGATIVE 2021    AMORPHOUS NOT REPORTED 2021       HgBA1c:    Lab Results   Component Value Date    LABA1C 8.5 2021     TSH:    Lab Results   Component Value Date    TSH 1.24 2021     Lactic Acid: No results found for: LACTA   Troponin: No results for input(s): TROPONINI in the last 72 hours. Radiology/Imagin21 US Retroperitoneal Impression:   Somewhat limited but essentially unremarkable renal ultrasound; the left   kidney is poorly visualized.       No hydronephrosis.      21 CXR Impression:  Increasing consolidation effusion in the left lower lobe.  Right-sided   central venous catheter distal tip the superior vena cava no evidence of   pneumothorax       ASSESSMENT:     Patient Active Problem List    Diagnosis Date Noted    [facility] 2021    LAKESHA (acute kidney injury) ([facility] 2021    Acute renal failure (ARF) ([facility] 2021    Uncontrolled type 2 diabetes mellitus with hyperglycemia [facility] 05/30/2021    Hyponatremia     Ventricular tachycardia ([facility] 05/28/2021    Acute on chronic respiratory failure with hypoxia and hypercapnia (HCC)     Pneumonia of left lower lobe due to infectious organism     PAF (paroxysmal atrial fibrillation) (HCC)     Pulmonary hypertension with chronic cor pulmonale (HCC)     RODNEY treated with BiPAP     Smoking greater than 40 pack years     Class 1 obesity due to excess calories with serious comorbidity and body mass index (BMI) of 30.0 to 30.9 in adult     Stage 4 very severe COPD by GOLD classification ([facility]     Lumbar facet joint syndrome 01/13/2021    Panniculitis involving lumbar region 01/13/2021    Lumbosacral spondylosis without myelopathy 11/04/2020    DDD (degenerative disc disease), lumbar 11/04/2020    Neuropathic pain 10/21/2020    Chronic bilateral low back pain without sciatica 10/21/2020          PLAN:     Additional Assessment:  Active Problems:    Acute renal failure (ARF) ([facility]  Resolved Problems:    * No resolved hospital problems. *       Plan:  Neuro  -neurochecks every 4 hours per ICU protocol  -Precedex sedation  -Received Seroquel 25 Pm 7/12/21, required Versed 2mg 7/13/21 at 1400. Possibly add in Zyprexa next if needed     CV  -Maintain MAP > 65  -EKG consistent with afib  -Echo pending, previous 05/2021 with EF 55-60% showing grade II diastolic dysfunction  -Cardiology consultation: Appreciate further recommendations  -Troponin 173  -History of A. fib on Eliquis, Eliquis continued. Continue Lopressor 5mg for HR >110.  If no control, Digoxin 125mcg.  - Toprol and Cardizem on held due to hypotension     Resp  -SPO2 greater than 80%, currently on nasal cannula 6 L     GI  -N.p.o. at this time       -Bajwa in place, manage I's and O's  -BUN 87 creatinine 2.17   -Trend BMP: Hyperkalemia and hyponatremia, improving  -Nephrology consultation: Appreciate further recommendations  -Hold Bumex 7/13/21 per nephrology     Heme  - Leukocytosis 22,700, increased from 7/12/21  - Normochromic, Normocytic Anemia 11.4     Endo:  T2DM, not on home insulin  - Glucose 225 and 175, added in basal 25 units Lantus nightly and mild sliding scale for NPO.   - TSH within normal limits     ID  Septic shock, unclear etiology- possible LLL PNA vs UTI  - Leukocytosis, 22.7  - UA- LE small, blood, few bacteria, ctx pending  - CXR with LLL infiltrate consistent with possible PNA  - Blood cultures negative x 6 hours  - Afebrile  - Continue Vanc/Cefepime     Dispo:   Pending clinical course      Fluids: N/A, Bumex 1mg BID  Feeding: NPO  Analgesics: 50 Fentanyl once 7/13, 650 Tylenol PRN  Sedation: Seroquel  Thrombo-prophylaxis: Eliquis 5mg  Mobilization: N/A, lap belt restraints, mits restraints  Head Up: N/A  Hemodynamics: Levo @ 22 mcg, Vaso off  Ulcer Prophylaxis:  Protonix 40mg q d  Glycemic Control: insulin 20 units total due to hyperglycemia  Spontaneous breathing trial: N/A  Bowel management: PEG  Indwelling catheter:  Bajwa 7/13  Drug De-escalation: off Kitty Hand MD  EM Resident PGY-1  Intensive Care Unit  7/13/2021 2:16 PM

## 2021-07-13 NOTE — CONSULTS
[facility] Cardiology Cardiology    Consult / H&P               Today's Date: 7/13/2021  Patient Name: Timmy Tatum  Date of admission: 7/12/2021  1:09 PM  Patient's age: 66 y. o., 1950  Admission Dx: Acute renal failure (ARF) ([facility] [N17.9]    Reason for Consult:  Cardiac evaluation    Requesting Physician: Angela Cruz MD    CHIEF COMPLAINT: Atrial flutter    History Obtained From:  patient, electronic medical record    HISTORY OF PRESENT ILLNESS:    This patient 42-year-old male with past medical history of hypertension, DM, A. fib/atrial flutter, CAD s/p CABG 2005, COPD, chronic diastolic CHF was brought in here as a transfer from [facility] where he was presented with hypotension. He was found to have LAKESHA hyperkalemia with potassium of 7. He was later transferred here for further management. Cardiology was consulted because of atrial flutter and elevated troponin at 173 repeat was 173. EKG showed atrial flutter with heart rate in 120s. Nephrology following for LAKESHA and hyperkalemia. He was started on IV Bumex with improvement in creatinine and also potassium down to 5.3 now. Past Medical History:   has a past medical history of Agent orange exposure, Anxiety state, Cancer ([address] CHF (congestive heart failure) ([address] Chronic obstructive pulmonary disease (COPD) ([address] Chronic post-traumatic stress disorder, Chronic respiratory failure with hypercapnia ([address] COPD exacerbation ([address] Coronary artery disease, Degenerative disc disease, lumbar, Depression, Essential hypertension, History of acute pancreatitis, Hyperglycemia, Hypokalemia, Lumbosacral disc disease, Obesity, Obstructive sleep apnea, and Personal history of malignant neoplasm of bladder. Past Surgical History:   has a past surgical history that includes Bladder surgery; other surgical history; other surgical history (Bilateral, 02/10/2021); and Pain management procedure (Bilateral, 02/10/2021).      Home Medications:    Prior to Admission medications    Medication Sig Start Date End Date Taking? Authorizing Provider   celecoxib (CELEBREX) 100 MG capsule take 1 capsule by mouth twice a day 6/3/21   ALISON Musa CNP   dextromethorphan-guaiFENesin (ROBITUSSIN-DM)  MG/5ML syrup Take 10 mLs by mouth every 4 hours as needed for Cough 6/1/21   Naida Dhaliwal MD   metFORMIN (GLUCOPHAGE) 1000 MG tablet Take 1 tablet by mouth 2 times daily (with meals) 6/1/21   Naida Dhaliwal MD   pregabalin (LYRICA) 225 MG capsule Take 1 capsule by mouth 2 times daily for 30 days. 5/7/21 6/6/21  Rosetta Salinas MD   dilTIAZem [facility]) 180 MG extended release capsule Take 180 mg by mouth daily 11/5/20   Historical Provider, MD   magnesium oxide (MAG-OX) 400 MG tablet Take 400 mg by mouth daily    Historical Provider, MD   NONFORMULARY Take 1 tablet by mouth daily ON COR vitamin - to help keep bladder cancer away    Historical Provider, MD   acetaminophen (TYLENOL) 325 MG tablet Take 650 mg by mouth every 6 hours as needed for Pain    Historical Provider, MD   spironolactone (ALDACTONE) 25 MG tablet Take 25 mg by mouth daily    Historical Provider, MD   albuterol sulfate  (90 Base) MCG/ACT inhaler Inhale 2 puffs into the lungs every 6 hours as needed    Historical Provider, MD   lidocaine (XYLOCAINE) 5 % ointment Apply topically as needed.  10/21/20   ALISON Musa CNP   sennosides-docusate sodium (SENOKOT-S) 8.6-50 MG tablet Take 1 tablet by mouth daily    Historical Provider, MD   Multiple Vitamins-Minerals (ONCOVITE) TABS Take by mouth    Historical Provider, MD   loratadine (CLARITIN) 10 MG tablet Take 10 mg by mouth daily    Historical Provider, MD   tiotropium (SPIRIVA HANDIHALER) 18 MCG inhalation capsule Inhale 18 mcg into the lungs daily    Historical Provider, MD   apixaban (ELIQUIS) 5 MG TABS tablet Take by mouth 2 times daily    Historical Provider, MD   potassium chloride (KLOR-CON M) 10 MEQ extended release tablet Take 10 mEq by mouth 2 times daily    Historical Provider, MD   CPAP Machine MISC by Does not apply route BiPAP    Historical Provider, MD   budesonide-formoterol (SYMBICORT) 160-4.5 MCG/ACT AERO Inhale 2 puffs into the lungs 2 times daily    Historical Provider, MD   bumetanide (BUMEX) 2 MG tablet Take 2 mg by mouth daily    Historical Provider, MD   magnesium hydroxide (MILK OF MAGNESIA) 400 MG/5ML suspension Take by mouth daily as needed for Constipation    Historical Provider, MD   ibuprofen (IBU) 400 MG tablet Take 400 mg by mouth every 6 hours as needed for Pain    Historical Provider, MD   metoprolol succinate (TOPROL XL) 25 MG extended release tablet Take 25 mg by mouth daily    Historical Provider, MD   Cyanocobalamin (VITAMIN B 12) 500 MCG TABS Take by mouth    Historical Provider, MD   pantoprazole (PROTONIX) 40 MG tablet Take 40 mg by mouth daily    Historical Provider, MD     Allergies:  Niacin and related, Other, and Statins    Social History:   reports that he has been smoking cigarettes. He has a 54.00 pack-year smoking history. He has never used smokeless tobacco. He reports current alcohol use. He reports that he does not use drugs. Family History: family history includes Emphysema in his mother; Heart Disease in his father; Heart Failure in his father. REVIEW OF SYSTEMS:    · Constitutional: there has been no unanticipated weight loss. There's been No change in energy level, No change in activity level. · Eyes: No visual changes or diplopia. No scleral icterus. · ENT: No Headaches  · Cardiovascular: No chest pain, shortness of breath. · Respiratory: No previous pulmonary problems, No cough  · Gastrointestinal: No abdominal pain. No change in bowel or bladder habits. · Genitourinary: No dysuria, trouble voiding, or hematuria. · Musculoskeletal: Positive for lower extremity edema. · Integumentary: No rash or pruritis.   · Neurological: No headache, diplopia, change in muscle strength, numbness or tingling. No change in gait, balance, coordination, mood, affect, memory, mentation, behavior. · Psychiatric: No anxiety, or depression. · Endocrine: No temperature intolerance. No excessive thirst, fluid intake, or urination. No tremor. · Hematologic/Lymphatic: No abnormal bruising or bleeding, blood clots or swollen lymph nodes. · Allergic/Immunologic: No nasal congestion or hives. PHYSICAL EXAM:      BP (!) 145/79   Pulse 114   Temp 97.9 °F (36.6 °C)   Resp 15   Ht 5' 10\" (1.778 m)   Wt 216 lb 14.9 oz (98.4 kg)   SpO2 94%   BMI 31.13 kg/m²    Constitutional and General Appearance: Alert, cooperative and have generalized coarse tremors  HEENT: PERRL, no cervical lymphadenopathy. No masses palpable. Normal oral mucosa  Respiratory:  · Bilateral basal inspiratory crackles on posterior lung examination  Cardiovascular:  · The apical impulse is not displaced  · Heart tones are crisp and normal. regular S1 and S2.  · Jugular venous pulsation Normal  · The carotid upstroke is normal in amplitude and contour without delay or bruit  · Peripheral pulses are symmetrical and full   Abdomen:   · No masses or tenderness  · Bowel sounds present  Extremities:  · Bilateral 2+ lower extremity edema  Neurological:  · Bilateral upper extremity tremorscourse    DATA:    Diagnostics:      EKG:   Typical Atrial flutter with variable conduction, incomplete RBBB     ECHO:   5/17/2021   Left Ventricle : Left ventricular systolic function is grossly        normal. No regional wall motion abnormalities noted. LVEF is 55-60%.       Transmitral Doppler flow pattern suggests grade II diastolic      dysfunction      Mitral Valve : Trace to mild mitral regurgitation.     LA- mild dilated      Holter 5/2021  Patient remained in atrial fibrillation throughout the recording with   average heart rate of 75 beats per minute suggestive adequate rate control   in atrial flutter.  Minimum heart was 40 beats per minute and maximum   heart of 128 beats per minute.       There were 117 (0.1%) wide complex beats which is 2 triplets and 10   couplets. Longest RR interval was 2.3 seconds. Patient reported 2 diary entries with symptoms of shortness of breath at   the time patient was in atrial flutter with a heart rate of 68 and 76   respectively.      Labs:   CBC:   Recent Labs     07/12/21  1525 07/13/21  0445   WBC 20.0* 22.7*   HGB 11.4* 11.5*   HCT 34.3* 35.3*    263     BMP:   Recent Labs     07/13/21  0039 07/13/21  0445   * 131*   K 5.6* 5.3   CO2 26 28   BUN 93* 87*   CREATININE 2.51* 2.17*   LABGLOM 25* 30*   GLUCOSE 202* 225*     IMPRESSION:    1. Paroxysmal atrial flutter/A. fib. On Cardizem, Lopressor and Eliquis  2. Acute on chronic HFpEF. 3. Recent Holter from 5/2021 showed persistent A. fib with rate controlled  4. Acute kidney injury likely cardiorenal. Creatinine improving  5. Hyperkalemia due #3. Improving   6. CAD s/p CABG in 2005  7. Elevated troponin likely type II MI from supply demand mismatch due to LAKESHA and CHF. Flat trend 173173  8. Acute on chronic hypoxic and hypercapnic respiratory failure  9. COPD  10. Hypertension  11. Diabetes mellitus  12. Pulmonary hypertension    RECOMMENDATIONS:  1. Follow-up on echo. 2. Toprol and Cardizem is on hold because of hypotension. Resume once blood pressure allows. 3. Continue with as needed IV Lopressor 5 mg for HR >110. If still no control then will give Digoxin 125 mcg   4. Resume Eliquis if there is no plan for any procedure. 5. Nephrology following for LAKESHA, hyperkalemia and fluid management  6.  Will follow       Plan to be discussed with rounding attending      Bill Resendiz MD.  Internal Medicine Resident PGY [facility]   7/13/2021, 7:08 AM        Attending Physician Statement:    I have discussed the care of  Claudia Alexandre , including pertinent history and exam findings, with the Cardiology

## 2021-07-13 NOTE — PROGRESS NOTES
Obtained CT scans and medication list from outside facility. CT chest without contrast 12/2019- several spiculated nodules noted, large R upper lobe nodule 15mm, similar nodules around 12mm medial L upper lobe and upper portion of L lower lobe with mass-like density along L pleural fissure. Was noted appearance more suspicious for neoplasm vs infectious etiology. PET CT 1/29/20: no abnormal increased metabolic activity to suggest neoplastic process.     Miguelina Warren MD  EM Resident PGY-1  Intensive Care Unit

## 2021-07-13 NOTE — PLAN OF CARE
Problem: Skin Integrity:  Goal: Will show no infection signs and symptoms  Description: Will show no infection signs and symptoms  7/13/2021 1335 by Lilian Burleson RN  Outcome: Ongoing  7/13/2021 0536 by Jenifer Huddleston RN  Outcome: Ongoing  Goal: Absence of new skin breakdown  Description: Absence of new skin breakdown  7/13/2021 1335 by Lilian Burleson RN  Outcome: Ongoing  7/13/2021 0536 by Jenifer Huddleston RN  Outcome: Ongoing     Problem: Falls - Risk of:  Goal: Will remain free from falls  Description: Will remain free from falls  7/13/2021 1335 by Lilian Burleson RN  Outcome: Ongoing  7/13/2021 0536 by Jenifer Huddleston RN  Outcome: Met This Shift  Goal: Absence of physical injury  Description: Absence of physical injury  7/13/2021 1335 by Lilian Burleson RN  Outcome: Ongoing  7/13/2021 0536 by Jenifer Huddleston RN  Outcome: Met This Shift     Problem: Confusion - Acute:  Goal: Absence of continued neurological deterioration signs and symptoms  Description: Absence of continued neurological deterioration signs and symptoms  7/13/2021 1335 by Lilian Burleson RN  Outcome: Ongoing  7/13/2021 0536 by Jenifer Huddleston RN  Outcome: Ongoing  Goal: Mental status will be restored to baseline  Description: Mental status will be restored to baseline  7/13/2021 1335 by Lilian Burleson RN  Outcome: Ongoing  7/13/2021 0536 by Jenifer Huddleston RN  Outcome: Ongoing     Problem: Discharge Planning:  Goal: Ability to perform activities of daily living will improve  Description: Ability to perform activities of daily living will improve  7/13/2021 1335 by Lilian Burleson RN  Outcome: Ongoing  7/13/2021 0536 by Jenifer Huddleston RN  Outcome: Ongoing  Goal: Participates in care planning  Description: Participates in care planning  7/13/2021 1335 by Lilian Burleson RN  Outcome: Ongoing  7/13/2021 0536 by Jenifer Huddleston RN  Outcome: Ongoing     Problem: Injury - Risk of, Physical Injury:  Goal: Will remain free from falls  Description: Will remain free from falls  7/13/2021 1335 by Kalin Wing RN  Outcome: Ongoing  7/13/2021 0536 by Elvi Mendiola RN  Outcome: Met This Shift  Goal: Absence of physical injury  Description: Absence of physical injury  7/13/2021 1335 by Kalin Wing RN  Outcome: Ongoing  7/13/2021 0536 by Elvi Mendiola RN  Outcome: Met This Shift     Problem: Mood - Altered:  Goal: Mood stable  Description: Mood stable  7/13/2021 1335 by Kalin Wing RN  Outcome: Ongoing  7/13/2021 0536 by Elvi Mendiola RN  Outcome: Ongoing  Goal: Absence of abusive behavior  Description: Absence of abusive behavior  7/13/2021 1335 by Kalin Wing RN  Outcome: Ongoing  7/13/2021 0536 by Elvi Mendiola RN  Outcome: Ongoing  Goal: Verbalizations of feeling emotionally comfortable while being cared for will increase  Description: Verbalizations of feeling emotionally comfortable while being cared for will increase  7/13/2021 1335 by Kalin Wing RN  Outcome: Ongoing  7/13/2021 0536 by Elvi Mendiola RN  Outcome: Ongoing     Problem: Psychomotor Activity - Altered:  Goal: Absence of psychomotor disturbance signs and symptoms  Description: Absence of psychomotor disturbance signs and symptoms  7/13/2021 1335 by Kalin Wing RN  Outcome: Ongoing  7/13/2021 0536 by Elvi Mendiola RN  Outcome: Ongoing     Problem: Sensory Perception - Impaired:  Goal: Demonstrations of improved sensory functioning will increase  Description: Demonstrations of improved sensory functioning will increase  7/13/2021 1335 by Kalin Wing RN  Outcome: Ongoing  7/13/2021 0536 by Elvi Mendiola RN  Outcome: Ongoing  Goal: Decrease in sensory misperception frequency  Description: Decrease in sensory misperception frequency  7/13/2021 1335 by Kalin Wing RN  Outcome: Ongoing  7/13/2021 0536 by Elvi Mendiola RN  Outcome: Ongoing  Goal: Able to refrain from responding to false sensory perceptions  Description: Able to refrain from responding to false sensory perceptions  7/13/2021 1335 by Lisandra Varela RN  Outcome: Ongoing  7/13/2021 0536 by Virgen Escudero RN  Outcome: Ongoing  Goal: Demonstrates accurate environmental perceptions  Description: Demonstrates accurate environmental perceptions  7/13/2021 1335 by Lisandra Varela RN  Outcome: Ongoing  7/13/2021 0536 by Virgen Escudero RN  Outcome: Ongoing  Goal: Able to distinguish between reality-based and nonreality-based thinking  Description: Able to distinguish between reality-based and nonreality-based thinking  7/13/2021 1335 by Lisandra Varela RN  Outcome: Ongoing  7/13/2021 0536 by Virgen Escudero RN  Outcome: Ongoing  Goal: Able to interrupt nonreality-based thinking  Description: Able to interrupt nonreality-based thinking  7/13/2021 1335 by Lisandra Varela RN  Outcome: Ongoing  7/13/2021 0536 by Virgen Escudero RN  Outcome: Ongoing     Problem: Sleep Pattern Disturbance:  Goal: Appears well-rested  Description: Appears well-rested  7/13/2021 1335 by Lisandra Varela RN  Outcome: Ongoing  7/13/2021 0536 by Virgen Escudero RN  Outcome: Met This Shift     Problem: Non-Violent Restraints  Goal: Removal from restraints as soon as assessed to be safe  Outcome: Ongoing  Goal: No harm/injury to patient while restraints in use  Outcome: Ongoing  Goal: Patient's dignity will be maintained  Outcome: Ongoing

## 2021-07-13 NOTE — PLAN OF CARE
Problem: Skin Integrity:  Goal: Will show no infection signs and symptoms  Description: Will show no infection signs and symptoms  7/13/2021 0536 by Josie Gotti RN  Outcome: Ongoing  7/12/2021 1936 by Nelia Enrique RN  Outcome: Ongoing  Goal: Absence of new skin breakdown  Description: Absence of new skin breakdown  7/13/2021 0536 by Josie Gotti RN  Outcome: Ongoing  7/12/2021 1936 by Nelia Enrique RN  Outcome: Ongoing     Problem: Falls - Risk of:  Goal: Will remain free from falls  Description: Will remain free from falls  7/13/2021 0536 by Josie Gotti RN  Outcome: Met This Shift  7/12/2021 1936 by Nelia Enrique RN  Outcome: Ongoing  Goal: Absence of physical injury  Description: Absence of physical injury  7/13/2021 0536 by Josie Gotti RN  Outcome: Met This Shift  7/12/2021 1936 by Nelia Enrique RN  Outcome: Ongoing     Problem: Confusion - Acute:  Goal: Absence of continued neurological deterioration signs and symptoms  Description: Absence of continued neurological deterioration signs and symptoms  Outcome: Ongoing  Goal: Mental status will be restored to baseline  Description: Mental status will be restored to baseline  Outcome: Ongoing     Problem: Discharge Planning:  Goal: Ability to perform activities of daily living will improve  Description: Ability to perform activities of daily living will improve  Outcome: Ongoing  Goal: Participates in care planning  Description: Participates in care planning  Outcome: Ongoing     Problem: Injury - Risk of, Physical Injury:  Goal: Will remain free from falls  Description: Will remain free from falls  7/13/2021 0536 by Josie Gotti RN  Outcome: Met This Shift  7/12/2021 1936 by Nelia Enrique RN  Outcome: Ongoing  Goal: Absence of physical injury  Description: Absence of physical injury  7/13/2021 0536 by Josie Gotti RN  Outcome: Met This Shift  7/12/2021 1936 by Nelia Enrique RN  Outcome: Ongoing     Problem: Mood - Altered:  Goal: Mood stable  Description: Mood stable  Outcome: Ongoing  Goal: Absence of abusive behavior  Description: Absence of abusive behavior  Outcome: Ongoing  Goal: Verbalizations of feeling emotionally comfortable while being cared for will increase  Description: Verbalizations of feeling emotionally comfortable while being cared for will increase  Outcome: Ongoing     Problem: Psychomotor Activity - Altered:  Goal: Absence of psychomotor disturbance signs and symptoms  Description: Absence of psychomotor disturbance signs and symptoms  Outcome: Ongoing     Problem: Sensory Perception - Impaired:  Goal: Demonstrations of improved sensory functioning will increase  Description: Demonstrations of improved sensory functioning will increase  Outcome: Ongoing  Goal: Decrease in sensory misperception frequency  Description: Decrease in sensory misperception frequency  Outcome: Ongoing  Goal: Able to refrain from responding to false sensory perceptions  Description: Able to refrain from responding to false sensory perceptions  Outcome: Ongoing  Goal: Demonstrates accurate environmental perceptions  Description: Demonstrates accurate environmental perceptions  Outcome: Ongoing  Goal: Able to distinguish between reality-based and nonreality-based thinking  Description: Able to distinguish between reality-based and nonreality-based thinking  Outcome: Ongoing  Goal: Able to interrupt nonreality-based thinking  Description: Able to interrupt nonreality-based thinking  Outcome: Ongoing     Problem: Sleep Pattern Disturbance:  Goal: Appears well-rested  Description: Appears well-rested  Outcome: Met This Shift

## 2021-07-14 LAB
ABSOLUTE EOS #: <0.03 K/UL (ref 0–0.44)
ABSOLUTE IMMATURE GRANULOCYTE: 0.17 K/UL (ref 0–0.3)
ABSOLUTE LYMPH #: 1.03 K/UL (ref 1.1–3.7)
ABSOLUTE MONO #: 1.48 K/UL (ref 0.1–1.2)
ALBUMIN (CALCULATED): 3.8 G/DL (ref 3.2–5.2)
ALBUMIN PERCENT: 61 % (ref 45–65)
ALPHA 1 PERCENT: 2 % (ref 3–6)
ALPHA 2 PERCENT: 14 % (ref 6–13)
ALPHA-1-GLOBULIN: 0.1 G/DL (ref 0.1–0.4)
ALPHA-2-GLOBULIN: 0.9 G/DL (ref 0.5–0.9)
ANION GAP SERPL CALCULATED.3IONS-SCNC: 11 MMOL/L (ref 9–17)
BASOPHILS # BLD: 0 % (ref 0–2)
BASOPHILS ABSOLUTE: 0.03 K/UL (ref 0–0.2)
BETA GLOBULIN: 0.8 G/DL (ref 0.5–1.1)
BETA PERCENT: 12 % (ref 11–19)
BUN BLDV-MCNC: 51 MG/DL (ref 8–23)
BUN/CREAT BLD: ABNORMAL (ref 9–20)
CALCIUM SERPL-MCNC: 8.4 MG/DL (ref 8.6–10.4)
CHLORIDE BLD-SCNC: 97 MMOL/L (ref 98–107)
CO2: 31 MMOL/L (ref 20–31)
CREAT SERPL-MCNC: 0.96 MG/DL (ref 0.7–1.2)
DIFFERENTIAL TYPE: ABNORMAL
EOSINOPHILS RELATIVE PERCENT: 0 % (ref 1–4)
GAMMA GLOBULIN %: 11 % (ref 9–20)
GAMMA GLOBULIN: 0.7 G/DL (ref 0.5–1.5)
GFR AFRICAN AMERICAN: >60 ML/MIN
GFR NON-AFRICAN AMERICAN: >60 ML/MIN
GFR SERPL CREATININE-BSD FRML MDRD: ABNORMAL ML/MIN/{1.73_M2}
GFR SERPL CREATININE-BSD FRML MDRD: ABNORMAL ML/MIN/{1.73_M2}
GLUCOSE BLD-MCNC: 113 MG/DL (ref 75–110)
GLUCOSE BLD-MCNC: 147 MG/DL (ref 75–110)
GLUCOSE BLD-MCNC: 148 MG/DL (ref 70–99)
GLUCOSE BLD-MCNC: 149 MG/DL (ref 75–110)
GLUCOSE BLD-MCNC: 163 MG/DL (ref 75–110)
GLUCOSE BLD-MCNC: 94 MG/DL (ref 75–110)
HCT VFR BLD CALC: 33.3 % (ref 40.7–50.3)
HEMOGLOBIN: 11.1 G/DL (ref 13–17)
IMMATURE GRANULOCYTES: 1 %
LV EF: 53 %
LVEF MODALITY: NORMAL
LYMPHOCYTES # BLD: 8 % (ref 24–43)
MAGNESIUM: 2.3 MG/DL (ref 1.6–2.6)
MCH RBC QN AUTO: 32.8 PG (ref 25.2–33.5)
MCHC RBC AUTO-ENTMCNC: 33.3 G/DL (ref 28.4–34.8)
MCV RBC AUTO: 98.5 FL (ref 82.6–102.9)
MONOCYTES # BLD: 12 % (ref 3–12)
NRBC AUTOMATED: 0 PER 100 WBC
PATHOLOGIST: ABNORMAL
PATHOLOGIST: NORMAL
PDW BLD-RTO: 14.9 % (ref 11.8–14.4)
PLATELET # BLD: 177 K/UL (ref 138–453)
PLATELET ESTIMATE: ABNORMAL
PMV BLD AUTO: 11.3 FL (ref 8.1–13.5)
POTASSIUM SERPL-SCNC: 3.2 MMOL/L (ref 3.7–5.3)
PROTEIN ELECTROPHORESIS, SERUM: ABNORMAL
RBC # BLD: 3.38 M/UL (ref 4.21–5.77)
RBC # BLD: ABNORMAL 10*6/UL
SEG NEUTROPHILS: 78 % (ref 36–65)
SEGMENTED NEUTROPHILS ABSOLUTE COUNT: 9.78 K/UL (ref 1.5–8.1)
SERUM IFX INTERP: NORMAL
SODIUM BLD-SCNC: 139 MMOL/L (ref 135–144)
TOTAL PROT. SUM,%: 100 % (ref 98–102)
TOTAL PROT. SUM: 6.3 G/DL (ref 6.3–8.2)
TOTAL PROTEIN: 6.2 G/DL (ref 6.4–8.3)
VANCOMYCIN RANDOM DATE LAST DOSE: NORMAL
VANCOMYCIN RANDOM DOSE AMOUNT: NORMAL
VANCOMYCIN RANDOM TIME LAST DOSE: NORMAL
VANCOMYCIN RANDOM: 9 UG/ML
WBC # BLD: 12.5 K/UL (ref 3.5–11.3)
WBC # BLD: ABNORMAL 10*3/UL

## 2021-07-14 PROCEDURE — 6370000000 HC RX 637 (ALT 250 FOR IP): Performed by: EMERGENCY MEDICINE

## 2021-07-14 PROCEDURE — 80048 BASIC METABOLIC PNL TOTAL CA: CPT

## 2021-07-14 PROCEDURE — 86334 IMMUNOFIX E-PHORESIS SERUM: CPT

## 2021-07-14 PROCEDURE — 6360000002 HC RX W HCPCS: Performed by: INTERNAL MEDICINE

## 2021-07-14 PROCEDURE — 94761 N-INVAS EAR/PLS OXIMETRY MLT: CPT

## 2021-07-14 PROCEDURE — 2060000000 HC ICU INTERMEDIATE R&B

## 2021-07-14 PROCEDURE — 80202 ASSAY OF VANCOMYCIN: CPT

## 2021-07-14 PROCEDURE — 99232 SBSQ HOSP IP/OBS MODERATE 35: CPT | Performed by: INTERNAL MEDICINE

## 2021-07-14 PROCEDURE — 2500000003 HC RX 250 WO HCPCS

## 2021-07-14 PROCEDURE — 82947 ASSAY GLUCOSE BLOOD QUANT: CPT

## 2021-07-14 PROCEDURE — 2580000003 HC RX 258: Performed by: EMERGENCY MEDICINE

## 2021-07-14 PROCEDURE — 85025 COMPLETE CBC W/AUTO DIFF WBC: CPT

## 2021-07-14 PROCEDURE — 83735 ASSAY OF MAGNESIUM: CPT

## 2021-07-14 PROCEDURE — 6360000002 HC RX W HCPCS: Performed by: STUDENT IN AN ORGANIZED HEALTH CARE EDUCATION/TRAINING PROGRAM

## 2021-07-14 PROCEDURE — 2580000003 HC RX 258: Performed by: INTERNAL MEDICINE

## 2021-07-14 PROCEDURE — 6370000000 HC RX 637 (ALT 250 FOR IP)

## 2021-07-14 PROCEDURE — 99291 CRITICAL CARE FIRST HOUR: CPT | Performed by: INTERNAL MEDICINE

## 2021-07-14 PROCEDURE — 94640 AIRWAY INHALATION TREATMENT: CPT

## 2021-07-14 PROCEDURE — 2700000000 HC OXYGEN THERAPY PER DAY

## 2021-07-14 PROCEDURE — 6360000002 HC RX W HCPCS: Performed by: EMERGENCY MEDICINE

## 2021-07-14 PROCEDURE — 93306 TTE W/DOPPLER COMPLETE: CPT

## 2021-07-14 RX ORDER — QUETIAPINE FUMARATE 25 MG/1
25 TABLET, FILM COATED ORAL NIGHTLY
Status: DISCONTINUED | OUTPATIENT
Start: 2021-07-14 | End: 2021-07-20 | Stop reason: HOSPADM

## 2021-07-14 RX ORDER — IPRATROPIUM BROMIDE AND ALBUTEROL SULFATE 2.5; .5 MG/3ML; MG/3ML
1 SOLUTION RESPIRATORY (INHALATION)
Status: DISCONTINUED | OUTPATIENT
Start: 2021-07-15 | End: 2021-07-14

## 2021-07-14 RX ORDER — QUETIAPINE FUMARATE 25 MG/1
25 TABLET, FILM COATED ORAL NIGHTLY PRN
Status: DISCONTINUED | OUTPATIENT
Start: 2021-07-14 | End: 2021-07-14

## 2021-07-14 RX ORDER — ALBUTEROL SULFATE 2.5 MG/3ML
2.5 SOLUTION RESPIRATORY (INHALATION)
Status: DISCONTINUED | OUTPATIENT
Start: 2021-07-14 | End: 2021-07-14

## 2021-07-14 RX ORDER — ALBUTEROL SULFATE 2.5 MG/3ML
2.5 SOLUTION RESPIRATORY (INHALATION) EVERY 4 HOURS PRN
Status: DISCONTINUED | OUTPATIENT
Start: 2021-07-14 | End: 2021-07-20 | Stop reason: HOSPADM

## 2021-07-14 RX ORDER — MIDODRINE HYDROCHLORIDE 5 MG/1
5 TABLET ORAL
Status: DISCONTINUED | OUTPATIENT
Start: 2021-07-14 | End: 2021-07-20 | Stop reason: HOSPADM

## 2021-07-14 RX ORDER — IPRATROPIUM BROMIDE AND ALBUTEROL SULFATE 2.5; .5 MG/3ML; MG/3ML
1 SOLUTION RESPIRATORY (INHALATION) EVERY 4 HOURS PRN
Status: DISCONTINUED | OUTPATIENT
Start: 2021-07-14 | End: 2021-07-19

## 2021-07-14 RX ADMIN — QUETIAPINE FUMARATE 25 MG: 25 TABLET ORAL at 21:42

## 2021-07-14 RX ADMIN — HEPARIN SODIUM 5000 UNITS: 5000 INJECTION INTRAVENOUS; SUBCUTANEOUS at 21:43

## 2021-07-14 RX ADMIN — POTASSIUM CHLORIDE 20 MEQ: 29.8 INJECTION, SOLUTION INTRAVENOUS at 07:30

## 2021-07-14 RX ADMIN — PANTOPRAZOLE SODIUM 40 MG: 40 TABLET, DELAYED RELEASE ORAL at 09:38

## 2021-07-14 RX ADMIN — SODIUM CHLORIDE, PRESERVATIVE FREE 10 ML: 5 INJECTION INTRAVENOUS at 21:48

## 2021-07-14 RX ADMIN — HEPARIN SODIUM 5000 UNITS: 5000 INJECTION INTRAVENOUS; SUBCUTANEOUS at 13:39

## 2021-07-14 RX ADMIN — MIDODRINE HYDROCHLORIDE 5 MG: 5 TABLET ORAL at 18:31

## 2021-07-14 RX ADMIN — INSULIN LISPRO 1 UNITS: 100 INJECTION, SOLUTION INTRAVENOUS; SUBCUTANEOUS at 04:22

## 2021-07-14 RX ADMIN — HYDROCORTISONE SODIUM SUCCINATE 100 MG: 100 INJECTION, POWDER, FOR SOLUTION INTRAMUSCULAR; INTRAVENOUS at 04:22

## 2021-07-14 RX ADMIN — BUDESONIDE AND FORMOTEROL FUMARATE DIHYDRATE 2 PUFF: 160; 4.5 AEROSOL RESPIRATORY (INHALATION) at 21:28

## 2021-07-14 RX ADMIN — MIDODRINE HYDROCHLORIDE 5 MG: 5 TABLET ORAL at 09:37

## 2021-07-14 RX ADMIN — HEPARIN SODIUM 5000 UNITS: 5000 INJECTION INTRAVENOUS; SUBCUTANEOUS at 06:13

## 2021-07-14 RX ADMIN — DEXMEDETOMIDINE HYDROCHLORIDE 0.5 MCG/KG/HR: 400 INJECTION INTRAVENOUS at 05:25

## 2021-07-14 RX ADMIN — MIDODRINE HYDROCHLORIDE 5 MG: 5 TABLET ORAL at 13:39

## 2021-07-14 RX ADMIN — CEFEPIME HYDROCHLORIDE 2000 MG: 2 INJECTION, POWDER, FOR SOLUTION INTRAVENOUS at 13:39

## 2021-07-14 RX ADMIN — INSULIN LISPRO 1 UNITS: 100 INJECTION, SOLUTION INTRAVENOUS; SUBCUTANEOUS at 17:27

## 2021-07-14 RX ADMIN — Medication 1500 MG: at 09:37

## 2021-07-14 RX ADMIN — HYDROCORTISONE SODIUM SUCCINATE 100 MG: 100 INJECTION, POWDER, FOR SOLUTION INTRAMUSCULAR; INTRAVENOUS at 12:13

## 2021-07-14 RX ADMIN — INSULIN LISPRO 1 UNITS: 100 INJECTION, SOLUTION INTRAVENOUS; SUBCUTANEOUS at 21:42

## 2021-07-14 RX ADMIN — POTASSIUM CHLORIDE 20 MEQ: 29.8 INJECTION, SOLUTION INTRAVENOUS at 06:13

## 2021-07-14 RX ADMIN — SODIUM CHLORIDE, PRESERVATIVE FREE 10 ML: 5 INJECTION INTRAVENOUS at 09:38

## 2021-07-14 RX ADMIN — BUDESONIDE AND FORMOTEROL FUMARATE DIHYDRATE 2 PUFF: 160; 4.5 AEROSOL RESPIRATORY (INHALATION) at 09:21

## 2021-07-14 RX ADMIN — HYDROCORTISONE SODIUM SUCCINATE 50 MG: 100 INJECTION, POWDER, FOR SOLUTION INTRAMUSCULAR; INTRAVENOUS at 21:24

## 2021-07-14 ASSESSMENT — PAIN SCALES - GENERAL
PAINLEVEL_OUTOF10: 0

## 2021-07-14 ASSESSMENT — ENCOUNTER SYMPTOMS
PHOTOPHOBIA: 0
VOMITING: 0
RHINORRHEA: 0
BACK PAIN: 0
NAUSEA: 0
SORE THROAT: 0
SHORTNESS OF BREATH: 0
WHEEZING: 0

## 2021-07-14 NOTE — PROGRESS NOTES
consistent with likely PNA, blood ctx negative at 6 hours, UTI with only small LE, blood and few bacteria. Agitated overnight 21 and started on Precedex 21. Not following commands and no NG/OG, so PO meds held. F.A.S.T.H.U.G.S.B.I. D     Feeding Diet:   N.p.o. for now     Fluids none     Family updated   yes     Analgesic none     Sedation Precedex     Thrombo-prophylaxis Heparin 5000 units 3 times daily     Mobility No     Heads up 30 Degrees     Ulcer prophylaxis [x]? ?? PPI Agent             [x]? ?? [id number]      []??? Sucralfate               []? ?? Other: none     Glycemic control Good, SSI if needed     Spontaneous breathing trial on nasal cannula     Bowel regimen/urine output Glycolax     Indwelling catheter/lines: Bajwa     De-escalation wean off Precedex, okay for stepdown    REVIEW OF SYSTEMS:  Review of Systems   Constitutional: Negative for chills and fever. HENT: Negative for rhinorrhea and sore throat. Eyes: Negative for photophobia. Respiratory: Negative for shortness of breath and wheezing. Cardiovascular: Negative for chest pain and palpitations. Gastrointestinal: Negative for nausea and vomiting. Genitourinary: Negative for dysuria and frequency. Musculoskeletal: Negative for back pain and neck pain. Skin: Negative for rash and wound. Neurological: Negative for seizures and headaches. All other systems reviewed and are negative.       OBJECTIVE:     VITAL SIGNS:  /66   Pulse 61   Temp 97.5 °F (36.4 °C)   Resp 18   Ht 5' 10\" (1.778 m)   Wt 216 lb 14.9 oz (98.4 kg)   SpO2 97%   BMI 31.13 kg/m²   Tmax over 24 hours:  Temp (24hrs), Av.9 °F (36.6 °C), Min:97.5 °F (36.4 °C), Max:98.6 °F (37 °C)      Patient Vitals for the past 8 hrs:   BP Temp Temp src Pulse Resp SpO2   21 0630    61 18 97 %   21 0615    60 13 100 %   21 0600 111/66 97.5 °F (36.4 °C)  60 16 100 %   21 0545    60 10 100 %   21 0530    60 11 100 % 07/14/21 0515    60 11 100 %   07/14/21 0500 107/67   61 12 96 %   07/14/21 0445    61 15 98 %   07/14/21 0430    61 18 99 %   07/14/21 0415    62 17 97 %   07/14/21 0400 111/60 97.9 °F (36.6 °C) Oral 62 19 97 %   07/14/21 0345    61 21 99 %   07/14/21 0330    61 12 98 %   07/14/21 0315    62 14 97 %   07/14/21 0300 (!) 113/59   62 15 98 %   07/14/21 0245    62 12 96 %   07/14/21 0230    62 12 96 %   07/14/21 0215    62 13 97 %   07/14/21 0200 112/62 97.5 °F (36.4 °C)  62 13 97 %   07/14/21 0145    62 15 97 %   07/14/21 0130    64 14 97 %   07/14/21 0115    72 17 97 %   07/14/21 0100 121/63   65 14 95 %         Intake/Output Summary (Last 24 hours) at 7/14/2021 0852  Last data filed at 7/14/2021 0400  Gross per 24 hour   Intake 804.98 ml   Output 4175 ml   Net -3370.02 ml     Date 07/14/21 0000 - 07/14/21 2359   Shift 4433-6203 7139-2952 8859-1809 24 Hour Total   INTAKE   I.V.(mL/kg) 328.3(3.3)   328.3(3.3)   Shift Total(mL/kg) 328.3(3.3)   328.3(3.3)   OUTPUT   Urine(mL/kg/hr) 640(0.8)   640   Shift Total(mL/kg) 640(6.5)   640(6.5)   Weight (kg) 98.4 98.4 98.4 98.4     Wt Readings from Last 3 Encounters:   07/12/21 216 lb 14.9 oz (98.4 kg)   06/01/21 195 lb 2.8 oz (88.5 kg)   12/02/20 213 lb (96.6 kg)     Body mass index is 31.13 kg/m². PHYSICAL EXAM:  Constitutional: Appears well, alert and oriented, follows commands  EENT: EOMI, sclera clear, anicteric, oropharynx clear, no lesions, neck supple with midline trachea. Neck: Supple, symmetrical, trachea midline, no adenopathy, thyroid symmetric, no jvd skin normal  Respiratory: clear to auscultation, no wheezes or rales and unlabored breathing.  No intercostal tenderness  Cardiovascular: regular rate and rhythm, normal S1, S2, no murmur noted and 2+ pulses throughout  Abdomen: soft, nontender, nondistended, no masses or organomegaly  Extremities:  peripheral pulses normal, +1 pedal edema, no clubbing or cyanosis      MEDICATIONS:  Scheduled Meds:   midodrine  5 mg Oral TID     vancomycin  1,500 mg Intravenous Q24H    [Held by provider] bumetanide  1 mg Intravenous BID    [Held by provider] metoprolol succinate  25 mg Oral Daily    QUEtiapine  12.5 mg Oral Once    metoprolol  5 mg Intravenous Q6H    insulin glargine  0.25 Units/kg Subcutaneous Nightly    insulin lispro  0-6 Units Subcutaneous Q4H    budesonide-formoterol  2 puff Inhalation BID    dilTIAZem  180 mg Oral Daily    pantoprazole  40 mg Oral Daily    sodium chloride  1,000 mL Intravenous Once    sodium chloride flush  5-40 mL Intravenous 2 times per day    hydrocortisone sodium succinate PF  100 mg Intravenous Q8H    cefepime  2,000 mg Intravenous Q24H    heparin (porcine)  5,000 Units Subcutaneous 3 times per day    vancomycin (VANCOCIN) intermittent dosing (placeholder)   Other RX Placeholder     Continuous Infusions:   dexmedetomidine 0.3 mcg/kg/hr (07/14/21 0819)    dextrose      sodium chloride      norepinephrine Stopped (07/14/21 0739)    vasopressin (Septic Shock) infusion Stopped (07/13/21 0403)     PRN Meds:   glucose, 15 g, PRN  dextrose, 12.5 g, PRN  glucagon (rDNA), 1 mg, PRN  dextrose, 100 mL/hr, PRN  sodium chloride flush, 5-40 mL, PRN  sodium chloride, 25 mL, PRN  ondansetron, 4 mg, Q8H PRN   Or  ondansetron, 4 mg, Q6H PRN  polyethylene glycol, 17 g, Daily PRN  acetaminophen, 650 mg, Q6H PRN   Or  acetaminophen, 650 mg, Q6H PRN  potassium chloride, 20 mEq, PRN        SUPPORT DEVICES: [] Ventilator [] BIPAP  [x] Nasal Cannula [x] Room Air    ABGs:   Arterial Blood Gas result:    Lab Results   Component Value Date    HUE8JUC NOT REPORTED 07/12/2021    FIO2 5.0 07/12/2021         DATA:  Complete Blood Count:   Recent Labs     07/12/21  1525 07/13/21  0445 07/14/21  0432   WBC 20.0* 22.7* 12.5*   RBC 3.48* 3.55* 3.38*   HGB 11.4* 11.5* 11.1*   HCT 34.3* 35.3* 33.3*   MCV 98.6 99.4 98.5   MCH 32.8 32.4 32.8   MCHC 33.2 32.6 33.3   RDW 15.6* 15.0* 14.9*    263 177   MPV 11.6 11.8 11.3        Last 3 Blood Glucose:   Recent Labs     07/12/21  1525 07/12/21  1715 07/12/21 1934 07/13/21 0039 07/13/21 0445 07/14/21  0432   GLUCOSE 162* 204* 94 202* 225* 148*        PT/INR:  No results found for: PROTIME, INR  PTT:  No results found for: APTT, PTT    Comprehensive Metabolic Profile:   Recent Labs     07/12/21  1934 07/12/21 2008 07/13/21 0039 07/13/21 0445 07/14/21  0432   NA   < >  --  127* 131* 139   K   < >  --  5.6* 5.3 3.2*   CL   < >  --  87* 89* 97*   CO2   < >  --  26 28 31   BUN   < >  --  93* 87* 51*   CREATININE   < >  --  2.51* 2.17* 0.96   GLUCOSE   < >  --  202* 225* 148*   CALCIUM   < >  --  8.3* 8.5* 8.4*   PROT  --  6.2*  --   --   --     < > = values in this interval not displayed.       Magnesium:   Lab Results   Component Value Date    MG 2.3 07/14/2021    MG 2.2 06/01/2021    MG 2.2 05/31/2021     Phosphorus:   Lab Results   Component Value Date    PHOS 2.6 06/01/2021    PHOS 2.4 05/31/2021    PHOS 3.3 05/30/2021     Ionized Calcium:   Lab Results   Component Value Date    CAION 1.12 06/01/2021    CAION 1.12 05/31/2021    CAION 1.10 05/30/2021        Urinalysis:   Lab Results   Component Value Date    NITRU NEGATIVE 07/12/2021    COLORU YELLOW 07/12/2021    PHUR 7.5 07/12/2021    WBCUA 5 TO 10 07/12/2021    RBCUA 20 TO 50 07/12/2021    MUCUS NOT REPORTED 07/12/2021    TRICHOMONAS NOT REPORTED 07/12/2021    YEAST NOT REPORTED 07/12/2021    BACTERIA FEW 07/12/2021    SPECGRAV 1.008 07/12/2021    LEUKOCYTESUR SMALL 07/12/2021    UROBILINOGEN Normal 07/12/2021    BILIRUBINUR NEGATIVE 07/12/2021    GLUCOSEU NEGATIVE 07/12/2021    KETUA NEGATIVE 07/12/2021    AMORPHOUS NOT REPORTED 07/12/2021       HgBA1c:    Lab Results   Component Value Date    LABA1C 8.0 07/13/2021     TSH:    Lab Results   Component Value Date    TSH 1.24 07/12/2021     Lactic Acid: No results found for: LACTA   Troponin: No results for input(s): TROPONINI in the last 72 hours. Radiology/Imagin21 US Retroperitoneal Impression:   Somewhat limited but essentially unremarkable renal ultrasound; the left   kidney is poorly visualized.       No hydronephrosis. 21 CXR Impression:  Increasing consolidation effusion in the left lower lobe.  Right-sided   central venous catheter distal tip the superior vena cava no evidence of   pneumothorax       ASSESSMENT:     Patient Active Problem List    Diagnosis Date Noted    Acute renal failure ([address] 2021    LAKESHA (acute kidney injury) ([address] 2021    Acute renal failure (ARF) ([address] 2021    Uncontrolled type 2 diabetes mellitus with hyperglycemia ([address] 2021    Hyponatremia     Ventricular tachycardia ([address] 2021    Acute on chronic respiratory failure with hypoxia and hypercapnia (HCC)     Pneumonia of left lower lobe due to infectious organism     PAF (paroxysmal atrial fibrillation) (HCC)     Pulmonary hypertension with chronic cor pulmonale (HCC)     RODNEY treated with BiPAP     Smoking greater than 40 pack years     Class 1 obesity due to excess calories with serious comorbidity and body mass index (BMI) of 30.0 to 30.9 in adult     Stage 4 very severe COPD by GOLD classification ([facility]     Lumbar facet joint syndrome 2021    Panniculitis involving lumbar region 2021    Lumbosacral spondylosis without myelopathy 2020    DDD (degenerative disc disease), lumbar 2020    Neuropathic pain 10/21/2020    Chronic bilateral low back pain without sciatica 10/21/2020          PLAN:     Additional Assessment:  Active Problems:    Acute renal failure (ARF) ([address]  Resolved Problems:    * No resolved hospital problems.  *       Plan:  Neuro  -neurochecks every 4 hours per ICU protocol  -Precedex sedation, wean off today as tolerated     CV  -Maintain MAP > 65  -EKG consistent with afib  -Echo pending, previous 2021 with EF 55-60% showing grade II diastolic dysfunction  -Cardiology consultation: Appreciate further recommendations  -Troponin 173  -History of A. fib on Eliquis, Eliquis continued. Continue Lopressor 5mg for HR >110.  If no control, Digoxin 125mcg.  - Toprol and Cardizem on held due to hypotension     Resp  -SPO2 greater than 80%, currently on nasal cannula 5 L     GI  -N.p.o. at this time       -Bajwa in place, manage I's and O's  -LAKESHA resolved  -Replace potassium and sodium  -Nephrology consultation: Appreciate further recommendations  -Hold Bumex 7/13/21 per nephrology     Heme  - Leukocytosis 12 K, improving  - Normochromic, Normocytic Anemia 11.1     Endo:  T2DM, not on home insulin  - Glucose 148, added in basal 25 units Lantus nightly and mild sliding scale for NPO.   - TSH within normal limits     ID  Septic shock, unclear etiology- possible LLL PNA vs UTI  - Leukocytosis, 12 K  - UA- LE small, blood, few bacteria, ctx pending  - CXR with LLL infiltrate consistent with possible PNA  - Blood cultures negative x 1 day  - Afebrile  - Continue Vanc/Cefepime     Dispo:   Okay for stepdown once off Fish Nick MD  EM Resident PGY-1  Intensive Care Unit  7/14/2021 8:52 AM

## 2021-07-14 NOTE — FLOWSHEET NOTE
[address] [facility] [address]  PROGRESS NOTE    Room # 0128/0128-01   Name: Javon Monterroso            Age: 66 y.o. Gender: male            Admit Date & Time: 7/12/2021  1:09 PM    PATIENT/EVENT DESCRIPTION:  Javon Monterroso is a 66 y.o. male      SPIRITUAL ASSESSMENT/INTERVENTION:  The patient's family was at bedside and they were calm and approachable. The  was able to activity listen and explore their thoughts and feelings. They engaged in the conversation and appeared to be coping. The  offered spiritual support. SPIRITUAL CARE FOLLOW-UP PLAN:  Chaplains will remain available to offer spiritual and emotional support as needed. Chaplains can be contacted 24/7 by [facility]    Electronically signed by Lencho Rhodes, on 7/14/2021 at 3:18 PM.  [address]  [phone]             07/14/21 1516   Encounter Summary   Services provided to: Patient and family together   Referral/Consult From: [facility] Family members   Continue Visiting   (07/14/21)   Complexity of Encounter Moderate   Length of Encounter 15 minutes   Spiritual Assessment Completed Yes   Routine   Type Initial   Assessment Calm; Approachable   Intervention Active listening   Outcome Engaged in conversation

## 2021-07-14 NOTE — PROGRESS NOTES
5 MG TABS tablet, Take by mouth 2 times daily  potassium chloride (KLOR-CON M) 10 MEQ extended release tablet, Take 10 mEq by mouth 2 times daily  CPAP Machine MISC, by Does not apply route BiPAP  budesonide-formoterol (SYMBICORT) 160-4.5 MCG/ACT AERO, Inhale 2 puffs into the lungs 2 times daily  bumetanide (BUMEX) 2 MG tablet, Take 2 mg by mouth daily  magnesium hydroxide (MILK OF MAGNESIA) 400 MG/5ML suspension, Take by mouth daily as needed for Constipation  ibuprofen (IBU) 400 MG tablet, Take 400 mg by mouth every 6 hours as needed for Pain  metoprolol succinate (TOPROL XL) 25 MG extended release tablet, Take 25 mg by mouth daily  Cyanocobalamin (VITAMIN B 12) 500 MCG TABS, Take by mouth  pantoprazole (PROTONIX) 40 MG tablet, Take 40 mg by mouth daily    Current Medications:     Scheduled Meds:    midodrine  5 mg Oral TID WC    vancomycin  1,500 mg Intravenous Q24H    [Held by provider] bumetanide  1 mg Intravenous BID    [Held by provider] metoprolol succinate  25 mg Oral Daily    QUEtiapine  12.5 mg Oral Once    metoprolol  5 mg Intravenous Q6H    insulin glargine  0.25 Units/kg Subcutaneous Nightly    insulin lispro  0-6 Units Subcutaneous Q4H    budesonide-formoterol  2 puff Inhalation BID    dilTIAZem  180 mg Oral Daily    pantoprazole  40 mg Oral Daily    sodium chloride  1,000 mL Intravenous Once    sodium chloride flush  5-40 mL Intravenous 2 times per day    hydrocortisone sodium succinate PF  100 mg Intravenous Q8H    cefepime  2,000 mg Intravenous Q24H    heparin (porcine)  5,000 Units Subcutaneous 3 times per day    vancomycin (VANCOCIN) intermittent dosing (placeholder)   Other RX Placeholder     Continuous Infusions:    dexmedetomidine 0.2 mcg/kg/hr (07/14/21 0663)    dextrose      sodium chloride      norepinephrine Stopped (07/14/21 3539)    vasopressin (Septic Shock) infusion Stopped (07/13/21 5494)     PRN Meds:  glucose, dextrose, glucagon (rDNA), dextrose, sodium chloride flush, sodium chloride, ondansetron **OR** ondansetron, polyethylene glycol, acetaminophen **OR** acetaminophen, potassium chloride    Input/Output:       I/O last 3 completed shifts: In: 942 [I.V.:942]  Out: 4675 [Urine:4675]    Vital Signs:   Temperature:  Temp: 97.7 °F (36.5 °C)  TMax:   Temp (24hrs), Av.9 °F (36.6 °C), Min:97.5 °F (36.4 °C), Max:98.6 °F (37 °C)    Respirations:  Resp: 16  Pulse:   Pulse: 61  BP:    BP: (!) 90/56  BP Range: Systolic (40UKN), YTC:498 , Min:90 , QVE:461       Diastolic (30FLQ), JRP:35, Min:50, Max:69      Physical Examination:     General:  Awake alert oriented x4  Eyes:   Pupils equal, round and reactive to light, pallor, no icterus. Neck:   No JVD, no thyromegaly, no lymphadenopathy. Chest:  Normal vesicular breath sounds present  Cardiac: S1 S2 RRR  Abdomen:  Soft, non-tender, no masses or organomegally appreciable, BS sluggish. Extremities:  No edema, palpable peripheral pulses, no cyanosis, no calf tenderness.       Labs:       Recent Labs     21  1525 21  0445 21  0432   WBC 20.0* 22.7* 12.5*   RBC 3.48* 3.55* 3.38*   HGB 11.4* 11.5* 11.1*   HCT 34.3* 35.3* 33.3*   MCV 98.6 99.4 98.5   MCH 32.8 32.4 32.8   MCHC 33.2 32.6 33.3   RDW 15.6* 15.0* 14.9*    263 177   MPV 11.6 11.8 11.3      BMP:   Recent Labs     21  0039 21  0445 21  0432   * 131* 139   K 5.6* 5.3 3.2*   CL 87* 89* 97*   CO2 26 28 31   BUN 93* 87* 51*   CREATININE 2.51* 2.17* 0.96   GLUCOSE 202* 225* 148*   CALCIUM 8.3* 8.5* 8.4*   Magnesium:    Recent Labs     21  0432   MG 2.3     SPEP:  Lab Results   Component Value Date    PROT 6.2 2021    ALBCAL PENDING 2021    ALBPCT PENDING 2021    LABALPH PENDING 2021    LABALPH PENDING 2021    A1PCT PENDING 2021    A2PCT PENDING 2021    LABBETA PENDING 2021    BETAPCT PENDING 2021    GAMGLOB PENDING 2021    GGPCT PENDING 2021    PATH PENDING 07/12/2021    PATH PENDING 07/12/2021     C3:     Lab Results   Component Value Date    C3 144 07/12/2021     C4:     Lab Results   Component Value Date    C4 23 07/12/2021     Hep BsAg:         Lab Results   Component Value Date    HEPBSAG NONREACTIVE 07/12/2021     Hep C AB:          Lab Results   Component Value Date    HEPCAB NONREACTIVE 07/12/2021       Urinalysis/Chemistries:      Lab Results   Component Value Date    NITRU NEGATIVE 07/12/2021    COLORU YELLOW 07/12/2021    PHUR 7.5 07/12/2021    WBCUA 5 TO 10 07/12/2021    RBCUA 20 TO 50 07/12/2021    MUCUS NOT REPORTED 07/12/2021    TRICHOMONAS NOT REPORTED 07/12/2021    YEAST NOT REPORTED 07/12/2021    BACTERIA FEW 07/12/2021    SPECGRAV 1.008 07/12/2021    LEUKOCYTESUR SMALL 07/12/2021    UROBILINOGEN Normal 07/12/2021    BILIRUBINUR NEGATIVE 07/12/2021    GLUCOSEU NEGATIVE 07/12/2021    KETUA NEGATIVE 07/12/2021    AMORPHOUS NOT REPORTED 07/12/2021     Urine Sodium:     Lab Results   Component Value Date    LISA <20 07/12/2021     Urine Osmolarity:   Lab Results   Component Value Date    OSMOU 288 05/29/2021     Urine Creatinine:     Lab Results   Component Value Date    LABCREA 42.8 07/12/2021       Radiology:     CXR: Reviewed as available    Assessment:     1. Acute Kidney Injury secondary to ischemic ATN due to dysrhythmia/hypotension. Complicated further by concomitant NSAID use-improving. Creatinine peaked to 3.2. Creatinine 0.96 today  2. Hyperkalemia secondary to LAKESHA/Aldactone/exogenous potassium supplement. Resolved  3. Hyponatremia-appropriate ADH excess related to volume overload-resolved  4. Circulatory shock on pressor-resolved  5. Paroxysmal flutter/fibrillation  6. History of CABG  7. History of type 2 diabetes mellitus  8. COPD  9. History of bladder cancer s/p BCG at [address]:   1. Urine output is good.  -7 L since admission. Hold off diuretics  2. Avoid NSAIDs/Corona 2 inhibitors/Aldactone  3. Avoid nephrotoxin  4. BMP in a.m. Nutrition   Please ensure that patient is on a renal diet/TF. Avoid nephrotoxic drugs/contrast exposure. We will continue to follow along with you. Zay Conte MD  PGY-3, Internal Medicine Resident  [facility]  7/14/2021 10:07 AM    Attending Physician Statement  I have discussed the care of Dulce Hilton, including pertinent history and exam findings,  with the Fellow/Residentt. I have reviewed the key elements of all parts of the encounter with the Fellow/ Resident. I agree with the assessment, plan and orders as documented by the resident.     Va Mckinley MD MD, MRCP True Evelai), FACP   7/14/2021 10:55 AM    Nephrology [address] [place] [address]

## 2021-07-14 NOTE — CARE COORDINATION
Accepted transfer of patient from MICU 128 to Step-down bed 428. ICU course summary:    66 y.o. male hx of CAD s/p CABG 2005, COPD, type 2 diabetes, chronic diastolic CHF, cor pulmonale, bladder cancer per pt daughter in 2014, and PNA LLL vs mass in May 2021 admitted as transfer from [facility] for unclear etiology hypotension, found to have LAKESHA and potassium of 7. Patient does not know why he was sent to outside facility, was at a nursing home and was noted to be hypotensive. Pt not intubated at arrival.     Cardiology was consulted for atrial flutter and elevated troponin at 173. Rate controlled with Lopressor and Toprol. Patient was briefly hypotensive on presentation and needed Levophed support. Currently maintaining pressure without any support. Nephrology was consulted for LAKESHA, hyponatremia and hyperkalemia. Received 1 dose of Bumex with excellent response in urine output. LAKESHA resolved now. Getting gentle hydration sodium improved to 139 from 129. Potassium decreased from 6.2 on presentation to 3.2. Replacing overcorrected potassium now. On chest x-ray, there is concern of left lower lobe infiltrate likely pneumonia. Cultures negative so far. Patient getting cefepime and vancomycin. WBCs improving. Breathing well on 5 L nasal cannula. Reports he uses 2 liter oxygen at home. Will wean as tolerated. Was getting agitated overnight, needed Precedex because of unable to take p.o. Seroquel. Alert and oriented and appears calm today. Off Precedex now. Added Seroquel nightly as needed and tolerating PO. Follow-up needs:    1. Wean down oxygen requirement as tolerated. Baseline requirement is 2 L.  2. PT/OT to work with the patient  3. Hydrocortisone decreased to 50 mg every 8 hours. Assess and wean accordingly. 4.   On cefepime and vancomycin. Adjust according to cultures.

## 2021-07-14 NOTE — PROGRESS NOTES
Physician Progress Note      Cameron Raman  CSN #:                  [id number]  :                       1950  ADMIT DATE:       2021 1:09 PM  DISCH DATE:  RESPONDING  PROVIDER #:        Jimy Arias MD          QUERY TEXT:    Pt admitted with LAKESHA. Pt noted to have PNA, possible UTI, AMS and leukocytosis   with hypotension. If possible, please document in the progress notes and   discharge summary if you are evaluating and /or treating any of the following: The medical record reflects the following:  Risk Factors: PNA, UTI  Clinical Indicators: LAKESHA, WBC 20.11, procalcitonin 0.55 Acute respiratory   failure on BIPAP. AMS and hypotension requiring pressor support. Per H&P: Left   lower lobe infiltrate. Could be pneumonia with increased risk of Pseudomonas   and MRSA. Change ceftriaxone to Cefepime and vancomycin. CC progress note   : Septic shock  Treatment: ICU, IV Maxipime, Vanco, Vasopressin & Levophed Gtts, CXR,   labs/monitoring    Thank-you,  Lizandro Flores RN, [email or web address]  Options provided:  -- Sepsis, present on admission  -- Sepsis present on admission, now resolved  -- Sepsis, not present on admission  -- Sepsis was ruled out  -- Other - I will add my own diagnosis  -- Disagree - Not applicable / Not valid  -- Disagree - Clinically unable to determine / Unknown  -- Refer to Clinical Documentation Reviewer    PROVIDER RESPONSE TEXT:    This patient has sepsis, present on admission, now resolved.     Query created by: Bill Grigsby on 2021 9:11 AM      Electronically signed by:  Jimy Arias MD 2021 3:15 PM

## 2021-07-14 NOTE — PROGRESS NOTES
[facility]   Progress Note                   Date:   7/14/2021  Patient name: Timmy Tatum  Date of admission:  7/12/2021  1:09 PM  MRN:   [id number]  YOB: 1950  PCP: Kimberly Abbasi    Reason for Admission:      Subjective:       No acute events overnight. Weaned down from levophed. Vitally  /66, heart rate  60 rate controlled. On 6 L of oxygen  Patient is sedated on Precedex. Potassium this am 3.2. replacing    Patient history:   66 y M with PMH of HTN, A. fib/atrial flutter, CAD status post CABG 2005 transferred from [facility] a presented with hypotension found to have LAKESHA and hyperkalemia with potassium of 7. Cardiology was consulted because of atrial flutter and elevated troponin of 173.     Medications:   Scheduled Meds:   midodrine  5 mg Oral TID WC    vancomycin  1,500 mg Intravenous Q24H    [Held by provider] bumetanide  1 mg Intravenous BID    [Held by provider] metoprolol succinate  25 mg Oral Daily    QUEtiapine  12.5 mg Oral Once    metoprolol  5 mg Intravenous Q6H    insulin glargine  0.25 Units/kg Subcutaneous Nightly    insulin lispro  0-6 Units Subcutaneous Q4H    budesonide-formoterol  2 puff Inhalation BID    dilTIAZem  180 mg Oral Daily    pantoprazole  40 mg Oral Daily    sodium chloride  1,000 mL Intravenous Once    sodium chloride flush  5-40 mL Intravenous 2 times per day    hydrocortisone sodium succinate PF  100 mg Intravenous Q8H    cefepime  2,000 mg Intravenous Q24H    heparin (porcine)  5,000 Units Subcutaneous 3 times per day    vancomycin (VANCOCIN) intermittent dosing (placeholder)   Other RX Placeholder       Continuous Infusions:   dexmedetomidine 0.4 mcg/kg/hr (07/14/21 0631)    dextrose      sodium chloride      norepinephrine 1 mcg/min (07/14/21 0115)    vasopressin (Septic Shock) infusion Stopped (07/13/21 0403)       CBC:   Recent Labs     07/12/21  1525 07/13/21  0445 07/14/21  0432   WBC 20.0* 22.7* 12.5*   HGB 11.4* 11.5* 11.1*    263 177     BMP:    Recent Labs     07/13/21  0039 07/13/21  0445 07/14/21  0432   * 131* 139   K 5.6* 5.3 3.2*   CL 87* 89* 97*   CO2 26 28 31   BUN 93* 87* 51*   CREATININE 2.51* 2.17* 0.96   GLUCOSE 202* 225* 148*         Objective:   Vitals: /66   Pulse 61   Temp 97.5 °F (36.4 °C)   Resp 18   Ht 5' 10\" (1.778 m)   Wt 216 lb 14.9 oz (98.4 kg)   SpO2 97%   BMI 31.13 kg/m²     General appearance: awake, alert, in no apparent respiratory distress   HEENT: Head: Normocephalic, no lesions, without obvious abnormality  Neck: no JVD  Lungs: clear to auscultation bilaterally, no basilar rales, no wheezing   Heart: regular rate and rhythm, S1, S2 normal, no murmur, click, rub or gallop  Abdomen: soft, non-tender; bowel sounds normal  Extremities: No LE edema  Neurologic: Mental status: Alert, oriented. Motor and sensory not done. EKG: Typical Atrial flutter with variable conduction, incomplete RBBB       Echocardiogram:  Left Ventricle : Left ventricular systolic function is grossly        normal. No regional wall motion abnormalities noted. LVEF is 55-60%.      Transmitral Doppler flow pattern suggests grade II diastolic      dysfunction      Mitral Valve : Trace to mild mitral regurgitation.     LA- mild dilated     Coronary Angiography:     Holter 5/2021  Patient remained in atrial fibrillation throughout the recording with   average heart rate of 75 beats per minute suggestive adequate rate control   in atrial flutter.  Minimum heart was 40 beats per minute and maximum   heart of 128 beats per minute.       There were 117 (0.1%) wide complex beats which is 2 triplets and 10   couplets. Longest RR interval was 2.3 seconds. Patient reported 2 diary entries with symptoms of shortness of breath at   the time patient was in atrial flutter with a heart rate of 68 and 76   respectively.          Assessment:   1. Persistent atrial flutter/A. fib.   On Cardizem, Lopressor and Eliquis  2. Acute on chronic HFpEF. 3. Recent Holter from 5/2021 showed persistent A. fib with rate controlled  4. Acute kidney injury likely cardiorenal. Creatinine improving  5. Hyperkalemia due #3. Improving   6. CAD s/p CABG in 2005  7. Elevated troponin likely type II MI from supply demand mismatch due to LAKESHA and CHF. Flat trend 173173  8. Acute on chronic hypoxic and hypercapnic respiratory failure  9. COPD  10. Hypertension  11. Diabetes mellitus  12. Pulmonary hypertension  13. Leukocytosis      Treatment Plan:     1. Patient now is rate controlled. Resume Toprol when his blood pressure allows. 2. Continue with as needed Lopressor with parameter. 3. Keep potassium above 4 magnesium above 2. Replace electrolytes. 4. Will sign off. Please do not hesitate to contact us with any kind of question    Discussed with nursing. Lisa Dhaliwal MD MD  Cardiovascular Diseases   Internal Medicine Resident  S Count includes the [facility]   7/14/2021, 12:19 PM       Attending Physician Statement  I have discussed the case of Constance Ward including pertinent history and exam findings with the resident. I have seen and examined the patient and the key elements of the encounter have been performed by me. I agree with the assessment, plan and orders as documented by the resident With changes made to the note.      Electronically signed by Ming Mendoza MD on 7/14/2021 at 2:39 PM.    [facility]      [phone]

## 2021-07-14 NOTE — PLAN OF CARE
Problem: Skin Integrity:  Goal: Will show no infection signs and symptoms  Description: Will show no infection signs and symptoms  Outcome: Ongoing  Goal: Absence of new skin breakdown  Description: Absence of new skin breakdown  Outcome: Ongoing     Problem: Falls - Risk of:  Goal: Will remain free from falls  Description: Will remain free from falls  Outcome: Met This Shift  Goal: Absence of physical injury  Description: Absence of physical injury  Outcome: Met This Shift     Problem: Confusion - Acute:  Goal: Absence of continued neurological deterioration signs and symptoms  Description: Absence of continued neurological deterioration signs and symptoms  Outcome: Ongoing  Goal: Mental status will be restored to baseline  Description: Mental status will be restored to baseline  Outcome: Ongoing     Problem: Discharge Planning:  Goal: Ability to perform activities of daily living will improve  Description: Ability to perform activities of daily living will improve  Outcome: Ongoing  Goal: Participates in care planning  Description: Participates in care planning  Outcome: Ongoing     Problem: Injury - Risk of, Physical Injury:  Goal: Will remain free from falls  Description: Will remain free from falls  Outcome: Met This Shift  Goal: Absence of physical injury  Description: Absence of physical injury  Outcome: Met This Shift     Problem: Mood - Altered:  Goal: Mood stable  Description: Mood stable  Outcome: Ongoing  Goal: Absence of abusive behavior  Description: Absence of abusive behavior  Outcome: Ongoing  Goal: Verbalizations of feeling emotionally comfortable while being cared for will increase  Description: Verbalizations of feeling emotionally comfortable while being cared for will increase  Outcome: Ongoing     Problem: Psychomotor Activity - Altered:  Goal: Absence of psychomotor disturbance signs and symptoms  Description: Absence of psychomotor disturbance signs and symptoms  Outcome: Ongoing Problem: Sensory Perception - Impaired:  Goal: Demonstrations of improved sensory functioning will increase  Description: Demonstrations of improved sensory functioning will increase  Outcome: Ongoing  Goal: Decrease in sensory misperception frequency  Description: Decrease in sensory misperception frequency  Outcome: Ongoing  Goal: Able to refrain from responding to false sensory perceptions  Description: Able to refrain from responding to false sensory perceptions  Outcome: Ongoing  Goal: Demonstrates accurate environmental perceptions  Description: Demonstrates accurate environmental perceptions  Outcome: Ongoing  Goal: Able to distinguish between reality-based and nonreality-based thinking  Description: Able to distinguish between reality-based and nonreality-based thinking  Outcome: Ongoing  Goal: Able to interrupt nonreality-based thinking  Description: Able to interrupt nonreality-based thinking  Outcome: Ongoing     Problem: Sleep Pattern Disturbance:  Goal: Appears well-rested  Description: Appears well-rested  Outcome: Ongoing     Problem: Non-Violent Restraints  Goal: Removal from restraints as soon as assessed to be safe  Outcome: Completed  Goal: No harm/injury to patient while restraints in use  Outcome: Completed  Goal: Patient's dignity will be maintained  Outcome: Completed

## 2021-07-14 NOTE — PROGRESS NOTES
Critical care team - Resident sign-out to medicine service      Date and time: 7/14/2021 3:18 PM  Patient's name:  Claudio Conte  Medical Record Number: [id number]  Patient's account/billing number: [de-identified]  Patient's YOB: 1950  Age: 66 y.o. Date of Admission: 7/12/2021  1:09 PM  Length of stay during current admission: 2    Primary Care Physician: Denise Blakely    Code Status: Full Code    Mode of physician to physician communication:        [x] Via telephone   [] In person     Date and time of sign-out: 7/14/2021 3:18 PM    Accepting Internal Medicine resident: Sherrill Goldsmith    Accepting Medicine team: [facility]    Accepting team's attending: Dr. Lesly Smiley    Patient's current ICU Bed:  128     Patient's assigned bed on floor:  428        [] Med-Surg Monitored [x] Step-down       [] Psychiatry ICU       [] Psych floor     Reason for ICU admission:     Transferred from [facility] for unclear etiology hypotension, leukocytosis, mild anemia, and new onset LAKESHA. ICU course summary:     66 y. o. male hx of CAD s/p CABG 2005, COPD, type 2 diabetes, chronic diastolic CHF, cor pulmonale, bladder cancer per pt daughter in 2014, and PNA LLL vs mass in May 2021 admitted as transfer from [facility] for unclear etiology hypotension, found to have LAKESHA and potassium of 7. Patient does not know why he was sent to outside facility, was at a nursing home and was noted to be hypotensive. Pt not intubated at arrival.     Cardiology was consulted for atrial flutter and elevated troponin at 173. Rate controlled with Lopressor and Toprol. Patient was briefly hypotensive on presentation and needed Levophed support. Currently maintaining pressure without any support. Nephrology was consulted for LAKESHA, hyponatremia and hyperkalemia. Received 1 dose of Bumex with excellent response in urine output. LAKESHA resolved now. Getting gentle hydration sodium improved to 139 from 129.   Potassium decreased from 6.2 on presentation to 3.2. Replacing overcorrected potassium now. On chest x-ray, there is concern of left lower lobe infiltrate likely pneumonia. Cultures negative so far. Patient getting cefepime and vancomycin. WBCs improving. Breathing well on 5 L nasal cannula. Reports he uses 2 liter oxygen at home. Will wean as tolerated. Was getting agitated overnight, needed Precedex because of unable to take p.o. Seroquel. Alert and oriented and appears calm today. Off Precedex now. Added Seroquel nightly as needed and tolerating PO.     Procedures during patient's ICU stay:     Right IJ central line    Current Vitals:     /71   Pulse 61   Temp 97.5 °F (36.4 °C)   Resp 12   Ht 5' 10\" (1.778 m)   Wt 216 lb 14.9 oz (98.4 kg)   SpO2 99%   BMI 31.13 kg/m²       Cultures:     Blood cultures:                 [] None drawn      [x] Negative             []  Positive (Details:  )  Urine Culture:                   [] None drawn      [x] Negative             []  Positive (Details:  )  Sputum Culture:               [] None drawn       [] Negative             []  Positive (Details:  )   Endotracheal aspirate:     [] None drawn       [] Negative             []  Positive (Details:  )       Consults:     1. Cardiology  2.   Nephrology    Assessment:     Patient Active Problem List    Diagnosis Date Noted    Acute renal failure ([facility] 07/12/2021    LAKESHA (acute kidney injury) ([facility] 07/12/2021    Acute renal failure (ARF) ([facility] 07/12/2021    Uncontrolled type 2 diabetes mellitus with hyperglycemia ([facility] 05/30/2021    Hyponatremia     Ventricular tachycardia ([facility] 05/28/2021    Acute on chronic respiratory failure with hypoxia and hypercapnia (HCC)     Pneumonia of left lower lobe due to infectious organism     PAF (paroxysmal atrial fibrillation) (HCC)     Pulmonary hypertension with chronic cor pulmonale (HCC)     RODNEY treated with BiPAP     Smoking greater than 40 pack years     Class 1 obesity due to excess calories with serious comorbidity and body mass index (BMI) of 30.0 to 30.9 in adult     Stage 4 very severe COPD by GOLD classification ([facility]     Lumbar facet joint syndrome 01/13/2021    Panniculitis involving lumbar region 01/13/2021    Lumbosacral spondylosis without myelopathy 11/04/2020    DDD (degenerative disc disease), lumbar 11/04/2020    Neuropathic pain 10/21/2020    Chronic bilateral low back pain without sciatica 10/21/2020         Recommended Follow-up:     1. Wean down oxygen requirement as tolerated. Baseline requirement is 2 L.  2. PT/OT to work with the patient  3. Hydrocortisone decreased to 50 mg every 8 hours. Assess and wean accordingly. 4.   On cefepime and vancomycin. Adjust according to cultures. Above mentioned assessment and plan was discussed by me with the admitting medicine resident. The medicine team assigned to the patient by medicine admitting resident will be following up the patient from now onwards on the floor.      Ronda Quiñonez MD  Critical care resident,  Department of Internal Medicine/ Critical care  [facility])             7/14/2021, 3:18 PM     Lyn Jimenez MD  PGY1 Emergency Medicine

## 2021-07-14 NOTE — PLAN OF CARE
Problem: Skin Integrity:  Goal: Will show no infection signs and symptoms  Description: Will show no infection signs and symptoms  7/14/2021 1017 by Judith Jin RN  Outcome: Ongoing  7/14/2021 0633 by Carlotta Ordaz RN  Outcome: Ongoing  Goal: Absence of new skin breakdown  Description: Absence of new skin breakdown  7/14/2021 1017 by Judith Jin RN  Outcome: Ongoing  7/14/2021 0633 by Carlotta Ordaz RN  Outcome: Ongoing     Problem: Falls - Risk of:  Goal: Will remain free from falls  Description: Will remain free from falls  7/14/2021 1017 by Judith Jin RN  Outcome: Ongoing  7/14/2021 0633 by Carlotta Ordaz RN  Outcome: Met This Shift  Goal: Absence of physical injury  Description: Absence of physical injury  7/14/2021 1017 by Judith Jin RN  Outcome: Ongoing  7/14/2021 0633 by Carlotta Ordaz RN  Outcome: Met This Shift     Problem: Confusion - Acute:  Goal: Absence of continued neurological deterioration signs and symptoms  Description: Absence of continued neurological deterioration signs and symptoms  7/14/2021 1017 by Judith Jin RN  Outcome: Ongoing  7/14/2021 0633 by Carlotta Ordaz RN  Outcome: Ongoing  Goal: Mental status will be restored to baseline  Description: Mental status will be restored to baseline  7/14/2021 1017 by Judith Jin RN  Outcome: Ongoing  7/14/2021 0633 by Carlotta Ordaz RN  Outcome: Ongoing     Problem: Discharge Planning:  Goal: Ability to perform activities of daily living will improve  Description: Ability to perform activities of daily living will improve  7/14/2021 1017 by Judith Jin RN  Outcome: Ongoing  7/14/2021 0633 by Carlotta Ordaz RN  Outcome: Ongoing  Goal: Participates in care planning  Description: Participates in care planning  7/14/2021 1017 by Judith Jin RN  Outcome: Ongoing  7/14/2021 0633 by Carlotta Ordaz RN  Outcome: Ongoing     Problem: Injury - Risk of, Physical Injury:  Goal: Will remain free from falls  Description: sensory perceptions  7/14/2021 1017 by Keira Delaney RN  Outcome: Ongoing  7/14/2021 3421 by Darwin Hoskins RN  Outcome: Ongoing  Goal: Demonstrates accurate environmental perceptions  Description: Demonstrates accurate environmental perceptions  7/14/2021 1017 by Keira Delaney RN  Outcome: Ongoing  7/14/2021 0633 by Darwin Hoskins RN  Outcome: Ongoing  Goal: Able to distinguish between reality-based and nonreality-based thinking  Description: Able to distinguish between reality-based and nonreality-based thinking  7/14/2021 1017 by Keira Delaney RN  Outcome: Ongoing  7/14/2021 0633 by Darwin Hoskins RN  Outcome: Ongoing  Goal: Able to interrupt nonreality-based thinking  Description: Able to interrupt nonreality-based thinking  7/14/2021 1017 by Keira Delaney RN  Outcome: Ongoing  7/14/2021 0633 by Darwin Hoskins RN  Outcome: Ongoing     Problem: Sleep Pattern Disturbance:  Goal: Appears well-rested  Description: Appears well-rested  7/14/2021 1017 by Keira Delaney RN  Outcome: Ongoing  7/14/2021 0633 by Darwin Hoskins RN  Outcome: Ongoing

## 2021-07-14 NOTE — PROGRESS NOTES
Pharmacy Vancomycin Consult     Vancomycin Day: 3  Current Dosin mg x 1 loading dose followed by levels. Temp max:  97.9    Recent Labs     21  0445 21  0432   BUN 87* 51*   CREATININE 2.17* 0.96   WBC 22.7* 12.5*       Intake/Output Summary (Last 24 hours) at 2021 0606  Last data filed at 2021 0400  Gross per 24 hour   Intake 941.98 ml   Output 4675 ml   Net -3733.02 ml       Ht Readings from Last 1 Encounters:   21 5' 10\" (1.778 m)        Wt Readings from Last 1 Encounters:   21 216 lb 14.9 oz (98.4 kg)       Body mass index is 31.13 kg/m². Estimated Creatinine Clearance: 82 mL/min (based on SCr of 0.96 mg/dL). Trough: Random level 32 hours from loading dose = 9.0    Assessment/Plan:  Start patient on vancomycin 1500 mg every 24 hours and continue to closely monitor patient.     Chris Harris [facility]  2021 6:13 AM    Chris Harris [facility]  2021 6:12 AM

## 2021-07-15 LAB
ANCA MYELOPEROXIDASE: <0.3 AU/ML (ref 0–3.5)
ANCA PROTEINASE 3: <0.7 AU/ML (ref 0–2)
ANION GAP SERPL CALCULATED.3IONS-SCNC: 11 MMOL/L (ref 9–17)
ANTI DNA DOUBLE STRANDED: <0.5 IU/ML
ANTI-NUCLEAR ANTIBODY (ANA): NEGATIVE
BUN BLDV-MCNC: 39 MG/DL (ref 8–23)
BUN/CREAT BLD: ABNORMAL (ref 9–20)
CALCIUM SERPL-MCNC: 8.8 MG/DL (ref 8.6–10.4)
CHLORIDE BLD-SCNC: 96 MMOL/L (ref 98–107)
CO2: 31 MMOL/L (ref 20–31)
CREAT SERPL-MCNC: 0.81 MG/DL (ref 0.7–1.2)
EKG ATRIAL RATE: 256 BPM
EKG Q-T INTERVAL: 382 MS
EKG QRS DURATION: 104 MS
EKG QTC CALCULATION (BAZETT): 467 MS
EKG R AXIS: 101 DEGREES
EKG T AXIS: -33 DEGREES
EKG VENTRICULAR RATE: 90 BPM
ENA ANTIBODIES SCREEN: 0.2 U/ML
GFR AFRICAN AMERICAN: >60 ML/MIN
GFR NON-AFRICAN AMERICAN: >60 ML/MIN
GFR SERPL CREATININE-BSD FRML MDRD: ABNORMAL ML/MIN/{1.73_M2}
GFR SERPL CREATININE-BSD FRML MDRD: ABNORMAL ML/MIN/{1.73_M2}
GLUCOSE BLD-MCNC: 124 MG/DL (ref 75–110)
GLUCOSE BLD-MCNC: 128 MG/DL (ref 75–110)
GLUCOSE BLD-MCNC: 149 MG/DL (ref 75–110)
GLUCOSE BLD-MCNC: 150 MG/DL (ref 75–110)
GLUCOSE BLD-MCNC: 152 MG/DL (ref 75–110)
GLUCOSE BLD-MCNC: 161 MG/DL (ref 75–110)
GLUCOSE BLD-MCNC: 175 MG/DL (ref 70–99)
POTASSIUM SERPL-SCNC: 3.3 MMOL/L (ref 3.7–5.3)
SODIUM BLD-SCNC: 138 MMOL/L (ref 135–144)

## 2021-07-15 PROCEDURE — 94761 N-INVAS EAR/PLS OXIMETRY MLT: CPT

## 2021-07-15 PROCEDURE — 99232 SBSQ HOSP IP/OBS MODERATE 35: CPT | Performed by: FAMILY MEDICINE

## 2021-07-15 PROCEDURE — 97162 PT EVAL MOD COMPLEX 30 MIN: CPT

## 2021-07-15 PROCEDURE — 51798 US URINE CAPACITY MEASURE: CPT

## 2021-07-15 PROCEDURE — 97116 GAIT TRAINING THERAPY: CPT

## 2021-07-15 PROCEDURE — 80048 BASIC METABOLIC PNL TOTAL CA: CPT

## 2021-07-15 PROCEDURE — 6360000002 HC RX W HCPCS: Performed by: STUDENT IN AN ORGANIZED HEALTH CARE EDUCATION/TRAINING PROGRAM

## 2021-07-15 PROCEDURE — 36415 COLL VENOUS BLD VENIPUNCTURE: CPT

## 2021-07-15 PROCEDURE — 2580000003 HC RX 258: Performed by: EMERGENCY MEDICINE

## 2021-07-15 PROCEDURE — 6370000000 HC RX 637 (ALT 250 FOR IP): Performed by: EMERGENCY MEDICINE

## 2021-07-15 PROCEDURE — 82947 ASSAY GLUCOSE BLOOD QUANT: CPT

## 2021-07-15 PROCEDURE — 2700000000 HC OXYGEN THERAPY PER DAY

## 2021-07-15 PROCEDURE — 97166 OT EVAL MOD COMPLEX 45 MIN: CPT

## 2021-07-15 PROCEDURE — 94640 AIRWAY INHALATION TREATMENT: CPT

## 2021-07-15 PROCEDURE — 93005 ELECTROCARDIOGRAM TRACING: CPT | Performed by: FAMILY MEDICINE

## 2021-07-15 PROCEDURE — 97530 THERAPEUTIC ACTIVITIES: CPT

## 2021-07-15 PROCEDURE — 6360000002 HC RX W HCPCS: Performed by: INTERNAL MEDICINE

## 2021-07-15 PROCEDURE — 99232 SBSQ HOSP IP/OBS MODERATE 35: CPT | Performed by: INTERNAL MEDICINE

## 2021-07-15 PROCEDURE — 97535 SELF CARE MNGMENT TRAINING: CPT

## 2021-07-15 PROCEDURE — 51701 INSERT BLADDER CATHETER: CPT

## 2021-07-15 PROCEDURE — 2580000003 HC RX 258: Performed by: INTERNAL MEDICINE

## 2021-07-15 PROCEDURE — 6370000000 HC RX 637 (ALT 250 FOR IP): Performed by: STUDENT IN AN ORGANIZED HEALTH CARE EDUCATION/TRAINING PROGRAM

## 2021-07-15 PROCEDURE — 99233 SBSQ HOSP IP/OBS HIGH 50: CPT | Performed by: INTERNAL MEDICINE

## 2021-07-15 PROCEDURE — 6370000000 HC RX 637 (ALT 250 FOR IP)

## 2021-07-15 PROCEDURE — 2060000000 HC ICU INTERMEDIATE R&B

## 2021-07-15 RX ADMIN — BUDESONIDE AND FORMOTEROL FUMARATE DIHYDRATE 2 PUFF: 160; 4.5 AEROSOL RESPIRATORY (INHALATION) at 09:21

## 2021-07-15 RX ADMIN — INSULIN LISPRO 1 UNITS: 100 INJECTION, SOLUTION INTRAVENOUS; SUBCUTANEOUS at 04:55

## 2021-07-15 RX ADMIN — CEFEPIME HYDROCHLORIDE 2000 MG: 2 INJECTION, POWDER, FOR SOLUTION INTRAVENOUS at 01:11

## 2021-07-15 RX ADMIN — HYDROCORTISONE SODIUM SUCCINATE 50 MG: 100 INJECTION, POWDER, FOR SOLUTION INTRAMUSCULAR; INTRAVENOUS at 04:51

## 2021-07-15 RX ADMIN — MIDODRINE HYDROCHLORIDE 5 MG: 5 TABLET ORAL at 11:49

## 2021-07-15 RX ADMIN — POTASSIUM BICARBONATE 30 MEQ: 782 TABLET, EFFERVESCENT ORAL at 21:39

## 2021-07-15 RX ADMIN — INSULIN GLARGINE 25 UNITS: 100 INJECTION, SOLUTION SUBCUTANEOUS at 21:48

## 2021-07-15 RX ADMIN — MIDODRINE HYDROCHLORIDE 5 MG: 5 TABLET ORAL at 08:01

## 2021-07-15 RX ADMIN — BUDESONIDE AND FORMOTEROL FUMARATE DIHYDRATE 2 PUFF: 160; 4.5 AEROSOL RESPIRATORY (INHALATION) at 20:10

## 2021-07-15 RX ADMIN — CEFEPIME HYDROCHLORIDE 2000 MG: 2 INJECTION, POWDER, FOR SOLUTION INTRAVENOUS at 11:50

## 2021-07-15 RX ADMIN — HEPARIN SODIUM 5000 UNITS: 5000 INJECTION INTRAVENOUS; SUBCUTANEOUS at 21:37

## 2021-07-15 RX ADMIN — INSULIN LISPRO 1 UNITS: 100 INJECTION, SOLUTION INTRAVENOUS; SUBCUTANEOUS at 08:02

## 2021-07-15 RX ADMIN — MIDODRINE HYDROCHLORIDE 5 MG: 5 TABLET ORAL at 16:21

## 2021-07-15 RX ADMIN — HYDROCORTISONE SODIUM SUCCINATE 50 MG: 100 INJECTION, POWDER, FOR SOLUTION INTRAMUSCULAR; INTRAVENOUS at 11:49

## 2021-07-15 RX ADMIN — QUETIAPINE FUMARATE 25 MG: 25 TABLET ORAL at 21:37

## 2021-07-15 RX ADMIN — HEPARIN SODIUM 5000 UNITS: 5000 INJECTION INTRAVENOUS; SUBCUTANEOUS at 06:06

## 2021-07-15 RX ADMIN — HEPARIN SODIUM 5000 UNITS: 5000 INJECTION INTRAVENOUS; SUBCUTANEOUS at 13:46

## 2021-07-15 RX ADMIN — SODIUM CHLORIDE, PRESERVATIVE FREE 10 ML: 5 INJECTION INTRAVENOUS at 21:52

## 2021-07-15 RX ADMIN — INSULIN LISPRO 1 UNITS: 100 INJECTION, SOLUTION INTRAVENOUS; SUBCUTANEOUS at 11:50

## 2021-07-15 RX ADMIN — SODIUM CHLORIDE, PRESERVATIVE FREE 10 ML: 5 INJECTION INTRAVENOUS at 08:02

## 2021-07-15 RX ADMIN — INSULIN LISPRO 1 UNITS: 100 INJECTION, SOLUTION INTRAVENOUS; SUBCUTANEOUS at 16:22

## 2021-07-15 RX ADMIN — Medication 1500 MG: at 06:06

## 2021-07-15 RX ADMIN — HYDROCORTISONE SODIUM SUCCINATE 50 MG: 100 INJECTION, POWDER, FOR SOLUTION INTRAMUSCULAR; INTRAVENOUS at 21:35

## 2021-07-15 RX ADMIN — DILTIAZEM HYDROCHLORIDE 180 MG: 180 CAPSULE, COATED, EXTENDED RELEASE ORAL at 08:01

## 2021-07-15 ASSESSMENT — PAIN SCALES - GENERAL: PAINLEVEL_OUTOF10: 0

## 2021-07-15 NOTE — PROGRESS NOTES
[facility]  Office: [address], DO, Santiago Roxie, DO, Pete Mooney, DO, Artem Herr Blood, DO, Michael Collazo MD, Orquidea Stevenson MD, Betzy Gonzalez MD, Negrito Ryan MD, Rashmi Austin MD, Tashia Rivera MD, Park Aranda MD, Rosa Shepard DO, Niharika Martin MD, Klaus Anand DO, Yoli Montes De Oca MD,  Nathanial Apgar, DO, Ace Barrera MD, Charu Monzon MD, Cristel Sullivan MD, Ronald Thomas MD, Robb Acosta MD, Tempie Mcburney, MD, Meryl Thomas, Ronit Flores, CNP, Vivienne Camara, CNP, Venita Burciaga, CNS, Jordyn Fret, CNP, Carroll Lyle, CNP, Marleen Kiser, CNP, Natasha Suarez, CNP, Saadia Beasley, CNP, MARC BoudreauxC, Bairon Collier, [facility], Richar Kang, CNP, Lebron Johnson, CNP, Ada Mcmillan, CNP, Brenda Palafox, CNP, Edgar Leija, CNP, Alina Sommer, CNP, Nasir Grayson, CNP, Denton Junior, [address] [place] [address]    Progress Note    7/15/2021    11:06 AM    Name:   Rachel Hammer  MRN:     [id number]     Acct:      [de-identified]   Room:   [address] [place] [address] Day:  3  Admit Date:  2021  1:09 PM    PCP:   Kelley Guerrero  Code Status:  Full Code    Subjective:     C/C: SOB     Interval History Status: improved. Pt was seen and examined this morning  No acute events overnight   No complaints at this time          Review of Systems:     12 point ROS performed and negative for anything other than what was stated in subjective    Medications: Allergies:     Allergies   Allergen Reactions    Niacin And Related     Other      Nicotine patch - rash    Statins        Current Meds:   Scheduled Meds:    midodrine  5 mg Oral TID WC    vancomycin  1,500 mg Intravenous Q24H    cefepime  2,000 mg Intravenous Q12H    hydrocortisone sodium succinate PF  50 mg Intravenous Q8H    QUEtiapine  25 mg Oral Nightly    [Held by provider] bumetanide  1 mg Intravenous BID    [Held by provider] metoprolol succinate  25 Av °F (36.7 °C), Min:97.5 °F (36.4 °C), Max:99 °F (37.2 °C)    Recent Labs     21  2121 07/15/21  0026 07/15/21  0449 07/15/21  07   POCGLU 149* 124* 149* 161*       I/O (24Hr): Intake/Output Summary (Last 24 hours) at 7/15/2021 1106  Last data filed at 7/15/2021 0802  Gross per 24 hour   Intake 634.3 ml   Output 1255 ml   Net -620.7 ml       Labs:  Hematology:  Recent Labs     21  1525 21  0445 21  0432   WBC 20.0* 22.7* 12.5*   RBC 3.48* 3.55* 3.38*   HGB 11.4* 11.5* 11.1*   HCT 34.3* 35.3* 33.3*   MCV 98.6 99.4 98.5   MCH 32.8 32.4 32.8   MCHC 33.2 32.6 33.3   RDW 15.6* 15.0* 14.9*    263 177   MPV 11.6 11.8 11.3     Chemistry:  Recent Labs     21  1525 21  1525 21  1715 214 21  0039 215 21  0432   *   < > 129*   < > 127* 131* 139   K 6.2*   < > 5.5*   < > 5.6* 5.3 3.2*   CL 86*   < > 87*   < > 87* 89* 97*   CO2 26   < > 25   < > 26 28 31   GLUCOSE 162*   < > 204*   < > 202* 225* 148*   *   < > 99*   < > 93* 87* 51*   CREATININE 3.27*   < > 3.10*   < > 2.51* 2.17* 0.96   MG  --   --   --   --   --   --  2.3   ANIONGAP 15   < > 17   < > 14 14 11   LABGLOM 19*   < > 20*   < > 25* 30* >60   GFRAA 23*   < > 24*   < > 31* 36* >60   CALCIUM 8.5*   < > 8.5*   < > 8.3* 8.5* 8.4*   TROPHS 173*  --  173*  --   --   --   --     < > = values in this interval not displayed. Recent Labs     21  17121  18121  21021  1528 21  1841 21  12121  17221  2121 07/15/21  0026 07/15/21  0449 07/15/21  0721   PROT  --   --  6.2*  --   --   --   --   --   --   --   --   --    LABA1C  --   --   --   --  8.0*  --   --   --   --   --   --   --    TSH 1.24  --   --   --   --   --   --   --   --   --   --   --    POCGLU  --    < >  --    < >  --    < > 113* 163* 149* 124* 149* 161*    < > = values in this interval not displayed.      ABG:  Lab Results   Component renal injury pettyley related ischemic ATN secondary to hypotension/NSAIDs  - diuretics held   - monitor urine output   - avoid NSAIDs/ROJAS-2 inhibitors/aldactone     7. Paroxysmal afib   8. Hx of CABG   - telemetry   - continue cardizem, lopressor     9.  DMII   - accu checks ac/hs with ISS  - continue home yadira Martinez MD  7/15/2021  11:06 AM

## 2021-07-15 NOTE — PROGRESS NOTES
18 mcg into the lungs daily    Historical Provider, MD   apixaban (ELIQUIS) 5 MG TABS tablet Take by mouth 2 times daily    Historical Provider, MD   potassium chloride (KLOR-CON M) 10 MEQ extended release tablet Take 10 mEq by mouth 2 times daily    Historical Provider, MD   CPAP Machine MISC by Does not apply route BiPAP    Historical Provider, MD   budesonide-formoterol (SYMBICORT) 160-4.5 MCG/ACT AERO Inhale 2 puffs into the lungs 2 times daily    Historical Provider, MD   bumetanide (BUMEX) 2 MG tablet Take 2 mg by mouth daily    Historical Provider, MD   magnesium hydroxide (MILK OF MAGNESIA) 400 MG/5ML suspension Take by mouth daily as needed for Constipation    Historical Provider, MD   ibuprofen (IBU) 400 MG tablet Take 400 mg by mouth every 6 hours as needed for Pain    Historical Provider, MD   metoprolol succinate (TOPROL XL) 25 MG extended release tablet Take 25 mg by mouth daily    Historical Provider, MD   Cyanocobalamin (VITAMIN B 12) 500 MCG TABS Take by mouth    Historical Provider, MD   pantoprazole (PROTONIX) 40 MG tablet Take 40 mg by mouth daily    Historical Provider, MD   .    Assessment   Pt is in no distress ta this time.  Pt has a Hx of COPD and takes symbicort BID, spiriva QDAY and albuterol PRN at home    RR 18  Breath Sounds: Cl/DIm      · Bronchodilator assessment at level  1  · Hyperinflation assessment at level   · Secretion Management assessment at level    ·   · [x]    Bronchodilator Assessment  BRONCHODILATOR ASSESSMENT SCORE  Score 0 1 2 3 4 5   Breath Sounds   []  Patient Baseline [x]  No Wheeze good aeration []  Faint, scattered wheezing, good aeration []  Expiratory Wheezing and or moderately diminished []  Insp/Exp wheeze and/or very diminished []  Insp/Exp and/ or marked distress   Respiratory Rate   []  Patient Baseline [x]  Less than 20 [x]  Less than 20 []  20-25 []  Greater than 25 []  Greater than 25   Peak flow % of Pred or PB [x]  NA   []  Greater than 90%  []  81-90% 3. 62 3.83 42 - 45 2.82 3.03 3.26 3.49 3.72 3.96 4.22 4.47   46 - 49 2.40 2.57 2.76 2.94 3.14 3.33 3.54 3.75 46 - 49 2.70 2.92 3.14 3.37 3.61 3.85 4.10 4.36   50 - 53 2.31 2.48 2.66 2.85 3.04 3.24 3.45 3.66 50 - 53 2.58 2.80 3.02 3.25 3.49 3.73 3.98 4.24   54 - 57 2.21 2.38 2.57 2.75 2.95 3.14 3.35 3.56 54 - 57 2.46 2.67 2.89 3.12 3.36 3.60 3.85 4.11   58 - 61 2.10 2.28 2.46 2.65 2.84 3.04 3.24 3.45 58 - 61 2.32 2.54 2.76 2.99 3.23 3.47 3.72 3.98   62 - 65 1.99 2.17 2.35 2.54 2.73 2.93 3.13 3.34 62 - 65 2.19 2.40 2.62 2.85 3.09 3.33 3.58 3.84   66 - 69 1.88 2.05 2.23 2.42 2.61 2.81 3.02 3.23 66 - 69 2.04 2.26 2.48 2.71 2.95 3.19 3.44 3.70   70+ 1.82 1.99 2.17 2.36 2.55 2.75 2.95 3.16 70+ 1.97 2.19 2.41 2.64 2.87 3.12 3.37 3.62             Predicted Peak Expiratory Flow Rate                                       Height (in)  Female       Height (in) Male           Age 64 63 56 61 58 73 78 74 Age            21 344 357 372 387 402 417 432 446  60 62 64 66 68 70 72 74 76   25 337 352 366 381 396 411 426 441 25 447 476 [phone] [phone]   30 329 344 359 374 389 404 419 434 30 437 466 494 523 [phone] 667   35 322 337 351 366 381 396 411 426 35 426 455 484 512 541 [phone]   40 314 329 344 359 374 389 404 419 40 416 445 473 [phone] 617 647   45 307 322 336 351 366 381 396 411 45 405 434 463 491 520 549 577 606 636   50 299 314 329 344 359 374 389 404 50 395 424 452 481 [phone] 625   55 292 307 321 336 351 366 381 396 55 384 413 442 470 499 528 556 585 615   60 284 299 314 329 344 359 374 389 60 374 403 431 460 489 517 546 575 605   65 277 292 306 321 336 351 366 381 65 363 392 421 449 478 507 535 564 594   70 269 284 299 314 329 344 359 374 70 353 382 410 439 468 496 525 554 583   75 261 274 289 305 319 334 348 364 75 344 372 400 429 458 487 515 544 573   80 253 266 282 296 312 327 342 356 80 335 362 390 419 448 476 [phone]

## 2021-07-15 NOTE — PROGRESS NOTES
ocOccupational Therapy   Occupational Therapy Initial Assessment  Date: 7/15/2021   Patient Name: Phillip Villalba  MRN: [id number]     : 1950    Date of Service: 7/15/2021     Copied from chart. Chief complaint: Hypotension and LAKESHA    Discharge Recommendations:  Patient would benefit from continued therapy after discharge  OT Equipment Recommendations  Equipment Needed: Yes  Mobility Devices: ADL Assistive Devices  ADL Assistive Devices: Long-handled Sponge;Long-handled Shoe Horn;Reacher    Assessment   Performance deficits / Impairments: Decreased functional mobility ; Decreased endurance;Decreased ADL status; Decreased sensation;Decreased high-level IADLs;Decreased balance  Assessment: Pt agreeable and motivated throughout session. Pt participated in bed mobility with mod A for progressing trunk. Pt sat EOB with SBA. Pt educated on figure four technique and demo good use of it to don socks ind with increased time. Pt participated in ROM and MMT assessment EOB and required frequent breaks for high RR (as high as 35) and low O2 sat (as low as 85). Pt educated on pursed lip breathing and verbalized understanding but also verbalized not adhering to it. RR and O2 sats recovered to typical range within a minute. Pt participated in functional transfers with CGA. Pt participated in functional mobility to the chair with min A and use of RW. Pt in chair at end of session eating. Pt will continue to benefit from OT services to address deficits above to increase safety, endurance and independence with ADLs. Prognosis: Good  Decision Making: Medium Complexity  Patient Education: Pt educated on OT role, POC, benefits of continued OT services, pursed lip breathing, energy conservation. Pt demo good understanding.   REQUIRES OT FOLLOW UP: Yes  Activity Tolerance  Activity Tolerance: Patient limited by fatigue;Treatment limited secondary to medical complications (free text) (limited d/t high RR and low O2 sats)  Safety Devices  Safety Devices in place: Yes  Type of devices: Call light within reach;Gait belt;Left in chair  Restraints  Initially in place: No           Patient Diagnosis(es): There were no encounter diagnoses. has a past medical history of Agent orange exposure, Anxiety state, Cancer ([facility] [address] CHF (congestive heart failure) ([facility] [address] Chronic obstructive pulmonary disease (COPD) ([facility] [address] Chronic post-traumatic stress disorder, Chronic respiratory failure with hypercapnia ([facility] [address] COPD exacerbation ([facility] [address] Coronary artery disease, Degenerative disc disease, lumbar, Depression, Essential hypertension, History of acute pancreatitis, Hyperglycemia, Hypokalemia, Lumbosacral disc disease, Obesity, Obstructive sleep apnea, and Personal history of malignant neoplasm of bladder. has a past surgical history that includes Bladder surgery; other surgical history; other surgical history (Bilateral, 02/10/2021); and Pain management procedure (Bilateral, 02/10/2021).            Restrictions  Restrictions/Precautions  Restrictions/Precautions:  (up with assistance)  Required Braces or Orthoses?: No    Subjective   General  Patient assessed for rehabilitation services?: Yes  Family / Caregiver Present: No    Social/Functional History  Social/Functional History  Lives With: Other (comment)  Type of Home: [facility]  Home Layout: One level  Home Access: Level entry  Bathroom Shower/Tub: Curtain, Shower chair with back, Walk-in shower  Bathroom Toilet: Handicap height  Bathroom Equipment: Grab bars in shower, Shower chair, Grab bars around toilet  Home Equipment: Rolling walker, Wheelchair-manual (pt reports using RW at baseline)  Receives Help From: Personal care attendant  ADL Assistance: Needs assistance (pt reports needing help with LB dressing, bathing (hair washing and washing feet))  Homemaking Assistance: Needs assistance  Homemaking Responsibilities: No  Ambulation Assistance: Independent  Transfer Assistance: Independent  Active : No  Patient's  Info: ex-wife drives  Mode of Transportation: Car  Occupation: Retired  Type of occupation: [employer], , [employer]  Leisure & Hobbies: pt enjoys watching tv  IADL Comments: Facility/ex-wife takes care of laundry, cooking. Pt reports not needing to clean  Additional Comments: Pt participating in OT and PT at SNF, however unsure of frequency       Objective   Vision: Impaired  Vision Exceptions: Wears glasses at all times  Hearing: Exceptions to [facility]  Hearing Exceptions: Hard of hearing/hearing concerns (pt reports having hearing aids but does not use them)    Orientation  Overall Orientation Status: Within Functional Limits     Balance  Sitting Balance: Stand by assistance (sat EOB ~30 minutes without back support and in chair ~10 minutes with back supported)  Standing Balance: Contact guard assistance  Standing Balance  Time: ~4 minutes  Activity: Standing EOB and functional mobility  Comment: standing with support of RW  Functional Mobility  Functional - Mobility Device: Rolling Walker  Activity:  (from EOB to chair)  Assist Level: Minimal assistance     ADL  Feeding: Modified independent ;Setup (Pt set up for self-feeding at end of session. Feeding ind)  Grooming: Setup; Increased time to complete;Modified independent  (Pt blew nose with set up 3 different times)  UE Bathing: Setup;Minimal assistance; Increased time to complete  LE Bathing: Setup; Moderate assistance; Increased time to complete  UE Dressing: Setup;Minimal assistance; Increased time to complete  LE Dressing: Setup;Minimal assistance; Increased time to complete (Pt donned socks with figure four tech and required assist for posterior lean)  Toileting: Setup; Moderate assistance  Tone RUE  RUE Tone: Normotonic  Tone LUE  LUE Tone: Normotonic  Coordination  Movements Are Fluid And Coordinated: Yes  Coordination and Movement description: Decreased speed;Right UE;Left UE     Bed mobility  Supine to Sit: Co-treatment   Time In 0925         Time Out 1016         Minutes 51         Timed Code Treatment Minutes: [address] MSOT/S

## 2021-07-15 NOTE — CARE COORDINATION
Eleonora Varela Quality Flow/Interdisciplinary Rounds Progress Note    Quality Flow Rounds held on July 15, 2021 at [address] Attending:  Bedside Nurse, ,  and Nursing Unit Leadership    Barriers to Discharge: SNF vs [facility]    Anticipated Discharge Date:       Anticipated Discharge Disposition:SNF vs home w [facility]    Readmission Risk              Risk of Unplanned Readmission:  19           Discussed patient goal for the day, patient clinical progression, and barriers to discharge. The following Goal(s) of the Day/Commitment(s) have been identified:     Patient states he would like to discharge home. Reviewing patient's therapy notes, this may not be best discharge plan. CM called ex wife, requesting referral be sent to [facility] in [facility]. Unable to find information regarding SNF.  She stated she'll bring information regarding facility for CM to send referral.         Mk Hernandez RN  July 15, 2021

## 2021-07-15 NOTE — PROGRESS NOTES
Physical Therapy    Facility/Department: [address] STEPDOWN  Initial Assessment    NAME: Vanna Goodwin  : 1950  MRN: [id number]  Patient transferred from [facility] with hyperkalemia and acute kidney injury along with shock requiring pressors. Patient likely has shock from dehydration contributing to the patient's acute kidney injury and subsequent hyperkalemia. Patient with recent retention of urine and has a Bajwa catheter in place-hydrate patient using CVP/IVC measurement as a guide, placed central line for administration of pressors, also measure CVP, follow urine output and kidney function as the patient is rehydrated. Date of Service: 7/15/2021    Discharge Recommendations:  Patient would benefit from continued therapy after discharge        Assessment   Body structures, Functions, Activity limitations: Decreased functional mobility ; Decreased balance;Decreased coordination;Decreased sensation;Decreased strength;Decreased endurance  Assessment: Patient is easily short of breath, has neuropathy which increases his fall risk. Balance deficits making him unable to stand and blow his nose without having to hold the walker. Patient needs further PT to regain functional independence. Prognosis: Good  Decision Making: Medium Complexity  Clinical Presentation: evolving  PT Education: Goals;Transfer Training;PT Role;Plan of Care;General Safety;Gait Training;Functional Mobility Training;Home Exercise Program  REQUIRES PT FOLLOW UP: Yes  Activity Tolerance  Activity Tolerance: Patient limited by fatigue;Patient limited by endurance       Patient Diagnosis(es): There were no encounter diagnoses.      has a past medical history of Agent orange exposure, Anxiety state, Cancer ([address] CHF (congestive heart failure) ([address] Chronic obstructive pulmonary disease (COPD) ([address] Chronic post-traumatic stress disorder, Chronic respiratory failure with hypercapnia ([address] COPD exacerbation ([address] Coronary artery disease, Degenerative disc disease, lumbar, Depression, Essential hypertension, History of acute pancreatitis, Hyperglycemia, Hypokalemia, Lumbosacral disc disease, Obesity, Obstructive sleep apnea, and Personal history of malignant neoplasm of bladder. has a past surgical history that includes Bladder surgery; other surgical history; other surgical history (Bilateral, 02/10/2021); and Pain management procedure (Bilateral, 02/10/2021). Restrictions  Restrictions/Precautions  Restrictions/Precautions:  (up with assistance)  Required Braces or Orthoses?: No  Position Activity Restriction  Other position/activity restrictions:  Up with assist. On 4 LPM          Subjective  General  Chart Reviewed: Yes  Patient assessed for rehabilitation services?: Yes  Family / Caregiver Present: Yes (brother)  Follows Commands: Within Functional Limits  Pain Screening  Patient Currently in Pain: Denies  Vital Signs  Pulse: 66  Resp: 19  Patient Currently in Pain: Denies  Oxygen Therapy  SpO2: 95 %  O2 Device: Nasal cannula  O2 Flow Rate (L/min): 4 L/min       Orientation  Orientation  Overall Orientation Status: Within Functional Limits  Social/Functional History  Social/Functional History  Lives With: Other (comment)  Type of Home: Facility  Home Layout: One level  Home Access: Level entry  Bathroom Shower/Tub: Curtain, Shower chair with back, Walk-in shower  Bathroom Toilet: Handicap height  Bathroom Equipment: Grab bars in shower, Shower chair, Grab bars around toilet  Home Equipment: Rolling walker, [employer]  Receives Help From: Personal care attendant  ADL Assistance: Needs assistance  Homemaking Assistance: Needs assistance  Homemaking Responsibilities: No  Ambulation Assistance: Independent  Transfer Assistance: Independent  Active : No  Patient's  Info: ex-wife drives  Mode of Transportation: Car  Occupation: Retired  Type of occupation: [employer], , [employer]  Leisure & Hobbies: pt enjoys watching tv  IADL Comments: Facility/ex-wife takes care of laundry, cooking. Pt reports not needing to clean  Additional Comments: Pt participating in OT and PT at SNF, however unsure of frequency  Cognition   Cognition  Overall Cognitive Status: WFL    Objective     Observation/Palpation  Observation: Dense edema in legs and ankles. Neuropathy up to his knees. Edema in tops of feet       Strength RLE  Strength RLE: Exception  Comment: 4/5  Strength LLE  Comment: 4/5           Transfers  Sit to Stand: Stand by assistance  Stand to sit: Stand by assistance  Ambulation  Ambulation?: Yes  Ambulation 1  Surface: level tile  Device: Rolling Walker  Assistance: Contact guard assistance  Quality of Gait: Poor coordination with walker. Positions his left foot outside the base of support of walker on his turn. Gait Deviations: Slow Janett;Decreased head and trunk rotation;Decreased step length;Decreased step height;Shuffles  Distance: 14'  Comments: Short of breath     Balance  Standing - Static: Fair;-  Standing - Dynamic: Poor;+  Comments: Unable to stand and blow his nose. Needed to hold walker and attempt to blow his nose with one hand due to unsteadiness        Plan   Plan  Times per week: 5-6x/wk  Current Treatment Recommendations: Strengthening, Endurance Training, Patient/Caregiver Education & Training, Gait Training, Home Exercise Program, Functional Mobility Training, Safety Education & Training, Equipment Evaluation, Education, & procurement, Transfer Training, Balance Training  Safety Devices  Type of devices:  All fall risk precautions in place, Gait belt, Call light within reach, Left in chair, Nurse notified           AM-PAC Score  AM-PAC Inpatient Mobility Raw Score : 9 (07/15/21 1557)  AM-PAC Inpatient T-Scale Score : 30.55 (07/15/21 1557)  Mobility Inpatient CMS 0-100% Score: 81.38 (07/15/21 1557)  Mobility Inpatient CMS G-Code Modifier : CM (07/15/21 1557)          Goals  Short term goals  Time Frame for Short term goals: 14 visits  Short term goal 1: Sit to/from stand with supervision. Short term goal 2: Ambulate 200' with walker with SBA. Short term goal 3: Improve LE strength and endurance as seen by completion of 15 reps standing LE exercise.        Therapy Time   Individual Concurrent Group Co-treatment   Time In 1427         Time Out 1501         Minutes 34         Timed Code Treatment Minutes: 21 Minutes       Yunier Arnold, PT

## 2021-07-15 NOTE — PLAN OF CARE
Problem: Skin Integrity:  Goal: Will show no infection signs and symptoms  Description: Will show no infection signs and symptoms  7/15/2021 1554 by Neli Villegas RN  Outcome: Met This Shift     Problem: Skin Integrity:  Goal: Absence of new skin breakdown  Description: Absence of new skin breakdown  7/15/2021 1554 by Neli Villegas RN  Outcome: Met This Shift     Problem: Falls - Risk of:  Goal: Will remain free from falls  Description: Will remain free from falls  7/15/2021 1554 by Neli Villegas RN  Outcome: Met This Shift     Problem: Falls - Risk of:  Goal: Absence of physical injury  Description: Absence of physical injury  7/15/2021 1554 by Neli Villegas RN  Outcome: Met This Shift     Problem: Confusion - Acute:  Goal: Absence of continued neurological deterioration signs and symptoms  Description: Absence of continued neurological deterioration signs and symptoms  Outcome: Met This Shift     Problem: Confusion - Acute:  Goal: Mental status will be restored to baseline  Description: Mental status will be restored to baseline  Outcome: Met This Shift     Problem: Discharge Planning:  Goal: Ability to perform activities of daily living will improve  Description: Ability to perform activities of daily living will improve  7/15/2021 1554 by Neli Villegas RN  Outcome: Ongoing     Problem: Discharge Planning:  Goal: Participates in care planning  Description: Participates in care planning  Outcome: Ongoing     Problem: Injury - Risk of, Physical Injury:  Goal: Will remain free from falls  Description: Will remain free from falls  7/15/2021 1554 by Neli Villegas RN  Outcome: Met This Shift     Problem: Injury - Risk of, Physical Injury:  Goal: Absence of physical injury  Description: Absence of physical injury  7/15/2021 1554 by Neli Villegas RN  Outcome: Met This Shift     Problem: Mood - Altered:  Goal: Mood stable  Description: Mood stable  7/15/2021 1554 by Neli Villegas RN  Outcome: Ongoing Problem: Mood - Altered:  Goal: Absence of abusive behavior  Description: Absence of abusive behavior  7/15/2021 1554 by Jen Hill RN  Outcome: Ongoing     Problem: Mood - Altered:  Goal: Verbalizations of feeling emotionally comfortable while being cared for will increase  Description: Verbalizations of feeling emotionally comfortable while being cared for will increase  Outcome: Ongoing     Problem: Psychomotor Activity - Altered:  Goal: Absence of psychomotor disturbance signs and symptoms  Description: Absence of psychomotor disturbance signs and symptoms  Outcome: Met This Shift     Problem: Sleep Pattern Disturbance:  Goal: Appears well-rested  Description: Appears well-rested  7/15/2021 1554 by Jen Hill RN  Outcome: Not Met This Shift

## 2021-07-15 NOTE — PLAN OF CARE
Problem: Skin Integrity:  Goal: Will show no infection signs and symptoms  Description: Will show no infection signs and symptoms  Outcome: Ongoing     Problem: Skin Integrity:  Goal: Absence of new skin breakdown  Description: Absence of new skin breakdown  Outcome: Ongoing     Problem: Falls - Risk of:  Goal: Will remain free from falls  Description: Will remain free from falls  Outcome: Ongoing     Problem: Falls - Risk of:  Goal: Absence of physical injury  Description: Absence of physical injury  Outcome: Ongoing     Problem: Discharge Planning:  Goal: Ability to perform activities of daily living will improve  Description: Ability to perform activities of daily living will improve  Outcome: Ongoing     Problem: Injury - Risk of, Physical Injury:  Goal: Will remain free from falls  Description: Will remain free from falls  Outcome: Ongoing     Problem: Injury - Risk of, Physical Injury:  Goal: Absence of physical injury  Description: Absence of physical injury  Outcome: Ongoing     Problem: Mood - Altered:  Goal: Mood stable  Description: Mood stable  Outcome: Ongoing     Problem: Mood - Altered:  Goal: Absence of abusive behavior  Description: Absence of abusive behavior  Outcome: Ongoing     Problem: Sensory Perception - Impaired:  Goal: Demonstrations of improved sensory functioning will increase  Description: Demonstrations of improved sensory functioning will increase  Outcome: Ongoing     Problem: Sleep Pattern Disturbance:  Goal: Appears well-rested  Description: Appears well-rested  Outcome: Ongoing

## 2021-07-15 NOTE — PLAN OF CARE
Problem: Respiratory  Intervention: Respiratory assessment  Note:   PROVIDE ADEQUATE OXYGENATION WITH ACCEPTABLE SP02/ABG'S    [x]  IDENTIFY APPROPRIATE OXYGEN THERAPY  [x]   MONITOR SP02/ABG'S AS NEEDED   [x]   PATIENT EDUCATION AS NEEDED     BRONCHOSPASM/BRONCHOCONSTRICTION     [x]         IMPROVE AERATION/BREATH SOUNDS  [x]   ADMINISTER BRONCHODILATOR THERAPY AS APPROPRIATE  [x]   ASSESS BREATH SOUNDS  []   IMPLEMENT AEROSOL/MDI PROTOCOL  [x]   PATIENT EDUCATION AS NEEDED

## 2021-07-15 NOTE — PROGRESS NOTES
PULMONARY & CRITICAL CARE MEDICINE PROGRESS  NOTE     Patient:  Farhad Alejandre  MRN: [id number]  Admit date: 7/12/2021    SUBJECTIVE     I personally interviewed/examined the patient, reviewed interval history and interpreted all available radiographic, laboratory data at the time of service. Chief Compliant/Reason for Initial Consult:   Acute on Chronic Hypoxic Respiratory Failure. Brief Hospital Course and Interval History:  66 y. o. male hx of CAD s/p CABG 2005, COPD, type 2 diabetes, chronic diastolic CHF, cor pulmonale, bladder cancer per pt daughter in 2014, and PNA LLL vs mass in May 2021 admitted as transfer from [facility] for unclear etiology hypotension, found to have LAKESHA and potassium of 7. Patient does not know why he was sent to outside facility, was at a nursing home and was noted to be hypotensive. Pt not intubated at arrival.     Cardiology was consulted for atrial flutter and elevated troponin at 173. Rate controlled with Lopressor and Toprol. Patient was briefly hypotensive on presentation and needed Levophed support. Nephrology was consulted for LAKESHA, hyponatremia and hyperkalemia. Received 1 dose of Bumex with excellent response in urine output. LAKESHA resolved now. Getting gentle hydration sodium improved to 139 from 129. Potassium decreased from 6.2 on presentation to 3.2.     On chest x-ray, there is concern of left lower lobe infiltrate likely pneumonia. Cultures negative. Patient treated with cefepime and vancomycin. WBCs improving. Breathing well on 5 L nasal cannula. Reports he uses 2 liter oxygen at home. Will wean as tolerated. Interval History 07/15/21   Afebrile and hemodynamically stable  Saturating 96 - 100% on 4 L NC. Leukocytosis improving. CXR reviewed concerning for LLL Pneumonia. Continues on Vancomycin and Cefepime. No overnight events.      Review of Systems:  General: negative for chills, fatigue or fever  ENT: negative for headaches, nasal congestion, sore throat or visual changes  Allergy and Immunology: negative for postnasal drip or seasonal allergies  Hematological and Lymphatic: negative for bleeding problems, swollen lymph nodes  Respiratory: negative for cough or shortness of breath  Cardiovascular: negative for edema or palpitations  Gastrointestinal: negative for abdominal pain, change in bowel habits or nausea/vomiting  Genito-Urinary: negative for dysuria or urinary frequency/urgency  Musculoskeletal: negative for joint pain or joint swelling  Neurological: negative for numbness/tingling, seizures or weakness  Dermatological: negative for pruritus or rash    OBJECTIVE     VITAL SIGNS:   LAST-  BP (!) 105/54   Pulse 79   Temp 98 °F (36.7 °C) (Oral)   Resp 18   Ht 5' 10\" (1.778 m)   Wt 216 lb 14.9 oz (98.4 kg)   SpO2 98%   BMI 31.13 kg/m²   8-24 HR RANGE-  TEMP Temp  Av.7 °F (36.5 °C)  Min: 97.5 °F (36.4 °C)  Max: 98 °F (85.5 °C)   BP Systolic (12CDX), GGN:557 , Min:90 , RKP:241      Diastolic (04NHY), BNN:79, Min:54, Max:83     PULSE Pulse  Av  Min: 60  Max: 83   RR Resp  Av  Min: 18  Max: 18   O2 SAT SpO2  Av %  Min: 98 %  Max: 98 %   OXYGEN DELIVERY O2 Flow Rate (L/min)  Av L/min  Min: 4 L/min  Max: 4 L/min     Systemic Examination:   General appearance - looks comfortable and in no acute distress  Mental status - alert, oriented to person, place, and time  Eyes - pupils equal and reactive, extraocular eye movements intact  Mouth - mucous membranes moist, pharynx normal without lesions  Neck - supple, no significant adenopathy  Chest - Chest was symmetrical without dullness to percussion. Breath sounds bilaterally were clear to auscultation. There were no wheezes, rhonchi or rales.   There is no intercostal recession or use of accessory muscles  Heart - normal rate, regular rhythm, normal S1, S2, no murmurs, rubs, clicks or gallops  Abdomen - soft, nontender, nondistended, no masses or organomegaly  Neurological - alert, oriented, normal speech, no focal findings or movement disorder noted  Extremities - peripheral pulses normal, no pedal edema, no clubbing or cyanosis  Skin - normal coloration and turgor, no rashes, no suspicious skin lesions noted     DATA REVIEW     Medications:  Scheduled Meds:   midodrine  5 mg Oral TID WC    vancomycin  1,500 mg Intravenous Q24H    cefepime  2,000 mg Intravenous Q12H    hydrocortisone sodium succinate PF  50 mg Intravenous Q8H    QUEtiapine  25 mg Oral Nightly    [Held by provider] bumetanide  1 mg Intravenous BID    [Held by provider] metoprolol succinate  25 mg Oral Daily    QUEtiapine  12.5 mg Oral Once    metoprolol  5 mg Intravenous Q6H    insulin glargine  0.25 Units/kg Subcutaneous Nightly    insulin lispro  0-6 Units Subcutaneous Q4H    budesonide-formoterol  2 puff Inhalation BID    dilTIAZem  180 mg Oral Daily    sodium chloride  1,000 mL Intravenous Once    sodium chloride flush  5-40 mL Intravenous 2 times per day    heparin (porcine)  5,000 Units Subcutaneous 3 times per day    vancomycin (VANCOCIN) intermittent dosing (placeholder)   Other RX Placeholder     Continuous Infusions:   dexmedetomidine Stopped (07/14/21 1120)    dextrose      sodium chloride      norepinephrine Stopped (07/14/21 0739)    vasopressin (Septic Shock) infusion Stopped (07/13/21 0403)     LABS:-  ABGs:   No results found for: PH, PCO2, PO2, HCO3, O2SAT  No results for input(s): PHART, PO2ART, ROS8XOR, CBE2YRX, BEART, X7AZJNUJ in the last 72 hours.   Lab Results   Component Value Date    POCPH 7.319 (L) 07/12/2021    POCPCO2 57.8 (H) 07/12/2021    POCPO2 69.2 (L) 07/12/2021    POCHCO3 29.8 (H) 07/12/2021    LHWK4QTL 92 (L) 07/12/2021     CBC:   Recent Labs     07/12/21  1525 07/13/21  0445 07/14/21  0432   WBC 20.0* 22.7* 12.5*   HGB 11.4* 11.5* 11.1*   HCT 34.3* 35.3* 33.3*   MCV 98.6 99.4 98.5    607 789 LYMPHOPCT  --  3* 8*   RBC 3.48* 3.55* 3.38*   MCH 32.8 32.4 32.8   MCHC 33.2 32.6 33.3   RDW 15.6* 15.0* 14.9*     BMP:   Recent Labs     07/13/21  0039 07/13/21  0445 07/14/21  0432   * 131* 139   K 5.6* 5.3 3.2*   CL 87* 89* 97*   CO2 26 28 31   BUN 93* 87* 51*   CREATININE 2.51* 2.17* 0.96   GLUCOSE 202* 225* 148*     Liver Function Test:   Recent Labs     07/12/21 2008   PROT 6.2*     Amylase/Lipase:  No results for input(s): AMYLASE, LIPASE in the last 72 hours. Coagulation Profile:   No results for input(s): INR, PROTIME, APTT in the last 72 hours. Cardiac Enzymes:  No results for input(s): CKTOTAL, CKMB, CKMBINDEX, TROPONINI in the last 72 hours. Lactic Acid:  No results found for: LACTA  BNP:   No results found for: BNP  D-Dimer:  No results found for: DDIMER  Others:   Lab Results   Component Value Date    TSH 1.24 07/12/2021     No results found for: WINTER, RHEUMFACTOR, SEDRATE, CRP  Lab Results   Component Value Date    URICACID 8.2 (H) 05/29/2021     No results found for: IRON, TIBC, FERRITIN  No results found for: SPEP, UPEP  No results found for: PSA, CEA, , [id number],     Input/Output:    Intake/Output Summary (Last 24 hours) at 7/15/2021 0855  Last data filed at 7/15/2021 0802  Gross per 24 hour   Intake 634.3 ml   Output 1410 ml   Net -775.7 ml       Microbiology:    Pathology:    Radiology Reports:  [address]   Final Result   Somewhat limited but essentially unremarkable renal ultrasound; the left   kidney is poorly visualized. No hydronephrosis. XR CHEST PORTABLE   Final Result   Increasing consolidation effusion in the left lower lobe.   Right-sided   central venous catheter distal tip the superior vena cava no evidence of   pneumothorax             Echocardiogram:   Results for orders placed during the hospital encounter of 07/12/21    ECHO Complete 2D W Doppler W Color    Narrative  Transthoracic Echocardiography Report (TTE)    Patient Name АНДРЕЙ      Date of Study                 07/14/2021  KOFFI CAMARENA    Date of      1950  Gender                        Male  Birth    Age          66 year(s)  Race                              Room Number  0128        Height:                       70 inch, 177.8 cm    Corporate ID [id number]    Weight:                       216 pounds, 98 kg  #    Patient Acct [de-identified]   BSA:           2.16 m^2       BMI:      30.99  #                                                                kg/m^2    MR #         [id number]     Edilberto oRth    Accession #  [id number]  Interpreting Physician        [facility]    Fellow                   Referring Nurse Practitioner    Interpreting             Referring Physician           Naida Montiel MD  Fellow    Type of Study    TTE procedure:2D Echocardiogram, M-Mode, Doppler, Color Doppler. Procedure Date  Date: 07/14/2021 Start: 08:57 AM    Study Location: [facility]  Technical Quality: Fair visualization    Indications:Hypotension. History / Tech. Comments:  Procedure explained to patient. Study done bedside in MICU. Acute renal failure    Patient Status: Inpatient    Height: 70 inches Weight: 216 pounds BSA: 2.16 m^2 BMI: 30.99 kg/m^2    CONCLUSIONS    Summary  Left ventricle is normal in size. Global left ventricular systolic function  is normal. Estimated ejection fraction is 50-55 %. Evidence of diastolic dysfunction. Left atrium is moderately dilated. Right atrial dilatation. Trivial mitral regurgitation. Trivial pulmonic insufficiency. IVC dilated but unable to assess respiratory collapse due to patient on  ventilator.     Signature  ----------------------------------------------------------------------------  Electronically signed by Juli DelcidSonographer) on 07/14/2021  11:15 AM  ----------------------------------------------------------------------------    ----------------------------------------------------------------------------  Electronically signed by Steven Wasserman(Interpreting physician) on 2021  11:20 AM  ----------------------------------------------------------------------------  FINDINGS  Left Atrium  Left atrium is moderately dilated. Left Ventricle  Left ventricle is normal in size. Global left ventricular systolic function  is normal. Estimated ejection fraction is 50-55 % . Normal left ventricular wall thickness. Evidence of diastolic dysfunction. Right Atrium  Right atrial dilatation. Right Ventricle  Normal size right ventricular cavity. Right ventricular function appears normal .  Mitral Valve  Normal mitral valve structure. Trivial mitral regurgitation. No mitral stenosis. Aortic Valve  Aortic valve structure and function normal.  Aortic valve is trileaflet. No aortic insufficiency. No aortic stenosis. Tricuspid Valve  Normal tricuspid valve leaflets. No tricuspid regurgitation. No tricuspid stenosis. Insignificant tricuspid regurgitation, unable to estimate RVSP. Pulmonic Valve  Pulmonic valve is normal in structure and function. Trivial pulmonic insufficiency. No evidence of pulmonic stenosis. Pericardial Effusion  No pericardial effusion. Miscellaneous  Normal aortic root dimension. E/E' average = 23.95. IVC dilated but unable to assess respiratory collapse due to patient on  ventilator.     M-mode / 2D Measurements & Calculations:    LVIDd:4.6 cm(3.7 - 5.6 cm)        Diastolic YJXVDJ:53.46 ml  LVIDs:3.3 cm(2.2 - 4.0 cm)        Systolic ALHBAY:59.51 ml  IVSd:1 cm(0.6 - 1.1 cm)           Aortic Root:3.7 cm(2.0 - 3.7 cm)  LVPWd:1.1 cm(0.6 - 1.1 cm)        LA Dimension: 5 cm(1.9 - 4.0 cm)  Fractional Shortenin.26 %     LA volume/Index: 88.03 ml /41m^2  Calculated LVEF (%): 50.13 %      LVOT:2 cm  RVDd:4 cm    Mitral: Aortic    Valve Area (P1/2-Time): 3.38 cm^2       Peak Velocity: 0.88 m/s  Peak E-Wave: 1.43 m/s                   Mean Velocity: 0.62 m/s  Peak Gradient: 3.06 mmHg  Peak Gradient: 8.18 mmHg                Mean Gradient: 2 mmHg  Mean Gradient: 2 mmHg  Deceleration Time: 211 msec  P1/2t: 65 msec                          Area (continuity): 2.96 cm^2  AV VTI: 24.5 cm    Area (continuity): 2.07 cm^2  Mean Velocity: 0.61 m/s    Pulmonic:    Peak Velocity: 0.76 m/s  Peak Gradient: 2.31 mmHg    Diastology / Tissue Doppler  Septal Wall E' velocity:0.04 m/s  Septal Wall E/E':33.7  Lateral Wall E' velocity:0.10 m/s  Lateral Wall E/E':14.2      Cardiac Catheterization:   No results found for this or any previous visit. ASSESSMENT AND PLAN     Assessment:    // Acute on Chronic Hypoxic and Hypercapnic Respiratory Failure. // COPD on 2L home O2.   // LLL Pneumonia  // Acute Kidney Injury secondary to ischemic ATN  // Chronic Diastolic Congestive Heart Failure. Plan:    Patient remains hemodynamically stable and is currently saturating well on nasal cannula   Continue supplemental oxygen to keep oxygen saturation greater than 92%  Continue to wean to home O2. Continue nocturnal and as needed noninvasive mechanical ventilation (BiPAP)  Continue incentive spirometry, pulmonary toilet, aspiration precautions and bronchodilators  Continue Symbicort, Duonebs,   Continue to monitor I/O with a goal of even/negative fluid balance  Antimicrobials reviewed; continues on Vancomycin and Cefepime. On stress dose steroids. DVT and stress ulcer prophylaxis  Physical/occupational/speech therapy; increase activity as tolerated    I updated the patient regarding the current clinical condition, provisional diagnosis and management plan. I addressed concerns and answered all questions to the best of my abilities. It was my pleasure to evaluate [facility] today. We will continue to follow.  I would like to thank you for allowing me to participate in the care of this patient. Please feel free to call with any further questions or concerns. Sandi Zapata,   Pulmonary and Critical Care Medicine           7/15/2021, 8:55 AM    Please note that this chart was generated using voice recognition Dragon dictation software. Although every effort was made to ensure the accuracy of this automated transcription, some errors in transcription may have occurred.

## 2021-07-15 NOTE — PROGRESS NOTES
Renal Progress Note    Patient :  Baldo Ying; 66 y.o. MRN# [id number]  Location:  [facility]-  Attending:  Tyler Cotto MD  Admit Date:  7/12/2021   Hospital Day: 3    Subjective:       Patient seen and examined in his room. Resting comfortably in chair. No acute events overnight. Transfer out of ICU to the floor yesterday. Afebrile and  bp : 129/71  Creatinine 2.94-2.17-0.96->0.81  Sodium [phone] ->138    Urine output: 1.6 L / 24 hours  Not on any IV fluids    Renal ultrasound unremarkable    Free light chain ratio 2.01, elevated    Outpatient Medications:     Medications Prior to Admission: celecoxib (CELEBREX) 100 MG capsule, take 1 capsule by mouth twice a day  dextromethorphan-guaiFENesin (ROBITUSSIN-DM)  MG/5ML syrup, Take 10 mLs by mouth every 4 hours as needed for Cough  metFORMIN (GLUCOPHAGE) 1000 MG tablet, Take 1 tablet by mouth 2 times daily (with meals)  pregabalin (LYRICA) 225 MG capsule, Take 1 capsule by mouth 2 times daily for 30 days. dilTIAZem (TIAZAC) 180 MG extended release capsule, Take 180 mg by mouth daily  magnesium oxide (MAG-OX) 400 MG tablet, Take 400 mg by mouth daily  NONFORMULARY, Take 1 tablet by mouth daily ON COR vitamin - to help keep bladder cancer away  acetaminophen (TYLENOL) 325 MG tablet, Take 650 mg by mouth every 6 hours as needed for Pain  spironolactone (ALDACTONE) 25 MG tablet, Take 25 mg by mouth daily  albuterol sulfate  (90 Base) MCG/ACT inhaler, Inhale 2 puffs into the lungs every 6 hours as needed  lidocaine (XYLOCAINE) 5 % ointment, Apply topically as needed.   sennosides-docusate sodium (SENOKOT-S) 8.6-50 MG tablet, Take 1 tablet by mouth daily  Multiple Vitamins-Minerals (ONCOVITE) TABS, Take by mouth  loratadine (CLARITIN) 10 MG tablet, Take 10 mg by mouth daily  tiotropium (SPIRIVA HANDIHALER) 18 MCG inhalation capsule, Inhale 18 mcg into the lungs daily  apixaban (ELIQUIS) 5 MG TABS tablet, Take by mouth 2 times daily  potassium chloride (KLOR-CON M) 10 MEQ extended release tablet, Take 10 mEq by mouth 2 times daily  CPAP Machine MISC, by Does not apply route BiPAP  budesonide-formoterol (SYMBICORT) 160-4.5 MCG/ACT AERO, Inhale 2 puffs into the lungs 2 times daily  bumetanide (BUMEX) 2 MG tablet, Take 2 mg by mouth daily  magnesium hydroxide (MILK OF MAGNESIA) 400 MG/5ML suspension, Take by mouth daily as needed for Constipation  ibuprofen (IBU) 400 MG tablet, Take 400 mg by mouth every 6 hours as needed for Pain  metoprolol succinate (TOPROL XL) 25 MG extended release tablet, Take 25 mg by mouth daily  Cyanocobalamin (VITAMIN B 12) 500 MCG TABS, Take by mouth  pantoprazole (PROTONIX) 40 MG tablet, Take 40 mg by mouth daily    Current Medications:     Scheduled Meds:    midodrine  5 mg Oral TID WC    vancomycin  1,500 mg Intravenous Q24H    cefepime  2,000 mg Intravenous Q12H    hydrocortisone sodium succinate PF  50 mg Intravenous Q8H    QUEtiapine  25 mg Oral Nightly    [Held by provider] bumetanide  1 mg Intravenous BID    [Held by provider] metoprolol succinate  25 mg Oral Daily    QUEtiapine  12.5 mg Oral Once    metoprolol  5 mg Intravenous Q6H    insulin glargine  0.25 Units/kg Subcutaneous Nightly    insulin lispro  0-6 Units Subcutaneous Q4H    budesonide-formoterol  2 puff Inhalation BID    dilTIAZem  180 mg Oral Daily    sodium chloride  1,000 mL Intravenous Once    sodium chloride flush  5-40 mL Intravenous 2 times per day    heparin (porcine)  5,000 Units Subcutaneous 3 times per day    vancomycin (VANCOCIN) intermittent dosing (placeholder)   Other RX Placeholder     Continuous Infusions:    dextrose      sodium chloride       PRN Meds:  ipratropium-albuterol, albuterol, glucose, dextrose, glucagon (rDNA), dextrose, sodium chloride flush, sodium chloride, ondansetron **OR** ondansetron, polyethylene glycol, acetaminophen **OR** acetaminophen    Input/Output:       I/O last 3 completed shifts:   In: 573.1 [P.O.:480; I.V.:93.1]  Out: 1455 [Urine:1455]    Vital Signs:   Temperature:  Temp: 98 °F (36.7 °C)  TMax:   Temp (24hrs), Av.1 °F (36.7 °C), Min:97.7 °F (36.5 °C), Max:99 °F (37.2 °C)    Respirations:  Resp: 19  Pulse:   Pulse: 66  BP:    BP: (!) 109/57  BP Range: Systolic (91HCY), OXL:839 , Min:104 , UBF:052       Diastolic (63IIY), ZD, Min:54, Max:69      Physical Examination:     General:  Awake alert oriented x4  Eyes:   Pupils equal, round and reactive to light, pallor, no icterus. Neck:   No JVD, no thyromegaly, no lymphadenopathy. Chest:  Bibasilar crackles present   Cardiac: S1 S2 RRR  Abdomen:  Soft, non-tender, no masses or organomegally appreciable, BS sluggish. Extremities:  Pedal edema present    Labs:       Recent Labs     21   WBC 22.7* 12.5*   RBC 3.55* 3.38*   HGB 11.5* 11.1*   HCT 35.3* 33.3*   MCV 99.4 98.5   MCH 32.4 32.8   MCHC 32.6 33.3   RDW 15.0* 14.9*    177   MPV 11.8 11.3      BMP:   Recent Labs     07/13/21  0445 07/14/21  0432 07/15/21  1553   * 139 138   K 5.3 3.2* 3.3*   CL 89* 97* 96*   CO2 28 31 31   BUN 87* 51* 39*   CREATININE 2.17* 0.96 0.81   GLUCOSE 225* 148* 175*   CALCIUM 8.5* 8.4* 8.8   Magnesium:    Recent Labs     21   MG 2.3     SPEP:  Lab Results   Component Value Date    PROT 6.2 2021    ALBCAL 3.8 2021    ALBPCT 61 2021    LABALPH 0.1 2021    LABALPH 0.9 2021    A1PCT 2 2021    A2PCT 14 2021    LABBETA 0.8 2021    BETAPCT 12 2021    GAMGLOB 0.7 2021    GGPCT 11 2021    PATH ELECTRONICALLY SIGNED. Marco A Molina M.D. 2021    PATH ELECTRONICALLY SIGNED.  Marco A Molina M.D. 2021     C3:     Lab Results   Component Value Date    C3 144 2021     C4:     Lab Results   Component Value Date    C4 23 2021     Hep BsAg:         Lab Results   Component Value Date    HEPBSAG NONREACTIVE 2021     Hep C AB:          Lab Results Component Value Date    HEPCAB NONREACTIVE 07/12/2021       Urinalysis/Chemistries:      Lab Results   Component Value Date    NITRU NEGATIVE 07/12/2021    COLORU YELLOW 07/12/2021    PHUR 7.5 07/12/2021    WBCUA 5 TO 10 07/12/2021    RBCUA 20 TO 50 07/12/2021    MUCUS NOT REPORTED 07/12/2021    TRICHOMONAS NOT REPORTED 07/12/2021    YEAST NOT REPORTED 07/12/2021    BACTERIA FEW 07/12/2021    SPECGRAV 1.008 07/12/2021    LEUKOCYTESUR SMALL 07/12/2021    UROBILINOGEN Normal 07/12/2021    BILIRUBINUR NEGATIVE 07/12/2021    GLUCOSEU NEGATIVE 07/12/2021    KETUA NEGATIVE 07/12/2021    AMORPHOUS NOT REPORTED 07/12/2021     Urine Sodium:     Lab Results   Component Value Date    LISA <20 07/12/2021     Urine Osmolarity:   Lab Results   Component Value Date    OSMOU 288 05/29/2021     Urine Creatinine:     Lab Results   Component Value Date    LABCREA 42.8 07/12/2021       Radiology:     CXR: Reviewed as available    Assessment:     1. Acute Kidney Injury secondary to ischemic ATN due to dysrhythmia/hypotension. Complicated further by concomitant NSAID use-Resolved  2. Hyperkalemia secondary to LAKESHA/Aldactone/exogenous potassium supplement. Resolved  3. Hyponatremia-appropriate ADH excess related to volume overload-resolved  4. Circulatory shock on pressor-resolved  5. Paroxysmal flutter/fibrillation  6. History of CABG  7. History of type 2 diabetes mellitus  8. COPD  9. History of bladder cancer s/p BCG at [place]  10. Hypokalemia    Plan:   1. Recommend to resume home dose of diuretics am  2. Replace potassium  3. Continue midodrine 5 mg TID  4. Avoid NSAIDs/Corona 2 inhibitors/AldactoneAvoid nephrotoxin  5. Recommend to dc Bajwa and check PVR  6. Will sign off    Nutrition   Please ensure that patient is on a renal diet/TF. Avoid nephrotoxic drugs/contrast exposure. We will continue to follow along with you.      Tyler Alvarez MD  PGY-3, Internal Medicine Resident  [address] [place]  7/15/2021 4:28 PM  Attending Physician Statement  I have discussed the care of Amparo Obrien, including pertinent history and exam findings,  with the Fellow/Residentt. I have reviewed the key elements of all parts of the encounter with the Fellow/ Resident. I agree with the assessment, plan and orders as documented by the resident.     Shira Wing MD MD, MRCP Ling Palacio), FACP   7/15/2021 8:20 PM    Nephrology [address] [place] [address]

## 2021-07-16 ENCOUNTER — APPOINTMENT (OUTPATIENT)
Dept: GENERAL RADIOLOGY | Age: 71
DRG: 871 | End: 2021-07-16
Attending: INTERNAL MEDICINE
Payer: MEDICARE

## 2021-07-16 LAB
-: NORMAL
ABSOLUTE EOS #: <0.03 K/UL (ref 0–0.44)
ABSOLUTE IMMATURE GRANULOCYTE: 0.05 K/UL (ref 0–0.3)
ABSOLUTE LYMPH #: 1.04 K/UL (ref 1.1–3.7)
ABSOLUTE MONO #: 0.99 K/UL (ref 0.1–1.2)
ALBUMIN SERPL-MCNC: 3.4 G/DL (ref 3.5–5.2)
ALBUMIN/GLOBULIN RATIO: 1.3 (ref 1–2.5)
ALP BLD-CCNC: 110 U/L (ref 40–129)
ALT SERPL-CCNC: 24 U/L (ref 5–41)
ANION GAP SERPL CALCULATED.3IONS-SCNC: 9 MMOL/L (ref 9–17)
AST SERPL-CCNC: 24 U/L
BASOPHILS # BLD: 0 % (ref 0–2)
BASOPHILS ABSOLUTE: 0.03 K/UL (ref 0–0.2)
BILIRUB SERPL-MCNC: 0.58 MG/DL (ref 0.3–1.2)
BILIRUBIN DIRECT: 0.25 MG/DL
BILIRUBIN, INDIRECT: 0.33 MG/DL (ref 0–1)
BUN BLDV-MCNC: 30 MG/DL (ref 8–23)
CALCIUM SERPL-MCNC: 8.4 MG/DL (ref 8.6–10.4)
CHLORIDE BLD-SCNC: 97 MMOL/L (ref 98–107)
CO2: 31 MMOL/L (ref 20–31)
CREAT SERPL-MCNC: 0.56 MG/DL (ref 0.7–1.2)
DIFFERENTIAL TYPE: ABNORMAL
EOSINOPHILS RELATIVE PERCENT: 0 % (ref 1–4)
GFR AFRICAN AMERICAN: >60 ML/MIN
GFR NON-AFRICAN AMERICAN: >60 ML/MIN
GFR SERPL CREATININE-BSD FRML MDRD: ABNORMAL ML/MIN/{1.73_M2}
GFR SERPL CREATININE-BSD FRML MDRD: ABNORMAL ML/MIN/{1.73_M2}
GLUCOSE BLD-MCNC: 134 MG/DL (ref 75–110)
GLUCOSE BLD-MCNC: 137 MG/DL (ref 75–110)
GLUCOSE BLD-MCNC: 148 MG/DL (ref 75–110)
GLUCOSE BLD-MCNC: 152 MG/DL (ref 75–110)
GLUCOSE BLD-MCNC: 155 MG/DL (ref 75–110)
GLUCOSE BLD-MCNC: 175 MG/DL (ref 75–110)
GLUCOSE BLD-MCNC: 181 MG/DL (ref 70–99)
HCT VFR BLD CALC: 33.7 % (ref 40.7–50.3)
HEMOGLOBIN: 10.8 G/DL (ref 13–17)
IMMATURE GRANULOCYTES: 1 %
LYMPHOCYTES # BLD: 12 % (ref 24–43)
MCH RBC QN AUTO: 34.3 PG (ref 25.2–33.5)
MCHC RBC AUTO-ENTMCNC: 32 G/DL (ref 28.4–34.8)
MCV RBC AUTO: 107 FL (ref 82.6–102.9)
MONOCYTES # BLD: 11 % (ref 3–12)
NRBC AUTOMATED: 0 PER 100 WBC
PDW BLD-RTO: 15.5 % (ref 11.8–14.4)
PLATELET # BLD: 278 K/UL (ref 138–453)
PLATELET ESTIMATE: ABNORMAL
PMV BLD AUTO: 11.2 FL (ref 8.1–13.5)
POTASSIUM SERPL-SCNC: 3.5 MMOL/L (ref 3.7–5.3)
RBC # BLD: 3.15 M/UL (ref 4.21–5.77)
RBC # BLD: ABNORMAL 10*6/UL
REASON FOR REJECTION: NORMAL
SEG NEUTROPHILS: 76 % (ref 36–65)
SEGMENTED NEUTROPHILS ABSOLUTE COUNT: 6.69 K/UL (ref 1.5–8.1)
SODIUM BLD-SCNC: 137 MMOL/L (ref 135–144)
TOTAL PROTEIN: 6 G/DL (ref 6.4–8.3)
WBC # BLD: 8.8 K/UL (ref 3.5–11.3)
WBC # BLD: ABNORMAL 10*3/UL
ZZ NTE CLEAN UP: ORDERED TEST: NORMAL
ZZ NTE WITH NAME CLEAN UP: SPECIMEN SOURCE: NORMAL

## 2021-07-16 PROCEDURE — 6370000000 HC RX 637 (ALT 250 FOR IP): Performed by: EMERGENCY MEDICINE

## 2021-07-16 PROCEDURE — 94660 CPAP INITIATION&MGMT: CPT

## 2021-07-16 PROCEDURE — 36415 COLL VENOUS BLD VENIPUNCTURE: CPT

## 2021-07-16 PROCEDURE — 2060000000 HC ICU INTERMEDIATE R&B

## 2021-07-16 PROCEDURE — 6360000002 HC RX W HCPCS: Performed by: STUDENT IN AN ORGANIZED HEALTH CARE EDUCATION/TRAINING PROGRAM

## 2021-07-16 PROCEDURE — 99232 SBSQ HOSP IP/OBS MODERATE 35: CPT | Performed by: FAMILY MEDICINE

## 2021-07-16 PROCEDURE — 82947 ASSAY GLUCOSE BLOOD QUANT: CPT

## 2021-07-16 PROCEDURE — 2580000003 HC RX 258: Performed by: INTERNAL MEDICINE

## 2021-07-16 PROCEDURE — 97535 SELF CARE MNGMENT TRAINING: CPT

## 2021-07-16 PROCEDURE — 99233 SBSQ HOSP IP/OBS HIGH 50: CPT | Performed by: INTERNAL MEDICINE

## 2021-07-16 PROCEDURE — 2580000003 HC RX 258: Performed by: EMERGENCY MEDICINE

## 2021-07-16 PROCEDURE — 6370000000 HC RX 637 (ALT 250 FOR IP)

## 2021-07-16 PROCEDURE — 85025 COMPLETE CBC W/AUTO DIFF WBC: CPT

## 2021-07-16 PROCEDURE — 94640 AIRWAY INHALATION TREATMENT: CPT

## 2021-07-16 PROCEDURE — 80053 COMPREHEN METABOLIC PANEL: CPT

## 2021-07-16 PROCEDURE — 71046 X-RAY EXAM CHEST 2 VIEWS: CPT

## 2021-07-16 PROCEDURE — 6360000002 HC RX W HCPCS: Performed by: INTERNAL MEDICINE

## 2021-07-16 PROCEDURE — 6370000000 HC RX 637 (ALT 250 FOR IP): Performed by: STUDENT IN AN ORGANIZED HEALTH CARE EDUCATION/TRAINING PROGRAM

## 2021-07-16 PROCEDURE — 82248 BILIRUBIN DIRECT: CPT

## 2021-07-16 RX ADMIN — MIDODRINE HYDROCHLORIDE 5 MG: 5 TABLET ORAL at 17:31

## 2021-07-16 RX ADMIN — HEPARIN SODIUM 5000 UNITS: 5000 INJECTION INTRAVENOUS; SUBCUTANEOUS at 06:16

## 2021-07-16 RX ADMIN — HYDROCORTISONE SODIUM SUCCINATE 50 MG: 100 INJECTION, POWDER, FOR SOLUTION INTRAMUSCULAR; INTRAVENOUS at 12:05

## 2021-07-16 RX ADMIN — CEFEPIME HYDROCHLORIDE 2000 MG: 2 INJECTION, POWDER, FOR SOLUTION INTRAVENOUS at 13:33

## 2021-07-16 RX ADMIN — HYDROCORTISONE SODIUM SUCCINATE 50 MG: 100 INJECTION, POWDER, FOR SOLUTION INTRAMUSCULAR; INTRAVENOUS at 20:18

## 2021-07-16 RX ADMIN — BUDESONIDE AND FORMOTEROL FUMARATE DIHYDRATE 2 PUFF: 160; 4.5 AEROSOL RESPIRATORY (INHALATION) at 10:13

## 2021-07-16 RX ADMIN — INSULIN LISPRO 1 UNITS: 100 INJECTION, SOLUTION INTRAVENOUS; SUBCUTANEOUS at 20:23

## 2021-07-16 RX ADMIN — CEFEPIME HYDROCHLORIDE 2000 MG: 2 INJECTION, POWDER, FOR SOLUTION INTRAVENOUS at 00:37

## 2021-07-16 RX ADMIN — INSULIN LISPRO 1 UNITS: 100 INJECTION, SOLUTION INTRAVENOUS; SUBCUTANEOUS at 08:58

## 2021-07-16 RX ADMIN — HYDROCORTISONE SODIUM SUCCINATE 50 MG: 100 INJECTION, POWDER, FOR SOLUTION INTRAMUSCULAR; INTRAVENOUS at 03:47

## 2021-07-16 RX ADMIN — DILTIAZEM HYDROCHLORIDE 180 MG: 180 CAPSULE, COATED, EXTENDED RELEASE ORAL at 08:57

## 2021-07-16 RX ADMIN — HEPARIN SODIUM 5000 UNITS: 5000 INJECTION INTRAVENOUS; SUBCUTANEOUS at 22:14

## 2021-07-16 RX ADMIN — Medication 1500 MG: at 06:15

## 2021-07-16 RX ADMIN — INSULIN LISPRO 1 UNITS: 100 INJECTION, SOLUTION INTRAVENOUS; SUBCUTANEOUS at 12:05

## 2021-07-16 RX ADMIN — INSULIN GLARGINE 25 UNITS: 100 INJECTION, SOLUTION SUBCUTANEOUS at 20:23

## 2021-07-16 RX ADMIN — HEPARIN SODIUM 5000 UNITS: 5000 INJECTION INTRAVENOUS; SUBCUTANEOUS at 14:00

## 2021-07-16 RX ADMIN — POTASSIUM BICARBONATE 30 MEQ: 782 TABLET, EFFERVESCENT ORAL at 08:57

## 2021-07-16 RX ADMIN — BUDESONIDE AND FORMOTEROL FUMARATE DIHYDRATE 2 PUFF: 160; 4.5 AEROSOL RESPIRATORY (INHALATION) at 21:04

## 2021-07-16 RX ADMIN — SODIUM CHLORIDE, PRESERVATIVE FREE 10 ML: 5 INJECTION INTRAVENOUS at 20:18

## 2021-07-16 RX ADMIN — INSULIN LISPRO 1 UNITS: 100 INJECTION, SOLUTION INTRAVENOUS; SUBCUTANEOUS at 00:41

## 2021-07-16 RX ADMIN — MIDODRINE HYDROCHLORIDE 5 MG: 5 TABLET ORAL at 08:57

## 2021-07-16 RX ADMIN — QUETIAPINE FUMARATE 25 MG: 25 TABLET ORAL at 20:18

## 2021-07-16 ASSESSMENT — PAIN SCALES - GENERAL
PAINLEVEL_OUTOF10: 0

## 2021-07-16 NOTE — PROGRESS NOTES
PULMONARY & CRITICAL CARE MEDICINE PROGRESS  NOTE     Patient:  Roya Mccormick  MRN: [id number]  Admit date: 7/12/2021    SUBJECTIVE     I personally interviewed/examined the patient, reviewed interval history and interpreted all available radiographic, laboratory data at the time of service. Chief Compliant/Reason for Initial Consult:   Acute on Chronic Hypoxic Respiratory Failure. Brief Hospital Course and Interval History:  66 y. o. male hx of CAD s/p CABG 2005, COPD, type 2 diabetes, chronic diastolic CHF, cor pulmonale, bladder cancer per pt daughter in 2014, and PNA LLL vs mass in May 2021 admitted as transfer from [facility] for unclear etiology hypotension, found to have LAKESHA and potassium of 7. Patient does not know why he was sent to outside facility, was at a nursing home and was noted to be hypotensive. Pt not intubated at arrival.     Cardiology was consulted for atrial flutter and elevated troponin at 173. Rate controlled with Lopressor and Toprol. Patient was briefly hypotensive on presentation and needed Levophed support. Nephrology was consulted for LAKESHA, hyponatremia and hyperkalemia. Received 1 dose of Bumex with excellent response in urine output. LAKESHA resolved now. Getting gentle hydration sodium improved to 139 from 129. Potassium decreased from 6.2 on presentation to 3.2.     On chest x-ray, there is concern of left lower lobe infiltrate likely pneumonia. Cultures negative. Patient treated with cefepime and vancomycin. WBCs improving. Breathing well on 5 L nasal cannula. Reports he uses 2 liter oxygen at home. Will wean as tolerated. Interval History 07/16/21   Afebrile and hemodynamically stable  Saturating 96 - 98% on 4 L NC. Leukocytosis resolved. CXR reviewed concerning for LLL Pneumonia. Continues on Vancomycin and Cefepime. No overnight events. Resting comfortably on examination.      Review of Systems:  General: negative for chills, fatigue or fever  ENT: negative for headaches, nasal congestion, sore throat or visual changes  Allergy and Immunology: negative for postnasal drip or seasonal allergies  Hematological and Lymphatic: negative for bleeding problems, swollen lymph nodes  Respiratory: negative for cough or shortness of breath  Cardiovascular: negative for edema or palpitations  Gastrointestinal: negative for abdominal pain, change in bowel habits or nausea/vomiting  Genito-Urinary: negative for dysuria or urinary frequency/urgency  Musculoskeletal: negative for joint pain or joint swelling  Neurological: negative for numbness/tingling, seizures or weakness  Dermatological: negative for pruritus or rash    OBJECTIVE     VITAL SIGNS:   LAST-  /70   Pulse 66   Temp 98 °F (36.7 °C) (Oral)   Resp 15   Ht 5' 10\" (1.778 m)   Wt 216 lb 14.9 oz (98.4 kg)   SpO2 96%   BMI 31.13 kg/m²   8-24 HR RANGE-  TEMP Temp  Av.9 °F (36.6 °C)  Min: 97.8 °F (36.6 °C)  Max: 98 °F (20.8 °C)   BP Systolic (46FLS), RGM:047 , Min:109 , WBI:278      Diastolic (62JHS), XBY:93, Min:57, Max:72     PULSE Pulse  Av.9  Min: 65  Max: 67   RR Resp  Avg: 15.5  Min: 15  Max: 16   O2 SAT SpO2  Av %  Min: 94 %  Max: 96 %   OXYGEN DELIVERY No data recorded     Systemic Examination:   General appearance - looks comfortable and in no acute distress  Mental status - alert, oriented to person, place, and time  Eyes - pupils equal and reactive, extraocular eye movements intact  Mouth - mucous membranes moist, pharynx normal without lesions  Neck - supple, no significant adenopathy  Chest - Chest was symmetrical without dullness to percussion. Breath sounds bilaterally were clear to auscultation. There were no wheezes, rhonchi or rales.   There is no intercostal recession or use of accessory muscles  Heart - normal rate, regular rhythm, normal S1, S2, no murmurs, rubs, clicks or gallops  Abdomen - soft, nontender, nondistended, no masses or organomegaly  Neurological - alert, oriented, normal speech, no focal findings or movement disorder noted  Extremities - peripheral pulses normal, no pedal edema, no clubbing or cyanosis  Skin - normal coloration and turgor, no rashes, no suspicious skin lesions noted     DATA REVIEW     Medications:  Scheduled Meds:   potassium bicarb-citric acid  30 mEq Oral BID    midodrine  5 mg Oral TID     cefepime  2,000 mg Intravenous Q12H    hydrocortisone sodium succinate PF  50 mg Intravenous Q8H    QUEtiapine  25 mg Oral Nightly    [Held by provider] bumetanide  1 mg Intravenous BID    [Held by provider] metoprolol succinate  25 mg Oral Daily    QUEtiapine  12.5 mg Oral Once    metoprolol  5 mg Intravenous Q6H    insulin glargine  0.25 Units/kg Subcutaneous Nightly    insulin lispro  0-6 Units Subcutaneous Q4H    budesonide-formoterol  2 puff Inhalation BID    dilTIAZem  180 mg Oral Daily    sodium chloride  1,000 mL Intravenous Once    sodium chloride flush  5-40 mL Intravenous 2 times per day    heparin (porcine)  5,000 Units Subcutaneous 3 times per day     Continuous Infusions:   dextrose      sodium chloride       LABS:-  ABGs:   No results found for: PH, PCO2, PO2, HCO3, O2SAT  No results for input(s): PHART, PO2ART, TIZ3KXK, XYP1NRO, BEART, W3HVQYRK in the last 72 hours.   Lab Results   Component Value Date    POCPH 7.319 (L) 07/12/2021    POCPCO2 57.8 (H) 07/12/2021    POCPO2 69.2 (L) 07/12/2021    POCHCO3 29.8 (H) 07/12/2021    SRFG9GVV 92 (L) 07/12/2021     CBC:   Recent Labs     07/14/21  0432 07/16/21  0757   WBC 12.5* 8.8   HGB 11.1* 10.8*   HCT 33.3* 33.7*   MCV 98.5 107.0*    278   LYMPHOPCT 8* 12*   RBC 3.38* 3.15*   MCH 32.8 34.3*   MCHC 33.3 32.0   RDW 14.9* 15.5*     BMP:   Recent Labs     07/14/21  0432 07/15/21  1553 07/16/21  0757    138 137   K 3.2* 3.3* 3.5*   CL 97* 96* 97*   CO2 31 31 31   BUN 51* 39* 30*   CREATININE 0.96 0. 81 0.56*   GLUCOSE 148* 175* 181*     Liver Function Test:   Recent Labs     07/16/21  0757   PROT 6.0*   LABALBU 3.4*   ALT 24   AST 24   ALKPHOS 110   BILITOT 0.58     Amylase/Lipase:  No results for input(s): AMYLASE, LIPASE in the last 72 hours. Coagulation Profile:   No results for input(s): INR, PROTIME, APTT in the last 72 hours. Cardiac Enzymes:  No results for input(s): CKTOTAL, CKMB, CKMBINDEX, TROPONINI in the last 72 hours. Lactic Acid:  No results found for: LACTA  BNP:   No results found for: BNP  D-Dimer:  No results found for: DDIMER  Others:   Lab Results   Component Value Date    TSH 1.24 07/12/2021     Lab Results   Component Value Date    WINTER NEGATIVE 07/12/2021     Lab Results   Component Value Date    URICACID 8.2 (H) 05/29/2021     No results found for: IRON, TIBC, FERRITIN  No results found for: SPEP, UPEP  No results found for: PSA, CEA, , UM5162,     Input/Output:    Intake/Output Summary (Last 24 hours) at 7/16/2021 0851  Last data filed at 7/16/2021 0157  Gross per 24 hour   Intake 464 ml   Output 1402 ml   Net -938 ml       Microbiology:    Pathology:    Radiology Reports:  [address]   Final Result   Somewhat limited but essentially unremarkable renal ultrasound; the left   kidney is poorly visualized. No hydronephrosis. XR CHEST PORTABLE   Final Result   Increasing consolidation effusion in the left lower lobe.   Right-sided   central venous catheter distal tip the superior vena cava no evidence of   pneumothorax         XR CHEST (2 VW)    (Results Pending)       Echocardiogram:   Results for orders placed during the hospital encounter of 07/12/21    ECHO Complete 2D W Doppler W Color    Narrative  Transthoracic Echocardiography Report (TTE)    Patient Name [facility]      Date of Study                 07/14/2021  KOFFI CAMARENA    Date of      1950  Gender                        Male  Birth    Age          66 year(s)  Race     Room Number  0128        Height:                       70 inch, 177.8 cm    Corporate ID [id number]    Weight:                       216 pounds, 98 kg  #    Patient Acct [de-identified]   BSA:           2.16 m^2       BMI:      30.99  #                                                                kg/m^2    MR #         [id number]     Nelly Rodriguez    Accession #  [id number]  Interpreting Physician        [address] [place] [address]    Fellow                   Referring Nurse Practitioner    Interpreting             Referring Physician           Alejandra Fernandez MD  Fellow    Type of Study    TTE procedure:2D Echocardiogram, M-Mode, Doppler, Color Doppler. Procedure Date  Date: 07/14/2021 Start: 08:57 AM    Study Location: [facility]  Technical Quality: Fair visualization    Indications:Hypotension. History / Tech. Comments:  Procedure explained to patient. Study done bedside in MICU. Acute renal failure    Patient Status: Inpatient    Height: 70 inches Weight: 216 pounds BSA: 2.16 m^2 BMI: 30.99 kg/m^2    CONCLUSIONS    Summary  Left ventricle is normal in size. Global left ventricular systolic function  is normal. Estimated ejection fraction is 50-55 %. Evidence of diastolic dysfunction. Left atrium is moderately dilated. Right atrial dilatation. Trivial mitral regurgitation. Trivial pulmonic insufficiency. IVC dilated but unable to assess respiratory collapse due to patient on  ventilator.     Signature  ----------------------------------------------------------------------------  Electronically signed by Haley Bonilla on 07/14/2021  11:15 AM  ----------------------------------------------------------------------------    ----------------------------------------------------------------------------  Electronically signed by Steven Wasserman(Interpreting physician) on 07/14/2021  11:20 AM  ----------------------------------------------------------------------------  FINDINGS  Left Atrium  Left atrium is moderately dilated. Left Ventricle  Left ventricle is normal in size. Global left ventricular systolic function  is normal. Estimated ejection fraction is 50-55 % . Normal left ventricular wall thickness. Evidence of diastolic dysfunction. Right Atrium  Right atrial dilatation. Right Ventricle  Normal size right ventricular cavity. Right ventricular function appears normal .  Mitral Valve  Normal mitral valve structure. Trivial mitral regurgitation. No mitral stenosis. Aortic Valve  Aortic valve structure and function normal.  Aortic valve is trileaflet. No aortic insufficiency. No aortic stenosis. Tricuspid Valve  Normal tricuspid valve leaflets. No tricuspid regurgitation. No tricuspid stenosis. Insignificant tricuspid regurgitation, unable to estimate RVSP. Pulmonic Valve  Pulmonic valve is normal in structure and function. Trivial pulmonic insufficiency. No evidence of pulmonic stenosis. Pericardial Effusion  No pericardial effusion. Miscellaneous  Normal aortic root dimension. E/E' average = 23.95. IVC dilated but unable to assess respiratory collapse due to patient on  ventilator.     M-mode / 2D Measurements & Calculations:    LVIDd:4.6 cm(3.7 - 5.6 cm)        Diastolic XWODXC:05.18 ml  LVIDs:3.3 cm(2.2 - 4.0 cm)        Systolic BLSQVE:57.14 ml  IVSd:1 cm(0.6 - 1.1 cm)           Aortic Root:3.7 cm(2.0 - 3.7 cm)  LVPWd:1.1 cm(0.6 - 1.1 cm)        LA Dimension: 5 cm(1.9 - 4.0 cm)  Fractional Shortenin.26 %     LA volume/Index: 88.03 ml /41m^2  Calculated LVEF (%): 50.13 %      LVOT:2 cm  RVDd:4 cm    Mitral:                                 Aortic    Valve Area (P1/2-Time): 3.38 cm^2       Peak Velocity: 0.88 m/s  Peak E-Wave: 1.43 m/s                   Mean Velocity: 0.62 m/s  Peak Gradient: 3.06 mmHg  Peak Gradient: 8.18 mmHg                Mean Gradient: 2 mmHg  Mean Gradient: 2 mmHg  Deceleration Time: 211 msec  P1/2t: 65 msec                          Area (continuity): 2.96 cm^2  AV VTI: 24.5 cm    Area (continuity): 2.07 cm^2  Mean Velocity: 0.61 m/s    Pulmonic:    Peak Velocity: 0.76 m/s  Peak Gradient: 2.31 mmHg    Diastology / Tissue Doppler  Septal Wall E' velocity:0.04 m/s  Septal Wall E/E':33.7  Lateral Wall E' velocity:0.10 m/s  Lateral Wall E/E':14.2      Cardiac Catheterization:   No results found for this or any previous visit. ASSESSMENT AND PLAN     Assessment:    // Acute on Chronic Hypoxic and Hypercapnic Respiratory Failure. // COPD on 2L home O2.   // LLL Pneumonia  // Acute Kidney Injury secondary to ischemic ATN  // Chronic Diastolic Congestive Heart Failure. Plan:    Patient remains hemodynamically stable and is currently saturating well on nasal cannula   Continue supplemental oxygen to keep oxygen saturation greater than 92%  Continue to wean to home O2. (2L NC). Continue nocturnal and as needed noninvasive mechanical ventilation (BiPAP)  Continue incentive spirometry, pulmonary toilet, aspiration precautions and bronchodilators  Continue Symbicort, Duonebs,   Continue to monitor I/O with a goal of even/negative fluid balance  Antimicrobials reviewed; continues on Vancomycin and Cefepime. On stress dose steroids. DVT and stress ulcer prophylaxis  Physical/occupational/speech therapy; increase activity as tolerated    I updated the patient regarding the current clinical condition, provisional diagnosis and management plan. I addressed concerns and answered all questions to the best of my abilities. It was my pleasure to evaluate Adaline Been today. We will continue to follow. I would like to thank you for allowing me to participate in the care of this patient. Please feel free to call with any further questions or concerns.     Teri Kenyon, DO  Pulmonary and Critical Care Medicine           7/16/2021, 8:51 AM    Please note that this chart was generated using voice recognition Dragon dictation software. Although every effort was made to ensure the accuracy of this automated transcription, some errors in transcription may have occurred.

## 2021-07-16 NOTE — CARE COORDINATION
nHpredict tool uploaded to chart and can be viewed in the media tab, recommending [facility] or SNF for greatest gains     KOFFI CHIANG   1950      PLOF: lives in 2 story home w 4 SUJIT, (w family?), was in SNF PTA. has various DME home O2, BiPAP, nebulizer, glucometer. CLOF: MIN A for Gait w RW  12 ft, CGA for transfers, MIN A for bathing, UB dressing, MOD A for LB dressing and toileting, A&O, some agitation, follows directions, glasses and [place]     Predict recommends: either home w home care or SNF @ 15 days. nH Predict anticipates a +7 gain with either option. DC Plan: Per CC note, patient plans on returning home, CC states that may not be the best DC plan at this time, possibly [facility] SNF. Working w wife/patient. Yessica South  MSN, [address] [facility] Coordinator     Cell: [phone]

## 2021-07-16 NOTE — PROGRESS NOTES
Physical Therapy        Physical Therapy Cancel Note      DATE: 2021    NAME: Shane Conley  MRN: [id number]   : 1950      Patient not seen this date for Physical Therapy due to:    Patient Declined: Pt refused rehab at this time. Pt states that he has not been able to sleep and would like to due PT another time. Will check back as time allows.       Electronically signed by Vandy Merlin, PTA on 2021 at 10:36 AM

## 2021-07-16 NOTE — PROGRESS NOTES
Occupational Therapy  Facility/Department: [address] STEPDOWN  Daily Treatment Note  NAME: Dale Melo  : 1950  MRN: [id number]    Date of Service: 2021    Discharge Recommendations:  Patient would benefit from continued therapy after discharge       Assessment   Performance deficits / Impairments: Decreased functional mobility ; Decreased endurance;Decreased ADL status; Decreased sensation;Decreased high-level IADLs;Decreased balance  Prognosis: Good  Patient Education: OT POC, transfer/walker safety, importance of participation in therapy, pursed lip breathing, EC/WS - pt verbalized understanding. REQUIRES OT FOLLOW UP: Yes  Activity Tolerance  Activity Tolerance: Patient Tolerated treatment well;Patient limited by fatigue  Activity Tolerance: Mild dizziness. Safety Devices  Safety Devices in place: Yes  Type of devices: Call light within reach; Left in bed;Nurse notified;Gait belt         Patient Diagnosis(es): There were no encounter diagnoses. has a past medical history of Agent orange exposure, Anxiety state, Cancer ([address] CHF (congestive heart failure) ([address] Chronic obstructive pulmonary disease (COPD) ([address] Chronic post-traumatic stress disorder, Chronic respiratory failure with hypercapnia ([address] COPD exacerbation ([address] Coronary artery disease, Degenerative disc disease, lumbar, Depression, Essential hypertension, History of acute pancreatitis, Hyperglycemia, Hypokalemia, Lumbosacral disc disease, Obesity, Obstructive sleep apnea, and Personal history of malignant neoplasm of bladder. has a past surgical history that includes Bladder surgery; other surgical history; other surgical history (Bilateral, 02/10/2021); and Pain management procedure (Bilateral, 02/10/2021). Restrictions  Restrictions/Precautions  Restrictions/Precautions: Fall Risk  Required Braces or Orthoses?: No  Position Activity Restriction  Other position/activity restrictions:  Up with assist. On 4 LPM  Subjective General  Patient assessed for rehabilitation services?: Yes  Family / Caregiver Present: No  Vital Signs  Patient Currently in Pain: Denies   Orientation  Orientation  Overall Orientation Status: Within Functional Limits  Objective    ADL  Grooming: Setup; Increased time to complete;Supervision (Oral care standing at sink.)  UE Bathing: Setup; Increased time to complete;Supervision (Wash face/hands standing at sink.)  Toileting: Setup;Contact guard assistance; Increased time to complete (Stand to use urinal.)  Additional Comments: Pt transferred to standing at toilet to urinate using urinal. Pt leaning on wall with one hand for support. Pt transferred to standing at sink to complete simple grooming. Increased time needed to complete tasks. Pt stated mild dizziness during OOB activity, subsiding just a little bit. Pt educated on incorporating pursed lip breathing during times of SOB, pt verbalized understanding. Pt returned to seated EOB d/t fatigue. Pt declined further ADL care d/t fatigue and continuous dizziness. Emotional support and active listening provided to pt d/t extensive recent stays in hospital and SNF, pt wanting to go home. Pt appreciated time taken. Balance  Sitting Balance: Supervision (Seated EOB x20 minutes.)  Standing Balance: Contact guard assistance  Standing Balance  Time: 15 minutes  Activity: Functional mobility to/from bathroom using RW, stand at toilet to use urinal, simple grooming standing at sink.   Functional Mobility  Functional - Mobility Device: Rolling Walker  Activity: To/from bathroom  Assist Level: Contact guard assistance  Bed mobility  Supine to Sit: Stand by assistance  Sit to Supine: Minimal assistance (To manage BLE up onto EOB.)  Scooting: Stand by assistance  Transfers  Sit to stand: Contact guard assistance  Stand to sit: Stand by assistance  Cognition  Overall Cognitive Status: [facility]    Plan   Plan  Times per week: 3-4x/wk  Current Treatment Recommendations: Patient/Caregiver Education & Training, Endurance Training, Safety Education & Training, Self-Care / ADL, Functional Mobility Training, Balance Training, Equipment Evaluation, Education, & procurement  Goals  Short term goals  Time Frame for Short term goals: by discharge, pt will  Short term goal 1: demo 2 EC/WS technniques during functional tasks with 1 VC  Short term goal 2: participate in UB ADLs/grooming with supervision and AE PRN  Short term goal 3: participate in LB ADLs/toileting with SBA and AE PRN  Short term goal 4: participate in functional mobility/transfers with SBA to simulate home/facility distances  Short term goal 5: participate in standing functional task for 10+ minutes with CGA to increase activity tolerance and balance       Therapy Time   Individual Concurrent Group Co-treatment   Time In 1518         Time Out 1622         Minutes 64         Timed Code Treatment Minutes: 53 Minutes     Pt supine in bed upon therapist arrival. Pleasant and agreeable to therapy. See above for LOF for all tasks. Pt retired to supine in bed at end of session with call light within reach.     MORELIA Roberts/NADIA

## 2021-07-16 NOTE — FLOWSHEET NOTE
07/16/21 1158   Urine Assessment   Bladder Scan Volume (mL) 384 mL     Pt refusing straight cath at this time. 1629:  Pt able to void per self at this time. 350 ml.

## 2021-07-16 NOTE — CARE COORDINATION
Transitional Planning:    Attempted to meet with pt, however, he was sleeping. Spoke with Carrie Sr wife, who is on emergency contact list who spoke with Bill Craig yesterday. She stated that she spoke with Nataly Whitley last night and that they are in agreement to either return to [facility] or have a referral sent to [facility]. Pt is stating goal is to go home, however, family and therapy is stating it is not safe for him to return home. Referrals sent to [facility] and [facility].    2865- transport packet prepared.

## 2021-07-16 NOTE — PROGRESS NOTES
[facility]  Office: [address], DO, Ángel Smoker, DO, Jarred Centers, DO, Rody Rivera Blood, DO, Colonel Brandon MD, Tracy Ashley MD, Fanny Smith MD, Lori Crandall MD, Claire Ugalde MD, Ibis Jackson MD, Bhavna Fowler MD, Faustina Lucas, DO, Alexander Paul MD, Wilman Clay, DO, Shahana Choi MD,  Arian Hernandez, DO, Mackenzie Gannon MD, Poppy Perez MD, Lana Quarles MD, Prince Torres MD, Mercedes Jimenez MD, Adelaide Michael MD, Rocco Cordova, Quentin Meeks, CNP, Cecile Mcginnis, CNP, Caitlin Painter, CNS, Mauricio Mayes, CNP, Farhat Loyola, CNP, Delana Spatz, CNP, Abhijit Palacio, CNP, Fernando Pinon, CNP, Shannon Engel, PALeliaC, Mica Gallardo, [facility], Elaina Ji, CNP, Sujey Dies, CNP, Teddy West, CNP, Ronit Campbell, CNP, Jorge Byrne, CNP, Ajith Rosas, CNP, Kae Blackmon, CNP, Edilberto Ramachandran, [facility]    Progress Note    7/16/2021    7:56 AM    Name:   Dulce Hilton  MRN:     [id number]     Acct:      [de-identified]   Room:   [address] [place] [address] Day:  4  Admit Date:  7/12/2021  1:09 PM    PCP:   Niharika Albert  Code Status:  Full Code    Subjective:     C/C: No chief complaint on file. Interval History Status: not changed. Pt was seen and examined this morning  No acute events overnight   Feels more SOB this morning  No other complaints at this time        Review of Systems:     12 point ROS performed and negative for anything other than what was stated in subjective     Medications: Allergies:     Allergies   Allergen Reactions    Niacin And Related     Other      Nicotine patch - rash    Statins        Current Meds:   Scheduled Meds:    potassium bicarb-citric acid  30 mEq Oral BID    midodrine  5 mg Oral TID WC    vancomycin  1,500 mg Intravenous Q24H    cefepime  2,000 mg Intravenous Q12H    hydrocortisone sodium succinate PF  50 mg Intravenous Q8H    QUEtiapine  25 mg Oral Nightly    [Held by provider] bumetanide  1 mg Intravenous BID    [Held by provider] metoprolol succinate  25 mg Oral Daily    QUEtiapine  12.5 mg Oral Once    metoprolol  5 mg Intravenous Q6H    insulin glargine  0.25 Units/kg Subcutaneous Nightly    insulin lispro  0-6 Units Subcutaneous Q4H    budesonide-formoterol  2 puff Inhalation BID    dilTIAZem  180 mg Oral Daily    sodium chloride  1,000 mL Intravenous Once    sodium chloride flush  5-40 mL Intravenous 2 times per day    heparin (porcine)  5,000 Units Subcutaneous 3 times per day    vancomycin (VANCOCIN) intermittent dosing (placeholder)   Other RX Placeholder     Continuous Infusions:    dextrose      sodium chloride       PRN Meds: ipratropium-albuterol, albuterol, glucose, dextrose, glucagon (rDNA), dextrose, sodium chloride flush, sodium chloride, ondansetron **OR** ondansetron, polyethylene glycol, acetaminophen **OR** acetaminophen    Data:     Past Medical History:   has a past medical history of Agent orange exposure, Anxiety state, Cancer ([facility] [address] CHF (congestive heart failure) ([facility] [address] Chronic obstructive pulmonary disease (COPD) ([facility] [address] Chronic post-traumatic stress disorder, Chronic respiratory failure with hypercapnia ([facility] [address] COPD exacerbation ([facility] [address] Coronary artery disease, Degenerative disc disease, lumbar, Depression, Essential hypertension, History of acute pancreatitis, Hyperglycemia, Hypokalemia, Lumbosacral disc disease, Obesity, Obstructive sleep apnea, and Personal history of malignant neoplasm of bladder. Social History:   reports that he has been smoking cigarettes. He has a 54.00 pack-year smoking history. He has never used smokeless tobacco. He reports current alcohol use. He reports that he does not use drugs.      Family History:   Family History   Problem Relation Age of Onset    Emphysema Mother     Heart Disease Father     Heart Failure Father        Vitals:  /70   Pulse 66   Temp 98 °F (36.7 °C) (Oral) Resp 15   Ht 5' 10\" (1.778 m)   Wt 216 lb 14.9 oz (98.4 kg)   SpO2 96%   BMI 31.13 kg/m²   Temp (24hrs), Av.9 °F (36.6 °C), Min:97.8 °F (36.6 °C), Max:98 °F (36.7 °C)    Recent Labs     07/15/21  2003 07/16/21  0035 21  0353 21  0726   POCGLU 128* 155* 134* 175*       I/O (24Hr): Intake/Output Summary (Last 24 hours) at 2021 0756  Last data filed at 2021 0157  Gross per 24 hour   Intake 474 ml   Output 1402 ml   Net -928 ml       Labs:  Hematology:  Recent Labs     21   WBC 12.5*   RBC 3.38*   HGB 11.1*   HCT 33.3*   MCV 98.5   MCH 32.8   MCHC 33.3   RDW 14.9*      MPV 11.3     Chemistry:  Recent Labs     21  0432 07/15/21  1553    138   K 3.2* 3.3*   CL 97* 96*   CO2 31 31   GLUCOSE 148* 175*   BUN 51* 39*   CREATININE 0.96 0.81   MG 2.3  --    ANIONGAP 11 11   LABGLOM >60 >60   GFRAA >60 >60   CALCIUM 8.4* 8.8     Recent Labs     21  1528 21  1841 07/15/21  1141 07/15/21  1614 07/15/21  2003 07/16/21  0035 21  0353 21  0726   LABA1C 8.0*  --   --   --   --   --   --   --    POCGLU  --    < > 152* 150* 128* 155* 134* 175*    < > = values in this interval not displayed. ABG:  Lab Results   Component Value Date    POCPH 7.319 2021    POCPCO2 57.8 2021    POCPO2 69.2 2021    POCHCO3 29.8 2021    NBEA NOT REPORTED 2021    PBEA 3 2021    LAW8JGE NOT REPORTED 2021    YEQI3UPT 92 2021    FIO2 5.0 2021     Lab Results   Component Value Date/Time    SPECIAL  L ARM 2ML 2021 04:53 PM    SPECIAL  L HAND 2 ML 2021 04:53 PM     Lab Results   Component Value Date/Time    CULTURE NO GROWTH 4 DAYS 2021 04:53 PM    CULTURE NO GROWTH 4 DAYS 2021 04:53 PM       Radiology:  XR CHEST PORTABLE    Result Date: 2021  Increasing consolidation effusion in the left lower lobe.   Right-sided central venous catheter distal tip the superior vena cava no evidence of pneumothorax     US RETROPERITONEAL COMPLETE    Result Date: 7/12/2021  Somewhat limited but essentially unremarkable renal ultrasound; the left kidney is poorly visualized. No hydronephrosis. Physical Examination:        General appearance:  alert, cooperative and no distress  Mental Status:  oriented to person, place and time and normal affect  Lungs:  decreased breath sounds bilaterally   Heart:  regular rate and rhythm, no murmur  Abdomen:  soft, nontender, nondistended, normal bowel sounds   Extremities:  no edema, redness, tenderness in the calves  Skin:  no gross lesions, rashes, induration    Assessment:        Hospital Problems         Last Modified POA    Acute renal failure (ARF) ([facility] [address] 7/12/2021 Yes          Plan:        1. Acute on chronic hypoxic/hypercapneic respiratory failure   2. COPD   3. LLL pneumonia   - pulmonary on board   - d/c vancomycin, negative MRSA  - continue cefepime  - blood cultures NGTD  - continue solu cortef   - v/s per unit protocol   - continue RT protocol prn   - remains on 4 L of oxygen at this time, baseline home oxygen of 2L  - bipap qhs   - repeat chest xray to evaluate increasing consolidation effusion in LLL   - repeat CBC      4. LAKESHA   5. Hyperkalemia   6. Hyponatremia   - nephrology on board  - renal function improved   - monitor Is/Os   - renal injury likley related ischemic ATN secondary to hypotension/NSAIDs  - diuretics held   - monitor urine output   - avoid NSAIDs/ROJAS-2 inhibitors/aldactone      7. Paroxysmal afib   8. Hx of CABG   - telemetry   - continue cardizem, lopressor      9.  DMII   - accu checks ac/hs with [facility]  - continue home yadira Wheeler MD  7/16/2021  7:56 AM

## 2021-07-16 NOTE — CARE COORDINATION
Patient/family seen: Yes       Informed patient/family of BPCI-A Medical Bundle Program with potential outreach by either Care Transitions Team or naviHealth Team based on hospital admission and location.        BPCI-A Notification Letter given: Yes         Current discharge plan: SNF vs home with [facility]

## 2021-07-17 LAB
ABSOLUTE EOS #: 0.04 K/UL (ref 0–0.44)
ABSOLUTE IMMATURE GRANULOCYTE: 0.04 K/UL (ref 0–0.3)
ABSOLUTE LYMPH #: 1 K/UL (ref 1.1–3.7)
ABSOLUTE MONO #: 0.82 K/UL (ref 0.1–1.2)
ANION GAP SERPL CALCULATED.3IONS-SCNC: 12 MMOL/L (ref 9–17)
BASOPHILS # BLD: 0 % (ref 0–2)
BASOPHILS ABSOLUTE: <0.03 K/UL (ref 0–0.2)
BUN BLDV-MCNC: 26 MG/DL (ref 8–23)
BUN/CREAT BLD: ABNORMAL (ref 9–20)
CALCIUM SERPL-MCNC: 8.3 MG/DL (ref 8.6–10.4)
CHLORIDE BLD-SCNC: 99 MMOL/L (ref 98–107)
CO2: 31 MMOL/L (ref 20–31)
CREAT SERPL-MCNC: 0.46 MG/DL (ref 0.7–1.2)
DIFFERENTIAL TYPE: ABNORMAL
EOSINOPHILS RELATIVE PERCENT: 0 % (ref 1–4)
GFR AFRICAN AMERICAN: >60 ML/MIN
GFR NON-AFRICAN AMERICAN: >60 ML/MIN
GFR SERPL CREATININE-BSD FRML MDRD: ABNORMAL ML/MIN/{1.73_M2}
GFR SERPL CREATININE-BSD FRML MDRD: ABNORMAL ML/MIN/{1.73_M2}
GLUCOSE BLD-MCNC: 110 MG/DL (ref 70–99)
GLUCOSE BLD-MCNC: 126 MG/DL (ref 75–110)
GLUCOSE BLD-MCNC: 131 MG/DL (ref 75–110)
GLUCOSE BLD-MCNC: 142 MG/DL (ref 75–110)
GLUCOSE BLD-MCNC: 98 MG/DL (ref 75–110)
HCT VFR BLD CALC: 32.3 % (ref 40.7–50.3)
HEMOGLOBIN: 10.2 G/DL (ref 13–17)
IMMATURE GRANULOCYTES: 0 %
LYMPHOCYTES # BLD: 11 % (ref 24–43)
MAGNESIUM: 2.2 MG/DL (ref 1.6–2.6)
MCH RBC QN AUTO: 32.9 PG (ref 25.2–33.5)
MCHC RBC AUTO-ENTMCNC: 31.6 G/DL (ref 28.4–34.8)
MCV RBC AUTO: 104.2 FL (ref 82.6–102.9)
MONOCYTES # BLD: 9 % (ref 3–12)
NRBC AUTOMATED: 0 PER 100 WBC
PDW BLD-RTO: 15.3 % (ref 11.8–14.4)
PLATELET # BLD: ABNORMAL K/UL (ref 138–453)
PLATELET ESTIMATE: ABNORMAL
PLATELET, FLUORESCENCE: 158 K/UL (ref 138–453)
PLATELET, IMMATURE FRACTION: 7.2 % (ref 1.1–10.3)
PMV BLD AUTO: ABNORMAL FL (ref 8.1–13.5)
POTASSIUM SERPL-SCNC: 3.5 MMOL/L (ref 3.7–5.3)
RBC # BLD: 3.1 M/UL (ref 4.21–5.77)
RBC # BLD: ABNORMAL 10*6/UL
SEG NEUTROPHILS: 79 % (ref 36–65)
SEGMENTED NEUTROPHILS ABSOLUTE COUNT: 7.09 K/UL (ref 1.5–8.1)
SODIUM BLD-SCNC: 142 MMOL/L (ref 135–144)
WBC # BLD: 9 K/UL (ref 3.5–11.3)
WBC # BLD: ABNORMAL 10*3/UL

## 2021-07-17 PROCEDURE — 6360000002 HC RX W HCPCS: Performed by: STUDENT IN AN ORGANIZED HEALTH CARE EDUCATION/TRAINING PROGRAM

## 2021-07-17 PROCEDURE — 85025 COMPLETE CBC W/AUTO DIFF WBC: CPT

## 2021-07-17 PROCEDURE — 99233 SBSQ HOSP IP/OBS HIGH 50: CPT | Performed by: INTERNAL MEDICINE

## 2021-07-17 PROCEDURE — 6370000000 HC RX 637 (ALT 250 FOR IP): Performed by: EMERGENCY MEDICINE

## 2021-07-17 PROCEDURE — 83735 ASSAY OF MAGNESIUM: CPT

## 2021-07-17 PROCEDURE — 6360000002 HC RX W HCPCS: Performed by: INTERNAL MEDICINE

## 2021-07-17 PROCEDURE — 80048 BASIC METABOLIC PNL TOTAL CA: CPT

## 2021-07-17 PROCEDURE — 2580000003 HC RX 258: Performed by: INTERNAL MEDICINE

## 2021-07-17 PROCEDURE — 94640 AIRWAY INHALATION TREATMENT: CPT

## 2021-07-17 PROCEDURE — 36415 COLL VENOUS BLD VENIPUNCTURE: CPT

## 2021-07-17 PROCEDURE — 82947 ASSAY GLUCOSE BLOOD QUANT: CPT

## 2021-07-17 PROCEDURE — 2060000000 HC ICU INTERMEDIATE R&B

## 2021-07-17 PROCEDURE — 99232 SBSQ HOSP IP/OBS MODERATE 35: CPT | Performed by: FAMILY MEDICINE

## 2021-07-17 PROCEDURE — 85055 RETICULATED PLATELET ASSAY: CPT

## 2021-07-17 PROCEDURE — 6370000000 HC RX 637 (ALT 250 FOR IP)

## 2021-07-17 PROCEDURE — 2580000003 HC RX 258: Performed by: EMERGENCY MEDICINE

## 2021-07-17 PROCEDURE — 2700000000 HC OXYGEN THERAPY PER DAY

## 2021-07-17 RX ADMIN — HYDROCORTISONE SODIUM SUCCINATE 50 MG: 100 INJECTION, POWDER, FOR SOLUTION INTRAMUSCULAR; INTRAVENOUS at 04:04

## 2021-07-17 RX ADMIN — HYDROCORTISONE SODIUM SUCCINATE 50 MG: 100 INJECTION, POWDER, FOR SOLUTION INTRAMUSCULAR; INTRAVENOUS at 22:08

## 2021-07-17 RX ADMIN — SODIUM CHLORIDE, PRESERVATIVE FREE 10 ML: 5 INJECTION INTRAVENOUS at 09:12

## 2021-07-17 RX ADMIN — HEPARIN SODIUM 5000 UNITS: 5000 INJECTION INTRAVENOUS; SUBCUTANEOUS at 05:59

## 2021-07-17 RX ADMIN — MIDODRINE HYDROCHLORIDE 5 MG: 5 TABLET ORAL at 09:12

## 2021-07-17 RX ADMIN — HEPARIN SODIUM 5000 UNITS: 5000 INJECTION INTRAVENOUS; SUBCUTANEOUS at 13:36

## 2021-07-17 RX ADMIN — DILTIAZEM HYDROCHLORIDE 180 MG: 180 CAPSULE, COATED, EXTENDED RELEASE ORAL at 09:12

## 2021-07-17 RX ADMIN — HYDROCORTISONE SODIUM SUCCINATE 50 MG: 100 INJECTION, POWDER, FOR SOLUTION INTRAMUSCULAR; INTRAVENOUS at 12:10

## 2021-07-17 RX ADMIN — SODIUM CHLORIDE, PRESERVATIVE FREE 10 ML: 5 INJECTION INTRAVENOUS at 21:30

## 2021-07-17 RX ADMIN — BUDESONIDE AND FORMOTEROL FUMARATE DIHYDRATE 2 PUFF: 160; 4.5 AEROSOL RESPIRATORY (INHALATION) at 09:24

## 2021-07-17 RX ADMIN — SODIUM CHLORIDE, PRESERVATIVE FREE 10 ML: 5 INJECTION INTRAVENOUS at 12:15

## 2021-07-17 RX ADMIN — MIDODRINE HYDROCHLORIDE 5 MG: 5 TABLET ORAL at 12:10

## 2021-07-17 RX ADMIN — HEPARIN SODIUM 5000 UNITS: 5000 INJECTION INTRAVENOUS; SUBCUTANEOUS at 22:08

## 2021-07-17 RX ADMIN — CEFEPIME HYDROCHLORIDE 2000 MG: 2 INJECTION, POWDER, FOR SOLUTION INTRAVENOUS at 00:52

## 2021-07-17 RX ADMIN — INSULIN LISPRO 1 UNITS: 100 INJECTION, SOLUTION INTRAVENOUS; SUBCUTANEOUS at 12:10

## 2021-07-17 RX ADMIN — CEFEPIME HYDROCHLORIDE 2000 MG: 2 INJECTION, POWDER, FOR SOLUTION INTRAVENOUS at 13:35

## 2021-07-17 RX ADMIN — BUDESONIDE AND FORMOTEROL FUMARATE DIHYDRATE 2 PUFF: 160; 4.5 AEROSOL RESPIRATORY (INHALATION) at 20:40

## 2021-07-17 RX ADMIN — QUETIAPINE FUMARATE 25 MG: 25 TABLET ORAL at 22:08

## 2021-07-17 ASSESSMENT — PAIN SCALES - GENERAL
PAINLEVEL_OUTOF10: 0

## 2021-07-17 ASSESSMENT — ENCOUNTER SYMPTOMS: TACHYPNEA: 1

## 2021-07-17 NOTE — PROGRESS NOTES
PULMONARY & CRITICAL CARE MEDICINE PROGRESS  NOTE     Patient:  Baldo Ying  MRN: [id number]  Admit date: 7/12/2021    SUBJECTIVE     I personally interviewed/examined the patient, reviewed interval history and interpreted all available radiographic, laboratory data at the time of service. Chief Compliant/Reason for Initial Consult:   Acute on Chronic Hypoxic Respiratory Failure. Brief Hospital Course and Interval History:  66 y.o. male hx of CAD s/p CABG 2005, COPD, type 2 diabetes, chronic diastolic CHF, cor pulmonale, bladder cancer per pt daughter in 2014, and PNA LLL vs mass in May 2021 admitted as transfer from [facility] for unclear etiology hypotension, found to have LAKESHA and potassium of 7. Patient does not know why he was sent to outside facility, was at a nursing home and was noted to be hypotensive. Pt not intubated at arrival.     Cardiology was consulted for atrial flutter and elevated troponin at 173. Rate controlled with Lopressor and Toprol. Patient was briefly hypotensive on presentation and needed Levophed support. Nephrology was consulted for LAKESHA, hyponatremia and hyperkalemia. Received 1 dose of Bumex with excellent response in urine output. LAKESHA resolved now. Getting gentle hydration sodium improved to 139 from 129. Potassium decreased from 6.2 on presentation to 3.2. On chest x-ray, there is concern of left lower lobe infiltrate likely pneumonia. Cultures negative. Patient treated with cefepime and vancomycin. WBCs improving. Breathing well on 5 L nasal cannula. Reports he uses 2 liter oxygen at home. Will wean as tolerated. Interval History 07/17/21   Afebrile and hemodynamically stable  Saturating 94 - 98% on 4 L NC.   CXR reviewed concerning for LLL Pneumonia. Continues on Vancomycin and Cefepime. No overnight events. Wore BiPAP last night.    Conversational dyspnea    Review of Systems:  General: negative for chills, fatigue or fever  ENT: negative for headaches, nasal congestion, sore throat or visual changes  Allergy and Immunology: negative for postnasal drip or seasonal allergies  Hematological and Lymphatic: negative for bleeding problems, swollen lymph nodes  Respiratory: positive for shortness of breath. negative for cough  Cardiovascular: negative for edema or palpitations  Gastrointestinal: negative for abdominal pain, change in bowel habits or nausea/vomiting  Genito-Urinary: negative for dysuria or urinary frequency/urgency  Musculoskeletal: negative for joint pain or joint swelling  Neurological: negative for numbness/tingling, seizures or weakness  Dermatological: negative for pruritus or rash    OBJECTIVE     VITAL SIGNS:   LAST-  /80   Pulse 68   Temp 97.5 °F (36.4 °C) (Oral)   Resp 18   Ht 5' 10\" (1.778 m)   Wt 216 lb 14.9 oz (98.4 kg)   SpO2 98%   BMI 31.13 kg/m²   8-24 HR RANGE-  TEMP Temp  Av.9 °F (36.6 °C)  Min: 97.5 °F (36.4 °C)  Max: 98.5 °F (39.5 °C)   BP Systolic (58HVX), ZLE:805 , Min:124 , GMB:986      Diastolic (50HZM), YYE:26, Min:66, Max:89     PULSE Pulse  Av.3  Min: 67  Max: 87   RR Resp  Av.5  Min: 15  Max: 18   O2 SAT SpO2  Av %  Min: 98 %  Max: 98 %   OXYGEN DELIVERY O2 Flow Rate (L/min)  Av L/min  Min: 4 L/min  Max: 4 L/min     Systemic Examination:   General appearance - looks comfortable and in no acute distress  Mental status - alert, oriented to person, place, and time  Eyes - pupils equal and reactive, extraocular eye movements intact  Mouth - mucous membranes moist, pharynx normal without lesions  Neck - supple, no significant adenopathy  Chest - Chest was symmetrical without dullness to percussion. Breath sounds bilaterally were clear to auscultation. There were no wheezes, rhonchi or rales.   There is no intercostal recession or use of accessory muscles  Heart - normal rate, regular rhythm, normal S1, S2, no murmurs, rubs, clicks or gallops  Abdomen - soft, nontender, nondistended, no masses or organomegaly  Neurological - alert, oriented, normal speech, no focal findings or movement disorder noted  Extremities - peripheral pulses normal, bilateral pedal edema, no clubbing or cyanosis  Skin - normal coloration and turgor, no rashes, no suspicious skin lesions noted     DATA REVIEW     Medications:  Scheduled Meds:   midodrine  5 mg Oral TID WC    cefepime  2,000 mg Intravenous Q12H    hydrocortisone sodium succinate PF  50 mg Intravenous Q8H    QUEtiapine  25 mg Oral Nightly    [Held by provider] bumetanide  1 mg Intravenous BID    [Held by provider] metoprolol succinate  25 mg Oral Daily    QUEtiapine  12.5 mg Oral Once    metoprolol  5 mg Intravenous Q6H    insulin glargine  0.25 Units/kg Subcutaneous Nightly    insulin lispro  0-6 Units Subcutaneous Q4H    budesonide-formoterol  2 puff Inhalation BID    dilTIAZem  180 mg Oral Daily    sodium chloride  1,000 mL Intravenous Once    sodium chloride flush  5-40 mL Intravenous 2 times per day    heparin (porcine)  5,000 Units Subcutaneous 3 times per day     Continuous Infusions:   dextrose      sodium chloride       LABS:-  ABGs:   No results found for: PH, PCO2, PO2, HCO3, O2SAT  No results for input(s): PHART, PO2ART, JLX1LLD, KHE2AVC, BEART, H7NWHYYW in the last 72 hours.   Lab Results   Component Value Date    POCPH 7.319 (L) 07/12/2021    POCPCO2 57.8 (H) 07/12/2021    POCPO2 69.2 (L) 07/12/2021    POCHCO3 29.8 (H) 07/12/2021    TEII9AWM 92 (L) 07/12/2021     CBC:   Recent Labs     07/16/21  0757 07/17/21  0539   WBC 8.8 9.0   HGB 10.8* 10.2*   HCT 33.7* 32.3*   .0* 104.2*    See Reflexed IPF Result   LYMPHOPCT 12* 11*   RBC 3.15* 3.10*   MCH 34.3* 32.9   MCHC 32.0 31.6   RDW 15.5* 15.3*     BMP:   Recent Labs     07/15/21  1553 07/16/21  0757 07/17/21  0539    137 142   K 3.3* 3.5* 3.5*   CL 96* 97* 99   CO2 31 31 31   BUN 39* 30* 26*   CREATININE 0.81 0.56* 0.46*   GLUCOSE 175* 181* 110*     Liver Function Test:   Recent Labs     07/16/21  0757   PROT 6.0*   LABALBU 3.4*   ALT 24   AST 24   ALKPHOS 110   BILITOT 0.58     Amylase/Lipase:  No results for input(s): AMYLASE, LIPASE in the last 72 hours. Coagulation Profile:   No results for input(s): INR, PROTIME, APTT in the last 72 hours. Cardiac Enzymes:  No results for input(s): CKTOTAL, CKMB, CKMBINDEX, TROPONINI in the last 72 hours. Lactic Acid:  No results found for: LACTA  BNP:   No results found for: BNP  D-Dimer:  No results found for: DDIMER  Others:   Lab Results   Component Value Date    TSH 1.24 07/12/2021     Lab Results   Component Value Date    WINTER NEGATIVE 07/12/2021     Lab Results   Component Value Date    URICACID 8.2 (H) 05/29/2021     No results found for: IRON, TIBC, FERRITIN  No results found for: SPEP, UPEP  No results found for: PSA, CEA, , RT0362,     Input/Output:    Intake/Output Summary (Last 24 hours) at 7/17/2021 0849  Last data filed at 7/17/2021 0400  Gross per 24 hour   Intake 370.2 ml   Output 350 ml   Net 20.2 ml       Microbiology:    Pathology:    Radiology Reports:  XR CHEST (2 VW)   Final Result   Improving pulmonary vascular congestion. Continued left basilar infiltrate   or pneumonia and small left pleural effusion. US RETROPERITONEAL COMPLETE   Final Result   Somewhat limited but essentially unremarkable renal ultrasound; the left   kidney is poorly visualized. No hydronephrosis. XR CHEST PORTABLE   Final Result   Increasing consolidation effusion in the left lower lobe.   Right-sided   central venous catheter distal tip the superior vena cava no evidence of   pneumothorax             Echocardiogram:   Results for orders placed during the hospital encounter of 07/12/21    ECHO Complete 2D W Doppler W Color    Narrative  Transthoracic Echocardiography Report (TTE)    Patient Name [facility]      Date of Study 07/14/2021  KOFFI CAMARENA    Date of      1950  Gender                        Male  Birth    Age          66 year(s)  Race                              Room Number  0128        Height:                       70 inch, 177.8 cm    Corporate ID [id number]    Weight:                       216 pounds, 98 kg  #    Patient Acct [de-identified]   BSA:           2.16 m^2       BMI:      30.99  #                                                                kg/m^2    MR #         [id number]     Edwin Barrera    Accession #  [id number]  Interpreting Physician        [facility]    Fellow                   Referring Nurse Practitioner    Interpreting             Referring Physician           Elizabeth Roberts MD  Fellow    Type of Study    TTE procedure:2D Echocardiogram, M-Mode, Doppler, Color Doppler. Procedure Date  Date: 07/14/2021 Start: 08:57 AM    Study Location: [facility]  Technical Quality: Fair visualization    Indications:Hypotension. History / Tech. Comments:  Procedure explained to patient. Study done bedside in MICU. Acute renal failure    Patient Status: Inpatient    Height: 70 inches Weight: 216 pounds BSA: 2.16 m^2 BMI: 30.99 kg/m^2    CONCLUSIONS    Summary  Left ventricle is normal in size. Global left ventricular systolic function  is normal. Estimated ejection fraction is 50-55 %. Evidence of diastolic dysfunction. Left atrium is moderately dilated. Right atrial dilatation. Trivial mitral regurgitation. Trivial pulmonic insufficiency. IVC dilated but unable to assess respiratory collapse due to patient on  ventilator.     Signature  ----------------------------------------------------------------------------  Electronically signed by Juli DelcidSonographer) on 07/14/2021  11:15 AM  ----------------------------------------------------------------------------    ----------------------------------------------------------------------------  Electronically signed by Steven Wasserman(Interpreting physician) on 2021  11:20 AM  ----------------------------------------------------------------------------  FINDINGS  Left Atrium  Left atrium is moderately dilated. Left Ventricle  Left ventricle is normal in size. Global left ventricular systolic function  is normal. Estimated ejection fraction is 50-55 % . Normal left ventricular wall thickness. Evidence of diastolic dysfunction. Right Atrium  Right atrial dilatation. Right Ventricle  Normal size right ventricular cavity. Right ventricular function appears normal .  Mitral Valve  Normal mitral valve structure. Trivial mitral regurgitation. No mitral stenosis. Aortic Valve  Aortic valve structure and function normal.  Aortic valve is trileaflet. No aortic insufficiency. No aortic stenosis. Tricuspid Valve  Normal tricuspid valve leaflets. No tricuspid regurgitation. No tricuspid stenosis. Insignificant tricuspid regurgitation, unable to estimate RVSP. Pulmonic Valve  Pulmonic valve is normal in structure and function. Trivial pulmonic insufficiency. No evidence of pulmonic stenosis. Pericardial Effusion  No pericardial effusion. Miscellaneous  Normal aortic root dimension. E/E' average = 23.95. IVC dilated but unable to assess respiratory collapse due to patient on  ventilator.     M-mode / 2D Measurements & Calculations:    LVIDd:4.6 cm(3.7 - 5.6 cm)        Diastolic YOXLOB:86.96 ml  LVIDs:3.3 cm(2.2 - 4.0 cm)        Systolic RWKPHT:01.05 ml  IVSd:1 cm(0.6 - 1.1 cm)           Aortic Root:3.7 cm(2.0 - 3.7 cm)  LVPWd:1.1 cm(0.6 - 1.1 cm)        LA Dimension: 5 cm(1.9 - 4.0 cm)  Fractional Shortenin.26 %     LA volume/Index: 88.03 ml /41m^2  Calculated LVEF (%): 50.13 %      LVOT:2 cm  RVDd:4 cm    Mitral: evaluate Adaline Been today. We will continue to follow. I would like to thank you for allowing me to participate in the care of this patient. Please feel free to call with any further questions or concerns. Teri Kenyon,   Pulmonary and Critical Care Medicine           7/17/2021, 8:49 AM    Please note that this chart was generated using voice recognition Dragon dictation software. Although every effort was made to ensure the accuracy of this automated transcription, some errors in transcription may have occurred.

## 2021-07-17 NOTE — PROGRESS NOTES
[facility]  Office: [address], DO, Stephen Ashlyse, DO, Wale Valenzuela, DO, Giovana Diaz Brad, DO, Ella Blackman MD, Michael Romero MD, Larry Alvarez MD, Onel Ashley MD, Cristine Mustafa MD, Gris Sanders MD, Fco Culp MD, Naina Perrin, DO, Buck Genao MD, Jefry Morley, DO, Dallin Veronica MD,  Sagar Burroughs DO, Kike Abebe MD, Nitish Neely MD, Elisabeth Matos MD, Honorio Crawford MD, Cristhian Yun MD, Anson Mcclure MD, Davin Tomas, Maria T Mejia, WYATT, Mao Corona CNP, Eileen Del Toro, [facility], [facility], CNP, Sharlene Rivera, CNP, Alisson Kathleen, CNP, Gene Fierro, CNP, Sweta Landry, CNP, MARC McnairC, Monika Nagy, [facility], Niki Deluna, [facility], [facility], CNP, Areli Yeh, CNP, Loretta Tong, CNP, Irena Nowak, CNP, Naya Gabriel, CNP, Guillermo Aparicio, CNP, Meseret Locke, [address] [place] [address]    Progress Note    7/17/2021    9:14 AM    Name:   Phillip Villalba  MRN:     [id number]     Acct:      [de-identified]   Room:   [address] [place] [address] Day:  5  Admit Date:  7/12/2021  1:09 PM    PCP:   Cirilo Giles  Code Status:  Full Code    Subjective:     C/C: sob     Interval History Status: not changed. Pt was seen and examined this morning  No acute events overnight   States that he remains sob   No other complaints at this time     Brief History:     66 y. o. male hx of CAD s/p CABG 2005, COPD, type 2 diabetes, chronic diastolic CHF, cor pulmonale, bladder cancer per pt daughter in 2014, and PNA LLL vs mass in May 2021 admitted as transfer from [facility] for unclear etiology hypotension, found to have LAKESHA and potassium of 7. Patient does not know why he was sent to outside facility, was at a nursing home and was noted to be hypotensive.  Pt not intubated at arrival.  Cardiology was consulted for atrial flutter and elevated troponin at 173.  Rate controlled with Lopressor and Toprol.  Patient was briefly hypotensive on presentation and needed Levophed support. Nephrology was consulted for LAKESHA, hyponatremia and hyperkalemia.  Received 1 dose of Bumex with excellent response in urine output.  LAKESHA resolved now.  Getting gentle hydration sodium improved to 139 from 129.  Potassium decreased from 6.2 on presentation to 3.2. On chest x-ray, there is concern of left lower lobe infiltrate likely pneumonia.  Cultures negative.  Patient treated with cefepime and vancomycin.  WBCs improving.  Breathing well on 5 L nasal cannula.  Reports he uses 2 liter oxygen at home. Review of Systems:     12 point ROS performed and negative for anything other than what was stated in subjective     Medications: Allergies:     Allergies   Allergen Reactions    Niacin And Related     Other      Nicotine patch - rash    Statins        Current Meds:   Scheduled Meds:    midodrine  5 mg Oral TID WC    cefepime  2,000 mg Intravenous Q12H    hydrocortisone sodium succinate PF  50 mg Intravenous Q8H    QUEtiapine  25 mg Oral Nightly    [Held by provider] bumetanide  1 mg Intravenous BID    [Held by provider] metoprolol succinate  25 mg Oral Daily    QUEtiapine  12.5 mg Oral Once    metoprolol  5 mg Intravenous Q6H    insulin glargine  0.25 Units/kg Subcutaneous Nightly    insulin lispro  0-6 Units Subcutaneous Q4H    budesonide-formoterol  2 puff Inhalation BID    dilTIAZem  180 mg Oral Daily    sodium chloride  1,000 mL Intravenous Once    sodium chloride flush  5-40 mL Intravenous 2 times per day    heparin (porcine)  5,000 Units Subcutaneous 3 times per day     Continuous Infusions:    dextrose      sodium chloride       PRN Meds: ipratropium-albuterol, albuterol, glucose, dextrose, glucagon (rDNA), dextrose, sodium chloride flush, sodium chloride, ondansetron **OR** ondansetron, polyethylene glycol, acetaminophen **OR** acetaminophen    Data:     Past Medical History:   has a past medical history of Agent orange exposure, Anxiety state, Cancer ([facility] CHF (congestive heart failure) ([facility] Chronic obstructive pulmonary disease (COPD) ([facility] Chronic post-traumatic stress disorder, Chronic respiratory failure with hypercapnia ([facility] COPD exacerbation ([facility] Coronary artery disease, Degenerative disc disease, lumbar, Depression, Essential hypertension, History of acute pancreatitis, Hyperglycemia, Hypokalemia, Lumbosacral disc disease, Obesity, Obstructive sleep apnea, and Personal history of malignant neoplasm of bladder. Social History:   reports that he has been smoking cigarettes. He has a 54.00 pack-year smoking history. He has never used smokeless tobacco. He reports current alcohol use. He reports that he does not use drugs. Family History:   Family History   Problem Relation Age of Onset    Emphysema Mother     Heart Disease Father     Heart Failure Father        Vitals:  /80   Pulse 68   Temp 97.5 °F (36.4 °C) (Oral)   Resp 18   Ht 5' 10\" (1.778 m)   Wt 216 lb 14.9 oz (98.4 kg)   SpO2 98%   BMI 31.13 kg/m²   Temp (24hrs), Av.9 °F (36.6 °C), Min:97.5 °F (36.4 °C), Max:98.5 °F (36.9 °C)    Recent Labs     21  1732 21  0046 21  0756   POCGLU 137* 148* 131* 98       I/O (24Hr):     Intake/Output Summary (Last 24 hours) at 2021 0914  Last data filed at 2021 0400  Gross per 24 hour   Intake 370.2 ml   Output 350 ml   Net 20.2 ml       Labs:  Hematology:  Recent Labs     21  0757 21  0539   WBC 8.8 9.0   RBC 3.15* 3.10*   HGB 10.8* 10.2*   HCT 33.7* 32.3*   .0* 104.2*   MCH 34.3* 32.9   MCHC 32.0 31.6   RDW 15.5* 15.3*    See Reflexed IPF Result   MPV 11.2 NOT REPORTED     Chemistry:  Recent Labs     07/15/21  1553 21  0757 21  0539    137 142   K 3.3* 3.5* 3.5*   CL 96* 97* 99   CO2 31 31 31   GLUCOSE 175* 181* 110*   BUN 39* 30* 26*   CREATININE 0.81 0.56* 0.46*   MG  --   --  2.2   ANIONGAP 11 9 12 nondistended, normal bowel sounds   Extremities:  no edema, redness, tenderness in the calves  Skin:  no gross lesions, rashes, induration    Assessment:        Hospital Problems         Last Modified POA    Acute renal failure (ARF) ([address] 7/12/2021 Yes          Plan:        1. Acute on chronic hypoxic/hypercapneic respiratory failure   2. COPD   3. LLL pneumonia   - pulmonary on board   - d/c vancomycin, negative MRSA  - continue cefepime  - blood cultures NGTD  - continue solu cortef   - v/s per unit protocol   - continue RT protocol prn   - remains on 4 L of oxygen at this time, baseline home oxygen of 2L  - bipap qhs   - repeat chest xray showed improvement       4. LAKESHA   5. Hyperkalemia   6. Hyponatremia   - nephrology on board  - renal function improved   - monitor Is/Os   - renal injury likley related ischemic ATN secondary to hypotension/NSAIDs  - diuretics held   - monitor urine output   - avoid NSAIDs/ROJAS-2 inhibitors/aldactone      7. Paroxysmal afib   8. Hx of CABG   - telemetry   - continue cardizem lopressor      9. DMII   - accu checks ac/hs with [facility]  - continue home lantus     10.  DISPO  - likely d/c within next 24 hours   - pt remains sob and requiring higher oxygen than baseline   - PT/OT     Mena Macias MD  7/17/2021  9:14 AM

## 2021-07-17 NOTE — PLAN OF CARE
Problem: Skin Integrity:  Goal: Will show no infection signs and symptoms  Description: Will show no infection signs and symptoms  Outcome: Ongoing     Problem: Falls - Risk of:  Goal: Absence of physical injury  Description: Absence of physical injury  Outcome: Ongoing

## 2021-07-17 NOTE — PLAN OF CARE
Problem: Skin Integrity:  Goal: Will show no infection signs and symptoms  Description: Will show no infection signs and symptoms  7/17/2021 1046 by Yancy Ash RCP  Outcome: Ongoing  7/17/2021 0253 by Brian Atkins RN  Outcome: Ongoing  Goal: Absence of new skin breakdown  Description: Absence of new skin breakdown  7/17/2021 1046 by Yancy Ash RCP  Outcome: Ongoing  7/17/2021 0253 by Brian Atkins RN  Outcome: Ongoing

## 2021-07-18 LAB
ABSOLUTE EOS #: <0.03 K/UL (ref 0–0.44)
ABSOLUTE IMMATURE GRANULOCYTE: 0.06 K/UL (ref 0–0.3)
ABSOLUTE LYMPH #: 0.9 K/UL (ref 1.1–3.7)
ABSOLUTE MONO #: 0.6 K/UL (ref 0.1–1.2)
ANION GAP SERPL CALCULATED.3IONS-SCNC: 11 MMOL/L (ref 9–17)
BASOPHILS # BLD: 0 % (ref 0–2)
BASOPHILS ABSOLUTE: 0.03 K/UL (ref 0–0.2)
BUN BLDV-MCNC: 22 MG/DL (ref 8–23)
BUN/CREAT BLD: ABNORMAL (ref 9–20)
CALCIUM SERPL-MCNC: 9 MG/DL (ref 8.6–10.4)
CHLORIDE BLD-SCNC: 99 MMOL/L (ref 98–107)
CO2: 32 MMOL/L (ref 20–31)
CREAT SERPL-MCNC: 0.37 MG/DL (ref 0.7–1.2)
CULTURE: NORMAL
CULTURE: NORMAL
DIFFERENTIAL TYPE: ABNORMAL
EOSINOPHILS RELATIVE PERCENT: 0 % (ref 1–4)
GFR AFRICAN AMERICAN: >60 ML/MIN
GFR NON-AFRICAN AMERICAN: >60 ML/MIN
GFR SERPL CREATININE-BSD FRML MDRD: ABNORMAL ML/MIN/{1.73_M2}
GFR SERPL CREATININE-BSD FRML MDRD: ABNORMAL ML/MIN/{1.73_M2}
GLUCOSE BLD-MCNC: 105 MG/DL (ref 75–110)
GLUCOSE BLD-MCNC: 108 MG/DL (ref 75–110)
GLUCOSE BLD-MCNC: 116 MG/DL (ref 75–110)
GLUCOSE BLD-MCNC: 122 MG/DL (ref 70–99)
GLUCOSE BLD-MCNC: 124 MG/DL (ref 75–110)
GLUCOSE BLD-MCNC: 136 MG/DL (ref 75–110)
GLUCOSE BLD-MCNC: 176 MG/DL (ref 75–110)
GLUCOSE BLD-MCNC: 215 MG/DL (ref 75–110)
HCT VFR BLD CALC: 34.9 % (ref 40.7–50.3)
HEMOGLOBIN: 10.6 G/DL (ref 13–17)
IMMATURE GRANULOCYTES: 1 %
LYMPHOCYTES # BLD: 10 % (ref 24–43)
Lab: NORMAL
Lab: NORMAL
MAGNESIUM: 2.2 MG/DL (ref 1.6–2.6)
MCH RBC QN AUTO: 32.9 PG (ref 25.2–33.5)
MCHC RBC AUTO-ENTMCNC: 30.4 G/DL (ref 28.4–34.8)
MCV RBC AUTO: 108.4 FL (ref 82.6–102.9)
MONOCYTES # BLD: 7 % (ref 3–12)
NRBC AUTOMATED: 0 PER 100 WBC
PDW BLD-RTO: 15.3 % (ref 11.8–14.4)
PLATELET # BLD: 171 K/UL (ref 138–453)
PLATELET ESTIMATE: ABNORMAL
PMV BLD AUTO: 11.3 FL (ref 8.1–13.5)
POTASSIUM SERPL-SCNC: 3.3 MMOL/L (ref 3.7–5.3)
RBC # BLD: 3.22 M/UL (ref 4.21–5.77)
RBC # BLD: ABNORMAL 10*6/UL
SEG NEUTROPHILS: 82 % (ref 36–65)
SEGMENTED NEUTROPHILS ABSOLUTE COUNT: 7.16 K/UL (ref 1.5–8.1)
SODIUM BLD-SCNC: 142 MMOL/L (ref 135–144)
SPECIMEN DESCRIPTION: NORMAL
SPECIMEN DESCRIPTION: NORMAL
WBC # BLD: 8.8 K/UL (ref 3.5–11.3)
WBC # BLD: ABNORMAL 10*3/UL

## 2021-07-18 PROCEDURE — 6360000002 HC RX W HCPCS: Performed by: STUDENT IN AN ORGANIZED HEALTH CARE EDUCATION/TRAINING PROGRAM

## 2021-07-18 PROCEDURE — 2060000000 HC ICU INTERMEDIATE R&B

## 2021-07-18 PROCEDURE — 83735 ASSAY OF MAGNESIUM: CPT

## 2021-07-18 PROCEDURE — 2580000003 HC RX 258: Performed by: EMERGENCY MEDICINE

## 2021-07-18 PROCEDURE — 85025 COMPLETE CBC W/AUTO DIFF WBC: CPT

## 2021-07-18 PROCEDURE — 6370000000 HC RX 637 (ALT 250 FOR IP): Performed by: EMERGENCY MEDICINE

## 2021-07-18 PROCEDURE — 94660 CPAP INITIATION&MGMT: CPT

## 2021-07-18 PROCEDURE — 6370000000 HC RX 637 (ALT 250 FOR IP): Performed by: NURSE PRACTITIONER

## 2021-07-18 PROCEDURE — 6360000002 HC RX W HCPCS: Performed by: INTERNAL MEDICINE

## 2021-07-18 PROCEDURE — 99233 SBSQ HOSP IP/OBS HIGH 50: CPT | Performed by: INTERNAL MEDICINE

## 2021-07-18 PROCEDURE — 80048 BASIC METABOLIC PNL TOTAL CA: CPT

## 2021-07-18 PROCEDURE — 99232 SBSQ HOSP IP/OBS MODERATE 35: CPT | Performed by: INTERNAL MEDICINE

## 2021-07-18 PROCEDURE — 2580000003 HC RX 258: Performed by: INTERNAL MEDICINE

## 2021-07-18 PROCEDURE — 94640 AIRWAY INHALATION TREATMENT: CPT

## 2021-07-18 PROCEDURE — 6370000000 HC RX 637 (ALT 250 FOR IP)

## 2021-07-18 PROCEDURE — 2700000000 HC OXYGEN THERAPY PER DAY

## 2021-07-18 PROCEDURE — 36415 COLL VENOUS BLD VENIPUNCTURE: CPT

## 2021-07-18 RX ORDER — POTASSIUM CHLORIDE 20 MEQ/1
20 TABLET, EXTENDED RELEASE ORAL ONCE
Status: COMPLETED | OUTPATIENT
Start: 2021-07-18 | End: 2021-07-18

## 2021-07-18 RX ADMIN — POTASSIUM CHLORIDE 20 MEQ: 1500 TABLET, EXTENDED RELEASE ORAL at 19:59

## 2021-07-18 RX ADMIN — HYDROCORTISONE SODIUM SUCCINATE 50 MG: 100 INJECTION, POWDER, FOR SOLUTION INTRAMUSCULAR; INTRAVENOUS at 03:59

## 2021-07-18 RX ADMIN — HEPARIN SODIUM 5000 UNITS: 5000 INJECTION INTRAVENOUS; SUBCUTANEOUS at 06:45

## 2021-07-18 RX ADMIN — SODIUM CHLORIDE, PRESERVATIVE FREE 10 ML: 5 INJECTION INTRAVENOUS at 09:26

## 2021-07-18 RX ADMIN — BUDESONIDE AND FORMOTEROL FUMARATE DIHYDRATE 2 PUFF: 160; 4.5 AEROSOL RESPIRATORY (INHALATION) at 07:53

## 2021-07-18 RX ADMIN — MIDODRINE HYDROCHLORIDE 5 MG: 5 TABLET ORAL at 09:25

## 2021-07-18 RX ADMIN — CEFEPIME HYDROCHLORIDE 2000 MG: 2 INJECTION, POWDER, FOR SOLUTION INTRAVENOUS at 13:03

## 2021-07-18 RX ADMIN — HYDROCORTISONE SODIUM SUCCINATE 50 MG: 100 INJECTION, POWDER, FOR SOLUTION INTRAMUSCULAR; INTRAVENOUS at 19:59

## 2021-07-18 RX ADMIN — CEFEPIME HYDROCHLORIDE 2000 MG: 2 INJECTION, POWDER, FOR SOLUTION INTRAVENOUS at 00:50

## 2021-07-18 RX ADMIN — QUETIAPINE FUMARATE 25 MG: 25 TABLET ORAL at 19:58

## 2021-07-18 RX ADMIN — INSULIN LISPRO 2 UNITS: 100 INJECTION, SOLUTION INTRAVENOUS; SUBCUTANEOUS at 13:01

## 2021-07-18 RX ADMIN — INSULIN LISPRO 1 UNITS: 100 INJECTION, SOLUTION INTRAVENOUS; SUBCUTANEOUS at 16:33

## 2021-07-18 RX ADMIN — DILTIAZEM HYDROCHLORIDE 180 MG: 180 CAPSULE, COATED, EXTENDED RELEASE ORAL at 09:26

## 2021-07-18 RX ADMIN — HEPARIN SODIUM 5000 UNITS: 5000 INJECTION INTRAVENOUS; SUBCUTANEOUS at 21:41

## 2021-07-18 RX ADMIN — MIDODRINE HYDROCHLORIDE 5 MG: 5 TABLET ORAL at 13:00

## 2021-07-18 RX ADMIN — SODIUM CHLORIDE, PRESERVATIVE FREE 10 ML: 5 INJECTION INTRAVENOUS at 19:59

## 2021-07-18 RX ADMIN — HYDROCORTISONE SODIUM SUCCINATE 50 MG: 100 INJECTION, POWDER, FOR SOLUTION INTRAMUSCULAR; INTRAVENOUS at 13:00

## 2021-07-18 RX ADMIN — HEPARIN SODIUM 5000 UNITS: 5000 INJECTION INTRAVENOUS; SUBCUTANEOUS at 16:00

## 2021-07-18 RX ADMIN — BUDESONIDE AND FORMOTEROL FUMARATE DIHYDRATE 2 PUFF: 160; 4.5 AEROSOL RESPIRATORY (INHALATION) at 20:39

## 2021-07-18 ASSESSMENT — PAIN SCALES - GENERAL
PAINLEVEL_OUTOF10: 0

## 2021-07-18 NOTE — PROGRESS NOTES
PULMONARY & CRITICAL CARE MEDICINE PROGRESS  NOTE     Patient:  Russell Ramos  MRN: [id number]  Admit date: 7/12/2021    SUBJECTIVE     I personally interviewed/examined the patient, reviewed interval history and interpreted all available radiographic, laboratory data at the time of service. Chief Compliant/Reason for Initial Consult:   Acute on Chronic Hypoxic Respiratory Failure. Brief Hospital Course and Interval History:  66 y.o. male hx of CAD s/p CABG 2005, COPD, type 2 diabetes, chronic diastolic CHF, cor pulmonale, bladder cancer per pt daughter in 2014, and PNA LLL vs mass in May 2021 admitted as transfer from [facility] for unclear etiology hypotension, found to have LAKESHA and potassium of 7. Patient does not know why he was sent to outside facility, was at a nursing home and was noted to be hypotensive. Pt not intubated at arrival.     Cardiology was consulted for atrial flutter and elevated troponin at 173. Rate controlled with Lopressor and Toprol. Patient was briefly hypotensive on presentation and needed Levophed support. Nephrology was consulted for LAKESHA, hyponatremia and hyperkalemia. Received 1 dose of Bumex with excellent response in urine output. LAKESHA resolved now. Getting gentle hydration sodium improved to 139 from 129. Potassium decreased from 6.2 on presentation to 3.2. On chest x-ray, there is concern of left lower lobe infiltrate likely pneumonia. Cultures negative. Patient treated with cefepime and vancomycin. WBCs improving. Breathing well on 5 L nasal cannula. Reports he uses 2 liter oxygen at home. Will wean as tolerated. Interval History 07/18/21   Afebrile and hemodynamically stable  Saturating 94 - 98% on 6 L NC. Continues on cefepime. No overnight events. Wore BiPAP last night.    Denied any shortness of breath or wheezing    Review of Systems:  General: negative for chills, fatigue or fever  ENT: negative for headaches, nasal congestion, sore throat or visual changes  Allergy and Immunology: negative for postnasal drip or seasonal allergies  Hematological and Lymphatic: negative for bleeding problems, swollen lymph nodes  Respiratory: Negative for shortness of breath. negative for cough  Cardiovascular: negative for edema or palpitations  Gastrointestinal: negative for abdominal pain, change in bowel habits or nausea/vomiting  Genito-Urinary: negative for dysuria or urinary frequency/urgency  Musculoskeletal: negative for joint pain or joint swelling  Neurological: negative for numbness/tingling, seizures or weakness  Dermatological: negative for pruritus or rash    OBJECTIVE     VITAL SIGNS:   LAST-  /70   Pulse 64   Temp 98.2 °F (36.8 °C) (Temporal)   Resp 24   Ht 5' 10\" (1.778 m)   Wt 216 lb 14.9 oz (98.4 kg)   SpO2 95%   BMI 31.13 kg/m²   8-24 HR RANGE-  TEMP Temp  Av.2 °F (36.8 °C)  Min: 98 °F (36.7 °C)  Max: 98.6 °F (37 °C)   BP Systolic (39AVI), UCW:083 , Min:110 , FRH:054      Diastolic (93AJX), YBR:95, Min:70, Max:90     PULSE Pulse  Av.4  Min: 63  Max: 93   RR Resp  Av.3  Min: 17  Max: 24   O2 SAT SpO2  Av.7 %  Min: 95 %  Max: 97 %   OXYGEN DELIVERY O2 Flow Rate (L/min)  Av L/min  Min: 6 L/min  Max: 6 L/min     Systemic Examination:   General appearance - looks comfortable and in no acute distress  Mental status - alert, oriented to person, place, and time  Eyes - pupils equal and reactive  Mouth - mucous membranes moist, pharynx normal without lesions  Neck - supple, no significant adenopathy  Chest - Chest was symmetrical without dullness to percussion. Breath sounds bilaterally were clear to auscultation. There were no wheezes, rhonchi or rales.   There is no intercostal recession or use of accessory muscles  Heart - normal rate, regular rhythm, normal S1, S2, no murmurs, rubs, clicks or gallops  Abdomen - soft, nontender, nondistended, no masses or organomegaly  Neurological - alert, oriented, normal speech, no focal findings or movement disorder noted  Extremities -bilateral pedal edema, no clubbing or cyanosis  Skin - normal coloration and turgor, no rashes, no suspicious skin lesions noted     DATA REVIEW     Medications:  Scheduled Meds:   midodrine  5 mg Oral TID WC    cefepime  2,000 mg Intravenous Q12H    hydrocortisone sodium succinate PF  50 mg Intravenous Q8H    QUEtiapine  25 mg Oral Nightly    [Held by provider] bumetanide  1 mg Intravenous BID    [Held by provider] metoprolol succinate  25 mg Oral Daily    QUEtiapine  12.5 mg Oral Once    metoprolol  5 mg Intravenous Q6H    insulin glargine  0.25 Units/kg Subcutaneous Nightly    insulin lispro  0-6 Units Subcutaneous Q4H    budesonide-formoterol  2 puff Inhalation BID    dilTIAZem  180 mg Oral Daily    sodium chloride  1,000 mL Intravenous Once    sodium chloride flush  5-40 mL Intravenous 2 times per day    heparin (porcine)  5,000 Units Subcutaneous 3 times per day     Continuous Infusions:   dextrose      sodium chloride       LABS:-  ABGs:   No results found for: PH, PCO2, PO2, HCO3, O2SAT  No results for input(s): PHART, PO2ART, CLR0DRS, VCW8GXD, BEART, R6BVIBMH in the last 72 hours.   Lab Results   Component Value Date    POCPH 7.319 (L) 07/12/2021    POCPCO2 57.8 (H) 07/12/2021    POCPO2 69.2 (L) 07/12/2021    POCHCO3 29.8 (H) 07/12/2021    PZLW6ZNH 92 (L) 07/12/2021     CBC:   Recent Labs     07/16/21  0757 07/17/21  0539 07/18/21  0619   WBC 8.8 9.0 8.8   HGB 10.8* 10.2* 10.6*   HCT 33.7* 32.3* 34.9*   .0* 104.2* 108.4*    See Reflexed IPF Result 171   LYMPHOPCT 12* 11* 10*   RBC 3.15* 3.10* 3.22*   MCH 34.3* 32.9 32.9   MCHC 32.0 31.6 30.4   RDW 15.5* 15.3* 15.3*     BMP:   Recent Labs     07/16/21  0757 07/17/21  0539 07/18/21  0619    142 142   K 3.5* 3.5* 3.3*   CL 97* 99 99   CO2 31 31 32*   BUN 30* 26* 22   CREATININE 0.56* 0.46* 0.37* GLUCOSE 181* 110* 122*     Liver Function Test:   Recent Labs     07/16/21  0757   PROT 6.0*   LABALBU 3.4*   ALT 24   AST 24   ALKPHOS 110   BILITOT 0.58     Amylase/Lipase:  No results for input(s): AMYLASE, LIPASE in the last 72 hours. Coagulation Profile:   No results for input(s): INR, PROTIME, APTT in the last 72 hours. Cardiac Enzymes:  No results for input(s): CKTOTAL, CKMB, CKMBINDEX, TROPONINI in the last 72 hours. Lactic Acid:  No results found for: LACTA  BNP:   No results found for: BNP  D-Dimer:  No results found for: DDIMER  Others:   Lab Results   Component Value Date    TSH 1.24 07/12/2021     Lab Results   Component Value Date    WINTER NEGATIVE 07/12/2021     Lab Results   Component Value Date    URICACID 8.2 (H) 05/29/2021     No results found for: IRON, TIBC, FERRITIN  No results found for: SPEP, UPEP  No results found for: PSA, CEA, , SW9931,     Input/Output:    Intake/Output Summary (Last 24 hours) at 7/18/2021 8818  Last data filed at 7/18/2021 0344  Gross per 24 hour   Intake 240 ml   Output 1000 ml   Net -760 ml       Microbiology:    Pathology:    Radiology Reports:  XR CHEST (2 VW)   Final Result   Improving pulmonary vascular congestion. Continued left basilar infiltrate   or pneumonia and small left pleural effusion. US RETROPERITONEAL COMPLETE   Final Result   Somewhat limited but essentially unremarkable renal ultrasound; the left   kidney is poorly visualized. No hydronephrosis. XR CHEST PORTABLE   Final Result   Increasing consolidation effusion in the left lower lobe.   Right-sided   central venous catheter distal tip the superior vena cava no evidence of   pneumothorax             Echocardiogram:   Results for orders placed during the hospital encounter of 07/12/21    ECHO Complete 2D W Doppler W Color    Narrative  Transthoracic Echocardiography Report (TTE)    Patient Name [facility]      Date of Study                 07/14/2021  KOFFI CAMARENA    Date of 1950  Gender                        Male  Birth    Age          66 year(s)  Race                              Room Number  0128        Height:                       70 inch, 177.8 cm    Corporate ID [id number]    Weight:                       216 pounds, 98 kg  #    Patient Acct [de-identified]   BSA:           2.16 m^2       BMI:      30.99  #                                                                kg/m^2    MR #         [id number]     Clifford Simmons    Accession #  [id number]  Interpreting Physician        [facility]    Fellow                   Referring Nurse Practitioner    Interpreting             Referring Physician           Yolande Benitez MD  Fellow    Type of Study    TTE procedure:2D Echocardiogram, M-Mode, Doppler, Color Doppler. Procedure Date  Date: 07/14/2021 Start: 08:57 AM    Study Location: [facility]  Technical Quality: Fair visualization    Indications:Hypotension. History / Tech. Comments:  Procedure explained to patient. Study done bedside in MICU. Acute renal failure    Patient Status: Inpatient    Height: 70 inches Weight: 216 pounds BSA: 2.16 m^2 BMI: 30.99 kg/m^2    CONCLUSIONS    Summary  Left ventricle is normal in size. Global left ventricular systolic function  is normal. Estimated ejection fraction is 50-55 %. Evidence of diastolic dysfunction. Left atrium is moderately dilated. Right atrial dilatation. Trivial mitral regurgitation. Trivial pulmonic insufficiency. IVC dilated but unable to assess respiratory collapse due to patient on  ventilator.     Signature  ----------------------------------------------------------------------------  Electronically signed by Katlin Delcid(Sonographer) on 07/14/2021  11:15 AM  ----------------------------------------------------------------------------    ----------------------------------------------------------------------------  Electronically signed by Steven Wasserman(Interpreting physician) on 2021  11:20 AM  ----------------------------------------------------------------------------  FINDINGS  Left Atrium  Left atrium is moderately dilated. Left Ventricle  Left ventricle is normal in size. Global left ventricular systolic function  is normal. Estimated ejection fraction is 50-55 % . Normal left ventricular wall thickness. Evidence of diastolic dysfunction. Right Atrium  Right atrial dilatation. Right Ventricle  Normal size right ventricular cavity. Right ventricular function appears normal .  Mitral Valve  Normal mitral valve structure. Trivial mitral regurgitation. No mitral stenosis. Aortic Valve  Aortic valve structure and function normal.  Aortic valve is trileaflet. No aortic insufficiency. No aortic stenosis. Tricuspid Valve  Normal tricuspid valve leaflets. No tricuspid regurgitation. No tricuspid stenosis. Insignificant tricuspid regurgitation, unable to estimate RVSP. Pulmonic Valve  Pulmonic valve is normal in structure and function. Trivial pulmonic insufficiency. No evidence of pulmonic stenosis. Pericardial Effusion  No pericardial effusion. Miscellaneous  Normal aortic root dimension. E/E' average = 23.95. IVC dilated but unable to assess respiratory collapse due to patient on  ventilator.     M-mode / 2D Measurements & Calculations:    LVIDd:4.6 cm(3.7 - 5.6 cm)        Diastolic VSVNB.79 ml  LVIDs:3.3 cm(2.2 - 4.0 cm)        Systolic JGBGDX:47.57 ml  IVSd:1 cm(0.6 - 1.1 cm)           Aortic Root:3.7 cm(2.0 - 3.7 cm)  LVPWd:1.1 cm(0.6 - 1.1 cm)        LA Dimension: 5 cm(1.9 - 4.0 cm)  Fractional Shortenin.26 %     LA volume/Index: 88.03 ml /41m^2  Calculated LVEF (%): 50.13 %      LVOT:2 cm  RVDd:4 cm    Mitral:                                 Aortic    Valve Area (P1/2-Time): 3.38 cm^2       Peak Velocity: 0.88 m/s  Peak E-Wave: 1.43 m/s                   Mean Velocity: 0.62 m/s  Peak Gradient: 3.06 mmHg  Peak Gradient: 8.18 mmHg                Mean Gradient: 2 mmHg  Mean Gradient: 2 mmHg  Deceleration Time: 211 msec  P1/2t: 65 msec                          Area (continuity): 2.96 cm^2  AV VTI: 24.5 cm    Area (continuity): 2.07 cm^2  Mean Velocity: 0.61 m/s    Pulmonic:    Peak Velocity: 0.76 m/s  Peak Gradient: 2.31 mmHg    Diastology / Tissue Doppler  Septal Wall E' velocity:0.04 m/s  Septal Wall E/E':33.7  Lateral Wall E' velocity:0.10 m/s  Lateral Wall E/E':14.2      Cardiac Catheterization:   No results found for this or any previous visit. ASSESSMENT AND PLAN     Assessment:    // Acute on Chronic Hypoxic and Hypercapnic Respiratory Failure. // COPD on 2L home O2.   // LLL Pneumonia  // Acute Kidney Injury secondary to ischemic ATN  // Chronic Diastolic Congestive Heart Failure. Plan:    Patient remains hemodynamically stable and is currently saturating well on nasal cannula   Continue supplemental oxygen to keep oxygen saturation greater than 92%  Continue to wean to home O2. (2L NC). Continue nocturnal and as needed noninvasive mechanical ventilation (BiPAP)  Continue incentive spirometry, pulmonary toilet, aspiration precautions and bronchodilators  Continue Symbicort  Continue to monitor I/O with a goal of even/negative fluid balance. Strict intake and output. Low Sodium Diet  Fluid Restriction 1500 mL. Antimicrobials reviewed; continues on cefepime. On stress dose steroids. DVT and stress ulcer prophylaxis  Physical/occupational/speech therapy; increase activity as tolerated    I updated the patient regarding the current clinical condition, provisional diagnosis and management plan. I addressed concerns and answered all questions to the best of my abilities. It was my pleasure to evaluate Kim Mckeon today. We will continue to follow. I would like to thank you for allowing me to participate in the care of this patient.   Please feel free to call with any further questions or concerns. Erling Heimlich, MD  Pulmonary and Critical Care Medicine           7/18/2021, 8:32 AM    Please note that this chart was generated using voice recognition Dragon dictation software. Although every effort was made to ensure the accuracy of this automated transcription, some errors in transcription may have occurred.

## 2021-07-18 NOTE — PLAN OF CARE
Problem: Skin Integrity:  Goal: Will show no infection signs and symptoms  Description: Will show no infection signs and symptoms  Outcome: Ongoing  Goal: Absence of new skin breakdown  Description: Absence of new skin breakdown  Outcome: Ongoing     Problem: Falls - Risk of:  Goal: Will remain free from falls  Description: Will remain free from falls  Outcome: Ongoing  Goal: Absence of physical injury  Description: Absence of physical injury  Outcome: Ongoing     Problem: Injury - Risk of, Physical Injury:  Goal: Will remain free from falls  Description: Will remain free from falls  Outcome: Ongoing  Goal: Absence of physical injury  Description: Absence of physical injury  Outcome: Ongoing

## 2021-07-18 NOTE — PLAN OF CARE
Problem: Skin Integrity:  Goal: Will show no infection signs and symptoms  Description: Will show no infection signs and symptoms  Outcome: Ongoing     Problem: Skin Integrity:  Goal: Absence of new skin breakdown  Description: Absence of new skin breakdown  Outcome: Ongoing     Problem: Falls - Risk of:  Goal: Will remain free from falls  Description: Will remain free from falls  Outcome: Ongoing     Problem: Falls - Risk of:  Goal: Absence of physical injury  Description: Absence of physical injury  Outcome: Ongoing     Problem: Confusion - Acute:  Goal: Absence of continued neurological deterioration signs and symptoms  Description: Absence of continued neurological deterioration signs and symptoms  Outcome: Ongoing     Problem: Discharge Planning:  Goal: Ability to perform activities of daily living will improve  Description: Ability to perform activities of daily living will improve  Outcome: Ongoing     Problem: Injury - Risk of, Physical Injury:  Goal: Will remain free from falls  Description: Will remain free from falls  Outcome: Ongoing     Problem: Injury - Risk of, Physical Injury:  Goal: Absence of physical injury  Description: Absence of physical injury  Outcome: Ongoing     Problem: Sleep Pattern Disturbance:  Goal: Appears well-rested  Description: Appears well-rested  Outcome: Ongoing

## 2021-07-18 NOTE — PROGRESS NOTES
Christa Saba, PPatient Assessment complete. Acute renal failure (ARF) ([facility] [N17.9] . Vitals:    07/18/21 0753   BP:    Pulse:    Resp: 24   Temp:    SpO2: 95%   . Patients home meds are   Prior to Admission medications    Medication Sig Start Date End Date Taking? Authorizing Provider   celecoxib (CELEBREX) 100 MG capsule take 1 capsule by mouth twice a day 6/3/21   ALISON King CNP   dextromethorphan-guaiFENesin (ROBITUSSIN-DM)  MG/5ML syrup Take 10 mLs by mouth every 4 hours as needed for Cough 6/1/21   oPppy Whaley MD   metFORMIN (GLUCOPHAGE) 1000 MG tablet Take 1 tablet by mouth 2 times daily (with meals) 6/1/21   Poppy Whaley MD   pregabalin (LYRICA) 225 MG capsule Take 1 capsule by mouth 2 times daily for 30 days. 5/7/21 6/6/21  Jose Rankin MD   dilTIAZem [place] [facility]) 180 MG extended release capsule Take 180 mg by mouth daily 11/5/20   Historical Provider, MD   magnesium oxide (MAG-OX) 400 MG tablet Take 400 mg by mouth daily    Historical Provider, MD   NONFORMULARY Take 1 tablet by mouth daily ON COR vitamin - to help keep bladder cancer away    Historical Provider, MD   acetaminophen (TYLENOL) 325 MG tablet Take 650 mg by mouth every 6 hours as needed for Pain    Historical Provider, MD   spironolactone (ALDACTONE) 25 MG tablet Take 25 mg by mouth daily    Historical Provider, MD   albuterol sulfate  (90 Base) MCG/ACT inhaler Inhale 2 puffs into the lungs every 6 hours as needed    Historical Provider, MD   lidocaine (XYLOCAINE) 5 % ointment Apply topically as needed.  10/21/20   ALISON King CNP   sennosides-docusate sodium (SENOKOT-S) 8.6-50 MG tablet Take 1 tablet by mouth daily    Historical Provider, MD   Multiple Vitamins-Minerals (ONCOVITE) TABS Take by mouth    Historical Provider, MD   loratadine (CLARITIN) 10 MG tablet Take 10 mg by mouth daily    Historical Provider, MD   tiotropium (SPIRIVA HANDIHALER) 18 MCG inhalation capsule Inhale 18 mcg into the lungs daily    Historical Provider, MD   apixaban (ELIQUIS) 5 MG TABS tablet Take by mouth 2 times daily    Historical Provider, MD   potassium chloride (KLOR-CON M) 10 MEQ extended release tablet Take 10 mEq by mouth 2 times daily    Historical Provider, MD   CPAP Machine MISC by Does not apply route BiPAP    Historical Provider, MD   budesonide-formoterol (SYMBICORT) 160-4.5 MCG/ACT AERO Inhale 2 puffs into the lungs 2 times daily    Historical Provider, MD   bumetanide (BUMEX) 2 MG tablet Take 2 mg by mouth daily    Historical Provider, MD   magnesium hydroxide (MILK OF MAGNESIA) 400 MG/5ML suspension Take by mouth daily as needed for Constipation    Historical Provider, MD   ibuprofen (IBU) 400 MG tablet Take 400 mg by mouth every 6 hours as needed for Pain    Historical Provider, MD   metoprolol succinate (TOPROL XL) 25 MG extended release tablet Take 25 mg by mouth daily    Historical Provider, MD   Cyanocobalamin (VITAMIN B 12) 500 MCG TABS Take by mouth    Historical Provider, MD   pantoprazole (PROTONIX) 40 MG tablet Take 40 mg by mouth daily    Historical Provider, MD   .      RR 20  Breath Sounds: Clear      · Bronchodilator assessment at level  1  ·   · [x]    Bronchodilator Assessment  BRONCHODILATOR ASSESSMENT SCORE  Score 0 1 2 3 4 5   Breath Sounds   []  Patient Baseline [x]  No Wheeze good aeration []  Faint, scattered wheezing, good aeration []  Expiratory Wheezing and or moderately diminished []  Insp/Exp wheeze and/or very diminished []  Insp/Exp and/ or marked distress   Respiratory Rate   [x]  Patient Baseline []  Less than 20 []  Less than 20 [x]  20-25 []  Greater than 25 []  Greater than 25   Peak flow % of Pred or PB [x]  NA   []  Greater than 90%  []  81-90% []  71-80% []  Less than or equal to 70%  or unable to perform []  Unable due to Respiratory Distress   Dyspnea re []  Patient Baseline [x]  No SOB []  No SOB []  SOB on exertion []  SOB min activity []  At rest/acute   e FEV% Predicted       [x]  NA []  Above 69%  []  Unable []  Above 60-69%  []  Unable []  Above 50-59%  []  Unable []  Above 35-49%  []  Unable []  Less than 35%  []  Unable

## 2021-07-18 NOTE — CARE COORDINATION
Transitional Planning:    Per nursing request, met with pt and exwife at bedside to discuss either SNF or home with [facility]. Pt is insisting on going home with [facility] and to NOT going to SNF despite max encouragement. Discussed HH options, he stated he has had [facility] in past and does not want to go through them again. He requested referral sent to [facility]. Referral sent. [phone] received call from [facility] with Jakob, they are able to accept the pt for [facility]. Updated FRANKLIN.

## 2021-07-18 NOTE — PROGRESS NOTES
[facility]  Office: [address], DO, Rosettepiotr Arvind, DO, Thania Jin, DO, Kimberly Salinas, DO, Cristina Vasques MD, Mikey Martines MD, Jacquie Meza MD, Nando Edmonds MD, Jd Roca MD, Marian Tellez MD, Isha Donohue MD, Adrianna Keith, DO, Serena Felton MD, Pavan Noel DO, Earnestine Cruz MD,  Anna Mondragon DO, Angus Hernandez MD, Ria King MD, Sergio Dominguez MD, Eleno Heller MD, Lashawn Durant MD, Brooklyn Graham MD, Costa Taylor [facility], [facility], CNP, Chapo Luis, CNP, Beverly Tran, CNS, Peña Carpenter, CNP, Henna Fontenot, CNP, Jose Bonner, CNP, Malika Montes De Oca, [facility], Williams Chavis, CNP, Mariam Harper PA-C, Yunier De Jesus, [facility], Tiana Lebron, CNP, Lillian Vega, CNP, Mau Murry, CNP, Alana Montalvo, CNP, Elia Blackman CNP, Alexa Reardon, CNP, Petra Kelly CNP, [facility], [address] [place] [address]    Progress Note    7/18/2021    3:54 PM    Name:   Kalyani Salinas  MRN:     [id number]     Acct:      [de-identified]   Room:   [address] [place] [address] Day:  6  Admit Date:  7/12/2021  1:09 PM    PCP:   Benjy Varela  Code Status:  Full Code    Subjective:     C/C: sob     Interval History Status: not changed. Pt was seen and examined this morning. Patient reports that shortness of breath slightly better. He reports that he had a panic attack last night. Denies any other complaint at this time. Brief History:     66 y. o. male hx of CAD s/p CABG 2005, COPD, type 2 diabetes, chronic diastolic CHF, cor pulmonale, bladder cancer per pt daughter in 2014, and PNA LLL vs mass in May 2021 admitted as transfer from [facility] for unclear etiology hypotension, found to have LAKESHA and potassium of 7. Patient does not know why he was sent to outside facility, was at a nursing home and was noted to be hypotensive.  Pt not intubated at arrival.  Cardiology was consulted for atrial flutter and elevated troponin at 173.  Rate controlled with Lopressor and Toprol.  Patient was briefly hypotensive on presentation and needed Levophed support. Nephrology was consulted for LAKESHA, hyponatremia and hyperkalemia.  Received 1 dose of Bumex with excellent response in urine output.  LAKESHA resolved now.  Getting gentle hydration sodium improved to 139 from 129.  Potassium decreased from 6.2 on presentation to 3.2. On chest x-ray, there is concern of left lower lobe infiltrate likely pneumonia.  Cultures negative.  Patient treated with cefepime and vancomycin.  WBCs improving.  Breathing well on 5 L nasal cannula.  Reports he uses 2 liter oxygen at home. Review of Systems:     12 point ROS performed and negative for anything other than what was stated in subjective     Medications: Allergies:     Allergies   Allergen Reactions    Niacin And Related     Other      Nicotine patch - rash    Statins        Current Meds:   Scheduled Meds:    midodrine  5 mg Oral TID WC    cefepime  2,000 mg Intravenous Q12H    hydrocortisone sodium succinate PF  50 mg Intravenous Q8H    QUEtiapine  25 mg Oral Nightly    [Held by provider] bumetanide  1 mg Intravenous BID    [Held by provider] metoprolol succinate  25 mg Oral Daily    QUEtiapine  12.5 mg Oral Once    metoprolol  5 mg Intravenous Q6H    insulin glargine  0.25 Units/kg Subcutaneous Nightly    insulin lispro  0-6 Units Subcutaneous Q4H    budesonide-formoterol  2 puff Inhalation BID    dilTIAZem  180 mg Oral Daily    sodium chloride  1,000 mL Intravenous Once    sodium chloride flush  5-40 mL Intravenous 2 times per day    heparin (porcine)  5,000 Units Subcutaneous 3 times per day     Continuous Infusions:    dextrose      sodium chloride       PRN Meds: ipratropium-albuterol, albuterol, glucose, dextrose, glucagon (rDNA), dextrose, sodium chloride flush, sodium chloride, ondansetron **OR** ondansetron, polyethylene glycol, acetaminophen **OR** acetaminophen    Data:     Past Medical History:   has a past medical history of Agent orange exposure, Anxiety state, Cancer ([facility] CHF (congestive heart failure) ([facility] Chronic obstructive pulmonary disease (COPD) ([facility] Chronic post-traumatic stress disorder, Chronic respiratory failure with [facility]capnia ([facility] COPD exacerbation ([facility] Coronary artery disease, Degenerative disc disease, lumbar, Depression, Essential hypertension, History of acute pancreatitis, Hyperglycemia, Hypokalemia, Lumbosacral disc disease, Obesity, Obstructive sleep apnea, and Personal history of malignant neoplasm of bladder. Social History:   reports that he has been smoking cigarettes. He has a 54.00 pack-year smoking history. He has never used smokeless tobacco. He reports current alcohol use. He reports that he does not use drugs. Family History:   Family History   Problem Relation Age of Onset    Emphysema Mother     Heart Disease Father     Heart Failure Father        Vitals:  /74   Pulse 66   Temp 97.7 °F (36.5 °C) (Oral)   Resp 19   Ht 5' 10\" (1.778 m)   Wt 216 lb 14.9 oz (98.4 kg)   SpO2 95%   BMI 31.13 kg/m²   Temp (24hrs), Av.1 °F (36.7 °C), Min:97.7 °F (36.5 °C), Max:98.6 °F (37 °C)    Recent Labs     21  0036 21  0344 21  0756 21  1221   POCGLU 105 108 116* 215*       I/O (24Hr):     Intake/Output Summary (Last 24 hours) at 2021 1554  Last data filed at 2021 1207  Gross per 24 hour   Intake 240 ml   Output 700 ml   Net -460 ml       Labs:  Hematology:  Recent Labs     21  0757 21  0539 21  0619   WBC 8.8 9.0 8.8   RBC 3.15* 3.10* 3.22*   HGB 10.8* 10.2* 10.6*   HCT 33.7* 32.3* 34.9*   .0* 104.2* 108.4*   MCH 34.3* 32.9 32.9   MCHC 32.0 31.6 30.4   RDW 15.5* 15.3* 15.3*    See Reflexed IPF Result 171   MPV 11.2 NOT REPORTED 11.3     Chemistry:  Recent Labs     21  0757 21  0539 21  0619    142 142   K 3.5* 3.5* 3.3*   CL 97* 99 99   CO2 31 31 32*   GLUCOSE 181* 110* 122*   BUN 30* 26* 22   CREATININE 0.56* 0.46* 0.37*   MG  --  2.2 2.2   ANIONGAP 9 12 11   LABGLOM >60 >60 >60   GFRAA >60 >60 >60   CALCIUM 8.4* 8.3* 9.0     Recent Labs     07/16/21  0757 07/16/21  1205 07/17/21  1601 07/17/21  2049 07/18/21  0036 07/18/21  0344 07/18/21  0756 07/18/21  1221   PROT 6.0*  --   --   --   --   --   --   --    LABALBU 3.4*  --   --   --   --   --   --   --    AST 24  --   --   --   --   --   --   --    ALT 24  --   --   --   --   --   --   --    ALKPHOS 110  --   --   --   --   --   --   --    BILITOT 0.58  --   --   --   --   --   --   --    BILIDIR 0.25  --   --   --   --   --   --   --    POCGLU  --    < > 126* 124* 105 108 116* 215*    < > = values in this interval not displayed. ABG:  Lab Results   Component Value Date    POCPH 7.319 07/12/2021    POCPCO2 57.8 07/12/2021    POCPO2 69.2 07/12/2021    POCHCO3 29.8 07/12/2021    NBEA NOT REPORTED 07/12/2021    PBEA 3 07/12/2021    SKO6XGI NOT REPORTED 07/12/2021    BKOF1EHD 92 07/12/2021    FIO2 5.0 07/12/2021     Lab Results   Component Value Date/Time    SPECIAL  L ARM 2ML 07/12/2021 04:53 PM    SPECIAL  L HAND 2 ML 07/12/2021 04:53 PM     Lab Results   Component Value Date/Time    CULTURE NO GROWTH 6 DAYS 07/12/2021 04:53 PM    CULTURE NO GROWTH 6 DAYS 07/12/2021 04:53 PM       Radiology:  XR CHEST (2 VW)    Result Date: 7/16/2021  Improving pulmonary vascular congestion. Continued left basilar infiltrate or pneumonia and small left pleural effusion. XR CHEST PORTABLE    Result Date: 7/12/2021  Increasing consolidation effusion in the left lower lobe. Right-sided central venous catheter distal tip the superior vena cava no evidence of pneumothorax     US RETROPERITONEAL COMPLETE    Result Date: 7/12/2021  Somewhat limited but essentially unremarkable renal ultrasound; the left kidney is poorly visualized. No hydronephrosis.        Physical Examination: General appearance:  alert, cooperative and no distress  Mental Status:  oriented to person, place and time and normal affect  Lungs:  decreased breath sounds bilaterally   Heart:  regular rate and rhythm, no murmur  Abdomen:  soft, nontender, nondistended, normal bowel sounds   Extremities:  no edema, redness, tenderness in the calves  Skin:  no gross lesions, rashes, induration    Assessment:        Hospital Problems         Last Modified POA    Acute renal failure (ARF) ([facility] 7/12/2021 Yes          Plan:        1. Acute on chronic hypoxic/hypercapneic respiratory failure   2. COPD   3. LLL pneumonia   - pulmonary on board   - d/c vancomycin, negative MRSA  - continue cefepime  - blood cultures NGTD  - continue solu cortef   - v/s per unit protocol   - continue RT protocol prn   - remains on 4 L of oxygen at this time, baseline home oxygen of 2L  - bipap qhs   - repeat chest xray showed improvement       4. LAKESHA   5. Hypokalemia   6. Hyponatremia   - nephrology on board  - renal function improved   - monitor Is/Os   - renal injury likley related ischemic ATN secondary to hypotension/NSAIDs  - diuretics held   - monitor urine output   - avoid NSAIDs/ROJAS-2 inhibitors/aldactone      7. Paroxysmal afib   8. Hx of CABG   - telemetry   - continue cardizem, lopressor      9. DMII   - accu checks ac/hs with ISS  - continue home lantus     10.  DISPO  - likely d/c within next 24 hours   - pt remains sob and requiring higher oxygen than baseline   - PT/OT     Ria King MD  7/18/2021  3:54 PM

## 2021-07-18 NOTE — DISCHARGE INSTR - COC
 Pulmonary hypertension with chronic cor pulmonale (HCC) I27.29    RODNEY treated with BiPAP G47.33    Smoking greater than 40 pack years F17.210    Class 1 obesity due to excess calories with serious comorbidity and body mass index (BMI) of 30.0 to 30.9 in adult E66.09, Z68.30    Stage 4 very severe COPD by GOLD classification ([facility] J44.9    Hyponatremia E87.1    Uncontrolled type 2 diabetes mellitus with hyperglycemia ([facility]) E11.65    Acute renal failure (HCC) N17.9    LAKESHA (acute kidney injury) ([facility] N17.9    Acute renal failure (ARF) ([facility]) N17.9       Isolation/Infection:   Isolation            No Isolation          Patient Infection Status       None to display            Nurse Assessment:  Last Vital Signs: /77   Pulse 66   Temp 98.4 °F (36.9 °C) (Temporal)   Resp 17   Ht 5' 10\" (1.778 m)   Wt 216 lb 14.9 oz (98.4 kg)   SpO2 93%   BMI 31.13 kg/m²     Last documented pain score (0-10 scale): Pain Level: 0  Last Weight:   Wt Readings from Last 1 Encounters:   07/12/21 216 lb 14.9 oz (98.4 kg)     Mental Status:  oriented and alert    IV Access:  - Peripheral IV - site  R forearm, insertion date: 7/16/21    Nursing Mobility/ADLs:  Walking   Assisted  Transfer  Assisted  Bathing  Assisted  Dressing  Assisted  Toileting  Assisted  Feeding  Independent  Med Admin  Assisted  Med Delivery   whole    Wound Care Documentation and Therapy:        Elimination:  Continence:   · Bowel: Yes  · Bladder: Yes  Urinary Catheter: None   Colostomy/Ileostomy/Ileal Conduit: No       Date of Last BM:     Intake/Output Summary (Last 24 hours) at 7/18/2021 1804  Last data filed at 7/18/2021 1800  Gross per 24 hour   Intake 240 ml   Output 900 ml   Net -660 ml     I/O last 3 completed shifts: In: 240 [P.O.:240]  Out: 700 [Urine:700]    Safety Concerns:      At Risk for Falls    Impairments/Disabilities:      None    Nutrition Therapy:  Current Nutrition Therapy:   - Oral Diet:  General    Routes of Feeding: Oral  Liquids: Thin Liquids  Daily Fluid Restriction: no  Last Modified Barium Swallow with Video (Video Swallowing Test): not done    Treatments at the Time of Hospital Discharge:   Respiratory Treatments: Symbicort BID  Oxygen Therapy:  is on oxygen at 4 L/min per nasal cannula. Ventilator:    - CPAP   only when sleeping    Rehab Therapies: Physical Therapy and Occupational Therapy  Weight Bearing Status/Restrictions: No weight bearing restirctions  Other Medical Equipment (for information only, NOT a DME order):  walker, bath bench and bedside commode  Other Treatments:     Patient's personal belongings (please select all that are sent with patient):  Claudia    RN SIGNATURE:  [address] [place] [address] RN    CASE MANAGEMENT/SOCIAL WORK SECTION    Inpatient Status Date: ***    Readmission Risk Assessment Score:  Readmission Risk              Risk of Unplanned Readmission:  18           Discharging to Facility/ Agency   · Name: [facility] [address] [place] [address]         Phone: [phone]       Fax: [phone]          Dialysis Facility (if applicable)   · Name:  · Address:  · Dialysis Schedule:  · Phone:  · Fax:    / signature: Electronically signed by Milton Hoffman RN on 7/18/21 at 6:04 PM EDT    PHYSICIAN SECTION    Prognosis: Guarded    Condition at Discharge: Stable    Rehab Potential (if transferring to Rehab): Guarded    Recommended Labs or Other Treatments After Discharge: Continue current treatments    Physician Certification: I certify the above information and transfer of Dulce Hilton  is necessary for the continuing treatment of the diagnosis listed and that he requires [address] for greater 30 days.      Update Admission H&P: No change in H&P    PHYSICIAN SIGNATURE:  Electronically signed by Poppy Perez MD on 7/19/21 at 9:57 AM EDT

## 2021-07-19 PROBLEM — N17.9 ACUTE RENAL FAILURE (ARF) (HCC): Status: RESOLVED | Noted: 2021-07-12 | Resolved: 2021-07-19

## 2021-07-19 LAB
ABSOLUTE EOS #: 0.05 K/UL (ref 0–0.44)
ABSOLUTE IMMATURE GRANULOCYTE: 0.09 K/UL (ref 0–0.3)
ABSOLUTE LYMPH #: 1.64 K/UL (ref 1.1–3.7)
ABSOLUTE MONO #: 0.99 K/UL (ref 0.1–1.2)
ANION GAP SERPL CALCULATED.3IONS-SCNC: 11 MMOL/L (ref 9–17)
BASOPHILS # BLD: 0 % (ref 0–2)
BASOPHILS ABSOLUTE: 0.03 K/UL (ref 0–0.2)
BUN BLDV-MCNC: 26 MG/DL (ref 8–23)
BUN/CREAT BLD: ABNORMAL (ref 9–20)
CALCIUM SERPL-MCNC: 8.4 MG/DL (ref 8.6–10.4)
CHLORIDE BLD-SCNC: 100 MMOL/L (ref 98–107)
CO2: 30 MMOL/L (ref 20–31)
CREAT SERPL-MCNC: 0.59 MG/DL (ref 0.7–1.2)
DIFFERENTIAL TYPE: ABNORMAL
EOSINOPHILS RELATIVE PERCENT: 1 % (ref 1–4)
GFR AFRICAN AMERICAN: >60 ML/MIN
GFR NON-AFRICAN AMERICAN: >60 ML/MIN
GFR SERPL CREATININE-BSD FRML MDRD: ABNORMAL ML/MIN/{1.73_M2}
GFR SERPL CREATININE-BSD FRML MDRD: ABNORMAL ML/MIN/{1.73_M2}
GLUCOSE BLD-MCNC: 111 MG/DL (ref 75–110)
GLUCOSE BLD-MCNC: 126 MG/DL (ref 75–110)
GLUCOSE BLD-MCNC: 128 MG/DL (ref 75–110)
GLUCOSE BLD-MCNC: 134 MG/DL (ref 70–99)
GLUCOSE BLD-MCNC: 146 MG/DL (ref 75–110)
GLUCOSE BLD-MCNC: 157 MG/DL (ref 75–110)
GLUCOSE BLD-MCNC: 165 MG/DL (ref 75–110)
GLUCOSE BLD-MCNC: 168 MG/DL (ref 75–110)
GLUCOSE BLD-MCNC: 194 MG/DL (ref 75–110)
HCT VFR BLD CALC: 32.8 % (ref 40.7–50.3)
HEMOGLOBIN: 10.4 G/DL (ref 13–17)
IMMATURE GRANULOCYTES: 1 %
LYMPHOCYTES # BLD: 16 % (ref 24–43)
MCH RBC QN AUTO: 33 PG (ref 25.2–33.5)
MCHC RBC AUTO-ENTMCNC: 31.7 G/DL (ref 28.4–34.8)
MCV RBC AUTO: 104.1 FL (ref 82.6–102.9)
MONOCYTES # BLD: 10 % (ref 3–12)
NRBC AUTOMATED: 0 PER 100 WBC
PDW BLD-RTO: 15.5 % (ref 11.8–14.4)
PLATELET # BLD: 234 K/UL (ref 138–453)
PLATELET ESTIMATE: ABNORMAL
PMV BLD AUTO: 11.6 FL (ref 8.1–13.5)
POTASSIUM SERPL-SCNC: 3.9 MMOL/L (ref 3.7–5.3)
RBC # BLD: 3.15 M/UL (ref 4.21–5.77)
RBC # BLD: ABNORMAL 10*6/UL
SEG NEUTROPHILS: 72 % (ref 36–65)
SEGMENTED NEUTROPHILS ABSOLUTE COUNT: 7.64 K/UL (ref 1.5–8.1)
SODIUM BLD-SCNC: 141 MMOL/L (ref 135–144)
WBC # BLD: 10.4 K/UL (ref 3.5–11.3)
WBC # BLD: ABNORMAL 10*3/UL

## 2021-07-19 PROCEDURE — 2700000000 HC OXYGEN THERAPY PER DAY

## 2021-07-19 PROCEDURE — 6360000002 HC RX W HCPCS: Performed by: INTERNAL MEDICINE

## 2021-07-19 PROCEDURE — 6360000002 HC RX W HCPCS: Performed by: STUDENT IN AN ORGANIZED HEALTH CARE EDUCATION/TRAINING PROGRAM

## 2021-07-19 PROCEDURE — 80048 BASIC METABOLIC PNL TOTAL CA: CPT

## 2021-07-19 PROCEDURE — 99233 SBSQ HOSP IP/OBS HIGH 50: CPT | Performed by: INTERNAL MEDICINE

## 2021-07-19 PROCEDURE — 2580000003 HC RX 258: Performed by: INTERNAL MEDICINE

## 2021-07-19 PROCEDURE — 82947 ASSAY GLUCOSE BLOOD QUANT: CPT

## 2021-07-19 PROCEDURE — 97116 GAIT TRAINING THERAPY: CPT

## 2021-07-19 PROCEDURE — 2060000000 HC ICU INTERMEDIATE R&B

## 2021-07-19 PROCEDURE — 94761 N-INVAS EAR/PLS OXIMETRY MLT: CPT

## 2021-07-19 PROCEDURE — 97530 THERAPEUTIC ACTIVITIES: CPT

## 2021-07-19 PROCEDURE — 85025 COMPLETE CBC W/AUTO DIFF WBC: CPT

## 2021-07-19 PROCEDURE — 6370000000 HC RX 637 (ALT 250 FOR IP)

## 2021-07-19 PROCEDURE — 94660 CPAP INITIATION&MGMT: CPT

## 2021-07-19 PROCEDURE — 6370000000 HC RX 637 (ALT 250 FOR IP): Performed by: EMERGENCY MEDICINE

## 2021-07-19 PROCEDURE — 2580000003 HC RX 258: Performed by: EMERGENCY MEDICINE

## 2021-07-19 PROCEDURE — 97110 THERAPEUTIC EXERCISES: CPT

## 2021-07-19 PROCEDURE — 6370000000 HC RX 637 (ALT 250 FOR IP): Performed by: INTERNAL MEDICINE

## 2021-07-19 PROCEDURE — 36415 COLL VENOUS BLD VENIPUNCTURE: CPT

## 2021-07-19 PROCEDURE — 94640 AIRWAY INHALATION TREATMENT: CPT

## 2021-07-19 PROCEDURE — 99232 SBSQ HOSP IP/OBS MODERATE 35: CPT | Performed by: INTERNAL MEDICINE

## 2021-07-19 RX ORDER — MIDODRINE HYDROCHLORIDE 5 MG/1
5 TABLET ORAL
Qty: 90 TABLET | Refills: 3 | Status: SHIPPED | OUTPATIENT
Start: 2021-07-19

## 2021-07-19 RX ORDER — PREDNISONE 10 MG/1
TABLET ORAL
Qty: 30 TABLET | Refills: 0 | Status: ON HOLD | OUTPATIENT
Start: 2021-07-19 | End: 2021-08-15 | Stop reason: HOSPADM

## 2021-07-19 RX ADMIN — HYDROCORTISONE SODIUM SUCCINATE 50 MG: 100 INJECTION, POWDER, FOR SOLUTION INTRAMUSCULAR; INTRAVENOUS at 22:19

## 2021-07-19 RX ADMIN — BUDESONIDE AND FORMOTEROL FUMARATE DIHYDRATE 2 PUFF: 160; 4.5 AEROSOL RESPIRATORY (INHALATION) at 20:11

## 2021-07-19 RX ADMIN — TIOTROPIUM BROMIDE INHALATION SPRAY 2 PUFF: 3.12 SPRAY, METERED RESPIRATORY (INHALATION) at 11:22

## 2021-07-19 RX ADMIN — DILTIAZEM HYDROCHLORIDE 180 MG: 180 CAPSULE, COATED, EXTENDED RELEASE ORAL at 09:20

## 2021-07-19 RX ADMIN — ACETAMINOPHEN 650 MG: 325 TABLET ORAL at 21:24

## 2021-07-19 RX ADMIN — HYDROCORTISONE SODIUM SUCCINATE 50 MG: 100 INJECTION, POWDER, FOR SOLUTION INTRAMUSCULAR; INTRAVENOUS at 14:49

## 2021-07-19 RX ADMIN — SODIUM CHLORIDE, PRESERVATIVE FREE 10 ML: 5 INJECTION INTRAVENOUS at 22:51

## 2021-07-19 RX ADMIN — HYDROCORTISONE SODIUM SUCCINATE 50 MG: 100 INJECTION, POWDER, FOR SOLUTION INTRAMUSCULAR; INTRAVENOUS at 06:02

## 2021-07-19 RX ADMIN — CEFEPIME HYDROCHLORIDE 2000 MG: 2 INJECTION, POWDER, FOR SOLUTION INTRAVENOUS at 00:28

## 2021-07-19 RX ADMIN — CEFEPIME HYDROCHLORIDE 2000 MG: 2 INJECTION, POWDER, FOR SOLUTION INTRAVENOUS at 12:57

## 2021-07-19 RX ADMIN — HEPARIN SODIUM 5000 UNITS: 5000 INJECTION INTRAVENOUS; SUBCUTANEOUS at 22:18

## 2021-07-19 RX ADMIN — INSULIN LISPRO 1 UNITS: 100 INJECTION, SOLUTION INTRAVENOUS; SUBCUTANEOUS at 00:22

## 2021-07-19 RX ADMIN — MIDODRINE HYDROCHLORIDE 5 MG: 5 TABLET ORAL at 18:00

## 2021-07-19 RX ADMIN — MIDODRINE HYDROCHLORIDE 5 MG: 5 TABLET ORAL at 12:56

## 2021-07-19 RX ADMIN — MIDODRINE HYDROCHLORIDE 5 MG: 5 TABLET ORAL at 09:20

## 2021-07-19 RX ADMIN — SODIUM CHLORIDE, PRESERVATIVE FREE 10 ML: 5 INJECTION INTRAVENOUS at 09:21

## 2021-07-19 RX ADMIN — INSULIN LISPRO 1 UNITS: 100 INJECTION, SOLUTION INTRAVENOUS; SUBCUTANEOUS at 09:22

## 2021-07-19 RX ADMIN — INSULIN LISPRO 1 UNITS: 100 INJECTION, SOLUTION INTRAVENOUS; SUBCUTANEOUS at 12:50

## 2021-07-19 RX ADMIN — INSULIN LISPRO 1 UNITS: 100 INJECTION, SOLUTION INTRAVENOUS; SUBCUTANEOUS at 21:21

## 2021-07-19 RX ADMIN — QUETIAPINE FUMARATE 25 MG: 25 TABLET ORAL at 21:24

## 2021-07-19 RX ADMIN — HEPARIN SODIUM 5000 UNITS: 5000 INJECTION INTRAVENOUS; SUBCUTANEOUS at 14:59

## 2021-07-19 RX ADMIN — BUDESONIDE AND FORMOTEROL FUMARATE DIHYDRATE 2 PUFF: 160; 4.5 AEROSOL RESPIRATORY (INHALATION) at 09:20

## 2021-07-19 RX ADMIN — HEPARIN SODIUM 5000 UNITS: 5000 INJECTION INTRAVENOUS; SUBCUTANEOUS at 06:00

## 2021-07-19 ASSESSMENT — PAIN SCALES - GENERAL
PAINLEVEL_OUTOF10: 6
PAINLEVEL_OUTOF10: 0

## 2021-07-19 NOTE — PROGRESS NOTES
Physical Therapy  Facility/Department: [address] STE[address]  Daily Treatment Note  NAME: Timmy Tatum  : 1950  MRN: [id number]    Date of Service: 2021    Discharge Recommendations: Further therapy recommended at discharge. Patient would benefit from continued therapy after discharge        Assessment   Body structures, Functions, Activity limitations: Decreased functional mobility ; Decreased balance;Decreased coordination;Decreased sensation;Decreased strength;Decreased endurance  Assessment: Pt amb 28' x2 with CGA using RW. Pt demo SOB easily durring session. Pt continued to require A with amb at this date. Pt will benefit from continued therapy to adress further deficits. Prognosis: Good  Decision Making: Medium Complexity  Clinical Presentation: evolving  REQUIRES PT FOLLOW UP: Yes  Activity Tolerance  Activity Tolerance: Patient limited by fatigue;Patient limited by endurance     Patient Diagnosis(es): There were no encounter diagnoses. has a past medical history of Agent orange exposure, Anxiety state, Cancer ([address] CHF (congestive heart failure) ([address] Chronic obstructive pulmonary disease (COPD) ([address] Chronic post-traumatic stress disorder, Chronic respiratory failure with hypercapnia ([address] COPD exacerbation ([address] Coronary artery disease, Degenerative disc disease, lumbar, Depression, Essential hypertension, History of acute pancreatitis, Hyperglycemia, Hypokalemia, Lumbosacral disc disease, Obesity, Obstructive sleep apnea, and Personal history of malignant neoplasm of bladder. has a past surgical history that includes Bladder surgery; other surgical history; other surgical history (Bilateral, 02/10/2021); and Pain management procedure (Bilateral, 02/10/2021). Restrictions  Restrictions/Precautions  Restrictions/Precautions: Fall Risk  Required Braces or Orthoses?: No  Position Activity Restriction  Other position/activity restrictions:  Up with assist. On 6 LPM  Subjective General  Chart Reviewed: Yes  Family / Caregiver Present: No  Subjective  Subjective: RN and pt agreeable to PT. Pt notes \"wants to go home\". pt was sitting in recliner upon arrivial.  Pain Screening  Patient Currently in Pain: Denies  Vital Signs  Patient Currently in Pain: Denies       Orientation  Orientation  Overall Orientation Status: Within Functional Limits  Cognition      Objective   Bed mobility  Bridging: Unable to assess  Rolling to Left: Unable to assess  Rolling to Right: Unable to assess  Supine to Sit: Unable to assess  Scooting: Unable to assess  Comment: Pt was sitting in recliner upon arrivial.  Transfers  Sit to Stand: Stand by assistance  Stand to sit: Stand by assistance  Ambulation  Ambulation?: Yes  More Ambulation?: No  Ambulation 1  Surface: level tile  Device: Rolling Walker  Assistance: Contact guard assistance  Quality of Gait: Pt is unsteady and cautious during amb with decrease step length and height. Limited by fear of falling. Gait Deviations: Slow Janett;Decreased head and trunk rotation;Decreased step length;Decreased step height;Shuffles  Distance: 35 x2  Comments: Short of breath during amb required intermittent rest breaks      Balance  Posture: Good  Sitting - Static: Good  Sitting - Dynamic: Good  Standing - Static: Fair;+  Standing - Dynamic: Fair;+  Comments: standing assesed with RW. Exercises: Seated LE exercise program: Long Arc Quads, hip abduction/adduction, heel/toe raises, and marches. Reps: 10x    Goals  Short term goals  Time Frame for Short term goals: 14 visits  Short term goal 1: Sit to/from stand with supervision. Short term goal 2: Ambulate 200' with walker with SBA. Short term goal 3: Improve LE strength and endurance as seen by completion of 15 reps standing LE exercise.     Plan    Plan  Times per week: 5-6x/wk  Times per day: Daily  Current Treatment Recommendations: Strengthening, Endurance Training, Patient/Caregiver Education & Training, Gait Training, Home Exercise Program, Functional Mobility Training, Safety Education & Training, Equipment Evaluation, Education, & procurement, Transfer Training, Balance Training  Safety Devices  Type of devices: All fall risk precautions in place, Gait belt, Call light within reach, Left in chair, Nurse notified  Restraints  Initially in place: No     Therapy Time   Individual Concurrent Group Co-treatment   Time In 1335         Time Out 1413         Minutes 38         Timed Code Treatment Minutes: 38 Minutes    Treatment performed by Student PTA under the supervision of co-signing PTA who agrees with all treatment and documentation. Nubia Carry PTA. Rohan Bryant SPTA.

## 2021-07-19 NOTE — PROGRESS NOTES
Nutrition Assessment     Type and Reason for Visit: Initial (LOS)    Nutrition Recommendations/Plan: Modify current diet to provide 5 Carb Choices with each meal.  Encourage/monitor PO intakes as tolerated. Nutrition Assessment:  Pt seen based on length of stay. Admitted d/t hypotension, mild anemia, and LAKESHA. PMH includes: CAD, COPD, DM, CHF, h/o bladder cancer. Pt reports a good appetite and PO intakes. Observed lunch tray which pt ate 100% of his meal.  Weight history reviewed. No recorded BM during admission noted. Labs reviewed. Meds reviewed: Solu-Cortef, Lantus, Humalog. Malnutrition Assessment:  Malnutrition Status: No malnutrition    Estimated Daily Nutrient Needs:  Energy (kcal): MSJ x 1.1-1.2 = 5263-4490 kcals/day; Weight Used for Energy Requirements:  Admission     Protein (g): 1.2-1.5 gm/kg =  gm pro/day; Weight Used for Protein Requirements:  Ideal        Fluid (ml/day): 1500 mL per diet order/MD; Weight Used for Fluid Requirements:  Other (Comment)      Nutrition Related Findings: Labs/Meds reviewed. No BM since admission. Current Nutrition Therapies:    ADULT DIET; Regular; 3 carb choices (45 gm/meal); Low Sodium (2 gm); 1500 ml    Anthropometric Measures:  · Height: 5' 10\" (177.8 cm)  · Current Body Wt: 216 lb 14.9 oz (98.4 kg)   · BMI: 31.1    Nutrition Diagnosis: Resolved  · Altered nutrition-related lab values related to renal dysfunction as evidenced by  (LAKESHA, hyperkalemia upon admission)    Nutrition Interventions:   Food and/or Nutrient Delivery:  Modify Current Diet (Modify diet to provide additional carb choices with each meal.)  Nutrition Education/Counseling:  No recommendation at this time   Coordination of Nutrition Care:  Continue to monitor while inpatient    Goals:  Oral intakes to meet % of estimated nutrition needs.        Nutrition Monitoring and Evaluation:   Food/Nutrient Intake Outcomes:  Food and Nutrient Intake  Physical Signs/Symptoms Outcomes:  Biochemical Data, Constipation, Nutrition Focused Physical Findings, Skin, Weight     Electronically signed by Mila Ritchie RD, LD on 7/19/21 at 1:55 PM EDT    Contact: [phone]

## 2021-07-19 NOTE — DISCHARGE SUMMARY
[facility]  Office: [address], DO, Amelia [place], DO, Jeremy Rueda, DO, Magui [place], DO, Alexander Rangel MD, Cory Yepez MD, Christopher Morris MD, Moris Handley MD, Vivien Castillo MD, Jefry Raman MD, Serjio Eastman MD, Luis Marks, DO, Melissa Barroso MD, Awais Jay, DO, Vishnu Dawkins MD,  Saintclair Lank, DO, Ebony Goodman MD, Liv Cerda MD, Daniella Rosenberg MD, Aristeo Alfaro MD, Leann Arias MD, Glenroy Jacinto MD, Daryle Springer, [facility], [facility], CNP, Christa Taylor, CNP, Edwin Camacho, CNS, Whitley Santana, CNP, Shadi Harrison, CNP, Brad Tran, CNP, Aminata Conley, CNP, Agustín Parekh, CNP, Wil Goodman PA-C, Carlos Egan, [facility], Vinod Garcia, CNP, Troy Wakefield, CNP, Chi Foy, CNP, Isabel Cardenas, CNP, Cherie Dhillon, CNP, Yumiko Hook, CNP, Daniel Petersen, CNP, Victory Asad, [address] [place] [address]    Discharge Summary     Patient ID: Claudia Alexandre  :  1950   MRN: [id number]     ACCOUNT:  [de-identified]   Patient's PCP: Vivienne Finley  Admit Date: 2021   Discharge Date: 2021     Length of Stay: 8  Code Status:  Full Code  Admitting Physician: Hina Acosta MD  Discharge Physician: Liv Cerda MD     Active Discharge Diagnoses:     Hospital Problem Lists:  Active Problems:    * No active hospital problems. *  Resolved Problems:    Acute renal failure (ARF) ([facility] [address]      Admission Condition:  poor     Discharged Condition: fair    Hospital Stay:     Hospital Course: As per chart  \"70 y. o. male hx of CAD s/p CABG , COPD, type 2 diabetes, chronic diastolic CHF, cor pulmonale, bladder cancer per pt daughter in , and PNA LLL vs mass in May 2021 admitted as transfer from [facility] for unclear etiology hypotension, found to have LAKESHA and potassium of 7. Patient does not know why he was sent to outside facility, was at a nursing home and was noted to be hypotensive.  Pt not intubated at arrival.  Cardiology was consulted for atrial flutter and elevated troponin at 173.  Rate controlled with Lopressor and Toprol.  Patient was briefly hypotensive on presentation and needed Levophed support. Nephrology was consulted for LAKESHA, hyponatremia and hyperkalemia.  Received 1 dose of Bumex with excellent response in urine output.  LAKESHA resolved now.  Getting gentle hydration sodium improved to 139 from 129.  Potassium decreased from 6.2 on presentation to 3.2. On chest x-ray, there is concern of left lower lobe infiltrate likely pneumonia.  Cultures negative.  Patient treated with cefepime and vancomycin.  WBCs improving.  Breathing well on 5 L nasal cannula.  Reports he uses 2 liter oxygen at home. \"    Throughout the course of her hospital stay patient was treated with IV antibiotics. He was also treated with high-dose steroids. He continued to require more amount of oxygen than his baseline. Patient was also evaluated by pulmonology and nephrology.   Patient stable to be discharged home with home health care    Significant therapeutic interventions: IV antibiotics and steroids    Significant Diagnostic Studies:   Labs / Micro:  CBC:   Lab Results   Component Value Date    WBC 9.7 07/20/2021    RBC 3.25 07/20/2021    HGB 10.7 07/20/2021    HCT 33.8 07/20/2021    .0 07/20/2021    MCH 32.9 07/20/2021    MCHC 31.7 07/20/2021    RDW 15.9 07/20/2021     07/20/2021     BMP:    Lab Results   Component Value Date    GLUCOSE 98 07/20/2021     07/20/2021    K 3.7 07/20/2021    CL 99 07/20/2021    CO2 31 07/20/2021    ANIONGAP 9 07/20/2021    BUN 24 07/20/2021    CREATININE 0.45 07/20/2021    BUNCRER NOT REPORTED 07/20/2021    CALCIUM 8.5 07/20/2021    LABGLOM >60 07/20/2021    GFRAA >60 07/20/2021    GFR      07/20/2021    GFR NOT REPORTED 07/20/2021     HFP:    Lab Results   Component Value Date    PROT 6.0 07/16/2021     CMP:    Lab Results   Component Value Date    GLUCOSE 98 07/20/2021     07/20/2021    K 3.7 07/20/2021    CL 99 07/20/2021    CO2 31 07/20/2021    BUN 24 07/20/2021    CREATININE 0.45 07/20/2021    ANIONGAP 9 07/20/2021    ALKPHOS 110 07/16/2021    ALT 24 07/16/2021    AST 24 07/16/2021    BILITOT 0.58 07/16/2021    LABALBU 3.4 07/16/2021    ALBUMIN 1.3 07/16/2021    LABGLOM >60 07/20/2021    GFRAA >60 07/20/2021    GFR      07/20/2021    GFR NOT REPORTED 07/20/2021    PROT 6.0 07/16/2021    CALCIUM 8.5 07/20/2021     PT/INR:  No results found for: PTINR, PROTIME, INR  PTT: No results found for: APTT  FLP:  No results found for: CHOL, TRIG, HDL  U/A:    Lab Results   Component Value Date    COLORU YELLOW 07/12/2021    TURBIDITY CLEAR 07/12/2021    SPECGRAV 1.008 07/12/2021    HGBUR LARGE 07/12/2021    PHUR 7.5 07/12/2021    PROTEINU 1+ 07/12/2021    GLUCOSEU NEGATIVE 07/12/2021    KETUA NEGATIVE 07/12/2021    BILIRUBINUR NEGATIVE 07/12/2021    UROBILINOGEN Normal 07/12/2021    NITRU NEGATIVE 07/12/2021    LEUKOCYTESUR SMALL 07/12/2021     TSH:    Lab Results   Component Value Date    TSH 1.24 07/12/2021        Radiology:  XR CHEST (2 VW)    Result Date: 7/16/2021  Improving pulmonary vascular congestion. Continued left basilar infiltrate or pneumonia and small left pleural effusion. XR CHEST PORTABLE    Result Date: 7/12/2021  Increasing consolidation effusion in the left lower lobe. Right-sided central venous catheter distal tip the superior vena cava no evidence of pneumothorax     US RETROPERITONEAL COMPLETE    Result Date: 7/12/2021  Somewhat limited but essentially unremarkable renal ultrasound; the left kidney is poorly visualized. No hydronephrosis.        Consultations:    Consults:     Final Specialist Recommendations/Findings:   IP CONSULT TO CARDIOLOGY  IP CONSULT TO NEPHROLOGY  IP CONSULT TO HOME CARE NEEDS      The patient was seen and examined on day of discharge and this discharge summary is in conjunction with any daily progress note from day of discharge. Discharge plan:     Disposition: Home    Physician Follow Up: Follow-up with PCP within 1 week after discharge  No follow-up provider specified. Requiring Further Evaluation/Follow Up POST HOSPITALIZATION/Incidental Findings: Continue current management    Diet: cardiac diet    Activity: As tolerated    Instructions to Patient: Be compliant with your diet, medications, follow-ups    Discharge Medications:      Medication List      START taking these medications    midodrine 5 MG tablet  Commonly known as: PROAMATINE  Take 1 tablet by mouth 3 times daily (with meals)     predniSONE 10 MG tablet  Commonly known as: DELTASONE  Take 40mg (4 tablets) twice a day for 3 days; then 30mg (3 tablets) twice a day for 3 days; then 40mg (4 tablets) for 3 days; the 20mg (2 tablet) for 3 days; then 10 mg for 3 days and stop        CONTINUE taking these medications    acetaminophen 325 MG tablet  Commonly known as: TYLENOL     albuterol sulfate  (90 Base) MCG/ACT inhaler     budesonide-formoterol 160-4.5 MCG/ACT Aero  Commonly known as: SYMBICORT     bumetanide 2 MG tablet  Commonly known as: BUMEX     celecoxib 100 MG capsule  Commonly known as: CELEBREX  take 1 capsule by mouth twice a day     CPAP Machine Misc     dextromethorphan-guaiFENesin  MG/5ML syrup  Commonly known as: ROBITUSSIN-DM  Take 10 mLs by mouth every 4 hours as needed for Cough     dilTIAZem 180 MG extended release capsule  Commonly known as: TIAZAC     Eliquis 5 MG Tabs tablet  Generic drug: apixaban      MG tablet  Generic drug: ibuprofen     lidocaine 5 % ointment  Commonly known as: XYLOCAINE  Apply topically as needed.      loratadine 10 MG tablet  Commonly known as: CLARITIN     magnesium oxide 400 MG tablet  Commonly known as: MAG-OX     metFORMIN 1000 MG tablet  Commonly known as: GLUCOPHAGE  Take 1 tablet by mouth 2 times daily (with meals)     metoprolol succinate 25 MG extended release tablet  Commonly known as: TOPROL XL     Milk of Magnesia 400 MG/5ML suspension  Generic drug: magnesium hydroxide     NONFORMULARY     [place]ite Tabs     pantoprazole 40 MG tablet  Commonly known as: PROTONIX     potassium chloride 10 MEQ extended release tablet  Commonly known as: KLOR-CON M     pregabalin 225 MG capsule  Commonly known as: LYRICA  Take 1 capsule by mouth 2 times daily for 30 days. sennosides-docusate sodium 8.6-50 MG tablet  Commonly known as: SENOKOT-S     Spiriva HandiHaler 18 MCG inhalation capsule  Generic drug: tiotropium     spironolactone 25 MG tablet  Commonly known as: ALDACTONE     Vitamin B 12 500 MCG Tabs           Where to Get Your Medications      These medications were sent to [address] [place]  [address]    Phone: [phone]   midodrine 5 MG tablet     You can get these medications from any pharmacy    Bring a paper prescription for each of these medications  predniSONE 10 MG tablet         No discharge procedures on file. Time Spent on discharge is  40 mins in patient examination, evaluation, counseling as well as medication reconciliation, prescriptions for required medications, discharge plan and follow up. Electronically signed by   Rubio Mcintosh MD  7/20/2021  10:22 AM      Thank you Dr. Denise Blakely for the opportunity to be involved in this patient's care.

## 2021-07-19 NOTE — PROGRESS NOTES
[facility]  Office: [address], DO, Geraldine Jenkins, DO, Ema Enriquez, DO, Rashaun Salinas, DO, Chris Grant MD, Simón Torres MD, Shannan Fink MD, Reagan Ridley MD, Ryann Holden MD, Lisa Harden MD, Latisha Levi MD, Karissa Humphrey, DO, Wenceslao Pham MD, Paco Mohan, DO, Paul White MD,  So Beal, DO, Ru Bourne MD, John Bautista MD, Fermin Parkinson MD, Nehemias Durham MD, Shannan Bliss MD, Garcia Smith MD, Michael Kramer, Kiera Villar, CNP, Tony Bansal, [facility], Ebony Lamas, CNS, Eri Oliveira, CNP, Charan Sparks, CNP, Radha Marx, CNP, Сергей Lopez, CNP, Mitch Parents, CNP, Den Cuellar PA-C, Mary Ramírez, [facility], Luis Stein, CNP, Hugo Phoenix, CNP, Pushpa Christianson, CNP, Jacklyn Frankel, CNP, Shiv Candelaria, CNP, Negro Riddle, CNP, Breanna Moreira, CNP, Donna Coyle, [address] [place] [address]    Progress Note    7/19/2021    2:33 PM    Name:   Charu Moore  MRN:     [id number]     Acct:      [de-identified]   Room:   [address] [place] [address] Day:  7  Admit Date:  7/12/2021  1:09 PM    PCP:   Huston Severs  Code Status:  Full Code    Subjective:     C/C: sob     Interval History Status: not changed. Pt was seen and examined this morning. Patient reports that shortness of breath slightly better. He reports that he had a panic attack last night. Denies any other complaint at this time. Brief History:     66 y. o. male hx of CAD s/p CABG 2005, COPD, type 2 diabetes, chronic diastolic CHF, cor pulmonale, bladder cancer per pt daughter in 2014, and PNA LLL vs mass in May 2021 admitted as transfer from [facility] for unclear etiology hypotension, found to have LAKESHA and potassium of 7. Patient does not know why he was sent to outside facility, was at a nursing home and was noted to be hypotensive.  Pt not intubated at arrival.  Cardiology was consulted for atrial flutter and elevated troponin at 173.  Rate controlled with Lopressor and Toprol.  Patient was briefly hypotensive on presentation and needed Levophed support. Nephrology was consulted for LAKESHA, hyponatremia and hyperkalemia.  Received 1 dose of Bumex with excellent response in urine output.  LAKESHA resolved now.  Getting gentle hydration sodium improved to 139 from 129.  Potassium decreased from 6.2 on presentation to 3.2. On chest x-ray, there is concern of left lower lobe infiltrate likely pneumonia.  Cultures negative.  Patient treated with cefepime and vancomycin.  WBCs improving.  Breathing well on 5 L nasal cannula.  Reports he uses 2 liter oxygen at home. Review of Systems:     12 point ROS performed and negative for anything other than what was stated in subjective     Medications: Allergies:     Allergies   Allergen Reactions    Niacin And Related     Other      Nicotine patch - rash    Statins        Current Meds:   Scheduled Meds:    tiotropium  2 puff Inhalation Daily    midodrine  5 mg Oral TID WC    cefepime  2,000 mg Intravenous Q12H    hydrocortisone sodium succinate PF  50 mg Intravenous Q8H    QUEtiapine  25 mg Oral Nightly    [Held by provider] bumetanide  1 mg Intravenous BID    [Held by provider] metoprolol succinate  25 mg Oral Daily    QUEtiapine  12.5 mg Oral Once    metoprolol  5 mg Intravenous Q6H    insulin glargine  0.25 Units/kg Subcutaneous Nightly    insulin lispro  0-6 Units Subcutaneous Q4H    budesonide-formoterol  2 puff Inhalation BID    dilTIAZem  180 mg Oral Daily    sodium chloride  1,000 mL Intravenous Once    sodium chloride flush  5-40 mL Intravenous 2 times per day    heparin (porcine)  5,000 Units Subcutaneous 3 times per day     Continuous Infusions:    dextrose      sodium chloride       PRN Meds: albuterol, glucose, dextrose, glucagon (rDNA), dextrose, sodium chloride flush, sodium chloride, ondansetron **OR** ondansetron, polyethylene glycol, acetaminophen **OR** acetaminophen    Data:     Past Medical History:   has a past medical history of Agent orange exposure, Anxiety state, [facility] ([facility] CHF (congestive heart failure) ([facility] Chronic obstructive pulmonary disease (COPD) ([facility] Chronic post-traumatic stress disorder, Chronic respiratory failure with hypercapnia ([facility] COPD exacerbation ([facility] Coronary artery disease, Degenerative disc disease, lumbar, Depression, Essential hypertension, History of acute pancreatitis, Hyperglycemia, Hypokalemia, Lumbosacral disc disease, Obesity, Obstructive sleep apnea, and Personal history of malignant neoplasm of bladder. Social History:   reports that he has been smoking cigarettes. He has a 54.00 pack-year smoking history. He has never used smokeless tobacco. He reports current alcohol use. He reports that he does not use drugs. Family History:   Family History   Problem Relation Age of Onset    Emphysema Mother     Heart Disease Father     Heart Failure Father        Vitals:  /82   Pulse 68   Temp 97.7 °F (36.5 °C) (Oral)   Resp 17   Ht 5' 10\" (1.778 m)   Wt 216 lb 14.9 oz (98.4 kg)   SpO2 95%   BMI 31.13 kg/m²   Temp (24hrs), Av °F (36.7 °C), Min:97.6 °F (36.4 °C), Max:98.4 °F (36.9 °C)    Recent Labs     21  2355 21  0446 21  0754 21  1144   POCGLU 194* 128* 165* 157*       I/O (24Hr):     Intake/Output Summary (Last 24 hours) at 2021 1433  Last data filed at 2021 1330  Gross per 24 hour   Intake 50 ml   Output 400 ml   Net -350 ml       Labs:  Hematology:  Recent Labs     21  0539 21  0619 21  0442   WBC 9.0 8.8 10.4   RBC 3.10* 3.22* 3.15*   HGB 10.2* 10.6* 10.4*   HCT 32.3* 34.9* 32.8*   .2* 108.4* 104.1*   MCH 32.9 32.9 33.0   MCHC 31.6 30.4 31.7   RDW 15.3* 15.3* 15.5*   PLT See Reflexed IPF Result 171 234   MPV NOT REPORTED 11.3 11.6     Chemistry:  Recent Labs     21  0539 21  0619 21  0442    142 141   K 3.5* 3. 3* 3.9   CL 99 99 100   CO2 31 32* 30   GLUCOSE 110* 122* 134*   BUN 26* 22 26*   CREATININE 0.46* 0.37* 0.59*   MG 2.2 2.2  --    ANIONGAP 12 11 11   LABGLOM >60 >60 >60   GFRAA >60 >60 >60   CALCIUM 8.3* 9.0 8.4*     Recent Labs     07/18/21  1609 07/18/21  1947 07/18/21  2355 07/19/21  0446 07/19/21  0754 07/19/21  1144   POCGLU 176* 136* 194* 128* 165* 157*     ABG:  Lab Results   Component Value Date    POCPH 7.319 07/12/2021    POCPCO2 57.8 07/12/2021    POCPO2 69.2 07/12/2021    POCHCO3 29.8 07/12/2021    NBEA NOT REPORTED 07/12/2021    PBEA 3 07/12/2021    UYM9FTU NOT REPORTED 07/12/2021    SBLB2YPR 92 07/12/2021    FIO2 5.0 07/12/2021     Lab Results   Component Value Date/Time    SPECIAL  L ARM 2ML 07/12/2021 04:53 PM    SPECIAL  L HAND 2 ML 07/12/2021 04:53 PM     Lab Results   Component Value Date/Time    CULTURE NO GROWTH 6 DAYS 07/12/2021 04:53 PM    CULTURE NO GROWTH 6 DAYS 07/12/2021 04:53 PM       Radiology:  XR CHEST (2 VW)    Result Date: 7/16/2021  Improving pulmonary vascular congestion. Continued left basilar infiltrate or pneumonia and small left pleural effusion. XR CHEST PORTABLE    Result Date: 7/12/2021  Increasing consolidation effusion in the left lower lobe. Right-sided central venous catheter distal tip the superior vena cava no evidence of pneumothorax     US RETROPERITONEAL COMPLETE    Result Date: 7/12/2021  Somewhat limited but essentially unremarkable renal ultrasound; the left kidney is poorly visualized. No hydronephrosis.        Physical Examination:        General appearance:  alert, cooperative and no distress  Mental Status:  oriented to person, place and time and normal affect  Lungs:  decreased breath sounds bilaterally   Heart:  regular rate and rhythm, no murmur  Abdomen:  soft, nontender, nondistended, normal bowel sounds   Extremities:  no edema, redness, tenderness in the calves  Skin:  no gross lesions, rashes, induration    Assessment:            Plan: 1. Acute on chronic hypoxic/hypercapneic respiratory failure   2. COPD   3. LLL pneumonia   - pulmonary on board   - d/c vancomycin, negative MRSA  - continue cefepime  - blood cultures NGTD  - continue solu cortef   - v/s per unit protocol   - continue RT protocol prn   - remains on 4 L of oxygen at this time, baseline home oxygen of 2L  - bipap qhs   - repeat chest xray showed improvement       4. LAKESHA   5. Hypokalemia   6. Hyponatremia   - nephrology on board  - renal function improved   - monitor Is/Os   - renal injury likley related ischemic ATN secondary to hypotension/NSAIDs  - diuretics held   - monitor urine output   - avoid NSAIDs/ROJAS-2 inhibitors/aldactone      7. Paroxysmal afib   8. Hx of CABG   - telemetry   - continue cardizem lopressor      9. DMII   - accu checks ac/hs with [facility]  - continue home lantus     10.  DISPO  - likely d/c within next 24 hours   - pt remains sob and requiring higher oxygen than baseline   - PT/OT     Nilda Salomon MD  7/19/2021  2:33 PM

## 2021-07-19 NOTE — PROGRESS NOTES
fever  ENT: negative for headaches, nasal congestion, sore throat or visual changes  Allergy and Immunology: negative for postnasal drip or seasonal allergies  Hematological and Lymphatic: negative for bleeding problems, swollen lymph nodes  Respiratory: Negative for shortness of breath. negative for cough  Cardiovascular: negative for edema or palpitations  Gastrointestinal: negative for abdominal pain, change in bowel habits or nausea/vomiting  Genito-Urinary: negative for dysuria or urinary frequency/urgency  Musculoskeletal: negative for joint pain or joint swelling  Neurological: negative for numbness/tingling, seizures or weakness  Dermatological: negative for pruritus or rash    OBJECTIVE     VITAL SIGNS:   LAST-  /77   Pulse 66   Temp 97.6 °F (36.4 °C) (Oral)   Resp 23   Ht 5' 10\" (1.778 m)   Wt 216 lb 14.9 oz (98.4 kg)   SpO2 93%   BMI 31.13 kg/m²   8-24 HR RANGE-  TEMP Temp  Av °F (36.7 °C)  Min: 97.6 °F (36.4 °C)  Max: 98.4 °F (07.5 °C)   BP Systolic (47NUL), VFK:580 , Min:120 , XPW:698      Diastolic (83WBJ), BAA:02, Min:74, Max:84     PULSE Pulse  Av.3  Min: 65  Max: 80   RR Resp  Av  Min: 15  Max: 23   O2 SAT SpO2  Av %  Min: 93 %  Max: 97 %   OXYGEN DELIVERY No data recorded     Systemic Examination:   General appearance - looks comfortable and in no acute distress  Mental status - alert, oriented to person, place, and time  Eyes - pupils equal and reactive  Mouth - mucous membranes moist, pharynx normal without lesions  Neck - supple, no significant adenopathy  Chest - Chest was symmetrical without dullness to percussion. Breath sounds bilaterally were clear to auscultation. There were no wheezes, rhonchi or rales.   There is no intercostal recession or use of accessory muscles  Heart - normal rate, regular rhythm, normal S1, S2, no murmurs, rubs, clicks or gallops  Abdomen - soft, nontender, nondistended, no masses or organomegaly  Neurological - alert, oriented, normal speech, no focal findings or movement disorder noted  Extremities -bilateral pedal edema, no clubbing or cyanosis  Skin - normal coloration and turgor, no rashes, no suspicious skin lesions noted     DATA REVIEW     Medications:  Scheduled Meds:   midodrine  5 mg Oral TID WC    cefepime  2,000 mg Intravenous Q12H    hydrocortisone sodium succinate PF  50 mg Intravenous Q8H    QUEtiapine  25 mg Oral Nightly    [Held by provider] bumetanide  1 mg Intravenous BID    [Held by provider] metoprolol succinate  25 mg Oral Daily    QUEtiapine  12.5 mg Oral Once    metoprolol  5 mg Intravenous Q6H    insulin glargine  0.25 Units/kg Subcutaneous Nightly    insulin lispro  0-6 Units Subcutaneous Q4H    budesonide-formoterol  2 puff Inhalation BID    dilTIAZem  180 mg Oral Daily    sodium chloride  1,000 mL Intravenous Once    sodium chloride flush  5-40 mL Intravenous 2 times per day    heparin (porcine)  5,000 Units Subcutaneous 3 times per day     Continuous Infusions:   dextrose      sodium chloride       LABS:-  ABGs:   No results found for: PH, PCO2, PO2, HCO3, O2SAT  No results for input(s): PHART, PO2ART, NDP6KHB, ZML4BTO, BEART, M6HDUVNM in the last 72 hours.   Lab Results   Component Value Date    POCPH 7.319 (L) 07/12/2021    POCPCO2 57.8 (H) 07/12/2021    POCPO2 69.2 (L) 07/12/2021    POCHCO3 29.8 (H) 07/12/2021    AUCY9JOD 92 (L) 07/12/2021     CBC:   Recent Labs     07/17/21  0539 07/18/21  0619 07/19/21  0442   WBC 9.0 8.8 10.4   HGB 10.2* 10.6* 10.4*   HCT 32.3* 34.9* 32.8*   .2* 108.4* 104.1*   PLT See Reflexed IPF Result 171 234   LYMPHOPCT 11* 10* 16*   RBC 3.10* 3.22* 3.15*   MCH 32.9 32.9 33.0   MCHC 31.6 30.4 31.7   RDW 15.3* 15.3* 15.5*     BMP:   Recent Labs     07/17/21  0539 07/18/21  0619 07/19/21  0442    142 141   K 3.5* 3.3* 3.9   CL 99 99 100   CO2 31 32* 30   BUN 26* 22 26*   CREATININE 0.46* 0.37* 0.59*   GLUCOSE 110* 122* 134*     Liver Function Test:   No results for input(s): PROT, LABALBU, ALT, AST, GGT, ALKPHOS, BILITOT in the last 72 hours. Amylase/Lipase:  No results for input(s): AMYLASE, LIPASE in the last 72 hours. Coagulation Profile:   No results for input(s): INR, PROTIME, APTT in the last 72 hours. Cardiac Enzymes:  No results for input(s): CKTOTAL, CKMB, CKMBINDEX, TROPONINI in the last 72 hours. Lactic Acid:  No results found for: LACTA  BNP:   No results found for: BNP  D-Dimer:  No results found for: DDIMER  Others:   Lab Results   Component Value Date    TSH 1.24 07/12/2021     Lab Results   Component Value Date    WINTER NEGATIVE 07/12/2021     Lab Results   Component Value Date    URICACID 8.2 (H) 05/29/2021     No results found for: IRON, TIBC, FERRITIN  No results found for: SPEP, UPEP  No results found for: PSA, CEA, , NH3408,     Input/Output:    Intake/Output Summary (Last 24 hours) at 7/19/2021 0850  Last data filed at 7/19/2021 0200  Gross per 24 hour   Intake    Output 600 ml   Net -600 ml       Microbiology:    Pathology:    Radiology Reports:  XR CHEST (2 VW)   Final Result   Improving pulmonary vascular congestion. Continued left basilar infiltrate   or pneumonia and small left pleural effusion. US RETROPERITONEAL COMPLETE   Final Result   Somewhat limited but essentially unremarkable renal ultrasound; the left   kidney is poorly visualized. No hydronephrosis. XR CHEST PORTABLE   Final Result   Increasing consolidation effusion in the left lower lobe.   Right-sided   central venous catheter distal tip the superior vena cava no evidence of   pneumothorax             Echocardiogram:   Results for orders placed during the hospital encounter of 07/12/21    ECHO Complete 2D W Doppler W Color    Narrative  Transthoracic Echocardiography Report (TTE)    Patient Name [employer]      Date of Study                 07/14/2021  KOFFI CAMARENA    Date of      1950  Gender                        Male  Birth    Age 66 year(s)  Race                              Room Number  0128        Height:                       70 inch, 177.8 cm    Corporate ID [id number]    Weight:                       216 pounds, 98 kg  #    Patient Acct [de-identified]   BSA:           2.16 m^2       BMI:      30.99  #                                                                kg/m^2    MR #         [id number]     Ella Kumar    Accession #  [id number]  Interpreting Physician        [address] [place] [address]    Fellow                   Referring Nurse Practitioner    Interpreting             Referring Physician           Kasey Cloud MD  Fellow    Type of Study    TTE procedure:2D Echocardiogram, M-Mode, Doppler, Color Doppler. Procedure Date  Date: 07/14/2021 Start: 08:57 AM    Study Location: [facility]  Technical Quality: Fair visualization    Indications:Hypotension. History / Tech. Comments:  Procedure explained to patient. Study done bedside in MICU. Acute renal failure    Patient Status: Inpatient    Height: 70 inches Weight: 216 pounds BSA: 2.16 m^2 BMI: 30.99 kg/m^2    CONCLUSIONS    Summary  Left ventricle is normal in size. Global left ventricular systolic function  is normal. Estimated ejection fraction is 50-55 %. Evidence of diastolic dysfunction. Left atrium is moderately dilated. Right atrial dilatation. Trivial mitral regurgitation. Trivial pulmonic insufficiency. IVC dilated but unable to assess respiratory collapse due to patient on  ventilator.     Signature  ----------------------------------------------------------------------------  Electronically signed by Roxana George on 07/14/2021  11:15 AM  ----------------------------------------------------------------------------    ----------------------------------------------------------------------------  Electronically signed by Steven Wasserman(Interpreting physician) on 07/14/2021  11:20 AM  ----------------------------------------------------------------------------  FINDINGS  Left Atrium  Left atrium is moderately dilated. Left Ventricle  Left ventricle is normal in size. Global left ventricular systolic function  is normal. Estimated ejection fraction is 50-55 % . Normal left ventricular wall thickness. Evidence of diastolic dysfunction. Right Atrium  Right atrial dilatation. Right Ventricle  Normal size right ventricular cavity. Right ventricular function appears normal .  Mitral Valve  Normal mitral valve structure. Trivial mitral regurgitation. No mitral stenosis. Aortic Valve  Aortic valve structure and function normal.  Aortic valve is trileaflet. No aortic insufficiency. No aortic stenosis. Tricuspid Valve  Normal tricuspid valve leaflets. No tricuspid regurgitation. No tricuspid stenosis. Insignificant tricuspid regurgitation, unable to estimate RVSP. Pulmonic Valve  Pulmonic valve is normal in structure and function. Trivial pulmonic insufficiency. No evidence of pulmonic stenosis. Pericardial Effusion  No pericardial effusion. Miscellaneous  Normal aortic root dimension. E/E' average = 23.95. IVC dilated but unable to assess respiratory collapse due to patient on  ventilator.     M-mode / 2D Measurements & Calculations:    LVIDd:4.6 cm(3.7 - 5.6 cm)        Diastolic MXQMKC:59.14 ml  LVIDs:3.3 cm(2.2 - 4.0 cm)        Systolic FBKMTN:38.69 ml  IVSd:1 cm(0.6 - 1.1 cm)           Aortic Root:3.7 cm(2.0 - 3.7 cm)  LVPWd:1.1 cm(0.6 - 1.1 cm)        LA Dimension: 5 cm(1.9 - 4.0 cm)  Fractional Shortenin.26 %     LA volume/Index: 88.03 ml /41m^2  Calculated LVEF (%): 50.13 %      LVOT:2 cm  RVDd:4 cm    Mitral:                                 Aortic    Valve Area (P1/2-Time): 3.38 cm^2       Peak Velocity: 0.88 m/s  Peak E-Wave: 1.43 m/s                   Mean Velocity: 0.62 m/s  Peak Gradient: 3.06 mmHg  Peak Gradient: 8.18 mmHg                Mean Gradient: 2 mmHg  Mean Gradient: 2 mmHg  Deceleration Time: 211 msec  P1/2t: 65 msec                          Area (continuity): 2.96 cm^2  AV VTI: 24.5 cm    Area (continuity): 2.07 cm^2  Mean Velocity: 0.61 m/s    Pulmonic:    Peak Velocity: 0.76 m/s  Peak Gradient: 2.31 mmHg    Diastology / Tissue Doppler  Septal Wall E' velocity:0.04 m/s  Septal Wall E/E':33.7  Lateral Wall E' velocity:0.10 m/s  Lateral Wall E/E':14.2      Cardiac Catheterization:   No results found for this or any previous visit. ASSESSMENT AND PLAN     Assessment:    // Acute on Chronic Hypoxic and Hypercapnic Respiratory Failure. // COPD on 2L home O2.   // LLL Pneumonia  // Acute Kidney Injury secondary to ischemic ATN  // Chronic Diastolic Congestive Heart Failure. Plan:    Patient remains hemodynamically stable and is currently saturating well on nasal cannula   Continue supplemental oxygen to keep oxygen saturation greater than 92%  Continue to wean to home O2. (2L [place]). Continue nocturnal and as needed noninvasive mechanical ventilation (BiPAP)  Continue incentive spirometry, pulmonary toilet, aspiration precautions and bronchodilators  Continue Symbicort  Continue to monitor I/O with a goal of even/negative fluid balance. Strict intake and output. Low Sodium Diet  Fluid Restriction 1500 mL. Antimicrobials reviewed; continues on cefepime. On stress dose steroids. DVT and stress ulcer prophylaxis  Physical/occupational/speech therapy; increase activity as tolerated    I updated the patient regarding the current clinical condition, provisional diagnosis and management plan. I addressed concerns and answered all questions to the best of my abilities. It was my pleasure to evaluate Selina Cr today. We will continue to follow. I would like to thank you for allowing me to participate in the care of this patient. Please feel free to call with any further questions or concerns.     Dhara Like, DO  Pulmonary and Critical Care Medicine           7/19/2021, 8:50 AM    Please note that this chart was generated using voice recognition Dragon dictation software. Although every effort was made to ensure the accuracy of this automated transcription, some errors in transcription may have occurred. Attending Physician Statement  I have discussed the care of Selina Cr, including pertinent history and exam findings with the resident. I have reviewed the key elements of all parts of the encounter with the resident. I have seen and examined the patient with the resident. I agree with the assessment and plan and status of the problem list as documented. I have seen the patient during my round today, chart reviewed, labs and medications reviewed overnight events noted and other notes seen. This morning overnight patient he had a panic attack, he was on 4 L nasal cannula and will increase to 5 L he saturating 96% at this time he is feeling better. He does not complain of wheezing he has mild cough denies hemoptysis or chest pain denies much sputum production. He has bilateral reduced and distant breath sounds no expiratory wheezing and no rhonchi. No pedal edema present. He did use BiPAP last night he is on 12/6/40 percent. According to patient he does have BiPAP at home and he is compliant with BiPAP. He use 2-3 L of oxygen at home. We will wean O2 to keep saturation above 90%. Continue with Symbicort and will resume Spiriva which she takes at home. On review of the chest x-ray he has chronic looking left pleural effusion/pleural thickening with left lower lobe atelectasis infiltrate which was seen on CT and chest x-ray 5/27/2029 difficulty determine how long before that it was present as no other imaging studies available before that to review in our system. Currently he is on cefepime per primary service. Monitor intake and output and consider to restart diuretics may be lower dose.   He is on hydrocortisone okay to DC

## 2021-07-20 VITALS
DIASTOLIC BLOOD PRESSURE: 78 MMHG | WEIGHT: 220.46 LBS | HEIGHT: 70 IN | OXYGEN SATURATION: 95 % | HEART RATE: 67 BPM | SYSTOLIC BLOOD PRESSURE: 121 MMHG | TEMPERATURE: 97.5 F | BODY MASS INDEX: 31.56 KG/M2 | RESPIRATION RATE: 18 BRPM

## 2021-07-20 LAB
ABSOLUTE EOS #: 0.14 K/UL (ref 0–0.44)
ABSOLUTE IMMATURE GRANULOCYTE: 0.1 K/UL (ref 0–0.3)
ABSOLUTE LYMPH #: 1.89 K/UL (ref 1.1–3.7)
ABSOLUTE MONO #: 0.98 K/UL (ref 0.1–1.2)
ANION GAP SERPL CALCULATED.3IONS-SCNC: 9 MMOL/L (ref 9–17)
BASOPHILS # BLD: 0 % (ref 0–2)
BASOPHILS ABSOLUTE: 0.04 K/UL (ref 0–0.2)
BUN BLDV-MCNC: 24 MG/DL (ref 8–23)
BUN/CREAT BLD: ABNORMAL (ref 9–20)
CALCIUM SERPL-MCNC: 8.5 MG/DL (ref 8.6–10.4)
CHLORIDE BLD-SCNC: 99 MMOL/L (ref 98–107)
CO2: 31 MMOL/L (ref 20–31)
CREAT SERPL-MCNC: 0.45 MG/DL (ref 0.7–1.2)
DIFFERENTIAL TYPE: ABNORMAL
EOSINOPHILS RELATIVE PERCENT: 1 % (ref 1–4)
GFR AFRICAN AMERICAN: >60 ML/MIN
GFR NON-AFRICAN AMERICAN: >60 ML/MIN
GFR SERPL CREATININE-BSD FRML MDRD: ABNORMAL ML/MIN/{1.73_M2}
GFR SERPL CREATININE-BSD FRML MDRD: ABNORMAL ML/MIN/{1.73_M2}
GLUCOSE BLD-MCNC: 112 MG/DL (ref 75–110)
GLUCOSE BLD-MCNC: 204 MG/DL (ref 75–110)
GLUCOSE BLD-MCNC: 225 MG/DL (ref 75–110)
GLUCOSE BLD-MCNC: 98 MG/DL (ref 70–99)
HCT VFR BLD CALC: 33.8 % (ref 40.7–50.3)
HEMOGLOBIN: 10.7 G/DL (ref 13–17)
IMMATURE GRANULOCYTES: 1 %
LYMPHOCYTES # BLD: 19 % (ref 24–43)
MCH RBC QN AUTO: 32.9 PG (ref 25.2–33.5)
MCHC RBC AUTO-ENTMCNC: 31.7 G/DL (ref 28.4–34.8)
MCV RBC AUTO: 104 FL (ref 82.6–102.9)
MONOCYTES # BLD: 10 % (ref 3–12)
NRBC AUTOMATED: 0 PER 100 WBC
PDW BLD-RTO: 15.9 % (ref 11.8–14.4)
PLATELET # BLD: 246 K/UL (ref 138–453)
PLATELET ESTIMATE: ABNORMAL
PMV BLD AUTO: 11.1 FL (ref 8.1–13.5)
POTASSIUM SERPL-SCNC: 3.7 MMOL/L (ref 3.7–5.3)
RBC # BLD: 3.25 M/UL (ref 4.21–5.77)
RBC # BLD: ABNORMAL 10*6/UL
SEG NEUTROPHILS: 69 % (ref 36–65)
SEGMENTED NEUTROPHILS ABSOLUTE COUNT: 6.58 K/UL (ref 1.5–8.1)
SODIUM BLD-SCNC: 139 MMOL/L (ref 135–144)
WBC # BLD: 9.7 K/UL (ref 3.5–11.3)
WBC # BLD: ABNORMAL 10*3/UL

## 2021-07-20 PROCEDURE — 6370000000 HC RX 637 (ALT 250 FOR IP)

## 2021-07-20 PROCEDURE — 2580000003 HC RX 258: Performed by: INTERNAL MEDICINE

## 2021-07-20 PROCEDURE — 80048 BASIC METABOLIC PNL TOTAL CA: CPT

## 2021-07-20 PROCEDURE — 94761 N-INVAS EAR/PLS OXIMETRY MLT: CPT

## 2021-07-20 PROCEDURE — 99239 HOSP IP/OBS DSCHRG MGMT >30: CPT | Performed by: INTERNAL MEDICINE

## 2021-07-20 PROCEDURE — 6360000002 HC RX W HCPCS: Performed by: INTERNAL MEDICINE

## 2021-07-20 PROCEDURE — 85025 COMPLETE CBC W/AUTO DIFF WBC: CPT

## 2021-07-20 PROCEDURE — 94640 AIRWAY INHALATION TREATMENT: CPT

## 2021-07-20 PROCEDURE — 36415 COLL VENOUS BLD VENIPUNCTURE: CPT

## 2021-07-20 PROCEDURE — 2700000000 HC OXYGEN THERAPY PER DAY

## 2021-07-20 PROCEDURE — 6360000002 HC RX W HCPCS: Performed by: STUDENT IN AN ORGANIZED HEALTH CARE EDUCATION/TRAINING PROGRAM

## 2021-07-20 PROCEDURE — 94660 CPAP INITIATION&MGMT: CPT

## 2021-07-20 PROCEDURE — 6370000000 HC RX 637 (ALT 250 FOR IP): Performed by: EMERGENCY MEDICINE

## 2021-07-20 RX ADMIN — INSULIN LISPRO 2 UNITS: 100 INJECTION, SOLUTION INTRAVENOUS; SUBCUTANEOUS at 00:30

## 2021-07-20 RX ADMIN — MIDODRINE HYDROCHLORIDE 5 MG: 5 TABLET ORAL at 08:47

## 2021-07-20 RX ADMIN — TIOTROPIUM BROMIDE INHALATION SPRAY 2 PUFF: 3.12 SPRAY, METERED RESPIRATORY (INHALATION) at 08:28

## 2021-07-20 RX ADMIN — ACETAMINOPHEN 650 MG: 325 TABLET ORAL at 09:47

## 2021-07-20 RX ADMIN — HEPARIN SODIUM 5000 UNITS: 5000 INJECTION INTRAVENOUS; SUBCUTANEOUS at 05:10

## 2021-07-20 RX ADMIN — CEFEPIME HYDROCHLORIDE 2000 MG: 2 INJECTION, POWDER, FOR SOLUTION INTRAVENOUS at 00:54

## 2021-07-20 RX ADMIN — HYDROCORTISONE SODIUM SUCCINATE 50 MG: 100 INJECTION, POWDER, FOR SOLUTION INTRAMUSCULAR; INTRAVENOUS at 05:10

## 2021-07-20 RX ADMIN — INSULIN LISPRO 2 UNITS: 100 INJECTION, SOLUTION INTRAVENOUS; SUBCUTANEOUS at 08:51

## 2021-07-20 RX ADMIN — DILTIAZEM HYDROCHLORIDE 180 MG: 180 CAPSULE, COATED, EXTENDED RELEASE ORAL at 08:47

## 2021-07-20 ASSESSMENT — PAIN SCALES - GENERAL: PAINLEVEL_OUTOF10: 3

## 2021-07-20 NOTE — CARE COORDINATION
[facility] Quality Flow/Interdisciplinary Rounds Progress Note    Quality Flow Rounds held on 2021 at [address] Attending:  CM, Charge RN, Bedside RN, RN Supervisor    Barriers to Discharge:   None-home today    Anticipated Discharge Date:       Anticipated Discharge Disposition:    Readmission Risk              Risk of Unplanned Readmission:  24           Discussed patient goal for the day, patient clinical progression, and barriers to discharge. The following Goal(s) of the Day/Commitment(s) have been identified:      PS MD for home care order. Called [facility] and [facility]- aware of pts D/C. Needed information will be obtained from [facility]. Discharge [address] [place] [address] Case Management Department  Written by: Jomar Sharif RN    Patient Name: Chito Zapata  Attending Provider: Akilah Lorenz MD  Admit Date: 2021  1:09 PM  MRN: [id number]  Account: [de-identified]                     : 1950  Discharge Date:       Disposition: home with family support and [place], has home O2 through [facility] [place] . Exwife transported home.     BLAINE Gonazlez RN  2021

## 2021-07-20 NOTE — PROGRESS NOTES
PULMONARY & CRITICAL CARE MEDICINE PROGRESS  NOTE     Patient:  Molly Garcia  MRN: [id number]  Admit date: 7/12/2021    SUBJECTIVE     I personally interviewed/examined the patient, reviewed interval history and interpreted all available radiographic, laboratory data at the time of service. Chief Compliant/Reason for Initial Consult:   Acute on Chronic Hypoxic Respiratory Failure. Brief Hospital Course and Interval History:  66 y.o. male hx of CAD s/p CABG 2005, COPD, type 2 diabetes, chronic diastolic CHF, cor pulmonale, bladder cancer per pt daughter in 2014, and PNA LLL vs mass in May 2021 admitted as transfer from [facility] for unclear etiology hypotension, found to have LAKESHA and potassium of 7. Patient does not know why he was sent to outside facility, was at a nursing home and was noted to be hypotensive. Pt not intubated at arrival.     Cardiology was consulted for atrial flutter and elevated troponin at 173. Rate controlled with Lopressor and Toprol. Patient was briefly hypotensive on presentation and needed Levophed support. Nephrology was consulted for LAKESHA, hyponatremia and hyperkalemia. Received 1 dose of Bumex with excellent response in urine output. LAKESHA resolved now. Getting gentle hydration sodium improved to 139 from 129. Potassium decreased from 6.2 on presentation to 3.2. On chest x-ray, there is concern of left lower lobe infiltrate likely pneumonia. Cultures negative. Patient treated with cefepime and vancomycin. WBCs improving. Breathing well on 5 L nasal cannula. Reports he uses 2 liter oxygen at home. Will wean as tolerated. Interval History 07/20/21   Afebrile with T max 98.2 and hemodynamically stable. Saturating 94 - 97% on 4 L NC. Continues on cefepime. No overnight events. Wore BiPAP last night. Denied any shortness of breath or wheezing. Plan for discharge home with home care today. Review of Systems:  General: negative for chills, fatigue or fever  ENT: negative for headaches, nasal congestion, sore throat or visual changes  Allergy and Immunology: negative for postnasal drip or seasonal allergies  Hematological and Lymphatic: negative for bleeding problems, swollen lymph nodes  Respiratory: Negative for shortness of breath. negative for cough  Cardiovascular: negative for edema or palpitations  Gastrointestinal: negative for abdominal pain, change in bowel habits or nausea/vomiting  Genito-Urinary: negative for dysuria or urinary frequency/urgency  Musculoskeletal: negative for joint pain or joint swelling  Neurological: negative for numbness/tingling, seizures or weakness  Dermatological: negative for pruritus or rash    OBJECTIVE     VITAL SIGNS:   LAST-  /77   Pulse 58   Temp 97.5 °F (36.4 °C) (Axillary)   Resp 20   Ht 5' 10\" (1.778 m)   Wt 220 lb 7.4 oz (100 kg)   SpO2 97%   BMI 31.63 kg/m²   8-24 HR RANGE-  TEMP Temp  Av.8 °F (36.6 °C)  Min: 97.4 °F (36.3 °C)  Max: 98.2 °F (02.3 °C)   BP Systolic (27YCW), HCO:572 , Min:117 , UIY:672      Diastolic (27ASH), CON:99, Min:71, Max:85     PULSE Pulse  Av.4  Min: 58  Max: 68   RR Resp  Av  Min: 14  Max: 20   O2 SAT SpO2  Av %  Min: 97 %  Max: 97 %   OXYGEN DELIVERY No data recorded     Systemic Examination:   General appearance - looks comfortable and in no acute distress  Mental status - alert, oriented to person, place, and time  Eyes - pupils equal and reactive  Mouth - mucous membranes moist, pharynx normal without lesions  Neck - supple, no significant adenopathy  Chest - Chest was symmetrical without dullness to percussion. Breath sounds bilaterally were clear to auscultation. There were no wheezes, rhonchi or rales.   There is no intercostal recession or use of accessory muscles  Heart - normal rate, regular rhythm, normal S1, S2, no murmurs, rubs, clicks or gallops  Abdomen - soft, nontender, nondistended, no masses or organomegaly  Neurological - alert, oriented, normal speech, no focal findings or movement disorder noted  Extremities -bilateral pedal edema, no clubbing or cyanosis  Skin - normal coloration and turgor, no rashes, no suspicious skin lesions noted     DATA REVIEW     Medications:  Scheduled Meds:   tiotropium  2 puff Inhalation Daily    midodrine  5 mg Oral TID WC    cefepime  2,000 mg Intravenous Q12H    hydrocortisone sodium succinate PF  50 mg Intravenous Q8H    QUEtiapine  25 mg Oral Nightly    [Held by provider] bumetanide  1 mg Intravenous BID    [Held by provider] metoprolol succinate  25 mg Oral Daily    QUEtiapine  12.5 mg Oral Once    metoprolol  5 mg Intravenous Q6H    insulin glargine  0.25 Units/kg Subcutaneous Nightly    insulin lispro  0-6 Units Subcutaneous Q4H    budesonide-formoterol  2 puff Inhalation BID    dilTIAZem  180 mg Oral Daily    sodium chloride  1,000 mL Intravenous Once    sodium chloride flush  5-40 mL Intravenous 2 times per day    heparin (porcine)  5,000 Units Subcutaneous 3 times per day     Continuous Infusions:   dextrose      sodium chloride       LABS:-  ABGs:   No results found for: PH, PCO2, PO2, HCO3, O2SAT  No results for input(s): PHART, PO2ART, ZEK9PXM, TVA0FSF, BEART, P3QUWZHG in the last 72 hours.   Lab Results   Component Value Date    POCPH 7.319 (L) 07/12/2021    POCPCO2 57.8 (H) 07/12/2021    POCPO2 69.2 (L) 07/12/2021    POCHCO3 29.8 (H) 07/12/2021    MTIR4OSS 92 (L) 07/12/2021     CBC:   Recent Labs     07/18/21  0619 07/19/21  0442 07/20/21  0551   WBC 8.8 10.4 9.7   HGB 10.6* 10.4* 10.7*   HCT 34.9* 32.8* 33.8*   .4* 104.1* 104.0*    234 246   LYMPHOPCT 10* 16* 19*   RBC 3.22* 3.15* 3.25*   MCH 32.9 33.0 32.9   MCHC 30.4 31.7 31.7   RDW 15.3* 15.5* 15.9*     BMP:   Recent Labs     07/18/21  0619 07/19/21  0442 07/20/21  0551    141 139   K 3.3* 3.9 3.7   CL 99 100 99   CO2 32* 30 31   BUN 22 26* 24*   CREATININE 0.37* 0.59* 0.45*   GLUCOSE 122* 134* 98     Liver Function Test:   No results for input(s): PROT, LABALBU, ALT, AST, GGT, ALKPHOS, BILITOT in the last 72 hours. Amylase/Lipase:  No results for input(s): AMYLASE, LIPASE in the last 72 hours. Coagulation Profile:   No results for input(s): INR, PROTIME, APTT in the last 72 hours. Cardiac Enzymes:  No results for input(s): CKTOTAL, CKMB, CKMBINDEX, TROPONINI in the last 72 hours. Lactic Acid:  No results found for: LACTA  BNP:   No results found for: BNP  D-Dimer:  No results found for: DDIMER  Others:   Lab Results   Component Value Date    TSH 1.24 07/12/2021     Lab Results   Component Value Date    WINTER NEGATIVE 07/12/2021     Lab Results   Component Value Date    URICACID 8.2 (H) 05/29/2021     No results found for: IRON, TIBC, FERRITIN  No results found for: SPEP, UPEP  No results found for: PSA, CEA, , AD5446,     Input/Output:    Intake/Output Summary (Last 24 hours) at 7/20/2021 0755  Last data filed at 7/20/2021 7309  Gross per 24 hour   Intake 50 ml   Output 500 ml   Net -450 ml       Microbiology:    Pathology:    Radiology Reports:  XR CHEST (2 VW)   Final Result   Improving pulmonary vascular congestion. Continued left basilar infiltrate   or pneumonia and small left pleural effusion. US RETROPERITONEAL COMPLETE   Final Result   Somewhat limited but essentially unremarkable renal ultrasound; the left   kidney is poorly visualized. No hydronephrosis. XR CHEST PORTABLE   Final Result   Increasing consolidation effusion in the left lower lobe.   Right-sided   central venous catheter distal tip the superior vena cava no evidence of   pneumothorax             Echocardiogram:   Results for orders placed during the hospital encounter of 07/12/21    ECHO Complete 2D W Doppler W Color    Narrative  Transthoracic Echocardiography Report (TTE)    Patient Name [facility]      Date of Study 07/14/2021  KOFFI CAMARENA    Date of      1950  Gender                        Male  Birth    Age          66 year(s)  Race                              Room Number  0128        Height:                       70 inch, 177.8 cm    Corporate ID [id number]    Weight:                       216 pounds, 98 kg  #    Patient Acct [de-identified]   BSA:           2.16 m^2       BMI:      30.99  #                                                                kg/m^2    MR #         [id number]     Nasima Dunbar    Accession #  [id number]  Interpreting Physician        [address] [place] [address]    Fellow                   Referring Nurse Practitioner    Interpreting             Referring Physician           Kim Sharma MD  Fellow    Type of Study    TTE procedure:2D Echocardiogram, M-Mode, Doppler, Color Doppler. Procedure Date  Date: 07/14/2021 Start: 08:57 AM    Study Location: [facility]  Technical Quality: Fair visualization    Indications:Hypotension. History / Tech. Comments:  Procedure explained to patient. Study done bedside in MICU. Acute renal failure    Patient Status: Inpatient    Height: 70 inches Weight: 216 pounds BSA: 2.16 m^2 BMI: 30.99 kg/m^2    CONCLUSIONS    Summary  Left ventricle is normal in size. Global left ventricular systolic function  is normal. Estimated ejection fraction is 50-55 %. Evidence of diastolic dysfunction. Left atrium is moderately dilated. Right atrial dilatation. Trivial mitral regurgitation. Trivial pulmonic insufficiency. IVC dilated but unable to assess respiratory collapse due to patient on  ventilator.     Signature  ----------------------------------------------------------------------------  Electronically signed by Juli DelcidSonographer) on 07/14/2021  11:15 Aortic    Valve Area (P1/2-Time): 3.38 cm^2       Peak Velocity: 0.88 m/s  Peak E-Wave: 1.43 m/s                   Mean Velocity: 0.62 m/s  Peak Gradient: 3.06 mmHg  Peak Gradient: 8.18 mmHg                Mean Gradient: 2 mmHg  Mean Gradient: 2 mmHg  Deceleration Time: 211 msec  P1/2t: 65 msec                          Area (continuity): 2.96 cm^2  AV VTI: 24.5 cm    Area (continuity): 2.07 cm^2  Mean Velocity: 0.61 m/s    Pulmonic:    Peak Velocity: 0.76 m/s  Peak Gradient: 2.31 mmHg    Diastology / Tissue Doppler  Septal Wall E' velocity:0.04 m/s  Septal Wall E/E':33.7  Lateral Wall E' velocity:0.10 m/s  Lateral Wall E/E':14.2      Cardiac Catheterization:   No results found for this or any previous visit. ASSESSMENT AND PLAN     Assessment:    // Acute on Chronic Hypoxic and Hypercapnic Respiratory Failure. // COPD on 2L home O2.   // LLL Pneumonia  // Acute Kidney Injury secondary to ischemic ATN  // Chronic Diastolic Congestive Heart Failure. Plan:    Patient remains hemodynamically stable and is currently saturating well on nasal cannula   Continue supplemental oxygen to keep oxygen saturation greater than 92%  Continue to wean to home O2. (2L NC). Continue nocturnal and as needed noninvasive mechanical ventilation (BiPAP)  Continue incentive spirometry, pulmonary toilet, aspiration precautions and bronchodilators  Continue Symbicort  Continue to monitor I/O with a goal of even/negative fluid balance. Strict intake and output. Low Sodium Diet  Fluid Restriction 1500 mL. Antimicrobials reviewed; continues on cefepime. On stress dose steroids, okay for DC on taper dose. DVT and stress ulcer prophylaxis  Physical/occupational/speech therapy; increase activity as tolerated    I updated the patient regarding the current clinical condition, provisional diagnosis and management plan. I addressed concerns and answered all questions to the best of my abilities.     It was my pleasure to evaluate Phillip Peers today. We will continue to follow. I would like to thank you for allowing me to participate in the care of this patient. Please feel free to call with any further questions or concerns.     Castillo Horta,   Pulmonary and Critical Care Medicine           7/20/2021, 7:55 AM       Attending Physician Statement    Patient was not seen by me today as patient was discharged per primary service    Sayra Munguia MD  7/20/2021 2:58 PM

## 2021-07-21 ENCOUNTER — CARE COORDINATION (OUTPATIENT)
Dept: CASE MANAGEMENT | Age: 71
End: 2021-07-21

## 2021-07-21 NOTE — CARE COORDINATION
[facility] by [facility] [place] [facility] Transitions Initial Follow Up Call    Call within 2 business days of discharge: Yes    Patient: Charu Moore Patient : 1950   MRN: [id number]  Reason for Admission: Hypotension   Discharge Date: 21 RARS: Readmission Risk Score: 24    BPCI-A Medical Bundle Patient:  Working DR Renal Failure  Admitting Location: [facility]  Adm Date: 21  Date of Discharge: 21     Bundle End Date: 10/18/21    Last Discharge [facility]       Complaint Diagnosis Description Type Department Provider    21   Admission (Discharged) [facility] 4B John Bautista MD           Spoke with: Charu Moore and ex wife,  [address]: [facility]    Was able to contact Silverio Kahn for initial [employer] He stated that he was doing \"not to bad\". He said that he had a spell where he could not get off the toilet and he had to wait for his ex wife to arrive. He states that he has increased swelling in in feet from being up to long, but it elevating them. He said that he had a couple spells of shortness of breath last night. He denied chest pain, occasional cough and no fever/chills. He said that he has an appetite. Attempted to review medications with Silverio Kahn and his ex wife Dio Leonard got on the phone. She said that he had all his medications except the Toprol and she called the pharmacy and he did not have a script for it. Writer to call cardiology to verify medication and get order if needed. She then when on to say that he should not have came home. She said that he is extremely weak and can not get up from the toilet or chair. She said that some has to cook for him, because he can not tolerate standing. She also said that he fell yesterday while she was running errand and he had to crawl to the phone to call for help. She said that his son offered to take in Silverio Kahn, but she said that Silverio Kahn refused to live with son. She said that she was wondering why he did not go to rehab.   Explained that he refused. She then had to get off the phone due to visiting nurse arrival.  [facility] briefly and encouraged her to speak with home care nurse about the safety of pt being home. YOLY. Will follow up with home care to get up date on today's visit. [address] cardiology office called and spoke with Clarence  She stated that by looking at the notes, that pt is not to be on the Toprol XL. She said that it was originally discontinued by an outside provider, but that Dr Julisa Brooks said that he would discontinue it if he was on it due to his COPD. [facility] was called and informed her that he was not to take the Toprol. She said that she talked with the visiting nurse, but then left to get a heavier toilet seat riser. She said that he could afford private aides to assist him, but he does not like people over. She said that she will try and talk with him and review the list of private duty aides that the home care left. Non-face-to-face services provided:  Scheduled appointment with PCP-ex wife to call  Obtained and reviewed discharge summary and/or continuity of care documents  Assessment and support for treatment adherence and medication management-reviewed     Transitions of Care Initial Call    Was this an external facility discharge? No Discharge Facility: [facility]    Challenges to be reviewed by the provider   Additional needs identified to be addressed with provider: No  none             Method of communication with provider : none      Advance Care Planning:   Does patient have an Advance Directive: did not review. Was this a readmission? No  Patient stated reason for admission: hypotension  Patients top risk factors for readmission: functional physical ability, lack of knowledge about disease and medical condition-CHF/COPD    Care Transition Nurse (CTN) contacted the patient by telephone to perform post hospital discharge assessment. Verified name and  with patient as identifiers.  Provided introduction to self, and explanation of the CTN role. CTN reviewed discharge instructions, medical action plan and red flags with patient who verbalized understanding. Family given an opportunity to ask questions and does not have any further questions or concerns at this time. Were discharge instructions available to patient? Yes. Reviewed appropriate site of care based on symptoms and resources available to patient including: PCP, Specialist, Home health and When to call 911. The family agrees to contact the PCP office for questions related to their healthcare. Medication review was performed with family, who verbalizes understanding of administration of home medications. Family was given an opportunity to verbalize any questions and concerns and agrees to contact CTN or health care provider for questions related to their healthcare. Reviewed and educated family on any new and changed medications related to discharge diagnosis. Was patient discharged with a pulse oximeter? No Discussed and confirmed pulse oximeter discharge instructions and when to notify provider or seek emergency care. CTN provided contact information. Plan for follow-up call in 5-7 days based on severity of symptoms and risk factors. Plan for next call: follow up with home care          Care Transitions 24 Hour Call    Do you have any ongoing symptoms?: Yes  Patient-reported symptoms: Weakness  Do you have a copy of your discharge instructions?: Yes  Do you have all of your prescriptions and are they filled?: No  Have you been contacted by a [address]?: No  Have you scheduled your follow up appointment?: No  Were you discharged with any Home Care or Post Acute Services: Yes  Post Acute Services: Home Health (Comment: [facility])  Do you feel like you have everything you need to keep you well at home?: No  Care Transitions Interventions         Follow Up  No future appointments.     Mita Forbes RN

## 2021-07-22 ENCOUNTER — CARE COORDINATION (OUTPATIENT)
Dept: CASE MANAGEMENT | Age: 71
End: 2021-07-22

## 2021-07-22 NOTE — CARE COORDINATION
[facility] 45 Transitions Follow Up Call    2021    Patient: Claudio Conte  Patient : 1950   MRN: [id number]  Reason for Admission: Hypotension   Discharge Date: 21 RARS: Readmission Risk Score: 24    BPCI-A Medical Bundle Patient:  Working DR Renal Failure  Ad[email or web address]  Adm Date: 21  Date of Discharge: 21     Bundle End Date: 10/18/21     Call placed to [address] [place] [address] to review nurse visit that occurred yesterday. Spoke with Jose HUTCHINSON. She stated that she would send a message to the nurse that did the visit yesterday and have Dewey Jarrell call [facility]. Received call from Dewey Jarrell the visiting nurse. He said that Nataly Whitley was noted to be weak, but was able to ambulate to the bathroom with a walker and was able to get out of his computer chair. He said that he did not feel he was unsafe. Dewey Jarrell said that PT/OT and possible aide assistance weekly will be ordered. He said that he thought that Nataly Whitley had a follow up with PCP scheduled in the beginning of August.    Care Transitions Subsequent and Final Call    Subsequent and Final Calls  Are you currently active with any services?: Home Health  Care Transitions Interventions  Other Interventions:            Follow Up  Future Appointments   Date Time Provider Jeremiah Sierra   2021  1:15 PM ALISON Baptiste - [facility] MARTELP       Ritu Benitez RN

## 2021-07-23 ENCOUNTER — CARE COORDINATION (OUTPATIENT)
Dept: CASE MANAGEMENT | Age: 71
End: 2021-07-23

## 2021-07-23 NOTE — CARE COORDINATION
[facility] 45 Transitions Follow Up Call    2021    Patient: Amparo Obrien  Patient : 1950   MRN: [id number]  Reason for Admission: Hypotension   Discharge Date: 21 RARS: Readmission Risk Score: 24         Spoke with: Triny Grant    Call placed to Triny Grant, ex wife. She stated that he was doing better. She said that he is really working hard on his strengthening. She said that he was now able to participate more in his shower. She said that she got his a urinal and a elevated toilet seat that was taller, so he can now get off the toilet. She said that she tried to back down his oxygen to 2-3 liters as instructed ,but he could not tolerate it and had more shortness of breath, so it was increased back to the 4 liters. She said that the prednisone is keeping him up at night. She said that she is leaving for the weekend, but her niece and daughter who are nurses will be checking in on him. He also has some prepared meals that he can put in the microwave. She did say that he had a PCP appointment, but did not know when. No further questions or concerns. Will hand off to [facility] for 90 day outreach. Care Transitions Subsequent and Final Call    Subsequent and Final Calls  Are you currently active with any services?: Home Health  Care Transitions Interventions  Other Interventions:            Follow Up  Future Appointments   Date Time Provider Jeremiah Sierra   2021  1:15 PM ALISON Boyd - [facility] BUTLER [facility]       Joan Castro RN

## 2021-07-30 ENCOUNTER — CARE COORDINATION (OUTPATIENT)
Dept: CASE MANAGEMENT | Age: 71
End: 2021-07-30

## 2021-07-30 NOTE — CARE COORDINATION
[facility] 45 Transitions Follow Up Call    2021    Patient: Nato Lacey  Patient : 1950   MRN: <[id number]>  Reason for Admission:   Discharge Date: 21 RARS: Readmission Risk Score: 24         Spoke with: Devin Contreras, patient    Contacted patient for BPCI-A follow up. Melissa Hernandez stated that he is \"not too bad\". Stated he continues to become short of breath when up and moving around. Reports he continues to use 4 L of continuous oxygen. Reports having a cough with pale colored sputum. Advised to continue to monitor cough and sputum changes. He stated he has swelling in his feet and legs when they are down and when he is up and moving around. He informed CTN that swelling decreases once his legs/feet are elevated. No c/o chest pain/discomfort, pressure, tightness, fever. Home health nurse, PT and OT continues to make visits. Discussed signs/symptoms and when to contact his doctor. He verbalized understanding. He did not have any questions or concerns for CTN at this time. Will continue to follow. Care Transitions Subsequent and Final Call    Subsequent and Final Calls  Do you have any ongoing symptoms?: Yes  Patient-reported symptoms: Shortness of Breath  Do you currently have any active services?: Yes  Are you currently active with any services?: Home Health  Do you have any needs or concerns that I can assist you with?: No  Care Transitions Interventions  Other Interventions:            Follow Up  Future Appointments   Date Time Provider Jeremiah Sierra   2021  1:15 PM ALISON Cowan - CNP [place] [facility]       Landon Melgar RN

## 2021-08-05 ENCOUNTER — CARE COORDINATION (OUTPATIENT)
Dept: CASE MANAGEMENT | Age: 71
End: 2021-08-05

## 2021-08-05 NOTE — CARE COORDINATION
[facility] Transitions Bundled Payments for Care Improvement (BPCI) Follow Up Call  Qualifying Diagnosis of Qualifying Diagnosis Renal Failure   8/5/2021  Patient Name:  Edmund Crespo   YOB: 1950  Discharge Date:  7/20/21  RARS:  Readmission Risk Score: 24    PCP:  Marnie Campbell    Message left in compliance with HIPPA. Stated who I was, representing the [facility], Care Transitions Team. Requested to place a call to their Physician with any immediate health needs/questions/concerns.     Care Transitions will continue to follow per BPCI Program.  Arnaldo Yap RN, CTN   Future Appointments   Date Time Provider Jeremiah Sierra   8/25/2021  1:15 PM ALISON Sharif - CNP [place] Quant [facility]

## 2021-08-07 PROBLEM — J96.90 RESPIRATORY FAILURE (HCC): Status: ACTIVE | Noted: 2021-08-07

## 2021-08-08 ENCOUNTER — APPOINTMENT (OUTPATIENT)
Dept: GENERAL RADIOLOGY | Age: 71
DRG: 246 | End: 2021-08-08
Attending: FAMILY MEDICINE
Payer: MEDICARE

## 2021-08-08 ENCOUNTER — HOSPITAL ENCOUNTER (INPATIENT)
Age: 71
LOS: 7 days | Discharge: NURSING, REHAB OR LONG-TERM CARE | DRG: 246 | End: 2021-08-15
Attending: FAMILY MEDICINE | Admitting: INTERNAL MEDICINE
Payer: MEDICARE

## 2021-08-08 PROBLEM — E78.5 HYPERLIPIDEMIA: Status: ACTIVE | Noted: 2020-01-13

## 2021-08-08 PROBLEM — K21.9 GERD (GASTROESOPHAGEAL REFLUX DISEASE): Status: ACTIVE | Noted: 2020-01-13

## 2021-08-08 PROBLEM — I50.32 CHRONIC DIASTOLIC HEART FAILURE (HCC): Status: ACTIVE | Noted: 2019-10-17

## 2021-08-08 PROBLEM — I50.33 ACUTE ON CHRONIC DIASTOLIC CONGESTIVE HEART FAILURE (HCC): Status: ACTIVE | Noted: 2019-10-17

## 2021-08-08 PROBLEM — E11.9 TYPE 2 DIABETES MELLITUS, WITHOUT LONG-TERM CURRENT USE OF INSULIN (HCC): Status: ACTIVE | Noted: 2021-06-16

## 2021-08-08 LAB
ABSOLUTE EOS #: 0.12 K/UL (ref 0–0.4)
ABSOLUTE IMMATURE GRANULOCYTE: 0 K/UL (ref 0–0.3)
ABSOLUTE LYMPH #: 0.49 K/UL (ref 1–4.8)
ABSOLUTE MONO #: 0.24 K/UL (ref 0.1–0.8)
ALBUMIN SERPL-MCNC: 3.7 G/DL (ref 3.5–5.2)
ALBUMIN/GLOBULIN RATIO: 1.1 (ref 1–2.5)
ALLEN TEST: POSITIVE
ALP BLD-CCNC: 163 U/L (ref 40–129)
ALT SERPL-CCNC: 33 U/L (ref 5–41)
ANION GAP SERPL CALCULATED.3IONS-SCNC: 20 MMOL/L (ref 9–17)
AST SERPL-CCNC: 25 U/L
BASOPHILS # BLD: 0 % (ref 0–2)
BASOPHILS ABSOLUTE: 0 K/UL (ref 0–0.2)
BILIRUB SERPL-MCNC: 0.88 MG/DL (ref 0.3–1.2)
BNP INTERPRETATION: ABNORMAL
BUN BLDV-MCNC: 25 MG/DL (ref 8–23)
BUN/CREAT BLD: ABNORMAL (ref 9–20)
CALCIUM SERPL-MCNC: 9.4 MG/DL (ref 8.6–10.4)
CHLORIDE BLD-SCNC: 79 MMOL/L (ref 98–107)
CO2: 35 MMOL/L (ref 20–31)
CREAT SERPL-MCNC: 0.7 MG/DL (ref 0.7–1.2)
DIFFERENTIAL TYPE: ABNORMAL
EOSINOPHILS RELATIVE PERCENT: 1 % (ref 1–4)
FIO2: 30
GFR AFRICAN AMERICAN: >60 ML/MIN
GFR NON-AFRICAN AMERICAN: >60 ML/MIN
GFR SERPL CREATININE-BSD FRML MDRD: ABNORMAL ML/MIN/{1.73_M2}
GFR SERPL CREATININE-BSD FRML MDRD: ABNORMAL ML/MIN/{1.73_M2}
GLUCOSE BLD-MCNC: 128 MG/DL (ref 70–99)
GLUCOSE BLD-MCNC: 95 MG/DL (ref 75–110)
HCT VFR BLD CALC: 38.6 % (ref 40.7–50.3)
HEMOGLOBIN: 12.4 G/DL (ref 13–17)
IMMATURE GRANULOCYTES: 0 %
LACTIC ACID, SEPSIS WHOLE BLOOD: 1.7 MMOL/L (ref 0.5–1.9)
LACTIC ACID, SEPSIS WHOLE BLOOD: 1.7 MMOL/L (ref 0.5–1.9)
LACTIC ACID, SEPSIS: NORMAL MMOL/L (ref 0.5–1.9)
LACTIC ACID, SEPSIS: NORMAL MMOL/L (ref 0.5–1.9)
LYMPHOCYTES # BLD: 4 % (ref 24–44)
MAGNESIUM: 2.6 MG/DL (ref 1.6–2.6)
MCH RBC QN AUTO: 32.9 PG (ref 25.2–33.5)
MCHC RBC AUTO-ENTMCNC: 32.1 G/DL (ref 28.4–34.8)
MCV RBC AUTO: 102.4 FL (ref 82.6–102.9)
MODE: ABNORMAL
MONOCYTES # BLD: 2 % (ref 1–7)
MORPHOLOGY: ABNORMAL
NEGATIVE BASE EXCESS, ART: ABNORMAL (ref 0–2)
NRBC AUTOMATED: 0 PER 100 WBC
O2 DEVICE/FLOW/%: ABNORMAL
PATIENT TEMP: ABNORMAL
PDW BLD-RTO: 14.5 % (ref 11.8–14.4)
PLATELET # BLD: 222 K/UL (ref 138–453)
PLATELET ESTIMATE: ABNORMAL
PMV BLD AUTO: 11.1 FL (ref 8.1–13.5)
POC HCO3: 45.2 MMOL/L (ref 21–28)
POC O2 SATURATION: 92 % (ref 94–98)
POC PCO2 TEMP: ABNORMAL MM HG
POC PCO2: 66.6 MM HG (ref 35–48)
POC PH TEMP: ABNORMAL
POC PH: 7.44 (ref 7.35–7.45)
POC PO2 TEMP: ABNORMAL MM HG
POC PO2: 63.8 MM HG (ref 83–108)
POSITIVE BASE EXCESS, ART: 18 (ref 0–3)
POTASSIUM SERPL-SCNC: 3.9 MMOL/L (ref 3.7–5.3)
PRO-BNP: 1606 PG/ML
PROSTATE SPECIFIC ANTIGEN: 0.71 UG/L
RBC # BLD: 3.77 M/UL (ref 4.21–5.77)
RBC # BLD: ABNORMAL 10*6/UL
SAMPLE SITE: ABNORMAL
SEG NEUTROPHILS: 93 % (ref 36–66)
SEGMENTED NEUTROPHILS ABSOLUTE COUNT: 11.35 K/UL (ref 1.8–7.7)
SODIUM BLD-SCNC: 134 MMOL/L (ref 135–144)
TCO2 (CALC), ART: ABNORMAL MMOL/L (ref 22–29)
TOTAL PROTEIN: 7 G/DL (ref 6.4–8.3)
TROPONIN INTERP: ABNORMAL
TROPONIN T: ABNORMAL NG/ML
TROPONIN, HIGH SENSITIVITY: 108 NG/L (ref 0–22)
TROPONIN, HIGH SENSITIVITY: 121 NG/L (ref 0–22)
TROPONIN, HIGH SENSITIVITY: 125 NG/L (ref 0–22)
WBC # BLD: 12.2 K/UL (ref 3.5–11.3)
WBC # BLD: ABNORMAL 10*3/UL

## 2021-08-08 PROCEDURE — 94640 AIRWAY INHALATION TREATMENT: CPT

## 2021-08-08 PROCEDURE — 6360000002 HC RX W HCPCS: Performed by: NURSE PRACTITIONER

## 2021-08-08 PROCEDURE — 84484 ASSAY OF TROPONIN QUANT: CPT

## 2021-08-08 PROCEDURE — 2580000003 HC RX 258: Performed by: INTERNAL MEDICINE

## 2021-08-08 PROCEDURE — 2580000003 HC RX 258: Performed by: NURSE PRACTITIONER

## 2021-08-08 PROCEDURE — 83605 ASSAY OF LACTIC ACID: CPT

## 2021-08-08 PROCEDURE — 94761 N-INVAS EAR/PLS OXIMETRY MLT: CPT

## 2021-08-08 PROCEDURE — 36600 WITHDRAWAL OF ARTERIAL BLOOD: CPT

## 2021-08-08 PROCEDURE — APPNB60 APP NON BILLABLE TIME 46-60 MINS: Performed by: NURSE PRACTITIONER

## 2021-08-08 PROCEDURE — 94660 CPAP INITIATION&MGMT: CPT

## 2021-08-08 PROCEDURE — 82947 ASSAY GLUCOSE BLOOD QUANT: CPT

## 2021-08-08 PROCEDURE — 2060000000 HC ICU INTERMEDIATE R&B

## 2021-08-08 PROCEDURE — 87040 BLOOD CULTURE FOR BACTERIA: CPT

## 2021-08-08 PROCEDURE — 82803 BLOOD GASES ANY COMBINATION: CPT

## 2021-08-08 PROCEDURE — 6360000002 HC RX W HCPCS: Performed by: INTERNAL MEDICINE

## 2021-08-08 PROCEDURE — 83880 ASSAY OF NATRIURETIC PEPTIDE: CPT

## 2021-08-08 PROCEDURE — 6370000000 HC RX 637 (ALT 250 FOR IP): Performed by: NURSE PRACTITIONER

## 2021-08-08 PROCEDURE — 99223 1ST HOSP IP/OBS HIGH 75: CPT | Performed by: INTERNAL MEDICINE

## 2021-08-08 PROCEDURE — 71045 X-RAY EXAM CHEST 1 VIEW: CPT

## 2021-08-08 PROCEDURE — 83735 ASSAY OF MAGNESIUM: CPT

## 2021-08-08 PROCEDURE — 2500000003 HC RX 250 WO HCPCS: Performed by: INTERNAL MEDICINE

## 2021-08-08 PROCEDURE — 80053 COMPREHEN METABOLIC PANEL: CPT

## 2021-08-08 PROCEDURE — 2700000000 HC OXYGEN THERAPY PER DAY

## 2021-08-08 PROCEDURE — 85025 COMPLETE CBC W/AUTO DIFF WBC: CPT

## 2021-08-08 PROCEDURE — APPSS45 APP SPLIT SHARED TIME 31-45 MINUTES: Performed by: NURSE PRACTITIONER

## 2021-08-08 PROCEDURE — 36415 COLL VENOUS BLD VENIPUNCTURE: CPT

## 2021-08-08 PROCEDURE — 84153 ASSAY OF PSA TOTAL: CPT

## 2021-08-08 PROCEDURE — 2500000003 HC RX 250 WO HCPCS: Performed by: NURSE PRACTITIONER

## 2021-08-08 PROCEDURE — 93005 ELECTROCARDIOGRAM TRACING: CPT | Performed by: EMERGENCY MEDICINE

## 2021-08-08 RX ORDER — IPRATROPIUM BROMIDE AND ALBUTEROL SULFATE 2.5; .5 MG/3ML; MG/3ML
1 SOLUTION RESPIRATORY (INHALATION)
Status: DISCONTINUED | OUTPATIENT
Start: 2021-08-08 | End: 2021-08-10

## 2021-08-08 RX ORDER — SODIUM CHLORIDE 0.9 % (FLUSH) 0.9 %
10 SYRINGE (ML) INJECTION PRN
Status: DISCONTINUED | OUTPATIENT
Start: 2021-08-08 | End: 2021-08-15 | Stop reason: HOSPADM

## 2021-08-08 RX ORDER — CARVEDILOL 3.12 MG/1
3.12 TABLET ORAL 2 TIMES DAILY WITH MEALS
Status: DISCONTINUED | OUTPATIENT
Start: 2021-08-09 | End: 2021-08-10

## 2021-08-08 RX ORDER — SPIRONOLACTONE 25 MG/1
25 TABLET ORAL DAILY
Status: DISCONTINUED | OUTPATIENT
Start: 2021-08-08 | End: 2021-08-14

## 2021-08-08 RX ORDER — METOPROLOL TARTRATE 5 MG/5ML
5 INJECTION INTRAVENOUS EVERY 4 HOURS PRN
Status: DISCONTINUED | OUTPATIENT
Start: 2021-08-08 | End: 2021-08-15 | Stop reason: HOSPADM

## 2021-08-08 RX ORDER — POTASSIUM CHLORIDE 20 MEQ/1
10 TABLET, EXTENDED RELEASE ORAL 2 TIMES DAILY
Status: DISCONTINUED | OUTPATIENT
Start: 2021-08-08 | End: 2021-08-15 | Stop reason: HOSPADM

## 2021-08-08 RX ORDER — DILTIAZEM HYDROCHLORIDE 180 MG/1
180 CAPSULE, COATED, EXTENDED RELEASE ORAL DAILY
Status: DISCONTINUED | OUTPATIENT
Start: 2021-08-08 | End: 2021-08-09

## 2021-08-08 RX ORDER — MAGNESIUM SULFATE IN WATER 40 MG/ML
2000 INJECTION, SOLUTION INTRAVENOUS ONCE
Status: COMPLETED | OUTPATIENT
Start: 2021-08-08 | End: 2021-08-08

## 2021-08-08 RX ORDER — ACETAMINOPHEN 325 MG/1
650 TABLET ORAL EVERY 6 HOURS PRN
Status: DISCONTINUED | OUTPATIENT
Start: 2021-08-08 | End: 2021-08-15 | Stop reason: HOSPADM

## 2021-08-08 RX ORDER — NICOTINE 21 MG/24HR
1 PATCH, TRANSDERMAL 24 HOURS TRANSDERMAL DAILY
Status: DISCONTINUED | OUTPATIENT
Start: 2021-08-08 | End: 2021-08-15 | Stop reason: HOSPADM

## 2021-08-08 RX ORDER — CELECOXIB 100 MG/1
100 CAPSULE ORAL 2 TIMES DAILY
Status: DISCONTINUED | OUTPATIENT
Start: 2021-08-08 | End: 2021-08-08

## 2021-08-08 RX ORDER — METHYLPREDNISOLONE SODIUM SUCCINATE 40 MG/ML
40 INJECTION, POWDER, LYOPHILIZED, FOR SOLUTION INTRAMUSCULAR; INTRAVENOUS EVERY 8 HOURS
Status: DISCONTINUED | OUTPATIENT
Start: 2021-08-08 | End: 2021-08-10

## 2021-08-08 RX ORDER — MIDODRINE HYDROCHLORIDE 5 MG/1
5 TABLET ORAL
Status: DISCONTINUED | OUTPATIENT
Start: 2021-08-08 | End: 2021-08-10

## 2021-08-08 RX ORDER — LISINOPRIL 5 MG/1
5 TABLET ORAL DAILY
Status: DISCONTINUED | OUTPATIENT
Start: 2021-08-09 | End: 2021-08-13

## 2021-08-08 RX ORDER — PANTOPRAZOLE SODIUM 40 MG/1
40 TABLET, DELAYED RELEASE ORAL DAILY
Status: DISCONTINUED | OUTPATIENT
Start: 2021-08-08 | End: 2021-08-15 | Stop reason: HOSPADM

## 2021-08-08 RX ORDER — BUMETANIDE 0.25 MG/ML
1 INJECTION, SOLUTION INTRAMUSCULAR; INTRAVENOUS 2 TIMES DAILY
Status: DISCONTINUED | OUTPATIENT
Start: 2021-08-08 | End: 2021-08-10

## 2021-08-08 RX ORDER — SODIUM CHLORIDE 0.9 % (FLUSH) 0.9 %
5-40 SYRINGE (ML) INJECTION EVERY 12 HOURS SCHEDULED
Status: DISCONTINUED | OUTPATIENT
Start: 2021-08-08 | End: 2021-08-15 | Stop reason: HOSPADM

## 2021-08-08 RX ORDER — FUROSEMIDE 10 MG/ML
40 INJECTION INTRAMUSCULAR; INTRAVENOUS 2 TIMES DAILY
Status: DISCONTINUED | OUTPATIENT
Start: 2021-08-08 | End: 2021-08-08

## 2021-08-08 RX ORDER — POLYETHYLENE GLYCOL 3350 17 G/17G
17 POWDER, FOR SOLUTION ORAL DAILY PRN
Status: DISCONTINUED | OUTPATIENT
Start: 2021-08-08 | End: 2021-08-15 | Stop reason: HOSPADM

## 2021-08-08 RX ORDER — BUMETANIDE 1 MG/1
2 TABLET ORAL DAILY
Status: DISCONTINUED | OUTPATIENT
Start: 2021-08-08 | End: 2021-08-08

## 2021-08-08 RX ORDER — METHYLPREDNISOLONE SODIUM SUCCINATE 40 MG/ML
40 INJECTION, POWDER, LYOPHILIZED, FOR SOLUTION INTRAMUSCULAR; INTRAVENOUS DAILY
Status: DISCONTINUED | OUTPATIENT
Start: 2021-08-09 | End: 2021-08-08

## 2021-08-08 RX ORDER — ACETAMINOPHEN 650 MG/1
650 SUPPOSITORY RECTAL EVERY 6 HOURS PRN
Status: DISCONTINUED | OUTPATIENT
Start: 2021-08-08 | End: 2021-08-15 | Stop reason: HOSPADM

## 2021-08-08 RX ORDER — METHYLPREDNISOLONE SODIUM SUCCINATE 125 MG/2ML
125 INJECTION, POWDER, LYOPHILIZED, FOR SOLUTION INTRAMUSCULAR; INTRAVENOUS ONCE
Status: COMPLETED | OUTPATIENT
Start: 2021-08-08 | End: 2021-08-08

## 2021-08-08 RX ORDER — CETIRIZINE HYDROCHLORIDE 10 MG/1
10 TABLET ORAL DAILY
Status: DISCONTINUED | OUTPATIENT
Start: 2021-08-08 | End: 2021-08-15 | Stop reason: HOSPADM

## 2021-08-08 RX ORDER — ONDANSETRON 2 MG/ML
4 INJECTION INTRAMUSCULAR; INTRAVENOUS EVERY 6 HOURS PRN
Status: DISCONTINUED | OUTPATIENT
Start: 2021-08-08 | End: 2021-08-15 | Stop reason: HOSPADM

## 2021-08-08 RX ORDER — ONDANSETRON 4 MG/1
4 TABLET, ORALLY DISINTEGRATING ORAL EVERY 8 HOURS PRN
Status: DISCONTINUED | OUTPATIENT
Start: 2021-08-08 | End: 2021-08-15 | Stop reason: HOSPADM

## 2021-08-08 RX ORDER — DILTIAZEM HYDROCHLORIDE 5 MG/ML
10 INJECTION INTRAVENOUS ONCE
Status: DISCONTINUED | OUTPATIENT
Start: 2021-08-08 | End: 2021-08-10

## 2021-08-08 RX ORDER — LORAZEPAM 2 MG/ML
1 INJECTION INTRAMUSCULAR EVERY 6 HOURS PRN
Status: DISCONTINUED | OUTPATIENT
Start: 2021-08-08 | End: 2021-08-15 | Stop reason: HOSPADM

## 2021-08-08 RX ORDER — SODIUM CHLORIDE 9 MG/ML
25 INJECTION, SOLUTION INTRAVENOUS PRN
Status: DISCONTINUED | OUTPATIENT
Start: 2021-08-08 | End: 2021-08-15 | Stop reason: HOSPADM

## 2021-08-08 RX ORDER — BUDESONIDE AND FORMOTEROL FUMARATE DIHYDRATE 160; 4.5 UG/1; UG/1
2 AEROSOL RESPIRATORY (INHALATION) 2 TIMES DAILY
Status: DISCONTINUED | OUTPATIENT
Start: 2021-08-08 | End: 2021-08-15 | Stop reason: HOSPADM

## 2021-08-08 RX ORDER — ALBUTEROL SULFATE 90 UG/1
2 AEROSOL, METERED RESPIRATORY (INHALATION) EVERY 6 HOURS PRN
Status: DISCONTINUED | OUTPATIENT
Start: 2021-08-08 | End: 2021-08-15 | Stop reason: HOSPADM

## 2021-08-08 RX ADMIN — PANTOPRAZOLE SODIUM 40 MG: 40 TABLET, DELAYED RELEASE ORAL at 13:08

## 2021-08-08 RX ADMIN — METHYLPREDNISOLONE SODIUM SUCCINATE 40 MG: 40 INJECTION, POWDER, FOR SOLUTION INTRAMUSCULAR; INTRAVENOUS at 17:25

## 2021-08-08 RX ADMIN — BUMETANIDE 1 MG: 0.25 INJECTION, SOLUTION INTRAMUSCULAR; INTRAVENOUS at 17:25

## 2021-08-08 RX ADMIN — METOPROLOL TARTRATE 5 MG: 1 INJECTION, SOLUTION INTRAVENOUS at 13:35

## 2021-08-08 RX ADMIN — SODIUM CHLORIDE, PRESERVATIVE FREE 10 ML: 5 INJECTION INTRAVENOUS at 21:44

## 2021-08-08 RX ADMIN — IPRATROPIUM BROMIDE AND ALBUTEROL SULFATE 1 AMPULE: .5; 3 SOLUTION RESPIRATORY (INHALATION) at 20:28

## 2021-08-08 RX ADMIN — MAGNESIUM SULFATE 2000 MG: 2 INJECTION INTRAVENOUS at 16:24

## 2021-08-08 RX ADMIN — ENOXAPARIN SODIUM 150 MG: 150 INJECTION SUBCUTANEOUS at 17:25

## 2021-08-08 RX ADMIN — METHYLPREDNISOLONE SODIUM SUCCINATE 125 MG: 125 INJECTION, POWDER, FOR SOLUTION INTRAMUSCULAR; INTRAVENOUS at 14:11

## 2021-08-08 RX ADMIN — DILTIAZEM HYDROCHLORIDE 5 MG/HR: 5 INJECTION INTRAVENOUS at 17:57

## 2021-08-08 RX ADMIN — IPRATROPIUM BROMIDE AND ALBUTEROL SULFATE 1 AMPULE: .5; 3 SOLUTION RESPIRATORY (INHALATION) at 15:41

## 2021-08-08 RX ADMIN — LORAZEPAM 1 MG: 2 INJECTION INTRAMUSCULAR; INTRAVENOUS at 15:08

## 2021-08-08 RX ADMIN — DILTIAZEM HYDROCHLORIDE 180 MG: 180 CAPSULE, COATED, EXTENDED RELEASE ORAL at 13:08

## 2021-08-08 RX ADMIN — SPIRONOLACTONE 25 MG: 25 TABLET ORAL at 13:08

## 2021-08-08 ASSESSMENT — ENCOUNTER SYMPTOMS
ABDOMINAL DISTENTION: 0
COLOR CHANGE: 0
SHORTNESS OF BREATH: 1
COUGH: 1
ANAL BLEEDING: 0

## 2021-08-08 ASSESSMENT — PAIN SCALES - GENERAL
PAINLEVEL_OUTOF10: 0
PAINLEVEL_OUTOF10: 3
PAINLEVEL_OUTOF10: 0

## 2021-08-08 NOTE — H&P
[facility]  Office: [address], DO, Vianney Barakat, DO, Malena Marlee, DO, Patrick French Blood, DO, Mila Mesa MD, Amanda Giang MD, Eriak Cherry MD, Fauzia Stewart MD, Lindsey Sevilla MD, Shelly Cabezas MD, Mario Katz MD, Eugenio Preston, DO, Amelia Pompa MD, Carla Prasad DO, Jodee Monique MD,  Chuckie Sanchez DO, Stewart Martinez MD, Bautista Serrato MD, Paulo De La Vega MD, Carolyn Renteria MD, Angie López MD, Skylar Hinkle MD, Crescencio Lezama MD, Nasir Saavedra, [facility], [address], CNP, Bernie Kramer, CNP, Danni Montesinos, CNS, Zoila Monique, CNP, Shan Ayala, [facility], Laura Shields, CNP, Saul Jerez, CNP, Madiha Monterroso, CNP, Brian Roman PA-C, Marisa Allen, [facility], Ramírez Orta, CNP, Brando iHnes, CNP, German Dominique, CNP, Natalya Pisano, CNP, Denton Mcduffie, CNP, Giovanni Juan, CNP, Vaughn Nissen, CNP, Renetta Hernandez, [facility]   [address] [facility]    HISTORY AND PHYSICAL EXAMINATION            Date:   8/8/2021  Patient name:  Nat Montiel  Date of admission:  8/8/2021 11:20 AM  MRN:   [id number]  Account:  [de-identified]  YOB: 1950  PCP:    Cecilia Escobar  Room:   300/3004-01  Code Status:    Full Code    Chief Complaint:     Respiratory distress    History Obtained From:     patient    History of Present Illness:     Nat Montiel is a 66 y.o. Non- / non  male who presents with No chief complaint on file. and is admitted to the hospital for the management of Acute on chronic respiratory failure with hypoxia and hypercapnia ([facility] [address] Patient reported to [facility] facility after being found by his son with severe weakness and a questionable fall. Patient has extensive cardiac history including openheart bypass as well as diastolic failure. Patient was transported to [facility] where he was found to be in respiratory failure with hyperkalemia. Patient was treated with BiPAP and insulin D50. ABG and potassium did improve. Transport was arranged to this facility however due to bed availability patient was boarded in the emergency department at any hospital overnight. Reportedly when EMS arrived patient was in near complete respiratory failure and air medical was contacted for assistance. Air medical evaluated the patient and altered ventilator settings including increasing his PEEP and administering Lasix due to coarse rales. Aeromedical reports that on their arrival patient was agitated and not following commands. Patient was nonverbal and would have periods of near unresponsiveness. On patient's arrival at our facility he is alert to voice and answering yes/no questions. Patient is still restless but now following commands. Patient's son arrives and provides additional history. Tthe patient was discharged to home following a recent hospitalization. Since discharge he has been controlling his home medications and has not been following his discharge instructions exactly. Son reports that he has been taking potassium supplements. There is question on compliance with diuretics. Son further reports that he continues to smoke and does not know about his diet or oral fluid compliance. Patient typically self medicates with the prescriptions he feels he needs as opposed to those recommended. When the son arrived to check on him he found him in his bathroom incontinent of bladder and nearly unresponsive and pale. Fall status unknown however he had imaging was completed at the [facility] without acute abnormalities identified.     Past Medical History:     Past Medical History:   Diagnosis Date    Agent orange exposure     Anxiety state     Cancer ([facility] [address]     bladder    CHF (congestive heart failure) (HCC)     Chronic obstructive pulmonary disease (COPD) (HCC)     Chronic post-traumatic stress disorder     Chronic respiratory failure with hypercapnia (HCC)     COPD exacerbation ([facility] [address]     Coronary artery disease     Degenerative disc disease, lumbar     Depression     Diabetes mellitus ([facility] [address]     Essential hypertension     History of acute pancreatitis     Hyperglycemia     Hypokalemia     Lumbosacral disc disease     Obesity     Obstructive sleep apnea     Personal history of malignant neoplasm of bladder         Past Surgical History:     Past Surgical History:   Procedure Laterality Date    BLADDER SURGERY      OTHER SURGICAL HISTORY      heart bypass with stents    OTHER SURGICAL HISTORY Bilateral 02/10/2021    Bilateral L4-5, L5-S1 Facet Block Injection [facility] Dr Deepthi Foley PAIN MANAGEMENT PROCEDURE Bilateral 02/10/2021    Facet block injection L4-5, L5-S1, bilateral        Medications Prior to Admission:     Prior to Admission medications    Medication Sig Start Date End Date Taking? Authorizing Provider   midodrine (PROAMATINE) 5 MG tablet Take 1 tablet by mouth 3 times daily (with meals) 7/19/21   Bridgette Layne MD   predniSONE (DELTASONE) 10 MG tablet Take 40mg (4 tablets) twice a day for 3 days; then 30mg (3 tablets) twice a day for 3 days; then 40mg (4 tablets) for 3 days; the 20mg (2 tablet) for 3 days; then 10 mg for 3 days and stop 7/19/21   Bridgette Layne MD   celecoxib (CELEBREX) 100 MG capsule take 1 capsule by mouth twice a day 6/3/21   ALISON Whitney - CNP   dextromethorphan-guaiFENesin (ROBITUSSIN-DM)  MG/5ML syrup Take 10 mLs by mouth every 4 hours as needed for Cough 6/1/21   Sarah Oliveira MD   metFORMIN (GLUCOPHAGE) 1000 MG tablet Take 1 tablet by mouth 2 times daily (with meals) 6/1/21   Sarah Oliveira MD   pregabalin (LYRICA) 225 MG capsule Take 1 capsule by mouth 2 times daily for 30 days.  5/7/21 7/21/21  Lawrence Cheung MD   dilTIAZem [facility]) 180 MG extended release capsule Take 180 mg by mouth daily 11/5/20   Historical Provider, MD   magnesium oxide (MAG-OX) 400 MG tablet Take 400 mg by mouth daily    Historical Provider, MD   NONFORMULARY Take 1 tablet by mouth daily ON COR vitamin - to help keep bladder cancer away    Historical Provider, MD   acetaminophen (TYLENOL) 325 MG tablet Take 650 mg by mouth every 6 hours as needed for Pain    Historical Provider, MD   spironolactone (ALDACTONE) 25 MG tablet Take 25 mg by mouth daily    Historical Provider, MD   albuterol sulfate  (90 Base) MCG/ACT inhaler Inhale 2 puffs into the lungs every 6 hours as needed    Historical Provider, MD   lidocaine (XYLOCAINE) 5 % ointment Apply topically as needed.  10/21/20   Feliciano Robles, APRN - CNP   sennosides-docusate sodium (SENOKOT-S) 8.6-50 MG tablet Take 1 tablet by mouth daily    Historical Provider, MD   Multiple Vitamins-Minerals (ONCOVITE) TABS Take by mouth    Historical Provider, MD   loratadine (CLARITIN) 10 MG tablet Take 10 mg by mouth daily    Historical Provider, MD   tiotropium (SPIRIVA HANDIHALER) 18 MCG inhalation capsule Inhale 18 mcg into the lungs daily    Historical Provider, MD   apixaban (ELIQUIS) 5 MG TABS tablet Take by mouth 2 times daily    Historical Provider, MD   potassium chloride (KLOR-CON M) 10 MEQ extended release tablet Take 10 mEq by mouth 2 times daily    Historical Provider, MD   CPAP Machine MISC by Does not apply route BiPAP    Historical Provider, MD   budesonide-formoterol (SYMBICORT) 160-4.5 MCG/ACT AERO Inhale 2 puffs into the lungs 2 times daily    Historical Provider, MD   bumetanide (BUMEX) 2 MG tablet Take 2 mg by mouth daily    Historical Provider, MD   magnesium hydroxide (MILK OF MAGNESIA) 400 MG/5ML suspension Take by mouth daily as needed for Constipation    Historical Provider, MD   ibuprofen (IBU) 400 MG tablet Take 400 mg by mouth every 6 hours as needed for Pain    Historical Provider, MD   metoprolol succinate (TOPROL XL) 25 MG extended release tablet Take 25 mg by mouth daily  Patient not taking: Reported on 7/21/2021    Historical Provider, MD   Cyanocobalamin (VITAMIN B 12) 500 MCG TABS Take by mouth    Historical Provider, MD   pantoprazole (PROTONIX) 40 MG tablet Take 40 mg by mouth daily    Historical Provider, MD        Allergies:     Niacin and related, Other, and Statins    Social History:     Tobacco:    reports that he has been smoking cigarettes. He has a 54.00 pack-year smoking history. He has never used smokeless tobacco.  Alcohol:      reports current alcohol use. Drug Use:  reports no history of drug use. Family History:     Family History   Problem Relation Age of Onset    Emphysema Mother     Heart Disease Father     Heart Failure Father        Review of Systems:     Positive and Negative as described in HPI. Review of systems is partially inhibited due to acute change in level of consciousness. Review of Systems   Unable to perform ROS: Mental status change   Constitutional: Positive for activity change and fatigue. Respiratory: Positive for cough and shortness of breath. Cardiovascular: Negative for chest pain and palpitations. Gastrointestinal: Negative for abdominal distention and anal bleeding. Genitourinary: Negative for urgency. Musculoskeletal: Negative for arthralgias and gait problem. Skin: Negative for color change. Neurological: Positive for syncope and weakness. Physical Exam:   /72   Pulse 97   Temp 97.7 °F (36.5 °C) (Axillary)   Resp 21   Ht 5' 10\" (1.778 m)   Wt 220 lb 7.4 oz (100 kg)   SpO2 96%   BMI 31.63 kg/m²   Temp (24hrs), Av.7 °F (36.5 °C), Min:97.7 °F (36.5 °C), Max:97.7 °F (36.5 °C)    Recent Labs     21  1346   POCGLU 95       Intake/Output Summary (Last 24 hours) at 2021 1432  Last data filed at 2021 1418  Gross per 24 hour   Intake --   Output 1000 ml   Net -1000 ml       Physical Exam  Constitutional:       General: He is in acute distress. Appearance: He is obese. He is ill-appearing. HENT:      Head: Normocephalic and atraumatic.       Nose: Nose normal. Mouth/Throat:      Mouth: Mucous membranes are moist.   Eyes:      Extraocular Movements: Extraocular movements intact. Conjunctiva/sclera: Conjunctivae normal.      Pupils: Pupils are equal, round, and reactive to light. Cardiovascular:      Rate and Rhythm: Tachycardia present. Heart sounds: Murmur heard. Pulmonary:      Effort: Tachypnea, accessory muscle usage and respiratory distress present. Breath sounds: Decreased air movement present. Examination of the right-middle field reveals rales. Examination of the left-middle field reveals rales. Examination of the right-lower field reveals decreased breath sounds and rales. Examination of the left-lower field reveals decreased breath sounds and rales. Decreased breath sounds and rales present. Abdominal:      General: Abdomen is flat. Bowel sounds are normal.      Palpations: Abdomen is soft. Tenderness: There is no abdominal tenderness. Genitourinary:     Penis: Normal.       Testes: Normal.   Musculoskeletal:      Cervical back: Normal range of motion and neck supple. No rigidity. Right lower leg: Edema present. Left lower leg: Edema present. Skin:     General: Skin is warm and dry. Capillary Refill: Capillary refill takes 2 to 3 seconds. Coloration: Skin is not pale. Neurological:      General: No focal deficit present. Mental Status: He is oriented to person, place, and time.    Psychiatric:         Mood and Affect: Mood normal.         Behavior: Behavior normal.         Investigations:      Laboratory Testing:  Recent Results (from the past 24 hour(s))   POC Glucose Fingerstick    Collection Time: 08/08/21  1:46 PM   Result Value Ref Range    POC Glucose 95 75 - 110 mg/dL   Arterial Blood Gas, POC    Collection Time: 08/08/21  1:59 PM   Result Value Ref Range    POC pH 7.440 7.350 - 7.450    POC pCO2 66.6 (H) 35.0 - 48.0 mm Hg    POC PO2 63.8 (L) 83.0 - 108.0 mm Hg    POC HCO3 45.2 (HH) 21.0 - 28.0 mmol/L    TCO2 (calc), Art NOT REPORTED 22.0 - 29.0 mmol/L    Negative Base Excess, Art NOT REPORTED 0.0 - 2.0    Positive Base Excess, Art 18 (H) 0.0 - 3.0    POC O2 SAT 92 (L) 94.0 - 98.0 %    O2 Device/Flow/% BIPAP     Jose Test POSITIVE     Sample Site Right Radial Artery     Mode NOT REPORTED     FIO2 30.0     Pt Temp NOT REPORTED     POC pH Temp NOT REPORTED     POC pCO2 Temp NOT REPORTED mm Hg    POC pO2 Temp NOT REPORTED mm Hg       Imaging/Diagnostics:  No results found.     Assessment :      Hospital Problems         Last Modified POA    * (Principal) Acute on chronic respiratory failure with hypoxia and hypercapnia (HCC) 8/8/2021 Yes    PAF (paroxysmal atrial fibrillation) ([address] 8/8/2021 Yes    RODNEY treated with BiPAP 8/8/2021 Yes    Smoking greater than 40 pack years 8/8/2021 Yes    Class 1 obesity due to excess calories with serious comorbidity and body mass index (BMI) of 30.0 to 30.9 in adult 8/8/2021 Yes    Stage 4 very severe COPD by GOLD classification ([address] 8/8/2021 Yes    Uncontrolled type 2 diabetes mellitus with hyperglycemia ([address] 8/8/2021 Yes    Respiratory failure ([address] 8/8/2021 Yes    Benign essential hypertension 8/8/2021 Yes    Chronic diastolic heart failure ([address] 8/8/2021 Yes    GERD (gastroesophageal reflux disease) 8/8/2021 Yes    Type 2 diabetes mellitus, without long-term current use of insulin ([address] 8/8/2021 Yes    Long term (current) use of anticoagulants 8/8/2021 Yes          Plan:     Patient status inpatient in the  PCU    Acute on chronic respiratory failure with hypoxia and hypercapnia and a personal history of chronic diastolic heart failure and RODNEY and noncompliance with BiPAP as well as severe stage IV COPD and a personal history current daily tobacco use  Continue BiPAP, stat ABG  Administer 40 mg Lasix now then 40 twice daily, maintain Bajwa in place for accurate I's and O's for now  Stat chest x-ray  Solu-Medrol 125 mg now then 40 mg daily, de-escalate if respiratory status improves with diuretics  Scheduled and as needed breathing treatments  As needed Ativan for agitation and BiPAP compliance  Proximal A. fib on long-term anticoagulation  Continue beta-blocker, administer IV beta-blocker as patient is currently unable to swallow pills, restart home medication regimen as soon as possible  Continue Eliquis  Hypertension   Restart home medication regiment as soon as possible  Type 2 diabetes with obesity and GERD  Resume home medication regiment  Corrective sliding scale insulin for glycemic control  Education on the need for diet lifestyle modifications  Noncompliance/lack of understanding  Education on the need for compliance, family expresses understanding  Pharmacy consultation for accurate home medication recommendation  Appreciate discharge planning recommendations  Strongly recommend compliance with outpatient BiPAP  Hyperkalemia-resolved following treatment at [facility], continue to monitor       Consultations:   [facility]    Patient is admitted as inpatient status because of co-morbidities listed above, severity of signs and symptoms as outlined, requirement for current medical therapies and most importantly because of direct risk to patient if care not provided in a hospital setting. Expected length of stay > 48 hours.     ALISON Cavanaugh NP  8/8/2021  2:32 PM    Copy sent to Dr. Eleni Coyle

## 2021-08-08 NOTE — CARE COORDINATION
Case Management Initial Discharge Plan  Timmy Tatum,             Met with:family member patient, brother, son and daughter to discuss discharge plans. Information verified: address, contacts, phone number, , insurance Yes  Insurance Provider: Medicare    Emergency Contact/Next of Kin name & number: Antionette Puentes (daughter) [phone] and Alejandra Clark (ex wife_ [phone]  Who are involved in patient's support system? Multiple family members    PCP: Kimberly Abbasi  Date of last visit: month ago      Discharge Planning    Living Arrangements:  [address] has 2 stories  3 stairs to climb to get into front door, 1 flight of stairs to climb to reach second floor  Location of bedroom/bathroom in home first floor    Patient able to perform ADL's:Independent    Current Services (outpatient & in home) VNS with [facility]  DME equipment: cane and walker and oxygen at 2L  DME provider: [address] [place] [address] Road supply and self pay    Is patient receiving oral anticoagulation therapy? No      Potential Assistance Needed:  [facility] [place]    Patient agreeable to home care: Yes  Freedom of choice provided:  n/a daughter states he's current with [facility]    Prior SNF/Rehab Placement and Facility: [facility]   Agreeable to SNF/Rehab: No  Virgil of choice provided: yes     Evaluation: n/a    Expected Discharge date:       Patient expects to be discharged to: If home: is the family and/or caregiver wiling & able to provide support at home? Not all the time per daughter  Who will be providing this support? Family     Follow Up Appointment: Best Day/ Time:      Transportation provider: Daughter  Transportation arrangements needed for discharge: No    Readmission Risk              Risk of Unplanned Readmission:  15             Does patient have a readmission risk score greater than 14?: Yes  If yes, follow-up appointment must be made within 7 days of discharge.      Goals of Care: Improved respiration       Educated family on transitional options, provided freedom of choice and are agreeable with plan      Discharge Plan: [facility]). Patient was at [facility] and then at home for a week and a half before coming to the hospital. Patient currently has DME at home of oxygen, cane, and walker. Daughter states patient will prefer to return home with [facility] and not a SNF. She states family would not be able to provide support 24/7.            Electronically signed by Henrry Sauceda RN on 8/8/21 at 6:20 PM EDT

## 2021-08-09 ENCOUNTER — APPOINTMENT (OUTPATIENT)
Dept: GENERAL RADIOLOGY | Age: 71
DRG: 246 | End: 2021-08-09
Attending: FAMILY MEDICINE
Payer: MEDICARE

## 2021-08-09 ENCOUNTER — CARE COORDINATION (OUTPATIENT)
Dept: CASE MANAGEMENT | Age: 71
End: 2021-08-09

## 2021-08-09 LAB
ABSOLUTE EOS #: 0 K/UL (ref 0–0.44)
ABSOLUTE IMMATURE GRANULOCYTE: 0.15 K/UL (ref 0–0.3)
ABSOLUTE LYMPH #: 0.62 K/UL (ref 1.1–3.7)
ABSOLUTE MONO #: 0.23 K/UL (ref 0.1–1.2)
ALBUMIN SERPL-MCNC: 3.7 G/DL (ref 3.5–5.2)
ALBUMIN/GLOBULIN RATIO: 1.2 (ref 1–2.5)
ALLEN TEST: POSITIVE
ALP BLD-CCNC: 157 U/L (ref 40–129)
ALT SERPL-CCNC: 29 U/L (ref 5–41)
ANION GAP SERPL CALCULATED.3IONS-SCNC: 24 MMOL/L (ref 9–17)
AST SERPL-CCNC: 20 U/L
BASOPHILS # BLD: 1 % (ref 0–2)
BASOPHILS ABSOLUTE: 0.08 K/UL (ref 0–0.2)
BILIRUB SERPL-MCNC: 0.63 MG/DL (ref 0.3–1.2)
BILIRUBIN URINE: NEGATIVE
BNP INTERPRETATION: ABNORMAL
BUN BLDV-MCNC: 37 MG/DL (ref 8–23)
BUN/CREAT BLD: ABNORMAL (ref 9–20)
CALCIUM SERPL-MCNC: 9.5 MG/DL (ref 8.6–10.4)
CHLORIDE BLD-SCNC: 78 MMOL/L (ref 98–107)
CHOLESTEROL/HDL RATIO: 5.5
CHOLESTEROL: 260 MG/DL
CO2: 33 MMOL/L (ref 20–31)
COLOR: YELLOW
COMMENT UA: ABNORMAL
CREAT SERPL-MCNC: 0.83 MG/DL (ref 0.7–1.2)
DIFFERENTIAL TYPE: ABNORMAL
EOSINOPHILS RELATIVE PERCENT: 0 % (ref 1–4)
FIO2: ABNORMAL
GFR AFRICAN AMERICAN: >60 ML/MIN
GFR NON-AFRICAN AMERICAN: >60 ML/MIN
GFR SERPL CREATININE-BSD FRML MDRD: ABNORMAL ML/MIN/{1.73_M2}
GFR SERPL CREATININE-BSD FRML MDRD: ABNORMAL ML/MIN/{1.73_M2}
GLUCOSE BLD-MCNC: 206 MG/DL (ref 70–99)
GLUCOSE BLD-MCNC: 289 MG/DL (ref 75–110)
GLUCOSE BLD-MCNC: 298 MG/DL (ref 75–110)
GLUCOSE BLD-MCNC: 349 MG/DL (ref 74–100)
GLUCOSE URINE: NEGATIVE
HCO3 VENOUS: 41.1 MMOL/L (ref 22–29)
HCT VFR BLD CALC: 38.2 % (ref 40.7–50.3)
HDLC SERPL-MCNC: 47 MG/DL
HEMOGLOBIN: 12.6 G/DL (ref 13–17)
IMMATURE GRANULOCYTES: 2 %
KETONES, URINE: ABNORMAL
LDL CHOLESTEROL: 178 MG/DL (ref 0–130)
LEUKOCYTE ESTERASE, URINE: NEGATIVE
LYMPHOCYTES # BLD: 8 % (ref 24–43)
MAGNESIUM: 2.3 MG/DL (ref 1.6–2.6)
MCH RBC QN AUTO: 33.2 PG (ref 25.2–33.5)
MCHC RBC AUTO-ENTMCNC: 33 G/DL (ref 28.4–34.8)
MCV RBC AUTO: 100.8 FL (ref 82.6–102.9)
MODE: ABNORMAL
MONOCYTES # BLD: 3 % (ref 3–12)
MORPHOLOGY: NORMAL
NEGATIVE BASE EXCESS, VEN: ABNORMAL (ref 0–2)
NITRITE, URINE: NEGATIVE
NRBC AUTOMATED: 0 PER 100 WBC
O2 DEVICE/FLOW/%: ABNORMAL
O2 SAT, VEN: 87 % (ref 60–85)
PATIENT TEMP: ABNORMAL
PCO2, VEN: 48.4 MM HG (ref 41–51)
PDW BLD-RTO: 14.2 % (ref 11.8–14.4)
PH UA: 5.5 (ref 5–8)
PH VENOUS: 7.54 (ref 7.32–7.43)
PLATELET # BLD: 212 K/UL (ref 138–453)
PLATELET ESTIMATE: ABNORMAL
PMV BLD AUTO: 10.6 FL (ref 8.1–13.5)
PO2, VEN: 48.2 MM HG (ref 30–50)
POC PCO2 TEMP: ABNORMAL MM HG
POC PH TEMP: ABNORMAL
POC PO2 TEMP: ABNORMAL MM HG
POSITIVE BASE EXCESS, VEN: 16 (ref 0–3)
POTASSIUM SERPL-SCNC: 4 MMOL/L (ref 3.7–5.3)
PRO-BNP: 2071 PG/ML
PROCALCITONIN: 0.1 NG/ML
PROTEIN UA: NEGATIVE
RBC # BLD: 3.79 M/UL (ref 4.21–5.77)
RBC # BLD: ABNORMAL 10*6/UL
SAMPLE SITE: ABNORMAL
SEG NEUTROPHILS: 86 % (ref 36–65)
SEGMENTED NEUTROPHILS ABSOLUTE COUNT: 6.62 K/UL (ref 1.5–8.1)
SODIUM BLD-SCNC: 135 MMOL/L (ref 135–144)
SPECIFIC GRAVITY UA: 1.01 (ref 1–1.03)
TOTAL CO2, VENOUS: ABNORMAL MMOL/L (ref 23–30)
TOTAL PROTEIN: 6.9 G/DL (ref 6.4–8.3)
TRIGL SERPL-MCNC: 177 MG/DL
TROPONIN INTERP: ABNORMAL
TROPONIN INTERP: ABNORMAL
TROPONIN T: ABNORMAL NG/ML
TROPONIN T: ABNORMAL NG/ML
TROPONIN, HIGH SENSITIVITY: 75 NG/L (ref 0–22)
TROPONIN, HIGH SENSITIVITY: 88 NG/L (ref 0–22)
TSH SERPL DL<=0.05 MIU/L-ACNC: 0.28 MIU/L (ref 0.3–5)
TURBIDITY: CLEAR
URINE HGB: NEGATIVE
UROBILINOGEN, URINE: NORMAL
VLDLC SERPL CALC-MCNC: ABNORMAL MG/DL (ref 1–30)
WBC # BLD: 7.7 K/UL (ref 3.5–11.3)
WBC # BLD: ABNORMAL 10*3/UL

## 2021-08-09 PROCEDURE — 6370000000 HC RX 637 (ALT 250 FOR IP): Performed by: NURSE PRACTITIONER

## 2021-08-09 PROCEDURE — 97530 THERAPEUTIC ACTIVITIES: CPT

## 2021-08-09 PROCEDURE — 94660 CPAP INITIATION&MGMT: CPT

## 2021-08-09 PROCEDURE — 83880 ASSAY OF NATRIURETIC PEPTIDE: CPT

## 2021-08-09 PROCEDURE — 85025 COMPLETE CBC W/AUTO DIFF WBC: CPT

## 2021-08-09 PROCEDURE — 94640 AIRWAY INHALATION TREATMENT: CPT

## 2021-08-09 PROCEDURE — 2580000003 HC RX 258: Performed by: INTERNAL MEDICINE

## 2021-08-09 PROCEDURE — 2500000003 HC RX 250 WO HCPCS: Performed by: INTERNAL MEDICINE

## 2021-08-09 PROCEDURE — 97166 OT EVAL MOD COMPLEX 45 MIN: CPT

## 2021-08-09 PROCEDURE — 6360000002 HC RX W HCPCS: Performed by: INTERNAL MEDICINE

## 2021-08-09 PROCEDURE — 99232 SBSQ HOSP IP/OBS MODERATE 35: CPT | Performed by: FAMILY MEDICINE

## 2021-08-09 PROCEDURE — 2700000000 HC OXYGEN THERAPY PER DAY

## 2021-08-09 PROCEDURE — 84484 ASSAY OF TROPONIN QUANT: CPT

## 2021-08-09 PROCEDURE — 71046 X-RAY EXAM CHEST 2 VIEWS: CPT

## 2021-08-09 PROCEDURE — 36600 WITHDRAWAL OF ARTERIAL BLOOD: CPT

## 2021-08-09 PROCEDURE — 80053 COMPREHEN METABOLIC PANEL: CPT

## 2021-08-09 PROCEDURE — 82803 BLOOD GASES ANY COMBINATION: CPT

## 2021-08-09 PROCEDURE — 81003 URINALYSIS AUTO W/O SCOPE: CPT

## 2021-08-09 PROCEDURE — 84443 ASSAY THYROID STIM HORMONE: CPT

## 2021-08-09 PROCEDURE — 2580000003 HC RX 258: Performed by: NURSE PRACTITIONER

## 2021-08-09 PROCEDURE — 6370000000 HC RX 637 (ALT 250 FOR IP): Performed by: FAMILY MEDICINE

## 2021-08-09 PROCEDURE — 84145 PROCALCITONIN (PCT): CPT

## 2021-08-09 PROCEDURE — 97162 PT EVAL MOD COMPLEX 30 MIN: CPT

## 2021-08-09 PROCEDURE — 36415 COLL VENOUS BLD VENIPUNCTURE: CPT

## 2021-08-09 PROCEDURE — 80061 LIPID PANEL: CPT

## 2021-08-09 PROCEDURE — 83735 ASSAY OF MAGNESIUM: CPT

## 2021-08-09 PROCEDURE — 97535 SELF CARE MNGMENT TRAINING: CPT

## 2021-08-09 PROCEDURE — 2060000000 HC ICU INTERMEDIATE R&B

## 2021-08-09 PROCEDURE — 82947 ASSAY GLUCOSE BLOOD QUANT: CPT

## 2021-08-09 RX ORDER — DEXTROSE MONOHYDRATE 25 G/50ML
12.5 INJECTION, SOLUTION INTRAVENOUS PRN
Status: DISCONTINUED | OUTPATIENT
Start: 2021-08-09 | End: 2021-08-15 | Stop reason: HOSPADM

## 2021-08-09 RX ORDER — DILTIAZEM HYDROCHLORIDE 180 MG/1
180 CAPSULE, COATED, EXTENDED RELEASE ORAL DAILY
Status: DISCONTINUED | OUTPATIENT
Start: 2021-08-10 | End: 2021-08-13

## 2021-08-09 RX ORDER — CEFTRIAXONE 1 G/1
1000 INJECTION, POWDER, FOR SOLUTION INTRAMUSCULAR; INTRAVENOUS EVERY 24 HOURS
Status: DISCONTINUED | OUTPATIENT
Start: 2021-08-09 | End: 2021-08-09

## 2021-08-09 RX ORDER — DEXTROSE MONOHYDRATE 50 MG/ML
100 INJECTION, SOLUTION INTRAVENOUS PRN
Status: DISCONTINUED | OUTPATIENT
Start: 2021-08-09 | End: 2021-08-15 | Stop reason: HOSPADM

## 2021-08-09 RX ORDER — NICOTINE POLACRILEX 4 MG
15 LOZENGE BUCCAL PRN
Status: DISCONTINUED | OUTPATIENT
Start: 2021-08-09 | End: 2021-08-15 | Stop reason: HOSPADM

## 2021-08-09 RX ADMIN — CARVEDILOL 3.12 MG: 3.12 TABLET, FILM COATED ORAL at 16:47

## 2021-08-09 RX ADMIN — DILTIAZEM HYDROCHLORIDE 7.5 MG/HR: 5 INJECTION INTRAVENOUS at 03:35

## 2021-08-09 RX ADMIN — LISINOPRIL 5 MG: 5 TABLET ORAL at 08:59

## 2021-08-09 RX ADMIN — METHYLPREDNISOLONE SODIUM SUCCINATE 40 MG: 40 INJECTION, POWDER, FOR SOLUTION INTRAMUSCULAR; INTRAVENOUS at 00:17

## 2021-08-09 RX ADMIN — IPRATROPIUM BROMIDE AND ALBUTEROL SULFATE 1 AMPULE: .5; 3 SOLUTION RESPIRATORY (INHALATION) at 07:45

## 2021-08-09 RX ADMIN — BUMETANIDE 1 MG: 0.25 INJECTION, SOLUTION INTRAMUSCULAR; INTRAVENOUS at 19:56

## 2021-08-09 RX ADMIN — POTASSIUM CHLORIDE 10 MEQ: 1500 TABLET, EXTENDED RELEASE ORAL at 08:59

## 2021-08-09 RX ADMIN — BUDESONIDE AND FORMOTEROL FUMARATE DIHYDRATE 2 PUFF: 160; 4.5 AEROSOL RESPIRATORY (INHALATION) at 20:02

## 2021-08-09 RX ADMIN — INSULIN LISPRO 3 UNITS: 100 INJECTION, SOLUTION INTRAVENOUS; SUBCUTANEOUS at 17:30

## 2021-08-09 RX ADMIN — IPRATROPIUM BROMIDE AND ALBUTEROL SULFATE 1 AMPULE: .5; 3 SOLUTION RESPIRATORY (INHALATION) at 16:16

## 2021-08-09 RX ADMIN — SODIUM CHLORIDE, PRESERVATIVE FREE 10 ML: 5 INJECTION INTRAVENOUS at 19:55

## 2021-08-09 RX ADMIN — MIDODRINE HYDROCHLORIDE 5 MG: 5 TABLET ORAL at 16:47

## 2021-08-09 RX ADMIN — METHYLPREDNISOLONE SODIUM SUCCINATE 40 MG: 40 INJECTION, POWDER, FOR SOLUTION INTRAMUSCULAR; INTRAVENOUS at 16:47

## 2021-08-09 RX ADMIN — METHYLPREDNISOLONE SODIUM SUCCINATE 40 MG: 40 INJECTION, POWDER, FOR SOLUTION INTRAMUSCULAR; INTRAVENOUS at 09:00

## 2021-08-09 RX ADMIN — IPRATROPIUM BROMIDE AND ALBUTEROL SULFATE 1 AMPULE: .5; 3 SOLUTION RESPIRATORY (INHALATION) at 11:33

## 2021-08-09 RX ADMIN — ACETAMINOPHEN 650 MG: 325 TABLET ORAL at 17:02

## 2021-08-09 RX ADMIN — SODIUM CHLORIDE, PRESERVATIVE FREE 10 ML: 5 INJECTION INTRAVENOUS at 09:13

## 2021-08-09 RX ADMIN — BUMETANIDE 1 MG: 0.25 INJECTION, SOLUTION INTRAMUSCULAR; INTRAVENOUS at 08:59

## 2021-08-09 RX ADMIN — DILTIAZEM HYDROCHLORIDE 10 MG/HR: 5 INJECTION INTRAVENOUS at 08:48

## 2021-08-09 RX ADMIN — ENOXAPARIN SODIUM 150 MG: 150 INJECTION SUBCUTANEOUS at 08:59

## 2021-08-09 RX ADMIN — INSULIN LISPRO 2 UNITS: 100 INJECTION, SOLUTION INTRAVENOUS; SUBCUTANEOUS at 21:11

## 2021-08-09 RX ADMIN — CETIRIZINE HYDROCHLORIDE 10 MG: 10 TABLET ORAL at 09:01

## 2021-08-09 RX ADMIN — DILTIAZEM HYDROCHLORIDE 2.5 MG/HR: 5 INJECTION INTRAVENOUS at 22:00

## 2021-08-09 RX ADMIN — POTASSIUM CHLORIDE 10 MEQ: 1500 TABLET, EXTENDED RELEASE ORAL at 19:56

## 2021-08-09 RX ADMIN — PANTOPRAZOLE SODIUM 40 MG: 40 TABLET, DELAYED RELEASE ORAL at 08:59

## 2021-08-09 RX ADMIN — MIDODRINE HYDROCHLORIDE 5 MG: 5 TABLET ORAL at 09:00

## 2021-08-09 RX ADMIN — CARVEDILOL 3.12 MG: 3.12 TABLET, FILM COATED ORAL at 08:59

## 2021-08-09 ASSESSMENT — PAIN SCALES - GENERAL
PAINLEVEL_OUTOF10: 0
PAINLEVEL_OUTOF10: 0
PAINLEVEL_OUTOF10: 3
PAINLEVEL_OUTOF10: 0

## 2021-08-09 ASSESSMENT — ENCOUNTER SYMPTOMS
CONSTIPATION: 0
VOMITING: 0
VOICE CHANGE: 0
BACK PAIN: 0
CHEST TIGHTNESS: 0
ABDOMINAL PAIN: 0
NAUSEA: 0
SHORTNESS OF BREATH: 1
WHEEZING: 0
SINUS PRESSURE: 0
RHINORRHEA: 0
DIARRHEA: 0
COUGH: 0
CHOKING: 0

## 2021-08-09 NOTE — PROGRESS NOTES
Physical Therapy    Facility/Department: [facility]  Initial Assessment    NAME: Charu Moore  : 1950  MRN: [id number]    Date of Service: 2021  \"70 year old male presents to the ED with the complaint of severe weakness, progressively worsening sob and had a questionable fall. Found to be in acute hypercapnic hypoxic respiratory failure on admission. Started on BiPAP and aggressive treatment of COPD exacerbation along with IV bumex for possible CHF exacerbation. Patient was found to be in Atrial flutter with RVR. \"    Discharge Recommendations:    Further therapy recommended at discharge. PT Equipment Recommendations  Equipment Needed: No (pt currently unsafe to perform functional mobility unassisted)    Assessment   Body structures, Functions, Activity limitations: Decreased functional mobility ; Decreased cognition;Decreased posture;Decreased endurance;Decreased ROM; Decreased balance;Decreased strength;Decreased safe awareness  Assessment: Pt ambulated 3ft w/ RW CGA along HOB, requiring Dino to perform bed mobility. Pt demonstrates confusion throughout session requiring verbal cueing for all sequencing and initiation this date. Pt is a high fall risk and would be unsafe to perform functional moiblity unassisted due to cognition deficits and fall risk. Recommending continued skilled physical therapy to address these deficits and return pt to prior level of independence. Prognosis: Good  Decision Making: Medium Complexity  PT Education: PT Role;Plan of Care;Goals;Orientation  REQUIRES PT FOLLOW UP: Yes  Activity Tolerance  Activity Tolerance: Patient limited by endurance; Patient limited by fatigue       Patient Diagnosis(es): There were no encounter diagnoses.      has a past medical history of Agent orange exposure, Anxiety state, Cancer ([address] CHF (congestive heart failure) ([address] Chronic obstructive pulmonary disease (COPD) ([address] Chronic post-traumatic stress disorder, Chronic respiratory failure with hypercapnia ([facility] [address] COPD exacerbation ([facility] [address] Coronary artery disease, Degenerative disc disease, lumbar, Depression, Diabetes mellitus ([address] Essential hypertension, History of acute pancreatitis, Hyperglycemia, Hypokalemia, Lumbosacral disc disease, Obesity, Obstructive sleep apnea, and Personal history of malignant neoplasm of bladder. has a past surgical history that includes Bladder surgery; other surgical history; other surgical history (Bilateral, 02/10/2021); and Pain management procedure (Bilateral, 02/10/2021). Restrictions  Restrictions/Precautions  Restrictions/Precautions: Fall Risk  Required Braces or Orthoses?: No  Position Activity Restriction  Other position/activity restrictions: up w/assist  Vision/Hearing  Vision: Within Functional Limits  Hearing: Within functional limits     Subjective  General  Patient assessed for rehabilitation services?: Yes  Response To Previous Treatment: Not applicable  Family / Caregiver Present: No  Follows Commands: Within Functional Limits  Subjective  Subjective: RN and pt in agreement for PT eval; pt supine in bed upon PT arrival, pt on 2L NC, pt pleasantly confused throughout session  Pain Screening  Patient Currently in Pain: Denies  Vital Signs  Patient Currently in Pain: Denies       Orientation  Orientation  Overall Orientation Status: Impaired  Orientation Level: Disoriented to place; Disoriented to time;Disoriented to situation;Oriented to person  Social/Functional History  Social/Functional History  Lives With: Alone  Type of Home: House  Home Layout: Two level, Performs ADL's on one level, Able to Live on Main level with bedroom/bathroom  Home Access: Stairs to enter with rails  Entrance Stairs - Number of Steps: 3  Bathroom Accessibility: Accessible  Home Equipment: Standard walker, Cane, Oxygen (Unable to confirm what type of cane/walker pt has)  Receives Help From: Family  ADL Assistance: Independent  Homemaking Assistance: Independent  Ambulation Assistance: Independent  Transfer Assistance: Independent  Active : No  Patient's  Info: Family  Additional Comments: Unable to gather full histor d/t pt's cognition. Information above taken from case management note. Cognition   Cognition  Overall Cognitive Status: Exceptions  Arousal/Alertness: Appropriate responses to stimuli  Following Commands: Follows one step commands consistently; Follows multistep commands with increased time  Attention Span: Attends with cues to redirect  Safety Judgement: Decreased awareness of need for safety;Decreased awareness of need for assistance  Problem Solving: Assistance required to identify errors made;Assistance required to generate solutions;Assistance required to correct errors made  Insights: Decreased awareness of deficits  Initiation: Requires cues for some  Sequencing: Requires cues for some    Objective  Joint Mobility  Spine: WFL  ROM RLE: WFL  ROM LLE: WFL  ROM RUE: WFL  ROM LUE: WFL  Strength RLE  Strength RLE: Exception  Comment: Grossly 4/5 at hip, knee, and ankle  Strength LLE  Strength LLE: Exception  Comment: Grossly 4/5 at hip, knee, and ankle  Strength RUE  Strength RUE: Exception  Comment: Grossly 4/5 at shoulder, elbow, wrist/ hand; See OT assessment for full detail  Strength LUE  Strength LUE: Exception  Comment: Grossly 4/5 at shoulder, elbow, wrist/ hand; See OT assessment for full detail     Sensation  Overall Sensation Status: WFL (pt denies numbness/tingling)  Bed mobility  Supine to Sit: Minimal assistance  Sit to Supine: Minimal assistance  Comment: Assist provided for trunk progression and BLE support; increased time required to complete  Transfers  Sit to Stand: Contact guard assistance  Stand to sit: Contact guard assistance  Comment: pt demonstrates labored breathing throughout session, RR fluctuating between 20-32bpm  Ambulation  Ambulation?: Yes  Ambulation 1  Surface: level tile  Device: Rolling Walker  Other Apparatus: O2 (2L)  Assistance: Contact guard assistance  Gait Deviations: Slow Janett; Shuffles  Distance: 3ft  Comments: SpO2 to 88% following ambulation; pt fatigues quickly with ambulation requesting to return to supine position due to fatigue.  SpO2 to 92% following return to supine  Stairs/Curb  Stairs?: No     Balance  Posture: Fair  Sitting - Static: Good  Sitting - Dynamic: Good;-  Standing - Static: Fair;+  Standing - Dynamic: +;Fair  Comments: Standing balance assessed w/ RW        Plan   Plan  Times per week: 5-6x/week  Current Treatment Recommendations: Strengthening, Transfer Training, Endurance Training, Patient/Caregiver Education & Training, ROM, Equipment Evaluation, Education, & procurement, Balance Training, Gait Training, Home Exercise Program, Functional Mobility Training, Stair training, Safety Education & Training  Safety Devices  Type of devices: Left in bed, Call light within reach, Gait belt, Nurse notified, Bed alarm in place  Restraints  Initially in place: No  AM-PAC Score     AM-PAC Inpatient Mobility without Stair Climbing Raw Score : 15 (08/09/21 1632)  AM-PAC Inpatient without Stair Climbing T-Scale Score : 43.03 (08/09/21 1632)  Mobility Inpatient CMS 0-100% Score: 47.43 (08/09/21 1632)  Mobility Inpatient without Stair CMS G-Code Modifier : CK (08/09/21 1632)       Goals  Short term goals  Time Frame for Short term goals: 14  Short term goal 1: Pt to perform bed mobility from flat surface independently  Short term goal 2: Pt to demonstrate functional transfers independently  Short term goal 3: Ambulate 150ft w/ RW with supervision  Short term goal 4: Tolerate 30 minutes of therapy to demo increased endurance  Patient Goals   Patient goals : Did not state       Therapy Time   Individual Concurrent Group Co-treatment   Time In 1104         Time Out 1129         Minutes 25         Timed Code Treatment Minutes: 9 Minutes       William Jenkins, PT

## 2021-08-09 NOTE — PROGRESS NOTES
Nutrition Education    · Pt declined cardiac diet education at this time. Will honor pt's preference/decision.      Electronically signed by Salome Márquez RD, JELANI on 8/9/21 at 11:35 AM EDT    Contact: [phone]

## 2021-08-09 NOTE — PLAN OF CARE
RESPIRATORY    BRONCHOSPASM/BRONCHOCONSTRICTION     [x]         IMPROVE AERATION/BREATH SOUNDS  [x]   ADMINISTER BRONCHODILATOR THERAPY AS APPROPRIATE  [x]   ASSESS BREATH SOUNDS  []   IMPLEMENT AEROSOL/MDI PROTOCOL  [x]   PATIENT EDUCATION AS NEEDED

## 2021-08-09 NOTE — PROGRESS NOTES
Occupational Therapy   Occupational Therapy Initial Assessment  Date: 2021   Patient Name: Russell Ramos  MRN: [id number]     : 1950    Date of Service: 2021     Per chart:  Patient reported to outlying facility after being found by his son with severe weakness and a questionable fall. Patient has extensive cardiac history including openheart bypass as well as diastolic failure. Patient was transported to [facility] where he was found to be in respiratory failure with hyperkalemia. Patient was treated with BiPAP and insulin D50. ABG and potassium did improve. Transport was arranged to this facility however due to bed availability patient was boarded in the emergency department at any hospital overnight. Reportedly when EMS arrived patient was in near complete respiratory failure and air medical was contacted for assistance. Air medical evaluated the patient and altered ventilator settings including increasing his PEEP and administering Lasix due to coarse rales. Aeromedical reports that on their arrival patient was agitated and not following commands. Patient was nonverbal and would have periods of near unresponsiveness. On patient's arrival at our facility he is alert to voice and answering yes/no questions. Patient is still restless but now following commands.       Patient's son arrives and provides additional history. Tthe patient was discharged to home following a recent hospitalization. Since discharge he has been controlling his home medications and has not been following his discharge instructions exactly. Son reports that he has been taking potassium supplements. There is question on compliance with diuretics. Son further reports that he continues to smoke and does not know about his diet or oral fluid compliance. Patient typically self medicates with the prescriptions he feels he needs as opposed to those recommended.   When the son arrived to check on him he found him in his bathroom incontinent of bladder and nearly unresponsive and pale. Fall status unknown however he had imaging was completed at the [facility] without acute abnormalities identified. Discharge Recommendations:  Patient would benefit from continued therapy after discharge  OT Equipment Recommendations  Equipment Needed: Yes  Mobility Devices: German Sample; ADL Assistive Devices  Walker: Rolling  ADL Assistive Devices: Reacher;Sock-Aid Hard;Long-handled Shoe Horn;Grab Bars - shower;Hand-held Shower; Shower Chair with back    Assessment   Performance deficits / Impairments: Decreased functional mobility ; Decreased ADL status; Decreased endurance;Decreased high-level IADLs;Decreased safe awareness;Decreased cognition  Assessment: Pt completed bed mobility with Min A, functional transfers with CGA and functional side steps towards [facility] with CGA and use of RW. Pt completed grooming task lying supine in bed at end of session with setup. Pt limited this session by SPO2 levels and decreased endurance. Pt is expected to require skilled OT services throughout his acute hospitlization stay and post discharge to address the above noted defecits through skilled intervention for promotion of increased independence in ADLS, IADLS and functional mobility tasks. Pt is currently unsafe to return to prior living arragement without 24/7 assist to safely engage in all aspects of ADLs and IADLs.   Treatment Diagnosis: Acute on chronic respiratory failure with hypoxia and hypercapnia  Prognosis: Good  Decision Making: Medium Complexity  OT Education: Energy Conservation;OT Role;Plan of Care;Equipment;Transfer Training  Patient Education: pursed lip breathing tech, use of rest breaks, walker management-good to fair return  Barriers to Learning: decreased cognition  REQUIRES OT FOLLOW UP: Yes  Activity Tolerance  Activity Tolerance: Patient limited by fatigue  Safety Devices  Safety Devices in place: Yes  Type of devices: Gait belt;Patient at risk for falls; Bed alarm in place; Left in bed;Call light within reach;Nurse notified  Restraints  Initially in place: No           Patient Diagnosis(es): There were no encounter diagnoses. has a past medical history of Agent orange exposure, Anxiety state, Cancer ([facility] [address] CHF (congestive heart failure) ([facility] [address] Chronic obstructive pulmonary disease (COPD) ([facility] [address] Chronic post-traumatic stress disorder, Chronic respiratory failure with hypercapnia ([facility] [address] COPD exacerbation ([facility] [address] Coronary artery disease, Degenerative disc disease, lumbar, Depression, Diabetes mellitus ([facility] [address] Essential hypertension, History of acute pancreatitis, Hyperglycemia, Hypokalemia, Lumbosacral disc disease, Obesity, Obstructive sleep apnea, and Personal history of malignant neoplasm of bladder. has a past surgical history that includes Bladder surgery; other surgical history; other surgical history (Bilateral, 02/10/2021); and Pain management procedure (Bilateral, 02/10/2021). Treatment Diagnosis: Acute on chronic respiratory failure with hypoxia and hypercapnia      Restrictions  Restrictions/Precautions  Restrictions/Precautions: Fall Risk  Required Braces or Orthoses?: No  Position Activity Restriction  Other position/activity restrictions: up w/assist    Subjective   General  Patient assessed for rehabilitation services?: Yes  Family / Caregiver Present: No  Diagnosis: Acute on chronic respirator failure with  hypoxia and hypercapnia  General Comment  Comments: RN ok'd for OT/PT eval this AM. Pt agreeable to session, pleasent/cooperative throughout; however, slightly confused at times.   Patient Currently in Pain: Denies  Vital Signs  Pulse: 87  Resp: 25  BP: (!) 103/59  Patient Currently in Pain: Denies  Oxygen Therapy  SpO2: 95 %  Social/Functional History  Social/Functional History  Lives With: Alone  Type of Home: House  Home Layout: Two level, Performs ADL's on one level, Able to Live on Main level with bedroom/bathroom  Home Access: Stairs to enter with rails  Entrance Stairs - Number of Steps: 3  Bathroom Accessibility: Accessible  Home Equipment: Standard walker, Cane, Oxygen (Unable to confirm what type of cane/walker pt has)  Receives Help From: Family  ADL Assistance: Independent  Homemaking Assistance: Independent  Ambulation Assistance: Independent  Transfer Assistance: Independent  Active : No  Patient's  Info: Family  Additional Comments: Unable to gather full histor d/t pt's cognition. Information above taken from case management note. Objective        Orientation  Overall Orientation Status: Impaired  Orientation Level: Oriented to person;Disoriented to time;Disoriented to place; Disoriented to situation     Balance  Sitting Balance: Stand by assistance (Sitting EOB unsupported ~10 min. SPO2 86%-90%, with cues to initiate pursed lip breathing tech. Required intermittent rest breaks.)  Standing Balance: Contact guard assistance  Standing Balance  Time: 1 minute  Activity: static standing EOB , functional side steps towards HOB  Comment: Use of RW  Functional Mobility  Functional - Mobility Device: Rolling Walker  Activity: Other  Assist Level: Contact guard assistance  Functional Mobility Comments: Pt completed functional side steps towards HOB with RW. Pt with no LOB. Limited by increased fatigue, pt with noteable SOB. ADL  Feeding: Modified independent ;Setup  Grooming: Modified independent ;Setup (Lying supine in bed pt washed face with setup)  UE Bathing: Minimal assistance;Setup;Verbal cueing; Increased time to complete  LE Bathing: Moderate assistance; Increased time to complete;Setup;Verbal cueing  UE Dressing: Increased time to complete;Stand by assistance  LE Dressing: Minimal assistance;Setup;Verbal cueing; Increased time to complete  Toileting: Minimal assistance;Setup; Increased time to complete  Additional Comments: Limited d/t fatigue and SOB  Tone RUE  RUE Tone: Normotonic  Tone LUE  LUE Tone: Normotonic  Coordination  Movements Are Fluid And Coordinated: No  Coordination and Movement description: Decreased speed;Right UE;Left UE;Fine motor impairments     Bed mobility  Supine to Sit: Minimal assistance (Trunk progression and LE progression)  Sit to Supine: Minimal assistance (KLE progression)  Scooting: Contact guard assistance (To place feet on floor)  Comment: HOB elevated, increased effort. Transfers  Sit to stand: Contact guard assistance  Stand to sit: Contact guard assistance  Transfer Comments: Use of RW     Cognition  Overall Cognitive Status: Exceptions  Arousal/Alertness: Appropriate responses to stimuli  Following Commands: Follows one step commands consistently; Follows multistep commands with increased time  Attention Span: Attends with cues to redirect  Safety Judgement: Decreased awareness of need for safety;Decreased awareness of need for assistance  Problem Solving: Assistance required to identify errors made;Assistance required to generate solutions;Assistance required to correct errors made  Insights: Decreased awareness of deficits  Initiation: Requires cues for some  Sequencing: Requires cues for some                 LUE AROM (degrees)  LUE AROM : WFL  Left Hand AROM (degrees)  Left Hand AROM: WFL  RUE AROM (degrees)  RUE AROM : WFL  Right Hand AROM (degrees)  Right Hand AROM: WFL  LUE Strength  Gross LUE Strength: WFL  L Hand General: 4/5  LUE Strength Comment: Grossly 4/5  RUE Strength  Gross RUE Strength: WFL  R Hand General: 4/5  RUE Strength Comment: Grossly 4/5                   Plan   Plan  Times per week: 3-4x/week  Current Treatment Recommendations: Balance Training, Safety Education & Training, Patient/Caregiver Education & Training, Self-Care / ADL, Functional Mobility Training, Home Management Training, Endurance Training, Equipment Evaluation, Education, & procurement    AM-PAC Score        AM-PAC Inpatient Daily Activity Raw Score: 19 (08/09/21 1553)  AM-PAC Inpatient ADL T-Scale Score : 40.22 (08/09/21 1553)  ADL Inpatient CMS 0-100% Score: 42.8 (08/09/21 1553)  ADL Inpatient CMS G-Code Modifier : CK (08/09/21 1553)    Goals  Short term goals  Time Frame for Short term goals: By discharge, pt will:  Short term goal 1: Demo bed mobility with SUP  Short term goal 2: Demo functional sit<>stand transfers with SUP and LRD to increase participation in ADLs  Short term goal 3: Demo functional mobility wiht SUP and LRD to increase participation in ADLs  Short term goal 4: Demo +8 minutes of standing balance during ADL/functional task with SBA  Short term goal 5: Demo UB ADLs with MOD IND, use of AE PRN  Short term goal 6: Demo LB ADLs with SBA, use of DME PRN  Short term goal 7: Demo use of energy conservation tech independently throughout all functional/ADL tasks with <2 VCs       Therapy Time   Individual Concurrent Group Co-treatment   Time In 1104         Time Out 1128         Minutes 24         Timed Code Treatment Minutes: [address], OTR/L

## 2021-08-09 NOTE — PROGRESS NOTES
Patient assessed at bedside. RR stable at 18-24. SpO2 92% and breath sounds Clear/Diminished. Patient states no shortness of breath at this time. Appropriate therapy is being administered at this time.

## 2021-08-09 NOTE — PLAN OF CARE
Problem: Falls - Risk of:  Goal: Will remain free from falls  Description: Will remain free from falls  8/9/2021 1615 by Tank Castillo RN  Outcome: Ongoing  8/9/2021 0328 by Lucila Baires RN  Outcome: Ongoing  Goal: Absence of physical injury  Description: Absence of physical injury  8/9/2021 1615 by Tank Csatillo RN  Outcome: Ongoing  8/9/2021 0328 by Lucila Baires RN  Outcome: Ongoing     Problem: Skin Integrity:  Goal: Will show no infection signs and symptoms  Description: Will show no infection signs and symptoms  8/9/2021 1615 by Tank Castillo RN  Outcome: Ongoing  8/9/2021 0328 by Lucila Baires RN  Outcome: Ongoing  Goal: Absence of new skin breakdown  Description: Absence of new skin breakdown  8/9/2021 1615 by Tank Castillo RN  Outcome: Ongoing  8/9/2021 0328 by Lucila Baires RN  Outcome: Ongoing     Problem: Infection:  Goal: Will remain free from infection  Description: Will remain free from infection  8/9/2021 1615 by Tank Castillo RN  Outcome: Ongoing  8/9/2021 0328 by Lucila Baires RN  Outcome: Ongoing

## 2021-08-09 NOTE — PROGRESS NOTES
[facility]  Office: [address], DO, Cynthia Simple, DO, Rony Mckeon, DO, Karen Garcia Blood, DO, Mike Rooney MD, Aneta Leija MD, Madiha Real MD, Donna Linares MD, Alanis Newby MD, Ace Thorpe MD, Ector Sanchez MD, Issa Nesbitt, DO, Meir Ballesteros MD, Adia Carrillo DO, Nish Mireles MD,  Emilee Rios DO, Dimple lAcaraz MD, Yasir Tran MD, Vernon Blake MD, Jonny Ramsey MD, Ximena Dinero MD, Robin Wagner MD, Vania Holder MD, Heaven Messer, [facility], [facility], CNP, Reid Michelle, CNP, Denice Payne, CNS, Paz Lew, CNP, Rodolfo Lee, CNP, John Hyman, CNP, Lisa Lambert, [facility], Carmen Logan, CNP, Amy Bartlett PA-C, Sid Jansen, [facility], Giselle Silva, CNP, Ly Mcginnis, CNP, Michael Moralez, CNP, Rodrick Bates, CNP, Farrah Morgan, CNP, Pastor Park, CNP, Caro Bright, CNP, Bambi Warren, [address]      Daily Progress Note     Admit Date: 8/8/2021  Bed/Room No.  3004/3004-01  Admitting Physician : Madiha Real MD  Code Status :Full Code  Hospital Day:  LOS: 1 day   Chief Complaint:     Breathing difficulty     Principal Problem:    Acute on chronic respiratory failure with hypoxia and hypercapnia (HCC)  Active Problems:    PAF (paroxysmal atrial fibrillation) (HCC)    RODNEY treated with BiPAP    Smoking greater than 40 pack years    Class 1 obesity due to excess calories with serious comorbidity and body mass index (BMI) of 30.0 to 30.9 in adult    Stage 4 very severe COPD by GOLD classification [facility])    Uncontrolled type 2 diabetes mellitus with hyperglycemia ([facility] [address]    Respiratory failure ([facility]    Benign essential hypertension    Acute on chronic diastolic congestive heart failure (HCC)    GERD (gastroesophageal reflux disease)    Type 2 diabetes mellitus, without long-term current use of insulin ([facility]    Long term (current) use of anticoagulants    Acute exacerbation of chronic obstructive pulmonary disease (COPD) [facility])    Atrial flutter with rapid ventricular response (HCC)    Type 2 myocardial infarction due to arrhythmia [facility])  Resolved Problems:    * No resolved hospital problems. *    Subjective : Interval History/Significant events :  08/09/21    Patient remains confused. He is having difficulty breathing and remains on supplemental oxygen. Patient was on BiPAP last night. He denies any chest pain, palpitations, dizziness, diaphoresis. He is on IV diuretics. Vitals - Stable afebrile  Labs -normal electrolytes, kidney function, hyperglycemia, troponin trending down, elevated proBNP 2071, white count normal.  Chest x-ray shows left lower lobe infiltrate -chronic      Nursing notes , Consults notes reviewed. Overnight events and updates discussed with Nursing staff . Background History:         Roya Mccormick is 66 y.o. male  Who was admitted to the hospital on 8/8/2021 for treatment of Acute on chronic respiratory failure with hypoxia and hypercapnia ([facility] [address] Cells transfer from [facility] where he presented after fall with head injury. Patient was having difficulty breathing. Work-up at [facility] showed decompensated heart failure and was found to be in respiratory failure with hyperkalemia. Patient was treated with BiPAP and insulin with dextrose for hyperkalemia. CT head was negative for acute abnormality. Patient was life flighted to [facility] for further treatment. Patient has underlying history of bladder cancer, congestive heart failure, COPD, diabetes mellitus, hypertension, obstructive sleep apnea. He had cardiac bypass and stents for CAD. Work-up on admission showed elevated proBNP 1606, elevated troponin 125, leukocytosis 12.2.  Echocardiogram from last month showed preserved ejection fraction, diastolic dysfunction.     PMH:  Past Medical History:   Diagnosis Date    Agent orange exposure     Anxiety state     Cancer ([facility]     bladder    CHF for abdominal pain, constipation, diarrhea, nausea and vomiting. Genitourinary: Negative for difficulty urinating, discharge, dysuria, frequency and testicular pain. Musculoskeletal: Negative for back pain. Skin: Negative for rash. Neurological: Negative for dizziness, weakness, light-headedness and headaches. Hematological: Does not bruise/bleed easily. Psychiatric/Behavioral: Positive for confusion and sleep disturbance. Negative for agitation, behavioral problems, self-injury and suicidal ideas.      Objective :      Current Vitals : Temp: 97.7 °F (36.5 °C),  Pulse: 85, Resp: (!) 32, BP: 109/72, SpO2: 100 %  Last 24 Hrs Vitals   Patient Vitals for the past 24 hrs:   BP Temp Temp src Pulse Resp SpO2 Height Weight   08/09/21 0747 -- -- -- -- (!) 32 -- -- --   08/09/21 0745 -- -- -- -- 27 100 % -- --   08/09/21 0600 -- -- -- -- -- -- -- 181 lb (82.1 kg)   08/09/21 0400 109/72 97.7 °F (36.5 °C) Axillary 85 25 100 % -- --   08/09/21 0346 -- -- -- -- 22 -- -- --   08/09/21 0100 110/67 -- -- 89 18 100 % -- --   08/09/21 0015 106/67 97.6 °F (36.4 °C) Axillary 103 18 100 % -- --   08/08/21 2316 -- -- -- -- 20 -- -- --   08/08/21 2300 95/63 -- -- 94 -- 99 % -- --   08/08/21 2145 113/68 97.5 °F (36.4 °C) Axillary 107 21 100 % -- --   08/08/21 2029 -- -- -- -- 25 -- -- --   08/08/21 1630 105/84 -- -- 121 25 97 % -- --   08/08/21 1615 111/78 -- -- 149 -- 96 % -- --   08/08/21 1600 108/74 -- -- 138 -- 100 % -- --   08/08/21 1541 -- -- -- 123 26 95 % -- --   08/08/21 1504 109/77 -- -- 106 24 93 % -- --   08/08/21 1445 113/85 -- -- 105 27 97 % -- --   08/08/21 1430 (!) 134/93 -- -- 99 23 96 % -- --   08/08/21 1415 109/72 -- -- 97 21 96 % -- --   08/08/21 1400 (!) 111/97 -- -- 95 26 96 % -- --   08/08/21 1359 -- -- -- -- 24 95 % -- --   08/08/21 1345 117/76 97.5 °F (36.4 °C) Axillary 95 18 95 % -- --   08/08/21 1315 (!) 109/56 -- -- 127 24 97 % -- --   08/08/21 1245 132/88 -- -- 126 26 96 % -- --   08/08/21 1200 -- -- -on Metformin at home. humalog as needed   9. Essential hypertension - has chronic hypertension. Patient on midodrine. 10. CAD s/p CABG, stents -   11. Elevated troponin secondary to demand ischemia. Continue to monitor vitals , Intake / output ,  Cell count , HGB , Kidney function, oxygenation  as indicated . Plan and updates discussed with patient's son Valerie Alcaraz  ,  answers  explained to satisfaction.    Plan discussed with Staff Randell 5     (Please note that portions of this note were completed with a voice recognition program. Efforts were made to edit the dictations but occasionally words are mis-transcribed.)      Shelbie Monahan MD  8/9/2021

## 2021-08-09 NOTE — CARE COORDINATION
[facility] Transitions Initial Follow Up Call    Call within 2 business days of discharge: Yes    Patient: Shane Conley Patient : 1950   MRN: <[id number]>  Reason for Admission:   Discharge Date: 21 RARS: Readmission Risk Score: 18      Last Discharge [address] [address]       Complaint Diagnosis Description Type Department Provider    21   Admission (Current) [facility] CAR 3 Queen Bruce MD           # 1 attempt-unable to reach patient, noted [address]         Care Transitions 24 Hour Call    Do you have all of your prescriptions and are they filled?: No  Post Acute Services: Home Health (Comment: [facility])  Care Transitions Interventions         Follow Up  Future Appointments   Date Time Provider Jeremiah Sierra   2021  1:15 PM ALISON Hurtado - WYATT BUTLER DPP       Lisandra Plunkett RN

## 2021-08-09 NOTE — CONSULTS
[facility]    Inpatient Consultation Note               Today's Date: 8/9/2021  Patient Name: Rohan Hill  Date of admission: 8/8/2021 11:20 AM  Patient's age: 66 y. o., 1950  Admission Dx: Respiratory failure ([address] [J96.90]    Reason for  Consult:  A flutter w/ RVR    Requesting Physician: Tori Phelps MD    CHIEF COMPLAINT:     No chief complaint on file. History Obtained From:  patient, electronic medical record    HISTORY OF PRESENT ILLNESS:    66year old male presents to the ED with the complaint of severe weakness, progressively worsening sob and had a questionable fall. Found to be in acute hypercapnic hypoxic respiratory failure on admission. Started on BiPAP and aggressive treatment of COPD exacerbation along with IV bumex for possible CHF exacerbation. Patient was found to be in Atrial flutter with RVR. Cardiology consulted  Noted to have elevated troponin started on lovenox per primary team. HR was noted to be elevated up to 150's and was started on diltiazem gtt. Reports no chest pain at this time. Laying comfortably in bed. Past Medical History:   has a past medical history of Agent orange exposure, Anxiety state, Cancer ([address] CHF (congestive heart failure) ([address] Chronic obstructive pulmonary disease (COPD) ([address] Chronic post-traumatic stress disorder, Chronic respiratory failure with hypercapnia ([address] COPD exacerbation ([address] Coronary artery disease, Degenerative disc disease, lumbar, Depression, Diabetes mellitus ([address] Essential hypertension, History of acute pancreatitis, Hyperglycemia, Hypokalemia, Lumbosacral disc disease, Obesity, Obstructive sleep apnea, and Personal history of malignant neoplasm of bladder. Past Surgical History:   has a past surgical history that includes Bladder surgery; other surgical history; other surgical history (Bilateral, 02/10/2021); and Pain management procedure (Bilateral, 02/10/2021).      Home Medications:    Prior to Admission medications    Medication Sig Start Date End Date Taking? Authorizing Provider   midodrine (PROAMATINE) 5 MG tablet Take 1 tablet by mouth 3 times daily (with meals) 7/19/21   Rocky Patel MD   predniSONE (DELTASONE) 10 MG tablet Take 40mg (4 tablets) twice a day for 3 days; then 30mg (3 tablets) twice a day for 3 days; then 40mg (4 tablets) for 3 days; the 20mg (2 tablet) for 3 days; then 10 mg for 3 days and stop 7/19/21   Rocky Patel MD   celecoxib (CELEBREX) 100 MG capsule take 1 capsule by mouth twice a day 6/3/21   ALISON Garcia CNP   dextromethorphan-guaiFENesin (ROBITUSSIN-DM)  MG/5ML syrup Take 10 mLs by mouth every 4 hours as needed for Cough 6/1/21   Jhoana Arellano MD   metFORMIN (GLUCOPHAGE) 1000 MG tablet Take 1 tablet by mouth 2 times daily (with meals) 6/1/21   Jhoana Arellano MD   pregabalin (LYRICA) 225 MG capsule Take 1 capsule by mouth 2 times daily for 30 days. 5/7/21 7/21/21  Jose Archibald MD   dilTIAZem [facility]) 180 MG extended release capsule Take 180 mg by mouth daily 11/5/20   Historical Provider, MD   magnesium oxide (MAG-OX) 400 MG tablet Take 400 mg by mouth daily    Historical Provider, MD   NONFORMULARY Take 1 tablet by mouth daily ON COR vitamin - to help keep bladder cancer away    Historical Provider, MD   acetaminophen (TYLENOL) 325 MG tablet Take 650 mg by mouth every 6 hours as needed for Pain    Historical Provider, MD   spironolactone (ALDACTONE) 25 MG tablet Take 25 mg by mouth daily    Historical Provider, MD   albuterol sulfate  (90 Base) MCG/ACT inhaler Inhale 2 puffs into the lungs every 6 hours as needed    Historical Provider, MD   lidocaine (XYLOCAINE) 5 % ointment Apply topically as needed.  10/21/20   ALISON Garcia CNP   sennosides-docusate sodium (SENOKOT-S) 8.6-50 MG tablet Take 1 tablet by mouth daily    Historical Provider, MD   Multiple Vitamins-Minerals (ONCOVITE) TABS Take by mouth    Historical Provider, MD loratadine (CLARITIN) 10 MG tablet Take 10 mg by mouth daily    Historical Provider, MD   tiotropium ([place]) 18 MCG inhalation capsule Inhale 18 mcg into the lungs daily    Historical Provider, MD   apixaban (ELIQUIS) 5 MG TABS tablet Take by mouth 2 times daily    Historical Provider, MD   potassium chloride (KLOR-CON M) 10 MEQ extended release tablet Take 10 mEq by mouth 2 times daily    Historical Provider, MD   CPAP Machine MISC by Does not apply route BiPAP    Historical Provider, MD   budesonide-formoterol (SYMBICORT) 160-4.5 MCG/ACT AERO Inhale 2 puffs into the lungs 2 times daily    Historical Provider, MD   bumetanide (BUMEX) 2 MG tablet Take 2 mg by mouth daily    Historical Provider, MD   magnesium hydroxide (MILK OF MAGNESIA) 400 MG/5ML suspension Take by mouth daily as needed for Constipation    Historical Provider, MD   ibuprofen (IBU) 400 MG tablet Take 400 mg by mouth every 6 hours as needed for Pain    Historical Provider, MD   metoprolol succinate (TOPROL XL) 25 MG extended release tablet Take 25 mg by mouth daily  Patient not taking: Reported on 7/21/2021    Historical Provider, MD   Cyanocobalamin (VITAMIN B 12) 500 MCG TABS Take by mouth    Historical Provider, MD   pantoprazole (PROTONIX) 40 MG tablet Take 40 mg by mouth daily    Historical Provider, MD        Current Facility-Administered Medications: albuterol sulfate  (90 Base) MCG/ACT inhaler 2 puff, 2 puff, Inhalation, Q6H PRN  [Held by provider] apixaban (ELIQUIS) tablet 5 mg, 5 mg, Oral, BID  budesonide-formoterol (SYMBICORT) 160-4.5 MCG/ACT inhaler 2 puff, 2 puff, Inhalation, BID  [Held by provider] dilTIAZem (CARDIZEM CD) extended release capsule 180 mg, 180 mg, Oral, Daily  cetirizine (ZYRTEC) tablet 10 mg, 10 mg, Oral, Daily  [Held by provider] metFORMIN (GLUCOPHAGE) tablet 1,000 mg, 1,000 mg, Oral, BID WC  midodrine (PROAMATINE) tablet 5 mg, 5 mg, Oral, TID WC  pantoprazole (PROTONIX) tablet 40 mg, 40 mg, Oral, Daily  potassium chloride (KLOR-CON M) extended release tablet 10 mEq, 10 mEq, Oral, BID  [Held by provider] spironolactone (ALDACTONE) tablet 25 mg, 25 mg, Oral, Daily  sodium chloride flush 0.9 % injection 5-40 mL, 5-40 mL, Intravenous, 2 times per day  sodium chloride flush 0.9 % injection 10 mL, 10 mL, Intravenous, PRN  0.9 % sodium chloride infusion, 25 mL, Intravenous, PRN  ondansetron (ZOFRAN-ODT) disintegrating tablet 4 mg, 4 mg, Oral, Q8H PRN **OR** ondansetron (ZOFRAN) injection 4 mg, 4 mg, Intravenous, Q6H PRN  polyethylene glycol (GLYCOLAX) packet 17 g, 17 g, Oral, Daily PRN  acetaminophen (TYLENOL) tablet 650 mg, 650 mg, Oral, Q6H PRN **OR** acetaminophen (TYLENOL) suppository 650 mg, 650 mg, Rectal, Q6H PRN  lisinopril (PRINIVIL;ZESTRIL) tablet 5 mg, 5 mg, Oral, Daily  carvedilol (COREG) tablet 3.125 mg, 3.125 mg, Oral, BID WC  metoprolol (LOPRESSOR) injection 5 mg, 5 mg, Intravenous, Q4H PRN  ipratropium-albuterol (DUONEB) nebulizer solution 1 ampule, 1 ampule, Inhalation, Q4H While awake  LORazepam (ATIVAN) injection 1 mg, 1 mg, Intravenous, Q6H PRN  methylPREDNISolone sodium (SOLU-MEDROL) injection 40 mg, 40 mg, Intravenous, Q8H  bumetanide (BUMEX) injection 1 mg, 1 mg, Intravenous, BID  enoxaparin (LOVENOX) injection 150 mg, 1.5 mg/kg, Subcutaneous, Daily  dilTIAZem injection 10 mg, 10 mg, Intravenous, Once **FOLLOWED BY** dilTIAZem 125 mg in dextrose 5 % 125 mL infusion, 5-15 mg/hr, Intravenous, Continuous  nicotine (NICODERM CQ) 21 MG/24HR 1 patch, 1 patch, Transdermal, Daily    Allergies:  Niacin and related, Other, and Statins    Social History:   reports that he has been smoking cigarettes. He has a 54.00 pack-year smoking history. He has never used smokeless tobacco. He reports current alcohol use. He reports that he does not use drugs. Family History: family history includes Emphysema in his mother; Heart Disease in his father; Heart Failure in his father.      REVIEW OF SYSTEMS: Constitutional: there has been no unanticipated weight loss. Eyes: No visual changes or diplopia. ENT: No Headaches  Cardiovascular:  Remaining as above  Respiratory: No cough  Gastrointestinal: No abdominal pain. No change in bowel or bladder habits. Genitourinary: No dysuria, trouble voiding, or hematuria. Musculoskeletal: No joint complaints. Neurological: No headache  Hematologic/Lymphatic: No abnormal bruising or bleeding      PHYSICAL EXAM:      /72   Pulse 85   Temp 97.7 °F (36.5 °C) (Axillary)   Resp (!) 32   Ht 5' 10\" (1.778 m)   Wt 181 lb (82.1 kg)   SpO2 100%   BMI 25.97 kg/m²      Intake/Output Summary (Last 24 hours) at 8/9/2021 0857  Last data filed at 8/9/2021 8612  Gross per 24 hour   Intake 260 ml   Output 2860 ml   Net -2600 ml         Constitutional and General Appearance:   Alert, cooperative, no distress and appears stated age  Respiratory:  No for increased work of breathing. On auscultation: diminished breath sounds bilaterally  BiPAP  Cardiovascular:  Regular S1 and S2. No murmurs  Abdomen:   No masses or tenderness  Bowel sounds present  Extremities:   No Cyanosis or Clubbing   Lower extremity edema: No  Neurological:  Alert and oriented. DATA:    Diagnostics:    EKG:   A fib w/ RVR    ECHO:  7/14/21  Left ventricle is normal in size. Global left ventricular systolic function  is normal. Estimated ejection fraction is 50-55 %. Evidence of diastolic dysfunction. Left atrium is moderately dilated. Right atrial dilatation. Trivial mitral regurgitation. Trivial pulmonic insufficiency. IVC dilated but unable to assess respiratory collapse due to patient on  ventilator.       Labs:     CBC:   Recent Labs     08/08/21  1757 08/09/21  0629   WBC 12.2* 7.7   HGB 12.4* 12.6*   HCT 38.6* 38.2*    212     BMP:   Recent Labs     08/08/21  1757 08/09/21  0629   * 135   K 3.9 4.0   CO2 35* 33*   BUN 25* 37*   CREATININE 0.70 0.83   LABGLOM >60 >60 GLUCOSE 128* 206*     Pro-BNP:    Recent Labs     08/08/21  1412 08/09/21  0629   PROBNP 1,606* 2,071*     BNP: No results for input(s): BNP in the last 72 hours. PT/INR: No results for input(s): PROTIME, INR in the last 72 hours. APTT:No results for input(s): APTT in the last 72 hours. CARDIAC ENZYMES:No results for input(s): CKTOTAL, CKMB, CKMBINDEX, TROPONINI in the last 72 hours. Invalid input(s):  [address]  Recent Labs     08/08/21  1757 08/09/21  0024 08/09/21  0629   TROPONINT NOT REPORTED NOT REPORTED NOT REPORTED       FASTING LIPID PANEL:  Lab Results   Component Value Date    HDL 47 08/09/2021    TRIG 177 08/09/2021     LIVER PROFILE:  Recent Labs     08/08/21  1757 08/09/21  0629   AST 25 20   ALT 33 29   LABALBU 3.7 3.7         Patient's Active Problem List  Principal Problem:    Acute on chronic respiratory failure with hypoxia and hypercapnia (HCC)  Active Problems:    PAF (paroxysmal atrial fibrillation) (HCC)    RODNEY treated with BiPAP    Smoking greater than 40 pack years    Class 1 obesity due to excess calories with serious comorbidity and body mass index (BMI) of 30.0 to 30.9 in adult    Stage 4 very severe COPD by GOLD classification ([address]    Uncontrolled type 2 diabetes mellitus with hyperglycemia (HCC)    Respiratory failure (HCC)    Benign essential hypertension    Acute on chronic diastolic congestive heart failure (HCC)    GERD (gastroesophageal reflux disease)    Type 2 diabetes mellitus, without long-term current use of insulin ([address]    Long term (current) use of anticoagulants    Acute exacerbation of chronic obstructive pulmonary disease (COPD) (HCC)    Atrial flutter with rapid ventricular response ([address]    Type 2 myocardial infarction due to arrhythmia ([address]  Resolved Problems:    * No resolved hospital problems.  *        IMPRESSION:    Acute on chronic hypoxic & hypercapnic respiratory failure secondary to COPD exacerbation  Acute on chronic congestive heart

## 2021-08-10 LAB
ABSOLUTE EOS #: 0 K/UL (ref 0–0.44)
ABSOLUTE IMMATURE GRANULOCYTE: 0.12 K/UL (ref 0–0.3)
ABSOLUTE LYMPH #: 0.6 K/UL (ref 1.1–3.7)
ABSOLUTE MONO #: 0.48 K/UL (ref 0.1–1.2)
ALBUMIN SERPL-MCNC: 3.4 G/DL (ref 3.5–5.2)
ALBUMIN/GLOBULIN RATIO: 1.3 (ref 1–2.5)
ALP BLD-CCNC: 124 U/L (ref 40–129)
ALT SERPL-CCNC: 21 U/L (ref 5–41)
ANION GAP SERPL CALCULATED.3IONS-SCNC: 14 MMOL/L (ref 9–17)
AST SERPL-CCNC: 12 U/L
BASOPHILS # BLD: 0 % (ref 0–2)
BASOPHILS ABSOLUTE: 0 K/UL (ref 0–0.2)
BILIRUB SERPL-MCNC: 0.59 MG/DL (ref 0.3–1.2)
BUN BLDV-MCNC: 45 MG/DL (ref 8–23)
BUN/CREAT BLD: ABNORMAL (ref 9–20)
CALCIUM SERPL-MCNC: 8.7 MG/DL (ref 8.6–10.4)
CHLORIDE BLD-SCNC: 79 MMOL/L (ref 98–107)
CO2: 38 MMOL/L (ref 20–31)
CREAT SERPL-MCNC: 0.91 MG/DL (ref 0.7–1.2)
DIFFERENTIAL TYPE: ABNORMAL
EKG ATRIAL RATE: 254 BPM
EKG ATRIAL RATE: 256 BPM
EKG Q-T INTERVAL: 312 MS
EKG Q-T INTERVAL: 312 MS
EKG QRS DURATION: 82 MS
EKG QRS DURATION: 84 MS
EKG QTC CALCULATION (BAZETT): 431 MS
EKG QTC CALCULATION (BAZETT): 444 MS
EKG R AXIS: 114 DEGREES
EKG R AXIS: 116 DEGREES
EKG T AXIS: -15 DEGREES
EKG T AXIS: -38 DEGREES
EKG VENTRICULAR RATE: 115 BPM
EKG VENTRICULAR RATE: 122 BPM
EOSINOPHILS RELATIVE PERCENT: 0 % (ref 1–4)
ESTIMATED AVERAGE GLUCOSE: 151 MG/DL
GFR AFRICAN AMERICAN: >60 ML/MIN
GFR NON-AFRICAN AMERICAN: >60 ML/MIN
GFR SERPL CREATININE-BSD FRML MDRD: ABNORMAL ML/MIN/{1.73_M2}
GFR SERPL CREATININE-BSD FRML MDRD: ABNORMAL ML/MIN/{1.73_M2}
GLUCOSE BLD-MCNC: 253 MG/DL (ref 70–99)
GLUCOSE BLD-MCNC: 260 MG/DL (ref 75–110)
GLUCOSE BLD-MCNC: 324 MG/DL (ref 75–110)
GLUCOSE BLD-MCNC: 390 MG/DL (ref 75–110)
GLUCOSE BLD-MCNC: 439 MG/DL (ref 75–110)
HBA1C MFR BLD: 6.9 % (ref 4–6)
HCT VFR BLD CALC: 34 % (ref 40.7–50.3)
HEMOGLOBIN: 11.4 G/DL (ref 13–17)
IMMATURE GRANULOCYTES: 1 %
LYMPHOCYTES # BLD: 5 % (ref 24–43)
MAGNESIUM: 2.4 MG/DL (ref 1.6–2.6)
MCH RBC QN AUTO: 33.2 PG (ref 25.2–33.5)
MCHC RBC AUTO-ENTMCNC: 33.5 G/DL (ref 28.4–34.8)
MCV RBC AUTO: 99.1 FL (ref 82.6–102.9)
MONOCYTES # BLD: 4 % (ref 3–12)
MORPHOLOGY: ABNORMAL
MORPHOLOGY: ABNORMAL
NRBC AUTOMATED: 0 PER 100 WBC
PDW BLD-RTO: 14.9 % (ref 11.8–14.4)
PLATELET # BLD: 219 K/UL (ref 138–453)
PLATELET ESTIMATE: ABNORMAL
PMV BLD AUTO: 10.6 FL (ref 8.1–13.5)
POTASSIUM SERPL-SCNC: 3.4 MMOL/L (ref 3.7–5.3)
RBC # BLD: 3.43 M/UL (ref 4.21–5.77)
RBC # BLD: ABNORMAL 10*6/UL
SEG NEUTROPHILS: 90 % (ref 36–65)
SEGMENTED NEUTROPHILS ABSOLUTE COUNT: 10.8 K/UL (ref 1.5–8.1)
SODIUM BLD-SCNC: 131 MMOL/L (ref 135–144)
TOTAL PROTEIN: 6.1 G/DL (ref 6.4–8.3)
WBC # BLD: 12 K/UL (ref 3.5–11.3)
WBC # BLD: ABNORMAL 10*3/UL

## 2021-08-10 PROCEDURE — 6370000000 HC RX 637 (ALT 250 FOR IP): Performed by: INTERNAL MEDICINE

## 2021-08-10 PROCEDURE — 83735 ASSAY OF MAGNESIUM: CPT

## 2021-08-10 PROCEDURE — 93005 ELECTROCARDIOGRAM TRACING: CPT | Performed by: INTERNAL MEDICINE

## 2021-08-10 PROCEDURE — 83036 HEMOGLOBIN GLYCOSYLATED A1C: CPT

## 2021-08-10 PROCEDURE — 6370000000 HC RX 637 (ALT 250 FOR IP): Performed by: NURSE PRACTITIONER

## 2021-08-10 PROCEDURE — 82947 ASSAY GLUCOSE BLOOD QUANT: CPT

## 2021-08-10 PROCEDURE — 6370000000 HC RX 637 (ALT 250 FOR IP): Performed by: FAMILY MEDICINE

## 2021-08-10 PROCEDURE — 2060000000 HC ICU INTERMEDIATE R&B

## 2021-08-10 PROCEDURE — 85025 COMPLETE CBC W/AUTO DIFF WBC: CPT

## 2021-08-10 PROCEDURE — 6360000002 HC RX W HCPCS: Performed by: INTERNAL MEDICINE

## 2021-08-10 PROCEDURE — 99232 SBSQ HOSP IP/OBS MODERATE 35: CPT | Performed by: INTERNAL MEDICINE

## 2021-08-10 PROCEDURE — 94761 N-INVAS EAR/PLS OXIMETRY MLT: CPT

## 2021-08-10 PROCEDURE — 94660 CPAP INITIATION&MGMT: CPT

## 2021-08-10 PROCEDURE — 2580000003 HC RX 258: Performed by: NURSE PRACTITIONER

## 2021-08-10 PROCEDURE — 97116 GAIT TRAINING THERAPY: CPT

## 2021-08-10 PROCEDURE — 80053 COMPREHEN METABOLIC PANEL: CPT

## 2021-08-10 PROCEDURE — 2700000000 HC OXYGEN THERAPY PER DAY

## 2021-08-10 PROCEDURE — 36415 COLL VENOUS BLD VENIPUNCTURE: CPT

## 2021-08-10 PROCEDURE — 97110 THERAPEUTIC EXERCISES: CPT

## 2021-08-10 PROCEDURE — 93010 ELECTROCARDIOGRAM REPORT: CPT | Performed by: INTERNAL MEDICINE

## 2021-08-10 PROCEDURE — 94640 AIRWAY INHALATION TREATMENT: CPT

## 2021-08-10 RX ORDER — POTASSIUM CHLORIDE 20 MEQ/1
40 TABLET, EXTENDED RELEASE ORAL ONCE
Status: DISCONTINUED | OUTPATIENT
Start: 2021-08-10 | End: 2021-08-10

## 2021-08-10 RX ORDER — BUMETANIDE 1 MG/1
2 TABLET ORAL 2 TIMES DAILY
Status: DISCONTINUED | OUTPATIENT
Start: 2021-08-10 | End: 2021-08-12

## 2021-08-10 RX ORDER — METHYLPREDNISOLONE SODIUM SUCCINATE 40 MG/ML
30 INJECTION, POWDER, LYOPHILIZED, FOR SOLUTION INTRAMUSCULAR; INTRAVENOUS DAILY
Status: DISCONTINUED | OUTPATIENT
Start: 2021-08-11 | End: 2021-08-11

## 2021-08-10 RX ORDER — POTASSIUM CHLORIDE 20 MEQ/1
20 TABLET, EXTENDED RELEASE ORAL ONCE
Status: COMPLETED | OUTPATIENT
Start: 2021-08-10 | End: 2021-08-10

## 2021-08-10 RX ORDER — NICOTINE POLACRILEX 4 MG
15 LOZENGE BUCCAL PRN
Status: DISCONTINUED | OUTPATIENT
Start: 2021-08-10 | End: 2021-08-15 | Stop reason: HOSPADM

## 2021-08-10 RX ORDER — DEXTROSE MONOHYDRATE 50 MG/ML
100 INJECTION, SOLUTION INTRAVENOUS PRN
Status: DISCONTINUED | OUTPATIENT
Start: 2021-08-10 | End: 2021-08-15 | Stop reason: HOSPADM

## 2021-08-10 RX ORDER — DEXTROSE MONOHYDRATE 25 G/50ML
12.5 INJECTION, SOLUTION INTRAVENOUS PRN
Status: DISCONTINUED | OUTPATIENT
Start: 2021-08-10 | End: 2021-08-15 | Stop reason: HOSPADM

## 2021-08-10 RX ORDER — IPRATROPIUM BROMIDE AND ALBUTEROL SULFATE 2.5; .5 MG/3ML; MG/3ML
1 SOLUTION RESPIRATORY (INHALATION) 4 TIMES DAILY
Status: DISCONTINUED | OUTPATIENT
Start: 2021-08-10 | End: 2021-08-15 | Stop reason: HOSPADM

## 2021-08-10 RX ORDER — MIDODRINE HYDROCHLORIDE 5 MG/1
10 TABLET ORAL
Status: DISCONTINUED | OUTPATIENT
Start: 2021-08-10 | End: 2021-08-15 | Stop reason: HOSPADM

## 2021-08-10 RX ADMIN — INSULIN LISPRO 6 UNITS: 100 INJECTION, SOLUTION INTRAVENOUS; SUBCUTANEOUS at 20:54

## 2021-08-10 RX ADMIN — METOPROLOL TARTRATE 12.5 MG: 25 TABLET ORAL at 17:05

## 2021-08-10 RX ADMIN — POTASSIUM CHLORIDE 10 MEQ: 1500 TABLET, EXTENDED RELEASE ORAL at 20:55

## 2021-08-10 RX ADMIN — IPRATROPIUM BROMIDE AND ALBUTEROL SULFATE 1 AMPULE: .5; 2.5 SOLUTION RESPIRATORY (INHALATION) at 19:27

## 2021-08-10 RX ADMIN — IPRATROPIUM BROMIDE AND ALBUTEROL SULFATE 1 AMPULE: .5; 3 SOLUTION RESPIRATORY (INHALATION) at 07:39

## 2021-08-10 RX ADMIN — MIDODRINE HYDROCHLORIDE 10 MG: 5 TABLET ORAL at 12:16

## 2021-08-10 RX ADMIN — SODIUM CHLORIDE, PRESERVATIVE FREE 10 ML: 5 INJECTION INTRAVENOUS at 21:38

## 2021-08-10 RX ADMIN — BUMETANIDE 2 MG: 1 TABLET ORAL at 20:54

## 2021-08-10 RX ADMIN — ENOXAPARIN SODIUM 120 MG: 120 INJECTION SUBCUTANEOUS at 08:11

## 2021-08-10 RX ADMIN — POTASSIUM CHLORIDE 20 MEQ: 1500 TABLET, EXTENDED RELEASE ORAL at 17:04

## 2021-08-10 RX ADMIN — IPRATROPIUM BROMIDE AND ALBUTEROL SULFATE 1 AMPULE: .5; 2.5 SOLUTION RESPIRATORY (INHALATION) at 11:32

## 2021-08-10 RX ADMIN — INSULIN LISPRO 5 UNITS: 100 INJECTION, SOLUTION INTRAVENOUS; SUBCUTANEOUS at 08:00

## 2021-08-10 RX ADMIN — IPRATROPIUM BROMIDE AND ALBUTEROL SULFATE 1 AMPULE: .5; 2.5 SOLUTION RESPIRATORY (INHALATION) at 15:44

## 2021-08-10 RX ADMIN — INSULIN LISPRO 4 UNITS: 100 INJECTION, SOLUTION INTRAVENOUS; SUBCUTANEOUS at 12:00

## 2021-08-10 RX ADMIN — METHYLPREDNISOLONE SODIUM SUCCINATE 40 MG: 40 INJECTION, POWDER, FOR SOLUTION INTRAMUSCULAR; INTRAVENOUS at 00:21

## 2021-08-10 RX ADMIN — DILTIAZEM HYDROCHLORIDE 180 MG: 180 CAPSULE, COATED, EXTENDED RELEASE ORAL at 00:21

## 2021-08-10 RX ADMIN — PANTOPRAZOLE SODIUM 40 MG: 40 TABLET, DELAYED RELEASE ORAL at 08:10

## 2021-08-10 RX ADMIN — BUMETANIDE 2 MG: 1 TABLET ORAL at 10:41

## 2021-08-10 RX ADMIN — BUDESONIDE AND FORMOTEROL FUMARATE DIHYDRATE 2 PUFF: 160; 4.5 AEROSOL RESPIRATORY (INHALATION) at 07:39

## 2021-08-10 RX ADMIN — INSULIN LISPRO 3 UNITS: 100 INJECTION, SOLUTION INTRAVENOUS; SUBCUTANEOUS at 17:11

## 2021-08-10 RX ADMIN — BUDESONIDE AND FORMOTEROL FUMARATE DIHYDRATE 2 PUFF: 160; 4.5 AEROSOL RESPIRATORY (INHALATION) at 19:27

## 2021-08-10 RX ADMIN — MIDODRINE HYDROCHLORIDE 5 MG: 5 TABLET ORAL at 08:10

## 2021-08-10 RX ADMIN — SODIUM CHLORIDE, PRESERVATIVE FREE 10 ML: 5 INJECTION INTRAVENOUS at 08:11

## 2021-08-10 RX ADMIN — POTASSIUM CHLORIDE 10 MEQ: 1500 TABLET, EXTENDED RELEASE ORAL at 08:22

## 2021-08-10 RX ADMIN — METHYLPREDNISOLONE SODIUM SUCCINATE 40 MG: 40 INJECTION, POWDER, FOR SOLUTION INTRAMUSCULAR; INTRAVENOUS at 08:11

## 2021-08-10 RX ADMIN — CETIRIZINE HYDROCHLORIDE 10 MG: 10 TABLET ORAL at 08:11

## 2021-08-10 RX ADMIN — MIDODRINE HYDROCHLORIDE 10 MG: 5 TABLET ORAL at 17:30

## 2021-08-10 ASSESSMENT — ENCOUNTER SYMPTOMS
VOMITING: 0
SINUS PRESSURE: 0
COUGH: 0
CONSTIPATION: 0
BACK PAIN: 0
DIARRHEA: 0
SHORTNESS OF BREATH: 1
RHINORRHEA: 0
CHOKING: 0
ABDOMINAL PAIN: 0
NAUSEA: 0
WHEEZING: 0
CHEST TIGHTNESS: 0
VOICE CHANGE: 0

## 2021-08-10 ASSESSMENT — PAIN SCALES - GENERAL
PAINLEVEL_OUTOF10: 0
PAINLEVEL_OUTOF10: 0

## 2021-08-10 NOTE — PROGRESS NOTES
Physical Therapy  Facility/Department: [facility] CAR 3  Daily Treatment Note  NAME: Geronimo Liang  : 1950  MRN: [id number]    Date of Service: 8/10/2021    Discharge Recommendations:  Patient would benefit from continued therapy after discharge   PT Equipment Recommendations  Equipment Needed: Yes  Mobility Devices: Charlott Goon: Rolling    Assessment   Body structures, Functions, Activity limitations: Decreased functional mobility ; Decreased posture;Decreased endurance;Decreased balance;Decreased strength;Decreased safe awareness  Assessment: Improved gait distance. Improved bed mobility. Mild confusion, but he is now oriented to place, month, and year. Patient will need further PT to regain functional independence. Prognosis: Good  PT Education: PT Role;Plan of Care;Goals;Gait Training;General Safety; Functional Mobility Training;Home Exercise Program;Transfer Training  REQUIRES PT FOLLOW UP: Yes  Activity Tolerance  Activity Tolerance: Patient limited by endurance; Patient limited by fatigue     Patient Diagnosis(es): There were no encounter diagnoses. has a past medical history of Agent orange exposure, Anxiety state, Cancer ([address] CHF (congestive heart failure) ([address] Chronic obstructive pulmonary disease (COPD) ([address] Chronic post-traumatic stress disorder, Chronic respiratory failure with hypercapnia ([address] COPD exacerbation ([address] Coronary artery disease, Degenerative disc disease, lumbar, Depression, Diabetes mellitus ([address] Essential hypertension, History of acute pancreatitis, Hyperglycemia, Hypokalemia, Lumbosacral disc disease, Obesity, Obstructive sleep apnea, and Personal history of malignant neoplasm of bladder. has a past surgical history that includes Bladder surgery; other surgical history; other surgical history (Bilateral, 02/10/2021); and Pain management procedure (Bilateral, 02/10/2021).     Restrictions  Restrictions/Precautions  Restrictions/Precautions: Fall Risk  Required Braces or Orthoses?: No  Position Activity Restriction  Other position/activity restrictions: up w/assist  Subjective   General  Chart Reviewed: Yes  Family / Caregiver Present: No  Subjective  Subjective: RN and pt in agreement for PT; pt supine in bed upon PT arrival, pt on 3L NC, pt admits to being forgetful, is oriented x3. Pain Screening  Patient Currently in Pain: Denies  Vital Signs  Pulse: 110 (irregular in rhythm, occasional PVCs)  Patient Currently in Pain: Denies  Oxygen Therapy  O2 Device: Nasal cannula  O2 Flow Rate (L/min): 3 L/min       Orientation  Orientation  Orientation Level: Oriented to place;Oriented to time;Oriented to person;Disoriented to situation  Cognition   Cognition  Overall Cognitive Status: Exceptions  Arousal/Alertness: Appropriate responses to stimuli  Following Commands: Follows one step commands consistently; Follows multistep commands with increased time  Attention Span: Appears intact  Safety Judgement: Decreased awareness of need for safety;Decreased awareness of need for assistance  Problem Solving: Assistance required to identify errors made;Assistance required to generate solutions;Assistance required to correct errors made  Insights: Decreased awareness of deficits  Initiation: Requires cues for some  Sequencing: Requires cues for some  Objective   Bed mobility  Supine to Sit: Contact guard assistance  Sit to Supine: Stand by assistance  Transfers  Sit to Stand: Contact guard assistance  Stand to sit: Contact guard assistance  Ambulation  Ambulation?: Yes  Ambulation 1  Surface: level tile  Device: Rolling Walker  Other Apparatus: O2  Assistance: Contact guard assistance  Quality of Gait: Unsteady gait  Gait Deviations: Slow Janett; Shuffles  Distance: 22'  Comments: 91% on 3 LPM with exertion        Exercises  Straight Leg Raise: x10  Heelslides: x10  Knee Short Arc Quad: x10  Ankle Pumps: x15  Comments: Standing heel raises x10 reps, poor ability with left.                    AM-PAC Score  -[facility] Inpatient Mobility Raw Score : 17 (08/10/21 1527)  AM-PAC Inpatient T-Scale Score : 42.13 (08/10/21 1527)  Mobility Inpatient CMS 0-100% Score: 50.57 (08/10/21 1527)  Mobility Inpatient CMS G-Code Modifier : CK (08/10/21 1527)          Goals  Short term goals  Time Frame for Short term goals: 14  Short term goal 1: Pt to perform bed mobility from flat surface independently  Short term goal 2: Pt to demonstrate functional transfers independently  Short term goal 3: Ambulate 150ft w/ RW with supervision  Short term goal 4: Tolerate 30 minutes of therapy to demo increased endurance  Patient Goals   Patient goals : Did not state    Plan    Plan  Times per week: 5-6x/week  Current Treatment Recommendations: Strengthening, Transfer Training, Endurance Training, Patient/Caregiver Education & Training, Equipment Evaluation, Education, & procurement, Balance Training, Gait Training, Home Exercise Program, Functional Mobility Training, Stair training, Safety Education & Training  Safety Devices  Type of devices: Left in bed, Call light within reach, Gait belt, Nurse notified, Bed alarm in place, All fall risk precautions in place  Restraints  Initially in place: No     Therapy Time   Individual Concurrent Group Co-treatment   Time In 1400         Time Out 1425         Minutes 25         Timed Code Treatment Minutes: Danny 1690, PT

## 2021-08-10 NOTE — PROGRESS NOTES
[facility]   Progress Note                   Date:   8/10/2021  Patient name: Dulce Hilton  Date of admission:  8/8/2021 11:20 AM  MRN:   [id number]  YOB: 1950  PCP: Niharika Albert    Reason for Admission: Respiratory failure ([address] [J96.90]    Subjective:       Clinical Changes / Abnormalities: Patient seen and examined at the bed side after discussion with primary attending and RN. No new acute events overnight. Patient is doing well, has no complaints. Denies chest pain or SOB. Reviewed vitals, labs, tele, & previous testing. Atrial flutter on tele. Supplemental oxygen in use at patient's baseline as at home.         Medications:   Scheduled Meds:   midodrine  10 mg Oral TID WC    [START ON 8/11/2021] methylPREDNISolone  30 mg Intravenous Daily    ipratropium-albuterol  1 ampule Inhalation 4x daily    bumetanide  2 mg Oral BID    enoxaparin  1.5 mg/kg Subcutaneous Daily    insulin lispro  0-6 Units Subcutaneous TID WC    insulin lispro  0-3 Units Subcutaneous Nightly    dilTIAZem  180 mg Oral Daily    [Held by provider] apixaban  5 mg Oral BID    budesonide-formoterol  2 puff Inhalation BID    cetirizine  10 mg Oral Daily    [Held by provider] metFORMIN  1,000 mg Oral BID WC    pantoprazole  40 mg Oral Daily    potassium chloride  10 mEq Oral BID    [Held by provider] spironolactone  25 mg Oral Daily    sodium chloride flush  5-40 mL Intravenous 2 times per day    lisinopril  5 mg Oral Daily    carvedilol  3.125 mg Oral BID     dilTIAZem  10 mg Intravenous Once    nicotine  1 patch Transdermal Daily     Continuous Infusions:   dextrose      dextrose      sodium chloride      dilTIAZem Stopped (08/09/21 2250)     CBC:   Recent Labs     08/08/21 1757 08/09/21  0629 08/10/21  0549   WBC 12.2* 7.7 12.0*   HGB 12.4* 12.6* 11.4*    212 219     BMP:    Recent Labs     08/08/21 1757 08/09/21  0629 08/10/21  0549   * 135 131*   K 3.9 4.0 3.4*   CL Electronically signed by ALISON Stallings CNP on 8/10/2021 at 1:18 PM  [facility]      [phone]

## 2021-08-10 NOTE — CARE COORDINATION
Met with pt and daughter to discuss transitional planning. Pt wants to go home with [facility] as previously arranged. Daughter concerned about pt falling. She talked about looking into life alert for pt. Pt denies other needs for home.  Verified with Hill Alexandra at [facility] that pt is current and able to resume care at discharge

## 2021-08-10 NOTE — DISCHARGE INSTR - COC
PAF (paroxysmal atrial fibrillation) ([facility]) I48.0    Pulmonary hypertension with chronic cor pulmonale ([facility]) I27.29    RODNEY treated with BiPAP G47.33    Smoking greater than 40 pack years F17.210    Class 1 obesity due to excess calories with serious comorbidity and body mass index (BMI) of 30.0 to 30.9 in adult E66.09, Z68.30    Stage 4 very severe COPD by GOLD classification ([facility] J44.9    Hyponatremia E87.1    Uncontrolled type 2 diabetes mellitus with hyperglycemia ([facility]) E11.65    Acute renal failure ([facility]) N17.9    LAKESHA (acute kidney injury) ([facility] N17.9    Respiratory failure ([facility]) J96.90    Benign essential hypertension I10    Acute on chronic diastolic congestive heart failure ([facility]) I50.33    GERD (gastroesophageal reflux disease) K21.9    Hyperlipidemia E78.5    Type 2 diabetes mellitus, without long-term current use of insulin ([facility]) E11.9    Long term (current) use of anticoagulants Z79.01    Acute exacerbation of chronic obstructive pulmonary disease (COPD) ([facility]) J44.1    Atrial flutter with rapid ventricular response ([facility]) I48.92    Type 2 myocardial infarction due to arrhythmia ([facility]) I49.9, I21.A1       Isolation/Infection:   Isolation            No Isolation          Patient Infection Status       None to display            Nurse Assessment:  Last Vital Signs: /73   Pulse 110 Comment: irregular in rhythm, occasional PVCs  Temp 98.2 °F (36.8 °C) (Oral)   Resp 23   Ht 5' 10\" (1.778 m)   Wt 184 lb 8.4 oz (83.7 kg)   SpO2 94%   BMI 26.48 kg/m²     Last documented pain score (0-10 scale): Pain Level: 0  Last Weight:   Wt Readings from Last 1 Encounters:   08/10/21 184 lb 8.4 oz (83.7 kg)     Mental Status:  oriented and alert    IV Access:  - None    Nursing Mobility/ADLs:  Walking   Assisted  Transfer  Assisted  Bathing  Assisted  Dressing  Assisted  Toileting  Assisted  Feeding  Independent  Med Admin  Assisted  Med Delivery   whole    Wound Care Documentation and Therapy: Elimination:  Continence:   · Bowel: Yes  · Bladder: Yes  Urinary Catheter: Insertion date: 8/15/2021 for urinary retention  Colostomy/Ileostomy/Ileal Conduit: No       Date of Last BM: 8/12/2021    Intake/Output Summary (Last 24 hours) at 8/10/2021 1540  Last data filed at 8/10/2021 0458  Gross per 24 hour   Intake 230 ml   Output 1800 ml   Net -1570 ml     I/O last 3 completed shifts: In: 230 [P.O.:230]  Out: 1800 [Urine:1800]    Safety Concerns: At Risk for Falls    Impairments/Disabilities:      Vision    Nutrition Therapy:  Current Nutrition Therapy:   - Oral Diet:  Carb Control 4 carbs/meal (1800kcals/day) and Low Fat    Routes of Feeding: Oral  Liquids: No Restrictions  Daily Fluid Restriction: no  Last Modified Barium Swallow with Video (Video Swallowing Test): not done    Treatments at the Time of Hospital Discharge:   Respiratory Treatments: Symbicort, duoneb  Oxygen Therapy:  is on oxygen at 4 L/min per nasal cannula.   Ventilator:    - BiPAP   IPAP: 12 cmH20, CPAP/EPAP: 5 cmH2O only when sleeping  - CPAP   only when sleeping    Rehab Therapies: Physical Therapy and Occupational Therapy  Weight Bearing Status/Restrictions: No weight bearing restirctions  Other Medical Equipment (for information only, NOT a DME order):  walker  Other Treatments: PT     Patient's personal belongings (please select all that are sent with patient):  Glasses, cell phone,     RN SIGNATURE:  Electronically signed by Lakesha Handley RN on 8/15/21 at 10:35 AM EDT    CASE MANAGEMENT/SOCIAL WORK SECTION    Inpatient Status Date: ***    Readmission Risk Assessment Score:  Readmission Risk              Risk of Unplanned Readmission:  19           Discharging to Facility/ [address] [facility]  [address] [place] [address] [id number]       Phone: [phone]       Fax: [phone]              / signature: {Esignature:[id number]}    PHYSICIAN SECTION    Prognosis: Fair    Condition at Discharge: Stable    Rehab Potential (if transferring to Rehab): Fair    Recommended Labs or Other Treatments After Discharge:     -Follow-up with your cardiologist within 2 weeks time for hospitalization after having stent placed. Continue taking aspirin, Plavix, Eliquis for 1 month then stop taking the aspirin.  -Repeat labs in 1 week's time to reevaluate potassium level and sodium level.  -Need to monitor blood glucose for treatment of hyperglycemia. Continue Metformin.    -Follow-up with your primary care physician for posthospitalization management and for titration of insulin/diabetes regimen.  -Return to hospital if worsening chest pain, shortness of breath, difficulty breathing, nausea, vomiting, diarrhea.  -Patient will need PT OT  -Followup with your primary care physician to evaluate thyroid function and subclinical hyperthyroidism.  -Continue cardiac diet  -Needs follow-up with urology for management of urinary retention    Physician Certification: I certify the above information and transfer of Selina Cr  is necessary for the continuing treatment of the diagnosis listed and that he requires [facility] for greater 30 days.      Update Admission H&P: No change in H&P    PHYSICIAN SIGNATURE:  Electronically signed by Leona Dewitt DO on 8/15/21 at 12:44 PM EDT

## 2021-08-10 NOTE — CARE COORDINATION
8/10/21  nHpredict tool uploaded to chart and can be viewed in the media tab, recommending SNF for greatest gains.  KOFFI CHIANG     1950    PLOF: lives alone in two level home, performs ADLs on one level, able to live on main level (bed/bath). Has 3 SUJIT, cane, walker and home O2. [place], [facility]  CLOF:  CGA w RW CGA for transfers, MIN A for UE bathing, LE dressing, and toileting,  MOD A for LE bathing, A&O, impaired cognition and orientation, follows 1 step commands; multi step requires extra time, cues and repetition     nH Predict recommends: SNF @ 14 days. nH Predict anticipates a functional gain of +7 w SNF or +6 w home/HHC    DC Plan: Per CC note, daughter states patient would prefer to return home w continued [facility]. Kind regards,  Bill Resendiz.  Kellie Padilla MSN, [facility] Coordinator     Cell: [phone]

## 2021-08-10 NOTE — PROGRESS NOTES
[facility]  Office: [address], DO, Kalie Alcocer, DO, Thuy Amaya, DO, Michelle Salinas, DO, Atiya Ochoa MD, Bautista Flor MD, Cris Nur MD, Sarah Ferris MD, Ashleigh Soriano MD, Jairo Hernandez MD, Juan Santana MD, Koby Schulz, DO, Candida Cruz MD, Simin Israel DO, Daxa Garcia MD,  Aida Corral DO, Uriah Ritchie MD, Virginia Burdick MD, Wandy Howard MD, Molly Russell MD, Jiles Alpers, MD, Brenda Aragon MD, Yolanda Prajapati MD, Gloria Alston, [facility], [facility], CNP, Shivam Méndez, CNP, Lucía Dawn, CNS, Vilma Hyman, CNP, Mahi Shahid, CNP, Kelvin Herrera, CNP, Lisa Lambert, CNP, Aleida Devries, CNP, Bolivar Valerio PA-C, Melissa Wyatt, [facility] [place], CNP, Ron Salazar, CNP, Serafin Gagnon, CNP, Addison Michel, CNP, Celina Loredo, CNP, Patt العلي, CNP, Casey Ocampo, CNP, Jarred Ray, [address] [facility]      Daily Progress Note     Admit Date: 8/8/2021  Bed/Room No.  3004/3004-01  Admitting Physician : Simin Israel DO  Code Status :Full Code  Hospital Day:  LOS: 2 days   Chief Complaint:     Breathing difficulty     Principal Problem:    Acute on chronic respiratory failure with hypoxia and hypercapnia (HCC)  Active Problems:    PAF (paroxysmal atrial fibrillation) (HCC)    RODNEY treated with BiPAP    Smoking greater than 40 pack years    Class 1 obesity due to excess calories with serious comorbidity and body mass index (BMI) of 30.0 to 30.9 in adult    Stage 4 very severe COPD by GOLD classification [facility])    Uncontrolled type 2 diabetes mellitus with hyperglycemia ([facility] [address]    Respiratory failure ([facility] [address]    Benign essential hypertension    Acute on chronic diastolic congestive heart failure (HCC)    GERD (gastroesophageal reflux disease)    Type 2 diabetes mellitus, without long-term current use of insulin ([facility] [address]    Long term (current) use of anticoagulants    Acute exacerbation of chronic obstructive pulmonary disease (COPD) [facility])    Atrial flutter with rapid ventricular response ([facility])    Type 2 myocardial infarction due to arrhythmia [facility])  Resolved Problems:    * No resolved hospital problems. *    Subjective : Interval History/Significant events :  08/10/21    Patient feeling much better today, patient is on baseline oxygen of 3 to 4 L nasal cannula. No chest pain, complains of sacral pain otherwise feels fine. No wheezing on exam, tolerated BiPAP overnight. Spoke with cardiology this morning recommending cardiac catheter discharge    Background History:         Chito Zapata is 66 y.o. male  Who was admitted to the hospital on 8/8/2021 for treatment of Acute on chronic respiratory failure with hypoxia and hypercapnia ([facility] [address] Cells transfer from [facility] where he presented after fall with head injury. Patient was having difficulty breathing. Work-up at [facility] showed decompensated heart failure and was found to be in respiratory failure with hyperkalemia. Patient was treated with BiPAP and insulin with dextrose for hyperkalemia. CT head was negative for acute abnormality. Patient [facility] flighted to [facility] for further treatment. Patient has underlying history of bladder cancer, congestive heart failure, COPD, diabetes mellitus, hypertension, obstructive sleep apnea. He had cardiac bypass and stents for CAD. Work-up on admission showed elevated proBNP 1606, elevated troponin 125, leukocytosis 12.2.  Echocardiogram from last month showed preserved ejection fraction, diastolic dysfunction.     PMH:  Past Medical History:   Diagnosis Date    Agent orange exposure     Anxiety state     Cancer ([facility] [address]     bladder    CHF (congestive heart failure) (HCC)     Chronic obstructive pulmonary disease (COPD) (HCC)     Chronic post-traumatic stress disorder     Chronic respiratory failure with hypercapnia (HCC)     COPD exacerbation (HCC)     Coronary artery disease     Degenerative disc disease, lumbar     Depression     Diabetes mellitus ([facility] [address]     Essential hypertension     History of acute pancreatitis     Hyperglycemia     Hypokalemia     Lumbosacral disc disease     Obesity     Obstructive sleep apnea     Personal history of malignant neoplasm of bladder       Allergies: Allergies   Allergen Reactions    Niacin And Related     Other      Nicotine patch - rash    Statins       Medications :  midodrine, 10 mg, Oral, TID   [START ON 8/11/2021] methylPREDNISolone, 30 mg, Intravenous, Daily  ipratropium-albuterol, 1 ampule, Inhalation, 4x daily  enoxaparin, 1.5 mg/kg, Subcutaneous, Daily  insulin lispro, 0-6 Units, Subcutaneous, TID WC  insulin lispro, 0-3 Units, Subcutaneous, Nightly  dilTIAZem, 180 mg, Oral, Daily  [Held by provider] apixaban, 5 mg, Oral, BID  budesonide-formoterol, 2 puff, Inhalation, BID  cetirizine, 10 mg, Oral, Daily  [Held by provider] metFORMIN, 1,000 mg, Oral, BID WC  pantoprazole, 40 mg, Oral, Daily  potassium chloride, 10 mEq, Oral, BID  [Held by provider] spironolactone, 25 mg, Oral, Daily  sodium chloride flush, 5-40 mL, Intravenous, 2 times per day  lisinopril, 5 mg, Oral, Daily  carvedilol, 3.125 mg, Oral, BID WC  bumetanide, 1 mg, Intravenous, BID  dilTIAZem, 10 mg, Intravenous, Once  nicotine, 1 patch, Transdermal, Daily        Review of Systems   Review of Systems   Constitutional: Negative for activity change, appetite change, fatigue, fever and unexpected weight change. HENT: Negative for congestion, nosebleeds, rhinorrhea, sinus pressure, sneezing and voice change. Respiratory: Positive for shortness of breath. Negative for cough, choking, chest tightness and wheezing. Cardiovascular: Negative for chest pain, palpitations and leg swelling. Gastrointestinal: Negative for abdominal pain, constipation, diarrhea, nausea and vomiting.    Genitourinary: Negative for difficulty urinating, discharge, dysuria, frequency and testicular pain. Musculoskeletal: Negative for back pain. Skin: Negative for rash. Neurological: Negative for dizziness, weakness, light-headedness and headaches. Hematological: Does not bruise/bleed easily. Psychiatric/Behavioral: Positive for confusion and sleep disturbance. Negative for agitation, behavioral problems, self-injury and suicidal ideas.      Objective :      Current Vitals : Temp: 98 °F (36.7 °C),  Pulse: 60, Resp: 20, BP: (!) 91/44, SpO2: 94 %  Last 24 Hrs Vitals   Patient Vitals for the past 24 hrs:   BP Temp Temp src Pulse Resp SpO2 Weight   08/10/21 0800 (!) 91/44 98 °F (36.7 °C) Axillary -- -- -- --   08/10/21 0740 -- -- -- -- 20 94 % --   08/10/21 0739 -- -- -- -- 19 93 % --   08/10/21 0445 -- -- -- -- -- -- 184 lb 8.4 oz (83.7 kg)   08/10/21 0400 93/62 98 °F (36.7 °C) Axillary 60 16 100 % --   08/10/21 0015 95/70 97.7 °F (36.5 °C) Axillary 81 24 100 % --   08/09/21 2006 -- -- -- -- 28 -- --   08/09/21 1930 91/66 97.7 °F (36.5 °C) Oral 84 24 94 % --   08/09/21 1820 -- -- -- 95 21 90 % --   08/09/21 1730 99/61 -- -- 96 24 91 % --   08/09/21 1704 -- -- -- 90 23 92 % --   08/09/21 1630 (!) 93/58 -- -- 97 20 95 % --   08/09/21 1623 -- 98.1 °F (36.7 °C) Axillary 90 19 97 % --   08/09/21 1600 (!) 96/59 -- -- 97 19 91 % --   08/09/21 1530 (!) 89/57 -- -- 103 20 90 % --   08/09/21 1500 100/61 -- -- 101 23 -- --   08/09/21 1430 108/63 -- -- 90 21 95 % --   08/09/21 1415 (!) 103/59 -- -- 87 25 95 % --   08/09/21 1330 95/63 -- -- 87 29 94 % --   08/09/21 1300 94/65 -- -- 90 22 93 % --   08/09/21 1230 101/63 -- -- 93 24 90 % --   08/09/21 1200 105/66 -- -- 79 20 93 % --   08/09/21 1133 -- -- -- -- 21 91 % --   08/09/21 1130 116/65 -- -- 88 25 90 % --   08/09/21 1100 113/72 -- -- 89 21 91 % --   08/09/21 1030 125/80 -- -- 111 24 93 % --   08/09/21 1000 116/71 -- -- -- -- -- --     Intake / output   08/09 0701 - 08/10 0700  In: 230 [P.O.:230]  Out: 2160 [Urine:2160]  Physical Exam:  Physical Exam  Vitals and nursing note reviewed. Constitutional:       General: He is not in acute distress. Appearance: He is not diaphoretic. Interventions: Nasal cannula in place. HENT:      Head: Normocephalic and atraumatic. Nose:      Right Sinus: No maxillary sinus tenderness or frontal sinus tenderness. Left Sinus: No maxillary sinus tenderness or frontal sinus tenderness. Mouth/Throat:      Pharynx: No oropharyngeal exudate. Eyes:      General: No scleral icterus. Conjunctiva/sclera: Conjunctivae normal.      Pupils: Pupils are equal, round, and reactive to light. Neck:      Thyroid: No thyromegaly. Vascular: No JVD. Cardiovascular:      Rate and Rhythm: Normal rate and regular rhythm. Pulses:           Dorsalis pedis pulses are 2+ on the right side and 2+ on the left side. Heart sounds: Normal heart sounds. No murmur heard. Pulmonary:      Effort: Pulmonary effort is normal.      Breath sounds: Normal breath sounds. No wheezing or rales. Chest:      Comments: Sternotomy scar  Abdominal:      Palpations: Abdomen is soft. There is no mass. Tenderness: There is no abdominal tenderness. Musculoskeletal:      Cervical back: Full passive range of motion without pain and neck supple. Lymphadenopathy:      Head:      Right side of head: No submandibular adenopathy. Left side of head: No submandibular adenopathy. Cervical: No cervical adenopathy. Skin:     General: Skin is warm. Neurological:      Mental Status: He is disoriented. Motor: No tremor. Psychiatric:         Behavior: Behavior is cooperative.            Laboratory findings:    Recent Labs     08/08/21  1757 08/09/21  0629 08/10/21  0549   WBC 12.2* 7.7 12.0*   HGB 12.4* 12.6* 11.4*   HCT 38.6* 38.2* 34.0*    212 219     Recent Labs     08/08/21  1412 08/08/21  1757 08/09/21  0629 08/10/21  0549   NA  --  134* 135 131*   K  --  3.9 4.0 3.4*   CL  --  79* 78* 79*   CO2 able to tolerate cardiac risk reduction medications due to his low blood pressure. Will discuss with cardiology  -N.p.o. for now, cardiology is recommending a heart catheter for discharge. Patient may be able to go today  -Change diuretics to oral Bumex twice daily  -Wean steroids to 30 mg daily as patient has had rapid improvement in oxygenation which is suspect to be from volume overload as opposed to COPD. I do not feel this is an exacerbation  -Hyperglycemia likely secondary to steroids, weaning today.   Continue sliding scale  -Cardiac rate control medications per cardiology    Daisy Henderson DO  8/10/2021

## 2021-08-10 NOTE — PLAN OF CARE
Problem: Falls - Risk of:  Goal: Will remain free from falls  Description: Will remain free from falls  8/10/2021 0012 by Maddie Pantoja RN  Outcome: Ongoing  8/9/2021 1615 by Hellen Mitchell RN  Outcome: Ongoing  Goal: Absence of physical injury  Description: Absence of physical injury  8/10/2021 0012 by Maddie Pantoja RN  Outcome: Ongoing  8/9/2021 1615 by Hellen Mitchell RN  Outcome: Ongoing     Problem: Skin Integrity:  Goal: Will show no infection signs and symptoms  Description: Will show no infection signs and symptoms  8/10/2021 0012 by Maddie Pantoja RN  Outcome: Ongoing  8/9/2021 1615 by Hellen Mitchell RN  Outcome: Ongoing  Goal: Absence of new skin breakdown  Description: Absence of new skin breakdown  8/10/2021 0012 by Maddie Pantoja RN  Outcome: Ongoing  8/9/2021 1615 by Hellen Mitchell RN  Outcome: Ongoing     Problem: Infection:  Goal: Will remain free from infection  Description: Will remain free from infection  8/10/2021 0012 by Maddie Pantoja RN  Outcome: Ongoing  8/9/2021 1615 by Hellen Mitchell RN  Outcome: Ongoing     Problem: Safety:  Goal: Free from accidental physical injury  Description: Free from accidental physical injury  8/10/2021 0012 by Maddie Pantoja RN  Outcome: Ongoing  8/9/2021 1615 by Hellen Mitchell RN  Outcome: Ongoing  Goal: Free from intentional harm  Description: Free from intentional harm  8/10/2021 0012 by Maddie Pantoja RN  Outcome: Ongoing  8/9/2021 1615 by eHllen Mitchell RN  Outcome: Ongoing     Problem: Daily Care:  Goal: Daily care needs are met  Description: Daily care needs are met  8/10/2021 0012 by Maddie Pantoja RN  Outcome: Ongoing  8/9/2021 1615 by Hellen Mitchell RN  Outcome: Ongoing     Problem: Pain:  Goal: Patient's pain/discomfort is manageable  Description: Patient's pain/discomfort is manageable  8/10/2021 0012 by Maddie Pantoja RN  Outcome: Ongoing  8/9/2021 1615 by Hellen Mitchell RN  Outcome: Ongoing     Problem: Discharge Planning:  Goal: Patients continuum of care needs are met  Description: Patients continuum of care needs are met  Outcome: Ongoing

## 2021-08-10 NOTE — PROGRESS NOTES
Congestive Heart Failure Education completed and charted. CHF booklet given. Patient was receptive to education. Discussed the  importance of medication compliance. Discussed the importance of a heart healthy diet. Discussed 2000 mg sodium-restricted daily diet. Patient instructed to limit fluid intake to  1.5 to 2 liters per day. Patient instructed to weigh self at the same time of each day each morning, reinforced teaching to monitor for 3-5 lb weight increase over 1-2 days notify physician if change noted. Signs and symptoms of CHF discussed with patient, such as feeling more tired than normal, feeling short of breath, coughing that increases when lying down, sudden weight gain, swelling of the feet, legs or belly. Patient verbalized understanding to notify physician office if these symptoms occur.     EF 45-64%  Diastolic Dysfunction

## 2021-08-11 ENCOUNTER — APPOINTMENT (OUTPATIENT)
Dept: CARDIAC CATH/INVASIVE PROCEDURES | Age: 71
DRG: 246 | End: 2021-08-11
Attending: FAMILY MEDICINE
Payer: MEDICARE

## 2021-08-11 LAB
ALBUMIN SERPL-MCNC: 3.4 G/DL (ref 3.5–5.2)
ANION GAP SERPL CALCULATED.3IONS-SCNC: 13 MMOL/L (ref 9–17)
BUN BLDV-MCNC: 35 MG/DL (ref 8–23)
BUN/CREAT BLD: ABNORMAL (ref 9–20)
CALCIUM SERPL-MCNC: 8.8 MG/DL (ref 8.6–10.4)
CHLORIDE BLD-SCNC: 81 MMOL/L (ref 98–107)
CO2: 37 MMOL/L (ref 20–31)
CREAT SERPL-MCNC: 0.71 MG/DL (ref 0.7–1.2)
EKG ATRIAL RATE: 242 BPM
EKG P AXIS: -78 DEGREES
EKG Q-T INTERVAL: 380 MS
EKG QRS DURATION: 102 MS
EKG QTC CALCULATION (BAZETT): 427 MS
EKG R AXIS: 92 DEGREES
EKG T AXIS: -59 DEGREES
EKG VENTRICULAR RATE: 76 BPM
GFR AFRICAN AMERICAN: >60 ML/MIN
GFR NON-AFRICAN AMERICAN: >60 ML/MIN
GFR SERPL CREATININE-BSD FRML MDRD: ABNORMAL ML/MIN/{1.73_M2}
GFR SERPL CREATININE-BSD FRML MDRD: ABNORMAL ML/MIN/{1.73_M2}
GLUCOSE BLD-MCNC: 226 MG/DL (ref 75–110)
GLUCOSE BLD-MCNC: 244 MG/DL (ref 70–99)
GLUCOSE BLD-MCNC: 262 MG/DL (ref 75–110)
GLUCOSE BLD-MCNC: 359 MG/DL (ref 75–110)
HCT VFR BLD CALC: 34.7 % (ref 40.7–50.3)
HEMOGLOBIN: 11.5 G/DL (ref 13–17)
MAGNESIUM: 2.2 MG/DL (ref 1.6–2.6)
MCH RBC QN AUTO: 33 PG (ref 25.2–33.5)
MCHC RBC AUTO-ENTMCNC: 33.1 G/DL (ref 28.4–34.8)
MCV RBC AUTO: 99.4 FL (ref 82.6–102.9)
NRBC AUTOMATED: 0 PER 100 WBC
PDW BLD-RTO: 14.7 % (ref 11.8–14.4)
PHOSPHORUS: 2 MG/DL (ref 2.5–4.5)
PLATELET # BLD: 201 K/UL (ref 138–453)
PMV BLD AUTO: 10.2 FL (ref 8.1–13.5)
POTASSIUM SERPL-SCNC: 3.3 MMOL/L (ref 3.7–5.3)
RBC # BLD: 3.49 M/UL (ref 4.21–5.77)
SODIUM BLD-SCNC: 131 MMOL/L (ref 135–144)
WBC # BLD: 10 K/UL (ref 3.5–11.3)

## 2021-08-11 PROCEDURE — 2580000003 HC RX 258: Performed by: NURSE PRACTITIONER

## 2021-08-11 PROCEDURE — 80069 RENAL FUNCTION PANEL: CPT

## 2021-08-11 PROCEDURE — 6370000000 HC RX 637 (ALT 250 FOR IP): Performed by: NURSE PRACTITIONER

## 2021-08-11 PROCEDURE — B2131ZZ FLUOROSCOPY OF MULTIPLE CORONARY ARTERY BYPASS GRAFTS USING LOW OSMOLAR CONTRAST: ICD-10-PCS | Performed by: INTERNAL MEDICINE

## 2021-08-11 PROCEDURE — 6360000004 HC RX CONTRAST MEDICATION

## 2021-08-11 PROCEDURE — 6370000000 HC RX 637 (ALT 250 FOR IP): Performed by: STUDENT IN AN ORGANIZED HEALTH CARE EDUCATION/TRAINING PROGRAM

## 2021-08-11 PROCEDURE — 93455 CORONARY ART/GRFT ANGIO S&I: CPT | Performed by: INTERNAL MEDICINE

## 2021-08-11 PROCEDURE — 6370000000 HC RX 637 (ALT 250 FOR IP): Performed by: INTERNAL MEDICINE

## 2021-08-11 PROCEDURE — C1874 STENT, COATED/COV W/DEL SYS: HCPCS

## 2021-08-11 PROCEDURE — 2580000003 HC RX 258: Performed by: STUDENT IN AN ORGANIZED HEALTH CARE EDUCATION/TRAINING PROGRAM

## 2021-08-11 PROCEDURE — 6360000002 HC RX W HCPCS: Performed by: STUDENT IN AN ORGANIZED HEALTH CARE EDUCATION/TRAINING PROGRAM

## 2021-08-11 PROCEDURE — 6360000002 HC RX W HCPCS: Performed by: INTERNAL MEDICINE

## 2021-08-11 PROCEDURE — C1769 GUIDE WIRE: HCPCS

## 2021-08-11 PROCEDURE — 83735 ASSAY OF MAGNESIUM: CPT

## 2021-08-11 PROCEDURE — B2111ZZ FLUOROSCOPY OF MULTIPLE CORONARY ARTERIES USING LOW OSMOLAR CONTRAST: ICD-10-PCS | Performed by: INTERNAL MEDICINE

## 2021-08-11 PROCEDURE — 027034Z DILATION OF CORONARY ARTERY, ONE ARTERY WITH DRUG-ELUTING INTRALUMINAL DEVICE, PERCUTANEOUS APPROACH: ICD-10-PCS | Performed by: INTERNAL MEDICINE

## 2021-08-11 PROCEDURE — 99232 SBSQ HOSP IP/OBS MODERATE 35: CPT | Performed by: INTERNAL MEDICINE

## 2021-08-11 PROCEDURE — 93459 L HRT ART/GRFT ANGIO: CPT | Performed by: INTERNAL MEDICINE

## 2021-08-11 PROCEDURE — C9604 PERC D-E COR REVASC T CABG S: HCPCS | Performed by: INTERNAL MEDICINE

## 2021-08-11 PROCEDURE — 82947 ASSAY GLUCOSE BLOOD QUANT: CPT

## 2021-08-11 PROCEDURE — 2700000000 HC OXYGEN THERAPY PER DAY

## 2021-08-11 PROCEDURE — B2151ZZ FLUOROSCOPY OF LEFT HEART USING LOW OSMOLAR CONTRAST: ICD-10-PCS | Performed by: INTERNAL MEDICINE

## 2021-08-11 PROCEDURE — C1887 CATHETER, GUIDING: HCPCS

## 2021-08-11 PROCEDURE — B2181ZZ FLUOROSCOPY OF LEFT INTERNAL MAMMARY BYPASS GRAFT USING LOW OSMOLAR CONTRAST: ICD-10-PCS | Performed by: INTERNAL MEDICINE

## 2021-08-11 PROCEDURE — 94640 AIRWAY INHALATION TREATMENT: CPT

## 2021-08-11 PROCEDURE — 2709999900 HC NON-CHARGEABLE SUPPLY

## 2021-08-11 PROCEDURE — 6360000002 HC RX W HCPCS

## 2021-08-11 PROCEDURE — 85027 COMPLETE CBC AUTOMATED: CPT

## 2021-08-11 PROCEDURE — 4A023N7 MEASUREMENT OF CARDIAC SAMPLING AND PRESSURE, LEFT HEART, PERCUTANEOUS APPROACH: ICD-10-PCS | Performed by: INTERNAL MEDICINE

## 2021-08-11 PROCEDURE — 2060000000 HC ICU INTERMEDIATE R&B

## 2021-08-11 PROCEDURE — C1894 INTRO/SHEATH, NON-LASER: HCPCS

## 2021-08-11 PROCEDURE — 94660 CPAP INITIATION&MGMT: CPT

## 2021-08-11 PROCEDURE — C1725 CATH, TRANSLUMIN NON-LASER: HCPCS

## 2021-08-11 PROCEDURE — 2500000003 HC RX 250 WO HCPCS

## 2021-08-11 PROCEDURE — 6370000000 HC RX 637 (ALT 250 FOR IP)

## 2021-08-11 RX ORDER — CLOPIDOGREL BISULFATE 75 MG/1
75 TABLET ORAL DAILY
Status: DISCONTINUED | OUTPATIENT
Start: 2021-08-12 | End: 2021-08-15 | Stop reason: HOSPADM

## 2021-08-11 RX ORDER — POTASSIUM CHLORIDE 20 MEQ/1
40 TABLET, EXTENDED RELEASE ORAL ONCE
Status: DISCONTINUED | OUTPATIENT
Start: 2021-08-11 | End: 2021-08-11

## 2021-08-11 RX ORDER — SODIUM CHLORIDE 0.9 % (FLUSH) 0.9 %
5-40 SYRINGE (ML) INJECTION PRN
Status: DISCONTINUED | OUTPATIENT
Start: 2021-08-11 | End: 2021-08-15 | Stop reason: HOSPADM

## 2021-08-11 RX ORDER — ONDANSETRON 2 MG/ML
4 INJECTION INTRAMUSCULAR; INTRAVENOUS EVERY 6 HOURS PRN
Status: DISCONTINUED | OUTPATIENT
Start: 2021-08-11 | End: 2021-08-15 | Stop reason: HOSPADM

## 2021-08-11 RX ORDER — SODIUM CHLORIDE 9 MG/ML
25 INJECTION, SOLUTION INTRAVENOUS PRN
Status: DISCONTINUED | OUTPATIENT
Start: 2021-08-11 | End: 2021-08-15 | Stop reason: HOSPADM

## 2021-08-11 RX ORDER — INSULIN GLARGINE 100 [IU]/ML
10 INJECTION, SOLUTION SUBCUTANEOUS NIGHTLY
Status: DISCONTINUED | OUTPATIENT
Start: 2021-08-11 | End: 2021-08-11

## 2021-08-11 RX ORDER — ACETAMINOPHEN 325 MG/1
650 TABLET ORAL EVERY 4 HOURS PRN
Status: DISCONTINUED | OUTPATIENT
Start: 2021-08-11 | End: 2021-08-15 | Stop reason: HOSPADM

## 2021-08-11 RX ORDER — SODIUM CHLORIDE 0.9 % (FLUSH) 0.9 %
5-40 SYRINGE (ML) INJECTION EVERY 12 HOURS SCHEDULED
Status: DISCONTINUED | OUTPATIENT
Start: 2021-08-11 | End: 2021-08-15 | Stop reason: HOSPADM

## 2021-08-11 RX ORDER — ASPIRIN 81 MG/1
81 TABLET, CHEWABLE ORAL DAILY
Status: DISCONTINUED | OUTPATIENT
Start: 2021-08-12 | End: 2021-08-15 | Stop reason: HOSPADM

## 2021-08-11 RX ORDER — INSULIN GLARGINE 100 [IU]/ML
10 INJECTION, SOLUTION SUBCUTANEOUS EVERY MORNING
Status: DISCONTINUED | OUTPATIENT
Start: 2021-08-11 | End: 2021-08-12

## 2021-08-11 RX ORDER — POTASSIUM CHLORIDE 20 MEQ/1
40 TABLET, EXTENDED RELEASE ORAL ONCE
Status: COMPLETED | OUTPATIENT
Start: 2021-08-11 | End: 2021-08-11

## 2021-08-11 RX ADMIN — INSULIN GLARGINE 10 UNITS: 100 INJECTION, SOLUTION SUBCUTANEOUS at 10:48

## 2021-08-11 RX ADMIN — PANTOPRAZOLE SODIUM 40 MG: 40 TABLET, DELAYED RELEASE ORAL at 08:47

## 2021-08-11 RX ADMIN — BUDESONIDE AND FORMOTEROL FUMARATE DIHYDRATE 2 PUFF: 160; 4.5 AEROSOL RESPIRATORY (INHALATION) at 07:56

## 2021-08-11 RX ADMIN — DILTIAZEM HYDROCHLORIDE 180 MG: 180 CAPSULE, COATED, EXTENDED RELEASE ORAL at 08:46

## 2021-08-11 RX ADMIN — BUMETANIDE 2 MG: 1 TABLET ORAL at 08:45

## 2021-08-11 RX ADMIN — IPRATROPIUM BROMIDE AND ALBUTEROL SULFATE 1 AMPULE: .5; 2.5 SOLUTION RESPIRATORY (INHALATION) at 19:57

## 2021-08-11 RX ADMIN — METHYLPREDNISOLONE SODIUM SUCCINATE 30 MG: 40 INJECTION, POWDER, FOR SOLUTION INTRAMUSCULAR; INTRAVENOUS at 08:46

## 2021-08-11 RX ADMIN — IPRATROPIUM BROMIDE AND ALBUTEROL SULFATE 1 AMPULE: .5; 2.5 SOLUTION RESPIRATORY (INHALATION) at 16:03

## 2021-08-11 RX ADMIN — POTASSIUM CHLORIDE 40 MEQ: 1500 TABLET, EXTENDED RELEASE ORAL at 08:48

## 2021-08-11 RX ADMIN — INSULIN LISPRO 5 UNITS: 100 INJECTION, SOLUTION INTRAVENOUS; SUBCUTANEOUS at 21:29

## 2021-08-11 RX ADMIN — IPRATROPIUM BROMIDE AND ALBUTEROL SULFATE 1 AMPULE: .5; 2.5 SOLUTION RESPIRATORY (INHALATION) at 07:56

## 2021-08-11 RX ADMIN — METOPROLOL TARTRATE 12.5 MG: 25 TABLET ORAL at 08:46

## 2021-08-11 RX ADMIN — BUDESONIDE AND FORMOTEROL FUMARATE DIHYDRATE 2 PUFF: 160; 4.5 AEROSOL RESPIRATORY (INHALATION) at 19:57

## 2021-08-11 RX ADMIN — METOPROLOL TARTRATE 12.5 MG: 25 TABLET ORAL at 21:29

## 2021-08-11 RX ADMIN — BUMETANIDE 2 MG: 1 TABLET ORAL at 21:29

## 2021-08-11 RX ADMIN — SODIUM CHLORIDE, PRESERVATIVE FREE 10 ML: 5 INJECTION INTRAVENOUS at 08:49

## 2021-08-11 RX ADMIN — CETIRIZINE HYDROCHLORIDE 10 MG: 10 TABLET ORAL at 08:47

## 2021-08-11 RX ADMIN — LORAZEPAM 1 MG: 2 INJECTION INTRAMUSCULAR; INTRAVENOUS at 16:49

## 2021-08-11 RX ADMIN — MIDODRINE HYDROCHLORIDE 10 MG: 5 TABLET ORAL at 08:45

## 2021-08-11 RX ADMIN — POTASSIUM CHLORIDE 10 MEQ: 1500 TABLET, EXTENDED RELEASE ORAL at 21:29

## 2021-08-11 RX ADMIN — POTASSIUM CHLORIDE 10 MEQ: 1500 TABLET, EXTENDED RELEASE ORAL at 08:45

## 2021-08-11 RX ADMIN — SODIUM CHLORIDE, PRESERVATIVE FREE 10 ML: 5 INJECTION INTRAVENOUS at 21:29

## 2021-08-11 ASSESSMENT — ENCOUNTER SYMPTOMS
COUGH: 0
DIARRHEA: 0
BACK PAIN: 0
CHOKING: 0
CHEST TIGHTNESS: 0
WHEEZING: 0
SINUS PRESSURE: 0
CONSTIPATION: 0
VOICE CHANGE: 0
ABDOMINAL PAIN: 0
RHINORRHEA: 0
VOMITING: 0
NAUSEA: 0
SHORTNESS OF BREATH: 1

## 2021-08-11 ASSESSMENT — PAIN SCALES - GENERAL: PAINLEVEL_OUTOF10: 3

## 2021-08-11 NOTE — PROGRESS NOTES
[facility]   Progress Note                   Date:   8/11/2021  Patient name: Claudia Alexandre  Date of admission:  8/8/2021 11:20 AM  MRN:   [id number]  YOB: 1950  PCP: Vivienne Finley    Reason for Admission: Respiratory failure ([facility] [address] [J96.90]    Subjective:       Clinical Changes / Abnormalities: Patient seen and examined at the bed side after discussion with primary attending and RN. No new acute events overnight. Patient is doing well, has no complaints. Denies chest pain or SOB. Reviewed vitals, labs, tele, & previous testing. Atrial flutter on tele. Supplemental oxygen in use at patient's baseline as at home. Patient has been npo since midnight for cardiac cath today. Patient states that he feels that he would be able to lay flat during cath. May require use of BiPap during procedure.         Medications:   Scheduled Meds:   insulin glargine  10 Units Subcutaneous QAM    midodrine  10 mg Oral TID WC    methylPREDNISolone  30 mg Intravenous Daily    ipratropium-albuterol  1 ampule Inhalation 4x daily    bumetanide  2 mg Oral BID    metoprolol tartrate  12.5 mg Oral BID    insulin lispro  0-12 Units Subcutaneous TID WC    insulin lispro  0-6 Units Subcutaneous Nightly    enoxaparin  1.5 mg/kg Subcutaneous Daily    dilTIAZem  180 mg Oral Daily    [Held by provider] apixaban  5 mg Oral BID    budesonide-formoterol  2 puff Inhalation BID    cetirizine  10 mg Oral Daily    [Held by provider] metFORMIN  1,000 mg Oral BID WC    pantoprazole  40 mg Oral Daily    potassium chloride  10 mEq Oral BID    [Held by provider] spironolactone  25 mg Oral Daily    sodium chloride flush  5-40 mL Intravenous 2 times per day    lisinopril  5 mg Oral Daily    nicotine  1 patch Transdermal Daily     Continuous Infusions:   dextrose      dextrose      sodium chloride       CBC:   Recent Labs     08/09/21  0629 08/10/21  0549 08/11/21  0524   WBC 7.7 12.0* 10.0   HGB 12.6* 11.4* region     Ventricular tachycardia (HCC)     Acute on chronic respiratory failure with hypoxia and hypercapnia (HCC)     Pneumonia of left lower lobe due to infectious organism     PAF (paroxysmal atrial fibrillation) (HCC)     Pulmonary hypertension with chronic cor pulmonale (HCC)     RODNEY treated with BiPAP     Smoking greater than 40 pack years     Class 1 obesity due to excess calories with serious comorbidity and body mass index (BMI) of 30.0 to 30.9 in adult     Stage 4 very severe COPD by GOLD classification ([facility] [address]     Hyponatremia     Uncontrolled type 2 diabetes mellitus with hyperglycemia (HCC)     Acute renal failure (HCC)     LAKESHA (acute kidney injury) ([address]     Respiratory failure (HCC)     Benign essential hypertension     Acute on chronic diastolic congestive heart failure (HCC)     GERD (gastroesophageal reflux disease)     Hyperlipidemia     Type 2 diabetes mellitus, without long-term current use of insulin ([address]     Long term (current) use of anticoagulants     Acute exacerbation of chronic obstructive pulmonary disease (COPD) ([facility] [address]     Atrial flutter with rapid ventricular response ([facility] [address]     Type 2 myocardial infarction due to arrhythmia ([facility] [address]      Plan of Treatment:   1. COPD management per primary team.   2. NSTEMI with elevated troponin. Recommend cardiac cath prior to discharge. 3. Aflutter. Rate controlled. Off cardizem drip. Continue BB, PO Cardizem and Bumex PO. Continue Lovenox. Will plan to resume Eliquid prior to discharge. 4. HTN. BP stable. Continue lisinopril 5mg and Lopressor 12.5mg with hold parameters. Continue ProAmitine 10mg PO TID. 5. HLD. Continue statin. 6. Keep K+ > 4.0, and Mg+ > 2.0. K 3.3 and Mag 2.2 today. Routine potassium ordered 10mEq BID. Will order additional coverage agian today. 7. Plan for cardiac cath today. Patient has been NPO since midnight. May need to use BiPap during procedure.   8. I have discussed risks (including but not limited to vascular injury, infection, hematoma, contrast induced kidney dysfunction, CVA and MI), benefits, alternatives in detail. All questions answered. Patient agrees to proceed.          Electronically signed by ALISON Michaud CNP on 8/11/2021 at 9:56 AM  [facility]      [phone]

## 2021-08-11 NOTE — PROGRESS NOTES
Physical Therapy        Physical Therapy Cancel Note      DATE: 2021    NAME: Rohan Hill  MRN: [id number]   : 1950      Patient not seen this date for Physical Therapy due to: Other: heart cath today.  Ck       Electronically signed by Brice Estrada PT on 2021 at 10:45 AM

## 2021-08-11 NOTE — PLAN OF CARE
RN  Outcome: Ongoing     Problem: Discharge Planning:  Goal: Patients continuum of care needs are met  Description: Patients continuum of care needs are met  8/11/2021 0758 by Awilda Sloan RN  Outcome: Ongoing  8/11/2021 0506 by Marlyn Mehta RN  Outcome: Ongoing

## 2021-08-11 NOTE — OP NOTE
[facility]    CARDIAC CATHETERIZATION    Date:   8/11/2021  Patient name:  Phillip Villalba  Date of admission:  8/8/2021 11:20 AM  MRN:   [id number]  YOB: 1950    Operators:  Primary:   EFRAÍN Birmingham MD (Attending Physician)    Assistant/CV fellow: Wild Godoy MD      Procedure performed:     [x] Left Heart Catheterization. [] Graft Angiography. [x] Left Ventriculography. [] Right Heart Catheterization. [x] Coronary Angiography. [] Aortic Valve Studies. [x] PCI:      [] Other:       Pre Procedure Conscious Sedation Data:  ASA Class:    [] I [x] II [] III [] IV    Mallampati Class:  [] I [x] II [] III [] IV      Indication:  [] STEMI      [] + Stress test  [x] ACS      [] + EKG Changes  [x] Non Q MI       [] Significant Risk Factors  [] Recurrent Angina             [] Diabetes Mellitus    [] New LBBB      [] Other.  [] CHF / Low EF changes     [] Abnormal CTA / Ca Score      Procedure:  Access:  [x] Femoral  [] Radial  artery       [x] Right  [] Left    Procedure: After informed consent was obtained with explanation of the risks and benefits, patient was brought to the cath lab. The access area was prepped and draped in sterile fashion. 1% lidocaine was used for local block. The artery was cannulated with 6  Fr sheath with brisk arterial blood return. The side port was frequently flushed and aspirated with normal saline. Estimated Blood Loss:  [x] Minimal < 25 cc [] Moderate 25-50 cc  [] >50 cc    Findings:    LMCA: has distal 40% stenosis. LAD: has ostial eccentric 60% stenosis. LIMA-LAD is patent. LCx: has proximal 50% stenosis. The OM1 is small with 40% stenosis. The OM2   has proximal 50% stenosis. SVG-OM2 is patent. RCA: has proximal 100% occlusion. SVG-RCA is patent. SVG has proximal 75%   stenosis. During engagement there was dampening with bradycardia. The RPDA   has 60% stenosis and is a small vessel.      Graft Lesions         Lesion on Aorta Right to Prox RCA: Proximal body. 75% stenosis 12 mm     length reduced to 0%. Pre procedure TD III flow was noted. Post     Procedure TD III flow was present. Good runoff was present. The lesion     was diagnosed as Moderate Risk (B). The lesion was discrete and     eccentric. The lesion showed with irregular contour, mild angulation and     mild tortuosity.       Devices used         - [employer] Flex 180 cm. Number of passes: 1.         - Trek Balloon 2.5mm x 12mm. 1 inflation(s) to a max pressure of: 16     susana.         - Xience Nanda 3.5 x 12 XAVIER. 2 inflation(s) to a max pressure of: 17     susana.       Cardiac Grafts        -  There is a Vein graft that originates at the Aorta Right and attaches       to the Prox RCA.       The LV gram was performed in the MUIR 30 position. LVEF: 70 %. Conclusions:     Patent LIMA-LAD, SVG-OM2 and SVG-RCA.    Normal LV systolic function.    Proximal graft stenosis of SVG-RCA. Successful PTCA/XAVIER of proximal RCA. Recommendations:  1. Post-cath protocol  2. Continue optimal medical therapy  3. Risk factor modification      ____________________________________________________________________    History and Risk Factors    [x] Hypertension     [] Family history of CAD  [x] Hyperlipidemia     [] Cerebrovascular Disease   [] Prior MI       [] Peripheral Vascular disease   [] Prior PCI              [] Diabetes Mellitus    [] Left Main PCI. [] Currently on Dialysis. [x] Prior CABG. [] Currently smoker. [] Cardiac Arrest outside of healthcare facility. [] Yes    [] No        Witnessed     [] Yes   [] No     Arrest after arrival of EMS  [] Yes   [] No     [] Cardiac Arrest at other Facility. [] Yes   [] No    Pre-Procedure Information. Heart Failure       [] Yes    [x] No        Class  [] I      [] II  [] III    [] IV. New Diagnosis    [] Yes  [] No    HF Type      [] Systolic   [] Diastolic          [] Unknown.     Diagnostic Test:   EKG       [] Normal   [x] Abnormal    New antiarrhythmia medications:    [] Yes   [] No   New onset atrial fibrillation / Flutter     [] Yes   [] No   ECG Abnormalities:      [] V. Fib   [] Franchesca V. Tach           [] NS V. T   [] New LBBB           [] T. Inv  []  ST dev > 0.5 mm         [] PVC's freq  [] PVC's infrequent    Stress Test Performed:      [] Yes    [x] No     Type:     [] Stress Echo   [] Exercise Stress Test (no imaging)      [] Stress Nuclear  [] Stress Imaging     Results   [] Negative   [] Positive        [] Indeterminate  [] Unavailable     If Positive/ Risk / Extent of Ischemia:       [] Low  [] Intermediate         [] High  [] Unavailable      Cardiac CTA Performed:     [] Yes    [x] No      Results   [] CAD   [] Non obstructive CAD      [] No CAD   [] Uncertain      [] Unknown   [] Structural Disease. Pre Procedure Medications:   [x] Yes    [] No         [x] ASA   [x] Beta Blockers      [] Nitrate   [] Ca Channel Blockers      [] Ranolazine   [x] Statin       [] Plavix/Others antiplatelets      Electronically signed on 8/11/2021 at 2:35 PM by:    Melody Dejesus MD  Fellow, [id number] [facility]    I was present during entire procedure and performed all critical elements of the procedure.     Ruben العلي MD

## 2021-08-11 NOTE — CARE COORDINATION
Transitional [id number] with family and patient-wanting SNF-wanting [address] [phone] faxed referral to [facility] and talked with Chantal Juan.

## 2021-08-11 NOTE — PROGRESS NOTES
[facility]  Office: [address], DO, Urvashi Luna, DO, Kamronduncan Pierre, DO, Renny Iqra Blood, DO, Acosta Ramos MD, Octavio Odonnell MD, Indio Ansari MD, Jayden Albright MD, Charan Field MD, Amy Paul MD, Erick Bhakta MD, Silver Leo, DO, Pancho Clemons MD, Jessica Wright DO, Emi Joe MD,  Verona Prado DO, Norris Holter, MD, Nilda Salomon MD, Odalys Bob MD, Diana Manrique MD, Bonifacio Wolfe MD, Eduardo Chu MD, Ramila Valdez MD, Lakshmi Guillen CNP, [facility], CNP, Sia Woodson, CNP, Dennis Benites, CNS, Dash Pace, CNP, Ty Franklin, CNP, Meagan Luis, CNP, Lisa Lambert, CNP, Angeline Key, CNP, Lico Naik PA-C, Desmond Casey, [facility], Kaelyn Hernandez, CNP, Lilliana Garcia, CNP, Jean Abreu, CNP, Codi Quesada, CNP, Hoa Donohue, CNP, Faustino Blanco, CNP, Lelia Nuñez, CNP, Patsy Graham, [facility] Pacotodd Matson      Daily Progress Note     Admit Date: 8/8/2021  Bed/Room No.  3004/3004-01  Admitting Physician : Jessica Wright DO  Code Status :Full Code  Hospital Day:  LOS: 3 days   Chief Complaint:     Breathing difficulty     Principal Problem:    Acute on chronic respiratory failure with hypoxia and hypercapnia (HCC)  Active Problems:    PAF (paroxysmal atrial fibrillation) (HCC)    RODNEY treated with BiPAP    Smoking greater than 40 pack years    Class 1 obesity due to excess calories with serious comorbidity and body mass index (BMI) of 30.0 to 30.9 in adult    Stage 4 very severe COPD by GOLD classification [facility])    Uncontrolled type 2 diabetes mellitus with hyperglycemia ([facility]    Respiratory failure ([facility]    Benign essential hypertension    Acute on chronic diastolic congestive heart failure (HCC)    GERD (gastroesophageal reflux disease)    Type 2 diabetes mellitus, without long-term current use of insulin ([facility]    Long term (current) use of anticoagulants    Acute exacerbation of chronic obstructive pulmonary disease (COPD) [facility])    Atrial flutter with rapid ventricular response (HCC)    Type 2 myocardial infarction due to arrhythmia [facility])  Resolved Problems:    * No resolved hospital problems. *    Subjective : Interval History/Significant events :  08/11/21    Patient's plan for cardiac catheterization today. No complaints, eager to go home afterwards    Background History:         Roya Mccormick is 66 y.o. male  Who was admitted to the hospital on 8/8/2021 for treatment of Acute on chronic respiratory failure with hypoxia and hypercapnia ([facility] [facility] Cells transfer from [facility] where he presented after fall with head injury. Patient was having difficulty breathing. Work-up at [facility] showed decompensated heart failure and was found to be in respiratory failure with hyperkalemia. Patient was treated with BiPAP and insulin with dextrose for hyperkalemia. CT head was negative for acute abnormality. Patient was life flighted to [facility] for further treatment. Patient has underlying history of bladder cancer, congestive heart failure, COPD, diabetes mellitus, hypertension, obstructive sleep apnea. He had cardiac bypass and stents for CAD. Work-up on admission showed elevated proBNP 1606, elevated troponin 125, leukocytosis 12.2.  Echocardiogram from last month showed preserved ejection fraction, diastolic dysfunction.     PMH:  Past Medical History:   Diagnosis Date    Agent orange exposure     Anxiety state     Cancer ([facility]     bladder    CHF (congestive heart failure) (HCC)     Chronic obstructive pulmonary disease (COPD) (HCC)     Chronic post-traumatic stress disorder     Chronic respiratory failure with hypercapnia (HCC)     COPD exacerbation (HCC)     Coronary artery disease     Degenerative disc disease, lumbar     Depression     Diabetes mellitus ([facility]     Essential hypertension     History of acute pancreatitis     Hyperglycemia     Hypokalemia     Lumbosacral disc disease     Obesity     Obstructive sleep apnea     Personal history of malignant neoplasm of bladder       Allergies: Allergies   Allergen Reactions    Niacin And Related     Other      Nicotine patch - rash    Statins       Medications :  insulin glargine, 10 Units, Subcutaneous, QAM  midodrine, 10 mg, Oral, TID WC  methylPREDNISolone, 30 mg, Intravenous, Daily  ipratropium-albuterol, 1 ampule, Inhalation, 4x daily  bumetanide, 2 mg, Oral, BID  metoprolol tartrate, 12.5 mg, Oral, BID  insulin lispro, 0-12 Units, Subcutaneous, TID WC  insulin lispro, 0-6 Units, Subcutaneous, Nightly  enoxaparin, 1.5 mg/kg, Subcutaneous, Daily  dilTIAZem, 180 mg, Oral, Daily  [Held by provider] apixaban, 5 mg, Oral, BID  budesonide-formoterol, 2 puff, Inhalation, BID  cetirizine, 10 mg, Oral, Daily  [Held by provider] metFORMIN, 1,000 mg, Oral, BID WC  pantoprazole, 40 mg, Oral, Daily  potassium chloride, 10 mEq, Oral, BID  [Held by provider] spironolactone, 25 mg, Oral, Daily  sodium chloride flush, 5-40 mL, Intravenous, 2 times per day  lisinopril, 5 mg, Oral, Daily  nicotine, 1 patch, Transdermal, Daily        Review of Systems   Review of Systems   Constitutional: Negative for activity change, appetite change, fatigue, fever and unexpected weight change. HENT: Negative for congestion, nosebleeds, rhinorrhea, sinus pressure, sneezing and voice change. Respiratory: Positive for shortness of breath. Negative for cough, choking, chest tightness and wheezing. Cardiovascular: Negative for chest pain, palpitations and leg swelling. Gastrointestinal: Negative for abdominal pain, constipation, diarrhea, nausea and vomiting. Genitourinary: Negative for difficulty urinating, discharge, dysuria, frequency and testicular pain. Musculoskeletal: Negative for back pain. Skin: Negative for rash. Neurological: Negative for dizziness, weakness, light-headedness and headaches.    Hematological: Does not bruise/bleed easily. Psychiatric/Behavioral: Positive for confusion and sleep disturbance. Negative for agitation, behavioral problems, self-injury and suicidal ideas. Objective :      Current Vitals : Temp: 97.7 °F (36.5 °C),  Pulse: 61, Resp: 23, BP: 124/71, SpO2: 100 %  Last 24 Hrs Vitals   Patient Vitals for the past 24 hrs:   BP Temp Temp src Pulse Resp SpO2   08/11/21 0805 -- -- -- -- 23 100 %   08/11/21 0635 124/71 97.7 °F (36.5 °C) Oral 61 13 98 %   08/11/21 0507 -- -- -- 61 16 94 %   08/11/21 0400 111/67 97.7 °F (36.5 °C) Axillary 61 17 97 %   08/11/21 0329 -- -- -- -- 18 --   08/11/21 0000 105/68 97.7 °F (36.5 °C) Oral 61 21 97 %   08/10/21 2347 -- -- -- 61 -- --   08/10/21 2337 -- -- -- -- 30 --   08/10/21 2000 -- -- -- 66 -- --   08/10/21 1940 (!) 102/59 98.2 °F (36.8 °C) Oral 61 24 95 %   08/10/21 1929 -- -- -- -- 21 93 %   08/10/21 1700 (!) 112/55 -- -- -- -- --   08/10/21 1544 -- -- -- -- 18 95 %   08/10/21 1528 -- -- -- 110 -- --   08/10/21 1200 100/73 98.2 °F (36.8 °C) Oral 78 23 94 %   08/10/21 1132 -- -- -- -- 21 96 %     Intake / output   08/10 0701 - 08/11 0700  In: 500 [P.O.:500]  Out: 2700 [Urine:2700]  Physical Exam:  Physical Exam  Vitals and nursing note reviewed. Constitutional:       General: He is not in acute distress. Appearance: He is not diaphoretic. Interventions: Nasal cannula in place. HENT:      Head: Normocephalic and atraumatic. Nose:      Right Sinus: No maxillary sinus tenderness or frontal sinus tenderness. Left Sinus: No maxillary sinus tenderness or frontal sinus tenderness. Mouth/Throat:      Pharynx: No oropharyngeal exudate. Eyes:      General: No scleral icterus. Conjunctiva/sclera: Conjunctivae normal.      Pupils: Pupils are equal, round, and reactive to light. Neck:      Thyroid: No thyromegaly. Vascular: No JVD. Cardiovascular:      Rate and Rhythm: Normal rate and regular rhythm.       Pulses:           Dorsalis pedis pulses are 2+ on the right side and 2+ on the left side. Heart sounds: Normal heart sounds. No murmur heard. Pulmonary:      Effort: Pulmonary effort is normal.      Breath sounds: Normal breath sounds. No wheezing or rales. Chest:      Comments: Sternotomy scar  Abdominal:      Palpations: Abdomen is soft. There is no mass. Tenderness: There is no abdominal tenderness. Musculoskeletal:      Cervical back: Full passive range of motion without pain and neck supple. Lymphadenopathy:      Head:      Right side of head: No submandibular adenopathy. Left side of head: No submandibular adenopathy. Cervical: No cervical adenopathy. Skin:     General: Skin is warm. Neurological:      Mental Status: He is disoriented. Motor: No tremor. Psychiatric:         Behavior: Behavior is cooperative. Laboratory findings:    Recent Labs     08/09/21  0629 08/10/21  0549 08/11/21  0524   WBC 7.7 12.0* 10.0   HGB 12.6* 11.4* 11.5*   HCT 38.2* 34.0* 34.7*    219 201     Recent Labs     08/08/21  1412 08/08/21 1757 08/09/21  0629 08/10/21  0549 08/11/21  0524   NA  --    < > 135 131* 131*   K  --    < > 4.0 3.4* 3.3*   CL  --    < > 78* 79* 81*   CO2  --    < > 33* 38* 37*   GLUCOSE  --    < > 206* 253* 244*   BUN  --    < > 37* 45* 35*   CREATININE  --    < > 0.83 0.91 0.71   MG  --    < > 2.3 2.4 2.2   CALCIUM  --    < > 9.5 8.7 8.8   PHOS  --   --   --   --  2.0*   PSA 0.71  --   --   --   --     < > = values in this interval not displayed.      Recent Labs     08/08/21 1757 08/08/21 1757 08/09/21  0629 08/10/21  0549 08/10/21  0933 08/11/21  0524   PROT 7.0  --  6.9 6.1*  --   --    LABALBU 3.7   < > 3.7 3.4*  --  3.4*   LABA1C  --   --   --   --  6.9*  --    TSH  --   --  0.28*  --   --   --    AST 25  --  20 12  --   --    ALT 33  --  29 21  --   --    ALKPHOS 163*  --  157* 124  --   --    BILITOT 0.88  --  0.63 0.59  --   --    CHOL  --   --  260*  --   --   -- HDL  --   --  47  --   --   --    LDLCHOLESTEROL  --   --  178*  --   --   --    CHOLHDLRATIO  --   --  5.5*  --   --   --    TRIG  --   --  177*  --   --   --    VLDL  --   --  NOT REPORTED*  --   --   --     < > = values in this interval not displayed. Specific Gravity, UA   Date Value Ref Range Status   08/09/2021 1.013 1.005 - 1.030 Final     Protein, UA   Date Value Ref Range Status   08/09/2021 NEGATIVE NEGATIVE Final     RBC, UA   Date Value Ref Range Status   07/12/2021 20 TO 50 0 - 2 /HPF Final     Bacteria, UA   Date Value Ref Range Status   07/12/2021 FEW (A) None Final     Nitrite, Urine   Date Value Ref Range Status   08/09/2021 NEGATIVE NEGATIVE Final     WBC, UA   Date Value Ref Range Status   07/12/2021 5 TO 10 0 - 5 /HPF Final     Leukocyte Esterase, Urine   Date Value Ref Range Status   08/09/2021 NEGATIVE NEGATIVE Final       Imaging / Clinical Data :-   No results found. Clinical Course : gradually improving  Assessment and Plan  :        1. Acute on chronic diastolic heart failure - oral diuretics Bumex, low-salt, fluid restriction, Cardiology consultation  2. Acute respiratory failure with hypoxia  - Wean oxygen as tolerated, continue diuretics  3. Left lower infiltrate seem to be chronic -no acute pneumonia. Monitor off antibiotics. 4. A. fib with RVR-off cardizem  5. COPD exacerbation - duonebs  6. Acute metabolic encephalopathy -likely from  decompensated CHF   7. Type 2 diabetes mellitus -on Metformin at home. humalog as needed   8. CAD s/p CABG, stents       Plan:  -Blood pressure improved today.   Continue ProAmatine at 10 mg 3 times daily.  -Discontinue steroids  -Cardiac catheterization today  -Blood sugars are likely from steroid use, will monitor next 24 hours    Paco Mohan DO  8/11/2021

## 2021-08-11 NOTE — FLOWSHEET NOTE
SPIRITUAL CARE DEPARTMENT - [address]  PROGRESS NOTE    Shift date: 8/10/21  Shift day: Tuesday   Shift # 2    Room # 0454/8446-97   Name: Nato Lacey            Age: 66 y.o. Gender: male          [employer]: [employer]   Place of Yazidism: Unknown    Referral: Routine Visit    Admit Date & Time: 8/8/2021 11:20 AM    PATIENT/EVENT DESCRIPTION:  Nato Lacey is a 66 y.o. male in room 3004. SPIRITUAL ASSESSMENT/INTERVENTION:   met and conversed with Pt.  inquired as to why Pt arrived to [place]. V's. Pt explained how EMT's brought him to [place]. V's.  read from prayer card: prayer for the sick and read brought Psalm 139.  then prayed for Pt. SPIRITUAL CARE FOLLOW-UP PLAN:  Chaplains will remain available to offer spiritual and emotional support as needed. Electronically signed by Montrell Cortez Resident, on 8/10/2021 at 8:39 PM.  [facility]  [phone]       08/10/21 2036   Encounter Summary   Services provided to: Patient   Referral/Consult From: [id number] Nikita Gerber   (Church)   Continue Visiting   (8/10/21)   Complexity of Encounter Moderate   Length of Encounter 15 minutes   Spiritual Assessment Completed Yes   Routine   Type Initial   Assessment Approachable; Anxious; Fearful;Helplessness;Coping  (Has worry but feels good)   Intervention Active listening;Explored feelings, thoughts, concerns;Nurtured hope;Prayer;Scripture;Provided reading materials/devotional materials;Sustaining presence/ Ministry of presence   Outcome Acceptance;Comfort;Engaged in conversation;Coping;Encouraged; Hopeful;Receptive;Venting emotion

## 2021-08-11 NOTE — PROGRESS NOTES
Patient admitted, consent signed and questions answered. Patient ready for procedure. Call light to reach with side rails up 2 of 2. jaida groins clipped. RN at bedside with patient. History and physical in [facility].

## 2021-08-12 ENCOUNTER — APPOINTMENT (OUTPATIENT)
Dept: GENERAL RADIOLOGY | Age: 71
DRG: 246 | End: 2021-08-12
Attending: FAMILY MEDICINE
Payer: MEDICARE

## 2021-08-12 LAB
ALBUMIN SERPL-MCNC: 4 G/DL (ref 3.5–5.2)
ALBUMIN/GLOBULIN RATIO: 1.3 (ref 1–2.5)
ALP BLD-CCNC: 135 U/L (ref 40–129)
ALT SERPL-CCNC: 28 U/L (ref 5–41)
ANION GAP SERPL CALCULATED.3IONS-SCNC: 11 MMOL/L (ref 9–17)
AST SERPL-CCNC: 22 U/L
BILIRUB SERPL-MCNC: 1.13 MG/DL (ref 0.3–1.2)
BNP INTERPRETATION: ABNORMAL
BUN BLDV-MCNC: 30 MG/DL (ref 8–23)
BUN/CREAT BLD: ABNORMAL (ref 9–20)
CALCIUM SERPL-MCNC: 9.5 MG/DL (ref 8.6–10.4)
CHLORIDE BLD-SCNC: 88 MMOL/L (ref 98–107)
CO2: 38 MMOL/L (ref 20–31)
CREAT SERPL-MCNC: 0.75 MG/DL (ref 0.7–1.2)
EKG ATRIAL RATE: 249 BPM
EKG P AXIS: 92 DEGREES
EKG Q-T INTERVAL: 356 MS
EKG QRS DURATION: 102 MS
EKG QTC CALCULATION (BAZETT): 418 MS
EKG R AXIS: 94 DEGREES
EKG T AXIS: -50 DEGREES
EKG VENTRICULAR RATE: 83 BPM
GFR AFRICAN AMERICAN: >60 ML/MIN
GFR NON-AFRICAN AMERICAN: >60 ML/MIN
GFR SERPL CREATININE-BSD FRML MDRD: ABNORMAL ML/MIN/{1.73_M2}
GFR SERPL CREATININE-BSD FRML MDRD: ABNORMAL ML/MIN/{1.73_M2}
GLUCOSE BLD-MCNC: 131 MG/DL (ref 70–99)
GLUCOSE BLD-MCNC: 193 MG/DL (ref 75–110)
GLUCOSE BLD-MCNC: 243 MG/DL (ref 75–110)
GLUCOSE BLD-MCNC: 250 MG/DL (ref 75–110)
HCT VFR BLD CALC: 41.3 % (ref 40.7–50.3)
HEMOGLOBIN: 13.3 G/DL (ref 13–17)
MAGNESIUM: 2.1 MG/DL (ref 1.6–2.6)
MCH RBC QN AUTO: 32.8 PG (ref 25.2–33.5)
MCHC RBC AUTO-ENTMCNC: 32.2 G/DL (ref 28.4–34.8)
MCV RBC AUTO: 102 FL (ref 82.6–102.9)
NRBC AUTOMATED: 0 PER 100 WBC
PARTIAL THROMBOPLASTIN TIME: 21.3 SEC (ref 20.5–30.5)
PDW BLD-RTO: 14.6 % (ref 11.8–14.4)
PLATELET # BLD: 246 K/UL (ref 138–453)
PMV BLD AUTO: 10.2 FL (ref 8.1–13.5)
POTASSIUM SERPL-SCNC: 4.9 MMOL/L (ref 3.7–5.3)
PRO-BNP: 1935 PG/ML
RBC # BLD: 4.05 M/UL (ref 4.21–5.77)
SODIUM BLD-SCNC: 137 MMOL/L (ref 135–144)
TOTAL PROTEIN: 7.2 G/DL (ref 6.4–8.3)
WBC # BLD: 17.9 K/UL (ref 3.5–11.3)

## 2021-08-12 PROCEDURE — 94761 N-INVAS EAR/PLS OXIMETRY MLT: CPT

## 2021-08-12 PROCEDURE — 6370000000 HC RX 637 (ALT 250 FOR IP): Performed by: STUDENT IN AN ORGANIZED HEALTH CARE EDUCATION/TRAINING PROGRAM

## 2021-08-12 PROCEDURE — 6360000002 HC RX W HCPCS: Performed by: INTERNAL MEDICINE

## 2021-08-12 PROCEDURE — 80053 COMPREHEN METABOLIC PANEL: CPT

## 2021-08-12 PROCEDURE — 93005 ELECTROCARDIOGRAM TRACING: CPT | Performed by: INTERNAL MEDICINE

## 2021-08-12 PROCEDURE — 2700000000 HC OXYGEN THERAPY PER DAY

## 2021-08-12 PROCEDURE — 2500000003 HC RX 250 WO HCPCS: Performed by: STUDENT IN AN ORGANIZED HEALTH CARE EDUCATION/TRAINING PROGRAM

## 2021-08-12 PROCEDURE — 85027 COMPLETE CBC AUTOMATED: CPT

## 2021-08-12 PROCEDURE — 83735 ASSAY OF MAGNESIUM: CPT

## 2021-08-12 PROCEDURE — 36415 COLL VENOUS BLD VENIPUNCTURE: CPT

## 2021-08-12 PROCEDURE — 99232 SBSQ HOSP IP/OBS MODERATE 35: CPT | Performed by: INTERNAL MEDICINE

## 2021-08-12 PROCEDURE — 6360000002 HC RX W HCPCS: Performed by: STUDENT IN AN ORGANIZED HEALTH CARE EDUCATION/TRAINING PROGRAM

## 2021-08-12 PROCEDURE — 6360000002 HC RX W HCPCS: Performed by: NURSE PRACTITIONER

## 2021-08-12 PROCEDURE — 85730 THROMBOPLASTIN TIME PARTIAL: CPT

## 2021-08-12 PROCEDURE — 2580000003 HC RX 258: Performed by: STUDENT IN AN ORGANIZED HEALTH CARE EDUCATION/TRAINING PROGRAM

## 2021-08-12 PROCEDURE — 71045 X-RAY EXAM CHEST 1 VIEW: CPT

## 2021-08-12 PROCEDURE — 93325 DOPPLER ECHO COLOR FLOW MAPG: CPT

## 2021-08-12 PROCEDURE — 94640 AIRWAY INHALATION TREATMENT: CPT

## 2021-08-12 PROCEDURE — 2060000000 HC ICU INTERMEDIATE R&B

## 2021-08-12 PROCEDURE — 83880 ASSAY OF NATRIURETIC PEPTIDE: CPT

## 2021-08-12 PROCEDURE — 2500000003 HC RX 250 WO HCPCS: Performed by: INTERNAL MEDICINE

## 2021-08-12 PROCEDURE — 82947 ASSAY GLUCOSE BLOOD QUANT: CPT

## 2021-08-12 PROCEDURE — 93308 TTE F-UP OR LMTD: CPT

## 2021-08-12 RX ORDER — BUMETANIDE 0.25 MG/ML
2 INJECTION, SOLUTION INTRAMUSCULAR; INTRAVENOUS 2 TIMES DAILY
Status: DISCONTINUED | OUTPATIENT
Start: 2021-08-12 | End: 2021-08-12

## 2021-08-12 RX ORDER — DIGOXIN 0.25 MG/ML
250 INJECTION INTRAMUSCULAR; INTRAVENOUS ONCE
Status: COMPLETED | OUTPATIENT
Start: 2021-08-12 | End: 2021-08-12

## 2021-08-12 RX ORDER — BUMETANIDE 0.25 MG/ML
1 INJECTION, SOLUTION INTRAMUSCULAR; INTRAVENOUS 2 TIMES DAILY
Status: DISCONTINUED | OUTPATIENT
Start: 2021-08-12 | End: 2021-08-12

## 2021-08-12 RX ORDER — HEPARIN SODIUM 1000 [USP'U]/ML
40 INJECTION, SOLUTION INTRAVENOUS; SUBCUTANEOUS PRN
Status: DISCONTINUED | OUTPATIENT
Start: 2021-08-12 | End: 2021-08-13

## 2021-08-12 RX ORDER — BUMETANIDE 0.25 MG/ML
2 INJECTION, SOLUTION INTRAMUSCULAR; INTRAVENOUS 2 TIMES DAILY
Status: DISCONTINUED | OUTPATIENT
Start: 2021-08-12 | End: 2021-08-14

## 2021-08-12 RX ORDER — HEPARIN SODIUM 1000 [USP'U]/ML
80 INJECTION, SOLUTION INTRAVENOUS; SUBCUTANEOUS ONCE
Status: COMPLETED | OUTPATIENT
Start: 2021-08-12 | End: 2021-08-12

## 2021-08-12 RX ORDER — HEPARIN SODIUM 1000 [USP'U]/ML
80 INJECTION, SOLUTION INTRAVENOUS; SUBCUTANEOUS PRN
Status: DISCONTINUED | OUTPATIENT
Start: 2021-08-12 | End: 2021-08-13

## 2021-08-12 RX ORDER — HEPARIN SODIUM 10000 [USP'U]/100ML
5-30 INJECTION, SOLUTION INTRAVENOUS CONTINUOUS
Status: DISCONTINUED | OUTPATIENT
Start: 2021-08-12 | End: 2021-08-13

## 2021-08-12 RX ORDER — INSULIN GLARGINE 100 [IU]/ML
15 INJECTION, SOLUTION SUBCUTANEOUS EVERY MORNING
Status: DISCONTINUED | OUTPATIENT
Start: 2021-08-13 | End: 2021-08-15 | Stop reason: HOSPADM

## 2021-08-12 RX ORDER — DIGOXIN 0.25 MG/ML
125 INJECTION INTRAMUSCULAR; INTRAVENOUS ONCE
Status: DISCONTINUED | OUTPATIENT
Start: 2021-08-12 | End: 2021-08-12

## 2021-08-12 RX ADMIN — POTASSIUM CHLORIDE 10 MEQ: 1500 TABLET, EXTENDED RELEASE ORAL at 21:38

## 2021-08-12 RX ADMIN — SODIUM CHLORIDE, PRESERVATIVE FREE 10 ML: 5 INJECTION INTRAVENOUS at 21:39

## 2021-08-12 RX ADMIN — IPRATROPIUM BROMIDE AND ALBUTEROL SULFATE 1 AMPULE: .5; 2.5 SOLUTION RESPIRATORY (INHALATION) at 21:34

## 2021-08-12 RX ADMIN — MIDODRINE HYDROCHLORIDE 10 MG: 5 TABLET ORAL at 08:08

## 2021-08-12 RX ADMIN — INSULIN LISPRO 1 UNITS: 100 INJECTION, SOLUTION INTRAVENOUS; SUBCUTANEOUS at 21:39

## 2021-08-12 RX ADMIN — METOPROLOL TARTRATE 5 MG: 1 INJECTION, SOLUTION INTRAVENOUS at 06:29

## 2021-08-12 RX ADMIN — BUMETANIDE 2 MG: 0.25 INJECTION, SOLUTION INTRAMUSCULAR; INTRAVENOUS at 12:16

## 2021-08-12 RX ADMIN — BUDESONIDE AND FORMOTEROL FUMARATE DIHYDRATE 2 PUFF: 160; 4.5 AEROSOL RESPIRATORY (INHALATION) at 07:38

## 2021-08-12 RX ADMIN — BUMETANIDE 2 MG: 0.25 INJECTION, SOLUTION INTRAMUSCULAR; INTRAVENOUS at 21:38

## 2021-08-12 RX ADMIN — HEPARIN SODIUM 6700 UNITS: 1000 INJECTION INTRAVENOUS; SUBCUTANEOUS at 18:26

## 2021-08-12 RX ADMIN — SODIUM CHLORIDE, PRESERVATIVE FREE 10 ML: 5 INJECTION INTRAVENOUS at 08:09

## 2021-08-12 RX ADMIN — MIDODRINE HYDROCHLORIDE 10 MG: 5 TABLET ORAL at 16:18

## 2021-08-12 RX ADMIN — DILTIAZEM HYDROCHLORIDE 180 MG: 180 CAPSULE, COATED, EXTENDED RELEASE ORAL at 08:08

## 2021-08-12 RX ADMIN — LORAZEPAM 1 MG: 2 INJECTION INTRAMUSCULAR; INTRAVENOUS at 16:25

## 2021-08-12 RX ADMIN — PANTOPRAZOLE SODIUM 40 MG: 40 TABLET, DELAYED RELEASE ORAL at 08:08

## 2021-08-12 RX ADMIN — INSULIN LISPRO 4 UNITS: 100 INJECTION, SOLUTION INTRAVENOUS; SUBCUTANEOUS at 18:39

## 2021-08-12 RX ADMIN — LISINOPRIL 5 MG: 5 TABLET ORAL at 08:08

## 2021-08-12 RX ADMIN — SODIUM CHLORIDE, PRESERVATIVE FREE 10 ML: 5 INJECTION INTRAVENOUS at 08:06

## 2021-08-12 RX ADMIN — CLOPIDOGREL 75 MG: 75 TABLET, FILM COATED ORAL at 08:08

## 2021-08-12 RX ADMIN — INSULIN LISPRO 6 UNITS: 100 INJECTION, SOLUTION INTRAVENOUS; SUBCUTANEOUS at 12:19

## 2021-08-12 RX ADMIN — LORAZEPAM 1 MG: 2 INJECTION INTRAMUSCULAR; INTRAVENOUS at 08:47

## 2021-08-12 RX ADMIN — BUMETANIDE 2 MG: 1 TABLET ORAL at 08:08

## 2021-08-12 RX ADMIN — IPRATROPIUM BROMIDE AND ALBUTEROL SULFATE 1 AMPULE: .5; 2.5 SOLUTION RESPIRATORY (INHALATION) at 11:01

## 2021-08-12 RX ADMIN — MIDODRINE HYDROCHLORIDE 10 MG: 5 TABLET ORAL at 12:01

## 2021-08-12 RX ADMIN — IPRATROPIUM BROMIDE AND ALBUTEROL SULFATE 1 AMPULE: .5; 2.5 SOLUTION RESPIRATORY (INHALATION) at 16:11

## 2021-08-12 RX ADMIN — HEPARIN SODIUM AND DEXTROSE 18 UNITS/KG/HR: 10000; 5 INJECTION INTRAVENOUS at 18:27

## 2021-08-12 RX ADMIN — SODIUM CHLORIDE, PRESERVATIVE FREE 10 ML: 5 INJECTION INTRAVENOUS at 21:51

## 2021-08-12 RX ADMIN — POTASSIUM CHLORIDE 10 MEQ: 1500 TABLET, EXTENDED RELEASE ORAL at 08:08

## 2021-08-12 RX ADMIN — DIGOXIN 250 MCG: 0.25 INJECTION INTRAMUSCULAR; INTRAVENOUS at 14:11

## 2021-08-12 RX ADMIN — INSULIN GLARGINE 10 UNITS: 100 INJECTION, SOLUTION SUBCUTANEOUS at 08:03

## 2021-08-12 RX ADMIN — IPRATROPIUM BROMIDE AND ALBUTEROL SULFATE 1 AMPULE: .5; 2.5 SOLUTION RESPIRATORY (INHALATION) at 07:38

## 2021-08-12 RX ADMIN — ASPIRIN 81 MG: 81 TABLET, CHEWABLE ORAL at 08:08

## 2021-08-12 RX ADMIN — DIGOXIN 250 MCG: 0.25 INJECTION INTRAMUSCULAR; INTRAVENOUS at 09:52

## 2021-08-12 RX ADMIN — CETIRIZINE HYDROCHLORIDE 10 MG: 10 TABLET ORAL at 08:08

## 2021-08-12 RX ADMIN — METOPROLOL TARTRATE 12.5 MG: 25 TABLET ORAL at 08:08

## 2021-08-12 ASSESSMENT — ENCOUNTER SYMPTOMS
ABDOMINAL PAIN: 0
WHEEZING: 0
CONSTIPATION: 0
DIARRHEA: 0
SHORTNESS OF BREATH: 1
RHINORRHEA: 0
COUGH: 0
SINUS PRESSURE: 0
CHEST TIGHTNESS: 0
NAUSEA: 0
CHOKING: 0
VOICE CHANGE: 0
VOMITING: 0
BACK PAIN: 0

## 2021-08-12 NOTE — PROGRESS NOTES
Physician Progress Note      PATIENT:               Bran Culp  CSN #:                  [id number]  :                       1950  ADMIT DATE:       2021 11:20 AM  DISCH DATE:  William Schwab  PROVIDER #:        Lizandro BRADLEY          QUERY TEXT:    Patient admitted with acute CHF and acute respiratory failure. per cath   procedure note on  reason for cath listed as NSTEMI and NSTEMI documented    progress note per cardiology. per lab  troponin 125, and 75 on admission   . H&P indicates type 2 mi d/t demand ischemia   If possible, please document   in the progress notes and discharge summary if you are evaluating and/or   treating any of the following: The medical record reflects the following:  Risk Factors: age, chf, cad, a fib  Clinical Indicators: per H&P documented type 2 mi demand ischemia , troponins   125 and 75 on admission. ekg on  shows atrial flutter with marked st   abnormality. cath procedure with XAVIER done on  and documented indicator for   procedure is NSTEMI. and progress note cardiology on  states nstemi . Treatment: monitoring    Please Call if any questions Thank you  Mariella ANDERSONN [facility] [phone]  Options provided:  -- NSTEMI  -- Type 2 MI  -- Demand Ischemia with MI  -- Demand Ischemia only, no MI  -- Unstable Angina  -- Other - I will add my own diagnosis  -- Disagree - Not applicable / Not valid  -- Disagree - Clinically unable to determine / Unknown  -- Refer to Clinical Documentation Reviewer    PROVIDER RESPONSE TEXT:    This patient has an NSTEMI. Query created by:  Garrison Cardenas on 2021 2:20 PM      Electronically signed by:  Lizandro BRADLEY 2021 3:36 PM

## 2021-08-12 NOTE — PROGRESS NOTES
Physical Therapy        Physical Therapy Cancel Note      DATE: 2021    NAME: Vaen Up  MRN: [id number]   : 1950      Patient not seen this date for Physical Therapy due to:    Pt currently with HR of 110-120bpms and requiring bipap due to SOB at rest, pt is not appropriate for therapy at this time      Electronically signed by Lilo Diana PT on 2021 at 1:22 PM

## 2021-08-12 NOTE — PROGRESS NOTES
Called back to assess due by John Camacho. HR remains 120-130s after IV dig and SOB noted. Respiratory placed patient back on bipap. A Fib/flutter with RVR HR currently low 100s to 110s  Patient denies chest pain. Ongoing SOB. RR 22-29. Recommend pulmonary consult. CXR pending.

## 2021-08-12 NOTE — PROGRESS NOTES
Pt. Wanting Bipap off, O2 sat 97%. Bipap removed O2 3L NC applied. HR remains elevated 115 after IV dig given. Will monitor.

## 2021-08-12 NOTE — CARE COORDINATION
Transitional planning:  [facility] 26 admissions and left vm per  request to call NCM back regarding bed availability for this pt.

## 2021-08-12 NOTE — PROGRESS NOTES
Occupational [address]  Occupational Therapy Not Seen Note    DATE: 2021  Name: Miguel Hills  : 1950  MRN: [id number]    Patient not available for Occupational Therapy due to:      [x] Other: Pt currently with HR of 110-120bpms and requiring bipap due to SOB at rest, spoke to BLAINE Hernandez who is in agreement that pt is not appropriate for therapy at this time      Next Scheduled Treatment: Will check back PM as able or 2021    Dusty Murray, OT OTR/L

## 2021-08-12 NOTE — PROGRESS NOTES
Pt. Placed back on Bipap per resp. Therapy. Pt. Condition remains the unchanged. Will cont. To monitor and await cardiology.

## 2021-08-12 NOTE — CARE COORDINATION
Received call from Mady Luna at [facility], they should be able to accept pending bed availability, her number is [phone] ext #[phone]

## 2021-08-12 NOTE — PROGRESS NOTES
[facility]   Progress Note                   Date:   8/12/2021  Patient name: Farhad Alejandre  Date of admission:  8/8/2021 11:20 AM  MRN:   [id number]  YOB: 1950  PCP: Kellee Connolly    Reason for Admission: Respiratory failure ([address] [J96.90]    Subjective:       Clinical Changes / Abnormalities: Patient seen and examined at the bed side after discussion with RN. Had increased HR to 160s overnight. Received IV lopressor with some improvement. Remains with 's currently. Patient has some SOB and is on BiPAP currently. Denies chest pain. Reviewed vitals, labs, tele, & previous testing. Atrial flutter with RVR on tele 120's.        Medications:   Scheduled Meds:   insulin glargine  10 Units Subcutaneous QAM    sodium chloride flush  5-40 mL Intravenous 2 times per day    aspirin  81 mg Oral Daily    clopidogrel  75 mg Oral Daily    midodrine  10 mg Oral TID WC    ipratropium-albuterol  1 ampule Inhalation 4x daily    bumetanide  2 mg Oral BID    metoprolol tartrate  12.5 mg Oral BID    insulin lispro  0-12 Units Subcutaneous TID WC    insulin lispro  0-6 Units Subcutaneous Nightly    dilTIAZem  180 mg Oral Daily    [Held by provider] apixaban  5 mg Oral BID    budesonide-formoterol  2 puff Inhalation BID    cetirizine  10 mg Oral Daily    [Held by provider] metFORMIN  1,000 mg Oral BID WC    pantoprazole  40 mg Oral Daily    potassium chloride  10 mEq Oral BID    [Held by provider] spironolactone  25 mg Oral Daily    sodium chloride flush  5-40 mL Intravenous 2 times per day    lisinopril  5 mg Oral Daily    nicotine  1 patch Transdermal Daily     Continuous Infusions:   sodium chloride      dextrose      dextrose      sodium chloride       CBC:   Recent Labs     08/10/21  0549 08/11/21  0524 08/12/21  0649   WBC 12.0* 10.0 17.9*   HGB 11.4* 11.5* 13.3    201 246     BMP:    Recent Labs     08/10/21  0549 08/11/21  0524 08/12/21  0649   * 131* 137   K 3.4* 3.3* 4.9   CL 79* 81* 88*   CO2 38* 37* 38*   BUN 45* 35* 30*   CREATININE 0.91 0.71 0.75   GLUCOSE 253* 244* 131*     Hepatic:   Recent Labs     08/10/21  0549 08/12/21  0649   AST 12 22   ALT 21 28   BILITOT 0.59 1.13   ALKPHOS 124 135*     Troponin:   No results for input(s): TROPHS in the last 72 hours. BNP: No results for input(s): BNP in the last 72 hours. Lipids:   No results for input(s): CHOL, HDL in the last 72 hours. Invalid input(s): LDLCALCU  INR: No results for input(s): INR in the last 72 hours. Objective:   Vitals: /83   Pulse 113   Temp 97.5 °F (36.4 °C) (Oral)   Resp 27   Ht 5' 10\" (1.778 m)   Wt 184 lb 8.4 oz (83.7 kg)   SpO2 98%   BMI 26.48 kg/m²   General appearance: alert and cooperative with exam  HEENT: Head: Normocephalic, no lesions, without obvious abnormality. Neck: no JVD, trachea midline, no adenopathy  Lungs: Diminished throughout with auscultation. No wheezes or rales noted. No increased work or breathing. Heart: Irregular rate and rhythm, s1/s2 auscultated, no murmurs. Atrial flutter rate controlled. 62-70  Abdomen: soft, non-tender, bowel sounds active  Extremities: no edema  Neurologic: not done    Cardiac Cath 8/11/2021  Findings:  LMCA: has distal 40% stenosis. LAD: has ostial eccentric 60% stenosis. LIMA-LAD is patent. LCx: has proximal 50% stenosis. The OM1 is small with 40% stenosis. The OM2   has proximal 50% stenosis. SVG-OM2 is patent. RCA: has proximal 100% occlusion. SVG-RCA is patent. SVG has proximal 75%   stenosis. During engagement there was dampening with bradycardia. The RPDA   has 60% stenosis and is a small vessel. Graft Lesions     Lesion on Aorta Right to Prox RCA: Proximal body. 75% stenosis 12 mm     length reduced to 0%. Pre procedure TD III flow was noted. Post     Procedure TD III flow was present. Good runoff was present. The lesion     was diagnosed as Moderate Risk (B).  The lesion was discrete and   eccentric. The lesion showed with irregular contour, mild angulation and     mild tortuosity.     Devices used     - [employer] Prowater Flex 180 cm. Number of passes: 1.     - Trek Balloon 2.5mm x 12mm. 1 inflation(s) to a max pressure of: 16     susana.      - Xience Nanda 3.5 x 12 XAVIER. 2 inflation(s) to a max pressure of: 17     susana.     Cardiac Grafts      -  There is a Vein graft that originates at the Aorta Right and attaches       to the Prox RCA.     The LV gram was performed in the MUIR 30 position. LVEF: 70 %.     Conclusions:   Patent LIMA-LAD, SVG-OM2 and SVG-RCA.    Normal LV systolic function.    Proximal graft stenosis of SVG-RCA. Successful PTCA/XAVIER of proximal RCA.    Recommendations:  1. Post-cath protocol  2. Continue optimal medical therapy  3. Risk factor modification         Assessment / Acute Cardiac Problems:   1. Acute on chronic hypoxic & hypercapnic respiratory failure secondary to COPD exacerbation  2. Acute on chronic congestive heart failure  3. Paroxysmal Atrial fibrillation/Atrial flutter w/ RVR  4. Elevated troponin likely demand ischemia  5. CAD s/p CABG  6. HTN  7. HLD  8. T2DM  9. GERD  10.  Current smoker    Patient Active Problem List:     Neuropathic pain     Chronic bilateral low back pain without sciatica     Lumbosacral spondylosis without myelopathy     DDD (degenerative disc disease), lumbar     Lumbar facet joint syndrome     Panniculitis involving lumbar region     Ventricular tachycardia (HCC)     Acute on chronic respiratory failure with hypoxia and hypercapnia (HCC)     Pneumonia of left lower lobe due to infectious organism     PAF (paroxysmal atrial fibrillation) ([address]     Pulmonary hypertension with chronic cor pulmonale (HCC)     RODNEY treated with BiPAP     Smoking greater than 40 pack years     Class 1 obesity due to excess calories with serious comorbidity and body mass index (BMI) of 30.0 to 30.9 in adult     Stage 4 very severe COPD by GOLD classification ([address] Hyponatremia     Uncontrolled type 2 diabetes mellitus with hyperglycemia (HCC)     Acute renal failure (HCC)     LAKESHA (acute kidney injury) ([facility]     Respiratory failure (HCC)     Benign essential hypertension     Acute on chronic diastolic congestive heart failure (HCC)     GERD (gastroesophageal reflux disease)     Hyperlipidemia     Type 2 diabetes mellitus, without long-term current use of insulin ([facility]     Long term (current) use of anticoagulants     Acute exacerbation of chronic obstructive pulmonary disease (COPD) ([facility]     Atrial flutter with rapid ventricular response ([facility]     Type 2 myocardial infarction due to arrhythmia ([facility]      Plan of Treatment:   1. COPD management per primary team.   2. NSTEMI with elevated troponin. S/P Cath as noted above. Continue aspirin, Plavix, BB, ACE. Not on statin due to allergy. Resume Eliquis today. Plan for patient to have triple therapy for 4 weeks then will d/c aspirin. 3. Aflutter. -120's this morning. Received Lopressor IV overnight which helped short term. Will give IV Digoxin 250 mcg now and repeat 125 mcg IV in 6 hrs. Continue PO Cardizem and Bumex PO. Will resume Eliquis today. 4. SOB. Most likely related to increased HR. Continue BiPap as needed and wean back to NC as tolerated. No rales noted on assessment. 5. HTN. BP stable. Continue lisinopril 5mg and Lopressor 12.5mg with hold parameters. Continue ProAmitine 10mg PO TID. 6. Keep K+ > 4.0, and Mg+ > 2.0. Stable today  7. Remainder of care per primary service.         Electronically signed by ALISON Galeano CNP on 8/12/2021 at 9:26 [facility]      [phone]

## 2021-08-12 NOTE — PROGRESS NOTES
[facility]  Office: [address], DO, Meagan Ruiz, DO, Pepper Acuna, DO, Den Salinas, DO, Jc Ness MD, Ramos De Los Santos MD, Carla Villatoro MD, Yarely Beck MD, Padmini Saucedo MD, Bebeto Amin MD, Jeannie Abreu MD, Geraldo Manning DO, Bailey Fernandes MD, Efrem Watt DO, Toñito León MD,  Ayaz Smart DO, Nadine Moralez MD, Glenys Wolff MD, Dean An MD, Crispin Fajardo MD, Osmani Jackman MD, Kiran Finn MD, Sarita Hand MD, Castillo Pack, [facility], [facility], CNP, Jeremi Wyatt, CNP, Manolo Hughes, CNS, Dexter Mckinley, CNP, Claude Leep, CNP, Rekha Palacios, CNP, Lisa Lambert, CNP, Jose Ponce, CNP, Ck Madden PA-C, Dany Christianson, [facility], Johanny Benitez, CNP, Lizzie Way, CNP, Rohan Manning, CNP, Chandra Schrader, CNP, Lissy Cox, CNP, Aneesh Sharif, CNP, Cain Moser, [facility], Gilmar Rose, [facility]      Daily Progress Note     Admit Date: 8/8/2021  Bed/Room No.  3004/3004-01  Admitting Physician : Efrem Watt DO  Code Status :Full Code  Hospital Day:  LOS: 4 days   Chief Complaint:     Breathing difficulty     Principal Problem:    Acute on chronic respiratory failure with hypoxia and hypercapnia (HCC)  Active Problems:    PAF (paroxysmal atrial fibrillation) (HCC)    RODNEY treated with BiPAP    Smoking greater than 40 pack years    Class 1 obesity due to excess calories with serious comorbidity and body mass index (BMI) of 30.0 to 30.9 in adult    Stage 4 very severe COPD by GOLD classification [facility])    Uncontrolled type 2 diabetes mellitus with hyperglycemia ([facility]    Respiratory failure ([facility]    Benign essential hypertension    Acute on chronic diastolic congestive heart failure (HCC)    GERD (gastroesophageal reflux disease)    Type 2 diabetes mellitus, without long-term current use of insulin ([facility]    Long term (current) use of anticoagulants    Acute exacerbation of chronic obstructive pulmonary disease (COPD) [facility])    Atrial flutter with rapid ventricular response (HCC)    Type 2 myocardial infarction due to arrhythmia [facility])  Resolved Problems:    * No resolved hospital problems. *    Subjective : Interval History/Significant events :  08/12/21    Developed respiratory distress this morning, was tachypneic, noted to have 110 to 120 bpm on monitor. Patient was given digoxin with some improvement in heart rate. Chest x-ray was ordered showing some mild pulmonary vascular congestion, BNP still elevated at 1900. Additional dose of Lasix was given, discussed EKG with cardiology fellow. Background History:         Phillip Villalba is 66 y.o. male  Who was admitted to the hospital on 8/8/2021 for treatment of Acute on chronic respiratory failure with hypoxia and hypercapnia ([facility] Cells transfer from [facility] where he presented after fall with head injury. Patient was having difficulty breathing. Work-up at [facility] showed decompensated heart failure and was found to be in respiratory failure with hyperkalemia. Patient was treated with BiPAP and insulin with dextrose for hyperkalemia. CT head was negative for acute abnormality. Patient was life flighted to [facility] for further treatment. Patient has underlying history of bladder cancer, congestive heart failure, COPD, diabetes mellitus, hypertension, obstructive sleep apnea. He had cardiac bypass and stents for CAD. Work-up on admission showed elevated proBNP 1606, elevated troponin 125, leukocytosis 12.2.  Echocardiogram from last month showed preserved ejection fraction, diastolic dysfunction.     PMH:  Past Medical History:   Diagnosis Date    Agent orange exposure     Anxiety state     Cancer ([address]     bladder    CHF (congestive heart failure) (HCC)     Chronic obstructive pulmonary disease (COPD) (HCC)     Chronic post-traumatic stress disorder     Chronic respiratory failure with hypercapnia ([address]     COPD exacerbation ([facility]     Coronary artery disease     Degenerative disc disease, lumbar     Depression     Diabetes mellitus ([facility]     Essential hypertension     History of acute pancreatitis     Hyperglycemia     Hypokalemia     Lumbosacral disc disease     Obesity     Obstructive sleep apnea     Personal history of malignant neoplasm of bladder       Allergies: Allergies   Allergen Reactions    Niacin And Related     Other      Nicotine patch - rash    Statins       Medications :  bumetanide, 2 mg, Intravenous, BID  [START ON 8/13/2021] insulin glargine, 15 Units, Subcutaneous, QAM  sodium chloride flush, 5-40 mL, Intravenous, 2 times per day  aspirin, 81 mg, Oral, Daily  clopidogrel, 75 mg, Oral, Daily  midodrine, 10 mg, Oral, TID WC  ipratropium-albuterol, 1 ampule, Inhalation, 4x daily  metoprolol tartrate, 12.5 mg, Oral, BID  insulin lispro, 0-12 Units, Subcutaneous, TID WC  insulin lispro, 0-6 Units, Subcutaneous, Nightly  dilTIAZem, 180 mg, Oral, Daily  apixaban, 5 mg, Oral, BID  budesonide-formoterol, 2 puff, Inhalation, BID  cetirizine, 10 mg, Oral, Daily  [Held by provider] metFORMIN, 1,000 mg, Oral, BID WC  pantoprazole, 40 mg, Oral, Daily  potassium chloride, 10 mEq, Oral, BID  [Held by provider] spironolactone, 25 mg, Oral, Daily  sodium chloride flush, 5-40 mL, Intravenous, 2 times per day  lisinopril, 5 mg, Oral, Daily  nicotine, 1 patch, Transdermal, Daily        Review of Systems   Review of Systems   Constitutional: Negative for activity change, appetite change, fatigue, fever and unexpected weight change. HENT: Negative for congestion, nosebleeds, rhinorrhea, sinus pressure, sneezing and voice change. Respiratory: Positive for shortness of breath. Negative for cough, choking, chest tightness and wheezing. Cardiovascular: Negative for chest pain, palpitations and leg swelling. Gastrointestinal: Negative for abdominal pain, constipation, diarrhea, nausea and vomiting. Genitourinary: Negative for difficulty urinating, discharge, dysuria, frequency and testicular pain. Musculoskeletal: Negative for back pain. Skin: Negative for rash. Neurological: Negative for dizziness, weakness, light-headedness and headaches. Hematological: Does not bruise/bleed easily. Psychiatric/Behavioral: Positive for confusion and sleep disturbance. Negative for agitation, behavioral problems, self-injury and suicidal ideas.      Objective :      Current Vitals : Temp: 98.1 °F (36.7 °C),  Pulse: 109, Resp: (!) 35, BP: 105/71, SpO2: 97 %  Last 24 Hrs Vitals   Patient Vitals for the past 24 hrs:   BP Temp Temp src Pulse Resp SpO2   08/12/21 1412 105/71 -- -- -- -- --   08/12/21 1241 112/65 -- -- -- -- --   08/12/21 1114 -- -- -- -- (!) 35 --   08/12/21 1111 108/84 98.1 °F (36.7 °C) Oral 109 (!) 31 97 %   08/12/21 0845 -- -- -- -- 27 --   08/12/21 0745 119/83 -- -- 113 26 98 %   08/12/21 0515 -- -- -- 95 22 92 %   08/12/21 0400 -- -- -- 94 -- --   08/12/21 0315 107/67 97.5 °F (36.4 °C) Oral 96 20 --   08/12/21 0000 -- -- -- 84 -- --   08/11/21 2346 -- -- -- 80 -- --   08/11/21 2301 -- 97.8 °F (36.6 °C) Oral -- -- --   08/11/21 2300 111/85 -- -- 82 22 96 %   08/11/21 2129 125/73 -- -- 108 -- --   08/11/21 2000 -- -- -- 88 -- --   08/11/21 1958 -- -- -- -- 24 95 %   08/11/21 1915 121/80 97.5 °F (36.4 °C) Oral 99 25 95 %   08/11/21 1900 110/78 -- -- 87 21 97 %   08/11/21 1845 115/74 -- -- 100 22 96 %   08/11/21 1830 (!) 113/55 -- -- 97 24 94 %   08/11/21 1815 120/74 -- -- 91 25 95 %   08/11/21 1800 110/64 -- -- 92 27 94 %   08/11/21 1745 117/67 -- -- 96 27 96 %   08/11/21 1730 (!) 102/57 -- -- 93 28 96 %   08/11/21 1715 107/67 -- -- 92 26 95 %   08/11/21 1700 107/66 -- -- 99 27 90 %   08/11/21 1645 117/71 -- -- 90 26 91 %   08/11/21 1630 111/60 -- -- 75 25 96 %   08/11/21 1615 (!) 107/59 -- -- 63 22 100 %     Intake / output   08/11 1501 - 08/12 1500  In: 1536 [P.O.:1380; I.V.:156]  Out: 5335 [[id number]]  Physical Exam:  Physical Exam  Vitals and nursing note reviewed. Constitutional:       General: He is not in acute distress. Appearance: He is not diaphoretic. Interventions: Nasal cannula in place. HENT:      Head: Normocephalic and atraumatic. Nose:      Right Sinus: No maxillary sinus tenderness or frontal sinus tenderness. Left Sinus: No maxillary sinus tenderness or frontal sinus tenderness. Mouth/Throat:      Pharynx: No oropharyngeal exudate. Eyes:      General: No scleral icterus. Conjunctiva/sclera: Conjunctivae normal.      Pupils: Pupils are equal, round, and reactive to light. Neck:      Thyroid: No thyromegaly. Vascular: No JVD. Cardiovascular:      Rate and Rhythm: Normal rate and regular rhythm. Pulses:           Dorsalis pedis pulses are 2+ on the right side and 2+ on the left side. Heart sounds: Normal heart sounds. No murmur heard. Pulmonary:      Effort: Pulmonary effort is normal.      Breath sounds: Normal breath sounds. No wheezing or rales. Chest:      Comments: Sternotomy scar  Abdominal:      Palpations: Abdomen is soft. There is no mass. Tenderness: There is no abdominal tenderness. Musculoskeletal:      Cervical back: Full passive range of motion without pain and neck supple. Lymphadenopathy:      Head:      Right side of head: No submandibular adenopathy. Left side of head: No submandibular adenopathy. Cervical: No cervical adenopathy. Skin:     General: Skin is warm. Neurological:      Mental Status: He is disoriented. Motor: No tremor. Psychiatric:         Behavior: Behavior is cooperative.            Laboratory findings:    Recent Labs     08/10/21  0549 08/11/21  0524 08/12/21  0649   WBC 12.0* 10.0 17.9*   HGB 11.4* 11.5* 13.3   HCT 34.0* 34.7* 41.3    201 246     Recent Labs     08/10/21  0549 08/11/21  0524 08/12/21  0649   * 131* 137   K 3.4* 3.3* 4.9   CL 79* 81* 88* CO2 38* 37* 38*   GLUCOSE 253* 244* 131*   BUN 45* 35* 30*   CREATININE 0.91 0.71 0.75   MG 2.4 2.2 2.1   CALCIUM 8.7 8.8 9.5   PHOS  --  2.0*  --      Recent Labs     08/10/21  0549 08/10/21  0933 08/11/21  0524 08/12/21  0649   PROT 6.1*  --   --  7.2   LABALBU 3.4*  --  3.4* 4.0   LABA1C  --  6.9*  --   --    AST 12  --   --  22   ALT 21  --   --  28   ALKPHOS 124  --   --  135*   BILITOT 0.59  --   --  1.13          Specific Gravity, UA   Date Value Ref Range Status   08/09/2021 1.013 1.005 - 1.030 Final     Protein, UA   Date Value Ref Range Status   08/09/2021 NEGATIVE NEGATIVE Final     RBC, UA   Date Value Ref Range Status   07/12/2021 20 TO 50 0 - 2 /HPF Final     Bacteria, UA   Date Value Ref Range Status   07/12/2021 FEW (A) None Final     Nitrite, Urine   Date Value Ref Range Status   08/09/2021 NEGATIVE NEGATIVE Final     WBC, UA   Date Value Ref Range Status   07/12/2021 5 TO 10 0 - 5 /HPF Final     Leukocyte Esterase, Urine   Date Value Ref Range Status   08/09/2021 NEGATIVE NEGATIVE Final       Imaging / Clinical Data :-   No results found. Clinical Course : gradually improving  Assessment and Plan  :        1. Acute on chronic diastolic heart failure -restart IV diuretics, continue BiPAP and high flow when eating. 2. Acute respiratory failure with hypoxia  - Wean oxygen as tolerated, continue diuretics  3. Left lower infiltrate seem to be chronic -no acute pneumonia. Monitor off antibiotics. 4. A. fib with RVR-off cardizem  5. COPD exacerbation - duonebs  6. Acute metabolic encephalopathy -likely from  decompensated CHF   7. Type 2 diabetes mellitus -on Metformin at home. humalog as needed   8.  CAD s/p CABG, stents       Plan:  - Continue midodrine, continue ACE/BB  - restart IV bumex   - Check CTA PE protocol   - continue rate control with digoxin, cardizem    Vale Spears,   8/12/2021

## 2021-08-13 ENCOUNTER — APPOINTMENT (OUTPATIENT)
Dept: CT IMAGING | Age: 71
DRG: 246 | End: 2021-08-13
Attending: FAMILY MEDICINE
Payer: MEDICARE

## 2021-08-13 LAB
ALBUMIN SERPL-MCNC: 3 G/DL (ref 3.5–5.2)
ANION GAP SERPL CALCULATED.3IONS-SCNC: 12 MMOL/L (ref 9–17)
BUN BLDV-MCNC: 34 MG/DL (ref 8–23)
BUN/CREAT BLD: ABNORMAL (ref 9–20)
CALCIUM SERPL-MCNC: 8.4 MG/DL (ref 8.6–10.4)
CHLORIDE BLD-SCNC: 85 MMOL/L (ref 98–107)
CO2: 33 MMOL/L (ref 20–31)
CREAT SERPL-MCNC: 0.97 MG/DL (ref 0.7–1.2)
EKG ATRIAL RATE: 252 BPM
EKG ATRIAL RATE: 85 BPM
EKG P AXIS: 92 DEGREES
EKG P AXIS: 97 DEGREES
EKG Q-T INTERVAL: 348 MS
EKG Q-T INTERVAL: 356 MS
EKG QRS DURATION: 88 MS
EKG QRS DURATION: 92 MS
EKG QTC CALCULATION (BAZETT): 411 MS
EKG QTC CALCULATION (BAZETT): 423 MS
EKG R AXIS: 107 DEGREES
EKG R AXIS: 110 DEGREES
EKG T AXIS: -41 DEGREES
EKG T AXIS: -45 DEGREES
EKG VENTRICULAR RATE: 84 BPM
EKG VENTRICULAR RATE: 85 BPM
GFR AFRICAN AMERICAN: >60 ML/MIN
GFR NON-AFRICAN AMERICAN: >60 ML/MIN
GFR SERPL CREATININE-BSD FRML MDRD: ABNORMAL ML/MIN/{1.73_M2}
GFR SERPL CREATININE-BSD FRML MDRD: ABNORMAL ML/MIN/{1.73_M2}
GLUCOSE BLD-MCNC: 143 MG/DL (ref 75–110)
GLUCOSE BLD-MCNC: 154 MG/DL (ref 70–99)
GLUCOSE BLD-MCNC: 213 MG/DL (ref 75–110)
GLUCOSE BLD-MCNC: 273 MG/DL (ref 75–110)
GLUCOSE BLD-MCNC: 343 MG/DL (ref 75–110)
HCT VFR BLD CALC: 36.1 % (ref 40.7–50.3)
HCT VFR BLD CALC: 36.8 % (ref 40.7–50.3)
HEMOGLOBIN: 12 G/DL (ref 13–17)
HEMOGLOBIN: 12.1 G/DL (ref 13–17)
LACTIC ACID, SEPSIS WHOLE BLOOD: 1.2 MMOL/L (ref 0.5–1.9)
LACTIC ACID, SEPSIS: NORMAL MMOL/L (ref 0.5–1.9)
MAGNESIUM: 2 MG/DL (ref 1.6–2.6)
MCH RBC QN AUTO: 33.2 PG (ref 25.2–33.5)
MCH RBC QN AUTO: 34.2 PG (ref 25.2–33.5)
MCHC RBC AUTO-ENTMCNC: 32.9 G/DL (ref 28.4–34.8)
MCHC RBC AUTO-ENTMCNC: 33.2 G/DL (ref 28.4–34.8)
MCV RBC AUTO: 101.1 FL (ref 82.6–102.9)
MCV RBC AUTO: 102.8 FL (ref 82.6–102.9)
NRBC AUTOMATED: 0 PER 100 WBC
NRBC AUTOMATED: 0 PER 100 WBC
PARTIAL THROMBOPLASTIN TIME: 26.9 SEC (ref 20.5–30.5)
PARTIAL THROMBOPLASTIN TIME: 59.3 SEC (ref 20.5–30.5)
PDW BLD-RTO: 15 % (ref 11.8–14.4)
PDW BLD-RTO: 15.3 % (ref 11.8–14.4)
PHOSPHORUS: 2.2 MG/DL (ref 2.5–4.5)
PLATELET # BLD: 198 K/UL (ref 138–453)
PLATELET # BLD: 275 K/UL (ref 138–453)
PMV BLD AUTO: 10.8 FL (ref 8.1–13.5)
PMV BLD AUTO: 9.8 FL (ref 8.1–13.5)
POTASSIUM SERPL-SCNC: 3.9 MMOL/L (ref 3.7–5.3)
RBC # BLD: 3.51 M/UL (ref 4.21–5.77)
RBC # BLD: 3.64 M/UL (ref 4.21–5.77)
SODIUM BLD-SCNC: 130 MMOL/L (ref 135–144)
WBC # BLD: 16.7 K/UL (ref 3.5–11.3)
WBC # BLD: 17.7 K/UL (ref 3.5–11.3)

## 2021-08-13 PROCEDURE — 6360000004 HC RX CONTRAST MEDICATION: Performed by: INTERNAL MEDICINE

## 2021-08-13 PROCEDURE — 94640 AIRWAY INHALATION TREATMENT: CPT

## 2021-08-13 PROCEDURE — 85027 COMPLETE CBC AUTOMATED: CPT

## 2021-08-13 PROCEDURE — 83735 ASSAY OF MAGNESIUM: CPT

## 2021-08-13 PROCEDURE — 6370000000 HC RX 637 (ALT 250 FOR IP): Performed by: INTERNAL MEDICINE

## 2021-08-13 PROCEDURE — 6360000002 HC RX W HCPCS: Performed by: INTERNAL MEDICINE

## 2021-08-13 PROCEDURE — 6370000000 HC RX 637 (ALT 250 FOR IP): Performed by: STUDENT IN AN ORGANIZED HEALTH CARE EDUCATION/TRAINING PROGRAM

## 2021-08-13 PROCEDURE — 2580000003 HC RX 258: Performed by: STUDENT IN AN ORGANIZED HEALTH CARE EDUCATION/TRAINING PROGRAM

## 2021-08-13 PROCEDURE — 2500000003 HC RX 250 WO HCPCS: Performed by: INTERNAL MEDICINE

## 2021-08-13 PROCEDURE — 99232 SBSQ HOSP IP/OBS MODERATE 35: CPT | Performed by: INTERNAL MEDICINE

## 2021-08-13 PROCEDURE — 71260 CT THORAX DX C+: CPT

## 2021-08-13 PROCEDURE — 2060000000 HC ICU INTERMEDIATE R&B

## 2021-08-13 PROCEDURE — 87040 BLOOD CULTURE FOR BACTERIA: CPT

## 2021-08-13 PROCEDURE — 80069 RENAL FUNCTION PANEL: CPT

## 2021-08-13 PROCEDURE — 85730 THROMBOPLASTIN TIME PARTIAL: CPT

## 2021-08-13 PROCEDURE — 93010 ELECTROCARDIOGRAM REPORT: CPT | Performed by: INTERNAL MEDICINE

## 2021-08-13 PROCEDURE — 83605 ASSAY OF LACTIC ACID: CPT

## 2021-08-13 PROCEDURE — 82947 ASSAY GLUCOSE BLOOD QUANT: CPT

## 2021-08-13 PROCEDURE — 2700000000 HC OXYGEN THERAPY PER DAY

## 2021-08-13 PROCEDURE — 36415 COLL VENOUS BLD VENIPUNCTURE: CPT

## 2021-08-13 PROCEDURE — 6370000000 HC RX 637 (ALT 250 FOR IP): Performed by: NURSE PRACTITIONER

## 2021-08-13 RX ORDER — LISINOPRIL 2.5 MG/1
2.5 TABLET ORAL DAILY
Status: DISCONTINUED | OUTPATIENT
Start: 2021-08-14 | End: 2021-08-15 | Stop reason: HOSPADM

## 2021-08-13 RX ORDER — DILTIAZEM HYDROCHLORIDE 120 MG/1
120 CAPSULE, COATED, EXTENDED RELEASE ORAL DAILY
Status: DISCONTINUED | OUTPATIENT
Start: 2021-08-14 | End: 2021-08-15 | Stop reason: HOSPADM

## 2021-08-13 RX ORDER — LEVOFLOXACIN 5 MG/ML
500 INJECTION, SOLUTION INTRAVENOUS EVERY 24 HOURS
Status: DISCONTINUED | OUTPATIENT
Start: 2021-08-13 | End: 2021-08-14

## 2021-08-13 RX ORDER — METHYLPREDNISOLONE SODIUM SUCCINATE 40 MG/ML
30 INJECTION, POWDER, LYOPHILIZED, FOR SOLUTION INTRAMUSCULAR; INTRAVENOUS EVERY 8 HOURS
Status: DISCONTINUED | OUTPATIENT
Start: 2021-08-13 | End: 2021-08-14

## 2021-08-13 RX ADMIN — IPRATROPIUM BROMIDE AND ALBUTEROL SULFATE 1 AMPULE: .5; 2.5 SOLUTION RESPIRATORY (INHALATION) at 11:37

## 2021-08-13 RX ADMIN — METHYLPREDNISOLONE SODIUM SUCCINATE 30 MG: 40 INJECTION, POWDER, FOR SOLUTION INTRAMUSCULAR; INTRAVENOUS at 23:00

## 2021-08-13 RX ADMIN — MIDODRINE HYDROCHLORIDE 10 MG: 5 TABLET ORAL at 09:43

## 2021-08-13 RX ADMIN — MIDODRINE HYDROCHLORIDE 10 MG: 5 TABLET ORAL at 13:29

## 2021-08-13 RX ADMIN — CETIRIZINE HYDROCHLORIDE 10 MG: 10 TABLET ORAL at 09:43

## 2021-08-13 RX ADMIN — POTASSIUM CHLORIDE 10 MEQ: 1500 TABLET, EXTENDED RELEASE ORAL at 20:28

## 2021-08-13 RX ADMIN — BUDESONIDE AND FORMOTEROL FUMARATE DIHYDRATE 2 PUFF: 160; 4.5 AEROSOL RESPIRATORY (INHALATION) at 07:53

## 2021-08-13 RX ADMIN — BUDESONIDE AND FORMOTEROL FUMARATE DIHYDRATE 2 PUFF: 160; 4.5 AEROSOL RESPIRATORY (INHALATION) at 20:06

## 2021-08-13 RX ADMIN — ASPIRIN 81 MG: 81 TABLET, CHEWABLE ORAL at 09:43

## 2021-08-13 RX ADMIN — POTASSIUM CHLORIDE 10 MEQ: 1500 TABLET, EXTENDED RELEASE ORAL at 09:45

## 2021-08-13 RX ADMIN — BUMETANIDE 2 MG: 0.25 INJECTION, SOLUTION INTRAMUSCULAR; INTRAVENOUS at 20:28

## 2021-08-13 RX ADMIN — APIXABAN 5 MG: 5 TABLET, FILM COATED ORAL at 09:43

## 2021-08-13 RX ADMIN — MIDODRINE HYDROCHLORIDE 10 MG: 5 TABLET ORAL at 17:12

## 2021-08-13 RX ADMIN — SODIUM CHLORIDE, PRESERVATIVE FREE 10 ML: 5 INJECTION INTRAVENOUS at 09:48

## 2021-08-13 RX ADMIN — METHYLPREDNISOLONE SODIUM SUCCINATE 30 MG: 40 INJECTION, POWDER, FOR SOLUTION INTRAMUSCULAR; INTRAVENOUS at 15:41

## 2021-08-13 RX ADMIN — LEVOFLOXACIN 500 MG: 5 INJECTION, SOLUTION INTRAVENOUS at 09:48

## 2021-08-13 RX ADMIN — INSULIN GLARGINE 15 UNITS: 100 INJECTION, SOLUTION SUBCUTANEOUS at 09:47

## 2021-08-13 RX ADMIN — METHYLPREDNISOLONE SODIUM SUCCINATE 30 MG: 40 INJECTION, POWDER, FOR SOLUTION INTRAMUSCULAR; INTRAVENOUS at 09:46

## 2021-08-13 RX ADMIN — DILTIAZEM HYDROCHLORIDE 180 MG: 180 CAPSULE, COATED, EXTENDED RELEASE ORAL at 09:41

## 2021-08-13 RX ADMIN — INSULIN LISPRO 6 UNITS: 100 INJECTION, SOLUTION INTRAVENOUS; SUBCUTANEOUS at 17:12

## 2021-08-13 RX ADMIN — APIXABAN 5 MG: 5 TABLET, FILM COATED ORAL at 20:28

## 2021-08-13 RX ADMIN — INSULIN LISPRO 2 UNITS: 100 INJECTION, SOLUTION INTRAVENOUS; SUBCUTANEOUS at 10:35

## 2021-08-13 RX ADMIN — IPRATROPIUM BROMIDE AND ALBUTEROL SULFATE 1 AMPULE: .5; 2.5 SOLUTION RESPIRATORY (INHALATION) at 15:25

## 2021-08-13 RX ADMIN — CLOPIDOGREL 75 MG: 75 TABLET, FILM COATED ORAL at 09:43

## 2021-08-13 RX ADMIN — PANTOPRAZOLE SODIUM 40 MG: 40 TABLET, DELAYED RELEASE ORAL at 09:41

## 2021-08-13 RX ADMIN — INSULIN LISPRO 3 UNITS: 100 INJECTION, SOLUTION INTRAVENOUS; SUBCUTANEOUS at 20:28

## 2021-08-13 RX ADMIN — METFORMIN HYDROCHLORIDE 1000 MG: 500 TABLET ORAL at 09:44

## 2021-08-13 RX ADMIN — SODIUM CHLORIDE, PRESERVATIVE FREE 10 ML: 5 INJECTION INTRAVENOUS at 20:28

## 2021-08-13 RX ADMIN — METFORMIN HYDROCHLORIDE 1000 MG: 500 TABLET ORAL at 17:12

## 2021-08-13 RX ADMIN — HEPARIN SODIUM 3350 UNITS: 1000 INJECTION INTRAVENOUS; SUBCUTANEOUS at 02:40

## 2021-08-13 RX ADMIN — INSULIN LISPRO 4 UNITS: 100 INJECTION, SOLUTION INTRAVENOUS; SUBCUTANEOUS at 13:30

## 2021-08-13 RX ADMIN — IOPAMIDOL 75 ML: 755 INJECTION, SOLUTION INTRAVENOUS at 02:06

## 2021-08-13 RX ADMIN — IPRATROPIUM BROMIDE AND ALBUTEROL SULFATE 1 AMPULE: .5; 2.5 SOLUTION RESPIRATORY (INHALATION) at 20:06

## 2021-08-13 RX ADMIN — SODIUM CHLORIDE, PRESERVATIVE FREE 10 ML: 5 INJECTION INTRAVENOUS at 09:49

## 2021-08-13 RX ADMIN — IPRATROPIUM BROMIDE AND ALBUTEROL SULFATE 1 AMPULE: .5; 2.5 SOLUTION RESPIRATORY (INHALATION) at 07:53

## 2021-08-13 ASSESSMENT — PAIN SCALES - GENERAL
PAINLEVEL_OUTOF10: 0

## 2021-08-13 ASSESSMENT — ENCOUNTER SYMPTOMS
BACK PAIN: 0
VOICE CHANGE: 0
TACHYPNEA: 1
SHORTNESS OF BREATH: 1
SINUS PRESSURE: 0
DIARRHEA: 0
CHEST TIGHTNESS: 0
VOMITING: 0
CONSTIPATION: 0
RHINORRHEA: 0
WHEEZING: 0
NAUSEA: 0
ABDOMINAL PAIN: 0
COUGH: 0
CHOKING: 0

## 2021-08-13 NOTE — PROGRESS NOTES
[facility]   Progress Note                   Date:   8/13/2021  Patient name: Shane Conley  Date of admission:  8/8/2021 11:20 AM  MRN:   [id number]  YOB: 1950  PCP: Lars Ballesteros    Reason for Admission: Respiratory failure [facility]) [J96.90]    Subjective:       Clinical Changes / Abnormalities:   Patient seen and examined in bed in room with daughter present after discussion with RN. Denies chest pain or dizziness. On NC oxygen. Ongoing SOB but improved. A fib rate controlled on tele.         Medications:   Scheduled Meds:   levofloxacin  500 mg Intravenous Q24H    methylPREDNISolone  30 mg Intravenous Q8H    bumetanide  2 mg Intravenous BID    insulin glargine  15 Units Subcutaneous QAM    sodium chloride flush  5-40 mL Intravenous 2 times per day    aspirin  81 mg Oral Daily    clopidogrel  75 mg Oral Daily    midodrine  10 mg Oral TID WC    ipratropium-albuterol  1 ampule Inhalation 4x daily    metoprolol tartrate  12.5 mg Oral BID    insulin lispro  0-12 Units Subcutaneous TID WC    insulin lispro  0-6 Units Subcutaneous Nightly    dilTIAZem  180 mg Oral Daily    apixaban  5 mg Oral BID    budesonide-formoterol  2 puff Inhalation BID    cetirizine  10 mg Oral Daily    metFORMIN  1,000 mg Oral BID WC    pantoprazole  40 mg Oral Daily    potassium chloride  10 mEq Oral BID    [Held by provider] spironolactone  25 mg Oral Daily    sodium chloride flush  5-40 mL Intravenous 2 times per day    lisinopril  5 mg Oral Daily    nicotine  1 patch Transdermal Daily     Continuous Infusions:   sodium chloride      dextrose      dextrose      sodium chloride       CBC:   Recent Labs     08/12/21  0649 08/13/21  0442 08/13/21  0557   WBC 17.9* 17.7* 16.7*   HGB 13.3 12.0* 12.1*    275 198     BMP:    Recent Labs     08/11/21  0524 08/12/21  0649 08/13/21 0442   * 137 130*   K 3.3* 4.9 3.9   CL 81* 88* 85*   CO2 37* 38* 33*   BUN 35* 30* 34* CREATININE 0.71 0.75 0.97   GLUCOSE 244* 131* 154*     Hepatic:   Recent Labs     08/12/21  0649   AST 22   ALT 28   BILITOT 1.13   ALKPHOS 135*     Troponin:   No results for input(s): TROPHS in the last 72 hours. BNP: No results for input(s): BNP in the last 72 hours. Lipids:   No results for input(s): CHOL, HDL in the last 72 hours. Invalid input(s): LDLCALCU  INR: No results for input(s): INR in the last 72 hours. DIAGNOSTIC DATA    ECHO 8/12/2021  Limited Echo  Technically very challenging, not all walls well visualized. Left ventricle appears normal in size with normal systolic function. Estimated LV EF 55%. No obvious shunting seen by color doppler. No significant pericardial effusion is seen. Cardiac Cath 8/11/2021  Findings:  LMCA: has distal 40% stenosis. LAD: has ostial eccentric 60% stenosis. LIMA-LAD is patent. LCx: has proximal 50% stenosis. The OM1 is small with 40% stenosis. The OM2   has proximal 50% stenosis. SVG-OM2 is patent. RCA: has proximal 100% occlusion. SVG-RCA is patent. SVG has proximal 75%   stenosis. During engagement there was dampening with bradycardia. The RPDA   has 60% stenosis and is a small vessel. Graft Lesions     Lesion on Aorta Right to Prox RCA: Proximal body. 75% stenosis 12 mm     length reduced to 0%. Pre procedure TD III flow was noted. Post     Procedure TD III flow was present. Good runoff was present. The lesion     was diagnosed as Moderate Risk (B). The lesion was discrete and     eccentric. The lesion showed with irregular contour, mild angulation and     mild tortuosity.     Devices used     - ASAHI Prowater Flex 180 cm. Number of passes: 1.     - Trek Balloon 2.5mm x 12mm. 1 inflation(s) to a max pressure of: 16     susana.      - Xience Nanda 3.5 x 12 XAVIER.  2 inflation(s) to a max pressure of: 17     susana.     Cardiac Grafts      -  There is a Vein graft that originates at the Aorta Right and attaches       to the Prox RCA.     The LV gram was Class 1 obesity due to excess calories with serious comorbidity and body mass index (BMI) of 30.0 to 30.9 in adult     Stage 4 very severe COPD by GOLD classification ([facility] [address]     Hyponatremia     Uncontrolled type 2 diabetes mellitus with hyperglycemia (HCC)     Acute renal failure (HCC)     LAKESHA (acute kidney injury) ([address]     Respiratory failure (HCC)     Benign essential hypertension     Acute on chronic diastolic congestive heart failure (HCC)     GERD (gastroesophageal reflux disease)     Hyperlipidemia     Type 2 diabetes mellitus, without long-term current use of insulin ([address]     Long term (current) use of anticoagulants     Acute exacerbation of chronic obstructive pulmonary disease (COPD) ([address]     Atrial flutter with rapid ventricular response ([address]     Type 2 myocardial infarction due to arrhythmia ([address]      Plan of Treatment:   1. COPD managed by primary team  2. NSTEMI s/p cath as above. Continue ASA, plavix, BB, ACE  Triple therapy with Eliquis for 4 weeks then d/c ASA. 3.  A Flutter with RVR Now rate controlled to slow ventricular response. BB and Bumex, was held today due to HR and BP. Will decrease dose of Cardizem. 4.  HTN Hypotensive BB, ACE and Bumex held today. Continue midodrine. Will decrease dose of lisinopril. 5.  Keep K > 4.0 and Mag > 2.0 stable  6.   Rest of care managed per Primary Team.         Electronically signed by ALISON Handley NP on 8/13/2021 at 3:42 [facility]      [phone]

## 2021-08-13 NOTE — PROGRESS NOTES
[facility]  Office: [address], DO, Vianney Barakat, DO, Malena Whalen, DO, Patrick French Blood, DO, Mila Mesa MD, Amanda Giang MD, Erika Cherry MD, Fauzia Stewart MD, Lindsey Sevilla MD, Shelly Cabezas MD, Mario Katz MD, Eugenio Preston, DO, Amelia Pompa MD, Carla Prasad DO, Jodee Monique MD,  Chuckie Sanchez DO, Stewart Martinez MD, Bautista Serrato MD, Paulo De La Vega MD, Carolyn Renteria MD, Angie López MD, Skylar Hinkle MD, Crescencio Lezama MD, Nasir Saavedra, [facility], [facility] Candidosandra, CNP, Bernie Kramer, CNP, Danni Montesinos, CNS, Zoila Monique, CNP, Shan Ayala, CNP, Laura Shields, CNP, Lisa Lambert, CNP, Madiha Monterroos, CNP, GERALD Kunz-TICO, Marisa Allen, [facility], Ramírez Orta, CNP, Brando Hines, CNP, German Dominique, CNP, Natalya Pisano, CNP, Denton Mcduffie, CNP, Giovanni Juan, CNP, Vaughn Nissen, CNP, Renetta Hernandez, [employer]      Daily Progress Note     Admit Date: 8/8/2021  Bed/Room No.  3004/3004-01  Admitting Physician : Carla Prasad DO  Code Status :Full Code  Hospital Day:  LOS: 5 days   Chief Complaint:     Breathing difficulty     Principal Problem:    Acute on chronic respiratory failure with hypoxia and hypercapnia (HCC)  Active Problems:    PAF (paroxysmal atrial fibrillation) (HCC)    RODNEY treated with BiPAP    Smoking greater than 40 pack years    Class 1 obesity due to excess calories with serious comorbidity and body mass index (BMI) of 30.0 to 30.9 in adult    Stage 4 very severe COPD by GOLD classification [facility])    Uncontrolled type 2 diabetes mellitus with hyperglycemia ([facility] [address]    Respiratory failure ([facility] [address]    Benign essential hypertension    Acute on chronic diastolic congestive heart failure (HCC)    GERD (gastroesophageal reflux disease)    Type 2 diabetes mellitus, without long-term current use of insulin ([facility] [address]    Long term (current) use of anticoagulants    Acute exacerbation of chronic obstructive pulmonary disease (COPD) [facility])     Essential hypertension     History of acute pancreatitis     Hyperglycemia     Hypokalemia     Lumbosacral disc disease     Obesity     Obstructive sleep apnea     Personal history of malignant neoplasm of bladder       Allergies: Allergies   Allergen Reactions    Niacin And Related     Other      Nicotine patch - rash    Statins       Medications :  levofloxacin, 500 mg, Intravenous, Q24H  methylPREDNISolone, 30 mg, Intravenous, Q8H  [START ON 8/14/2021] dilTIAZem, 120 mg, Oral, Daily  [START ON 8/14/2021] lisinopril, 2.5 mg, Oral, Daily  bumetanide, 2 mg, Intravenous, BID  insulin glargine, 15 Units, Subcutaneous, QAM  sodium chloride flush, 5-40 mL, Intravenous, 2 times per day  aspirin, 81 mg, Oral, Daily  clopidogrel, 75 mg, Oral, Daily  midodrine, 10 mg, Oral, TID WC  ipratropium-albuterol, 1 ampule, Inhalation, 4x daily  metoprolol tartrate, 12.5 mg, Oral, BID  insulin lispro, 0-12 Units, Subcutaneous, TID WC  insulin lispro, 0-6 Units, Subcutaneous, Nightly  apixaban, 5 mg, Oral, BID  budesonide-formoterol, 2 puff, Inhalation, BID  cetirizine, 10 mg, Oral, Daily  metFORMIN, 1,000 mg, Oral, BID WC  pantoprazole, 40 mg, Oral, Daily  potassium chloride, 10 mEq, Oral, BID  [Held by provider] spironolactone, 25 mg, Oral, Daily  sodium chloride flush, 5-40 mL, Intravenous, 2 times per day  nicotine, 1 patch, Transdermal, Daily        Review of Systems   Review of Systems   Constitutional: Negative for activity change, appetite change, fatigue, fever and unexpected weight change. HENT: Negative for congestion, nosebleeds, rhinorrhea, sinus pressure, sneezing and voice change. Respiratory: Positive for shortness of breath. Negative for cough, choking, chest tightness and wheezing. Cardiovascular: Negative for chest pain, palpitations and leg swelling. Gastrointestinal: Negative for abdominal pain, constipation, diarrhea, nausea and vomiting.    Genitourinary: Negative for difficulty urinating, discharge, dysuria, frequency and testicular pain. Musculoskeletal: Negative for back pain. Skin: Negative for rash. Neurological: Negative for dizziness, weakness, light-headedness and headaches. Hematological: Does not bruise/bleed easily. Psychiatric/Behavioral: Positive for confusion and sleep disturbance. Negative for agitation, behavioral problems, self-injury and suicidal ideas. Objective :      Current Vitals : Temp: 98.1 °F (36.7 °C),  Pulse: 84, Resp: 21, BP: 105/60, SpO2: 96 %  Last 24 Hrs Vitals   Patient Vitals for the past 24 hrs:   BP Temp Temp src Pulse Resp SpO2   08/13/21 1525 -- -- -- -- 21 96 %   08/13/21 1137 -- -- -- -- 21 98 %   08/13/21 1115 105/60 98.1 °F (36.7 °C) Oral 84 27 96 %   08/13/21 0754 -- -- -- -- 24 100 %   08/13/21 0745 -- -- -- 79 26 100 %   08/13/21 0731 (!) 101/57 98.2 °F (36.8 °C) Axillary 83 24 100 %   08/13/21 0435 101/66 97.7 °F (36.5 °C) Axillary 90 24 97 %   08/13/21 0400 -- -- -- 88 -- --   08/13/21 0050 101/76 98.8 °F (37.1 °C) Oral 96 26 95 %   08/13/21 0030 -- -- -- -- 28 --   08/13/21 0000 -- -- -- 89 -- --   08/12/21 2216 -- -- -- -- 15 96 %   08/12/21 2138 -- -- -- -- 20 100 %   08/12/21 2136 100/63 -- -- 74 -- --   08/12/21 2000 -- -- -- 117 -- --   08/12/21 1900 107/69 98.4 °F (36.9 °C) Oral 105 20 98 %   08/12/21 1717 -- -- -- -- (!) 32 98 %   08/12/21 1615 99/62 97.5 °F (36.4 °C) Axillary -- -- --   08/12/21 1612 -- -- -- -- 27 95 %     Intake / output   08/12 1501 - 08/13 1500  In: 250 [P.O.:250]  Out: 1350 [Urine:1350]  Physical Exam:  Physical Exam  Vitals and nursing note reviewed. Constitutional:       General: He is not in acute distress. Appearance: He is not diaphoretic. Interventions: Nasal cannula in place. HENT:      Head: Normocephalic and atraumatic. Nose:      Right Sinus: No maxillary sinus tenderness or frontal sinus tenderness.       Left Sinus: No maxillary sinus tenderness or frontal sinus tenderness. Mouth/Throat:      Pharynx: No oropharyngeal exudate. Eyes:      General: No scleral icterus. Conjunctiva/sclera: Conjunctivae normal.      Pupils: Pupils are equal, round, and reactive to light. Neck:      Thyroid: No thyromegaly. Vascular: No JVD. Cardiovascular:      Rate and Rhythm: Normal rate and regular rhythm. Pulses:           Dorsalis pedis pulses are 2+ on the right side and 2+ on the left side. Heart sounds: Normal heart sounds. No murmur heard. Pulmonary:      Effort: Pulmonary effort is normal.      Breath sounds: Normal breath sounds. No wheezing or rales. Chest:      Comments: Sternotomy scar  Abdominal:      Palpations: Abdomen is soft. There is no mass. Tenderness: There is no abdominal tenderness. Musculoskeletal:      Cervical back: Full passive range of motion without pain and neck supple. Lymphadenopathy:      Head:      Right side of head: No submandibular adenopathy. Left side of head: No submandibular adenopathy. Cervical: No cervical adenopathy. Skin:     General: Skin is warm. Neurological:      Mental Status: He is disoriented. Motor: No tremor. Psychiatric:         Behavior: Behavior is cooperative.            Laboratory findings:    Recent Labs     08/12/21  0649 08/13/21 0442 08/13/21 0557   WBC 17.9* 17.7* 16.7*   HGB 13.3 12.0* 12.1*   HCT 41.3 36.1* 36.8*    275 198     Recent Labs     08/11/21  0524 08/12/21 0649 08/13/21 0442   * 137 130*   K 3.3* 4.9 3.9   CL 81* 88* 85*   CO2 37* 38* 33*   GLUCOSE 244* 131* 154*   BUN 35* 30* 34*   CREATININE 0.71 0.75 0.97   MG 2.2 2.1 2.0   CALCIUM 8.8 9.5 8.4*   PHOS 2.0*  --  2.2*     Recent Labs     08/11/21  0524 08/12/21  0649 08/13/21 0442   PROT  --  7.2  --    LABALBU 3.4* 4.0 3.0*   AST  --  22  --    ALT  --  28  --    ALKPHOS  --  135*  --    BILITOT  --  1.13  --           Specific Gravity, UA   Date Value Ref Range Status   08/09/2021 1.013 1.005 - 1.030 Final     Protein, UA   Date Value Ref Range Status   08/09/2021 NEGATIVE NEGATIVE Final     RBC, UA   Date Value Ref Range Status   07/12/2021 20 TO 50 0 - 2 /HPF Final     Bacteria, UA   Date Value Ref Range Status   07/12/2021 FEW (A) None Final     Nitrite, Urine   Date Value Ref Range Status   08/09/2021 NEGATIVE NEGATIVE Final     WBC, UA   Date Value Ref Range Status   07/12/2021 5 TO 10 0 - 5 /HPF Final     Leukocyte Esterase, Urine   Date Value Ref Range Status   08/09/2021 NEGATIVE NEGATIVE Final       Imaging / Clinical Data :-   No results found. Clinical Course : gradually improving  Assessment and Plan  :        1. Acute on chronic diastolic heart failure -continue IV diuretics, Cardizem was decreased by cardiology. 2. Acute respiratory failure with hypoxia  -unclear etiology, patient with good urine output but only mild improvement in oxygen requirements. Will start steroids and antibiotics for 5-day course. 3. Multiple pulmonary nodules-largest was 17 mm. Recommends a 3 to 6-month follow-up,  4. A. fib with RVR-Cardizem was decreased today due to bradycardia and hypotension  5. COPD exacerbation - duonebs, Levaquin, steroids  6. Acute metabolic encephalopathy -proving  7. Type 2 diabetes mellitus -on Metformin at home. humalog as needed   8. CAD s/p CABG, stents       Plan:  -Restart IV Bumex  -Decrease Cardizem dose due to bradycardia and hypotension  -Continue Levaquin and steroids for now. Reevaluate tomorrow  -Patient has bed available and can discharge when ready  -Will need outpatient follow-up for multiple pulmonary nodules, his most recent scan shows a 17mm left upper nodule increased from 12mm last year. Patient was informed of findings  -Continue triple therapy with aspirin Plavix and Eliquis until 21 days then discontinue aspirin.   Follow-up with cardiology outpatient    Media Sensor, DO  8/13/2021

## 2021-08-13 NOTE — CARE COORDINATION
Transitional planning:  Call received from Denis Lantigua, [facility], at [phone], ext [phone], has accepted the pt. Needs HENS. Nurse report number is [phone], Ext [phone].     26 PS Dr. Celestino Lui notified of above. [address] [facility] with Jaimie Nj RN and she states pt was on nonrebreather yesterday, then on O2 at 4 L this morning, but oxygen was decreasing to around 90%, so increased him to [address] [facility], states not to leave message, called and requested nurse. Spoke with Rapheal Lesch, RN and notified that pt's oxygen is being titrated still.

## 2021-08-14 LAB
ABSOLUTE EOS #: 0 K/UL (ref 0–0.4)
ABSOLUTE IMMATURE GRANULOCYTE: 0.16 K/UL (ref 0–0.3)
ABSOLUTE LYMPH #: 0.78 K/UL (ref 1–4.8)
ABSOLUTE MONO #: 0.62 K/UL (ref 0.1–0.8)
ALBUMIN SERPL-MCNC: 3.3 G/DL (ref 3.5–5.2)
ANION GAP SERPL CALCULATED.3IONS-SCNC: 13 MMOL/L (ref 9–17)
BASOPHILS # BLD: 0 % (ref 0–2)
BASOPHILS ABSOLUTE: 0 K/UL (ref 0–0.2)
BUN BLDV-MCNC: 36 MG/DL (ref 8–23)
BUN/CREAT BLD: ABNORMAL (ref 9–20)
CALCIUM SERPL-MCNC: 9.1 MG/DL (ref 8.6–10.4)
CHLORIDE BLD-SCNC: 86 MMOL/L (ref 98–107)
CO2: 32 MMOL/L (ref 20–31)
CREAT SERPL-MCNC: 0.87 MG/DL (ref 0.7–1.2)
CULTURE: NORMAL
CULTURE: NORMAL
DIFFERENTIAL TYPE: ABNORMAL
EOSINOPHILS RELATIVE PERCENT: 0 % (ref 1–4)
GFR AFRICAN AMERICAN: >60 ML/MIN
GFR NON-AFRICAN AMERICAN: >60 ML/MIN
GFR SERPL CREATININE-BSD FRML MDRD: ABNORMAL ML/MIN/{1.73_M2}
GFR SERPL CREATININE-BSD FRML MDRD: ABNORMAL ML/MIN/{1.73_M2}
GLUCOSE BLD-MCNC: 160 MG/DL (ref 70–99)
GLUCOSE BLD-MCNC: 189 MG/DL (ref 75–110)
GLUCOSE BLD-MCNC: 259 MG/DL (ref 75–110)
GLUCOSE BLD-MCNC: 268 MG/DL (ref 75–110)
GLUCOSE BLD-MCNC: 286 MG/DL (ref 75–110)
GLUCOSE BLD-MCNC: 367 MG/DL (ref 75–110)
HCT VFR BLD CALC: 35.8 % (ref 40.7–50.3)
HEMOGLOBIN: 12 G/DL (ref 13–17)
IMMATURE GRANULOCYTES: 1 %
LYMPHOCYTES # BLD: 5 % (ref 24–44)
Lab: NORMAL
Lab: NORMAL
MAGNESIUM: 2 MG/DL (ref 1.6–2.6)
MCH RBC QN AUTO: 34 PG (ref 25.2–33.5)
MCHC RBC AUTO-ENTMCNC: 33.5 G/DL (ref 28.4–34.8)
MCV RBC AUTO: 101.4 FL (ref 82.6–102.9)
MONOCYTES # BLD: 4 % (ref 1–7)
MORPHOLOGY: ABNORMAL
NRBC AUTOMATED: 0 PER 100 WBC
PDW BLD-RTO: 14.9 % (ref 11.8–14.4)
PHOSPHORUS: 2.4 MG/DL (ref 2.5–4.5)
PLATELET # BLD: 342 K/UL (ref 138–453)
PLATELET ESTIMATE: ABNORMAL
PMV BLD AUTO: 10.5 FL (ref 8.1–13.5)
POTASSIUM SERPL-SCNC: 5.3 MMOL/L (ref 3.7–5.3)
RBC # BLD: 3.53 M/UL (ref 4.21–5.77)
RBC # BLD: ABNORMAL 10*6/UL
SEG NEUTROPHILS: 90 % (ref 36–66)
SEGMENTED NEUTROPHILS ABSOLUTE COUNT: 13.94 K/UL (ref 1.8–7.7)
SODIUM BLD-SCNC: 131 MMOL/L (ref 135–144)
SPECIMEN DESCRIPTION: NORMAL
SPECIMEN DESCRIPTION: NORMAL
WBC # BLD: 15.5 K/UL (ref 3.5–11.3)
WBC # BLD: ABNORMAL 10*3/UL

## 2021-08-14 PROCEDURE — 6370000000 HC RX 637 (ALT 250 FOR IP): Performed by: STUDENT IN AN ORGANIZED HEALTH CARE EDUCATION/TRAINING PROGRAM

## 2021-08-14 PROCEDURE — 80069 RENAL FUNCTION PANEL: CPT

## 2021-08-14 PROCEDURE — 94761 N-INVAS EAR/PLS OXIMETRY MLT: CPT

## 2021-08-14 PROCEDURE — 6360000002 HC RX W HCPCS: Performed by: INTERNAL MEDICINE

## 2021-08-14 PROCEDURE — 94640 AIRWAY INHALATION TREATMENT: CPT

## 2021-08-14 PROCEDURE — 2060000000 HC ICU INTERMEDIATE R&B

## 2021-08-14 PROCEDURE — 2500000003 HC RX 250 WO HCPCS: Performed by: INTERNAL MEDICINE

## 2021-08-14 PROCEDURE — 99232 SBSQ HOSP IP/OBS MODERATE 35: CPT | Performed by: INTERNAL MEDICINE

## 2021-08-14 PROCEDURE — 6370000000 HC RX 637 (ALT 250 FOR IP): Performed by: NURSE PRACTITIONER

## 2021-08-14 PROCEDURE — 2580000003 HC RX 258: Performed by: STUDENT IN AN ORGANIZED HEALTH CARE EDUCATION/TRAINING PROGRAM

## 2021-08-14 PROCEDURE — 2700000000 HC OXYGEN THERAPY PER DAY

## 2021-08-14 PROCEDURE — 83735 ASSAY OF MAGNESIUM: CPT

## 2021-08-14 PROCEDURE — 85025 COMPLETE CBC W/AUTO DIFF WBC: CPT

## 2021-08-14 PROCEDURE — 6370000000 HC RX 637 (ALT 250 FOR IP): Performed by: INTERNAL MEDICINE

## 2021-08-14 PROCEDURE — 36415 COLL VENOUS BLD VENIPUNCTURE: CPT

## 2021-08-14 PROCEDURE — 94660 CPAP INITIATION&MGMT: CPT

## 2021-08-14 RX ORDER — CLOPIDOGREL BISULFATE 75 MG/1
75 TABLET ORAL DAILY
Qty: 30 TABLET | Refills: 3 | Status: SHIPPED | OUTPATIENT
Start: 2021-08-15 | End: 2021-08-15

## 2021-08-14 RX ORDER — SPIRONOLACTONE 25 MG/1
25 TABLET ORAL DAILY
Qty: 30 TABLET | Refills: 1 | Status: SHIPPED | OUTPATIENT
Start: 2021-08-14 | End: 2021-08-15 | Stop reason: HOSPADM

## 2021-08-14 RX ORDER — BUMETANIDE 2 MG/1
2 TABLET ORAL DAILY
Qty: 30 TABLET | Refills: 1 | Status: SHIPPED | OUTPATIENT
Start: 2021-08-14 | End: 2021-08-25 | Stop reason: DRUGHIGH

## 2021-08-14 RX ORDER — BUMETANIDE 1 MG/1
2 TABLET ORAL DAILY
Status: DISCONTINUED | OUTPATIENT
Start: 2021-08-14 | End: 2021-08-15 | Stop reason: HOSPADM

## 2021-08-14 RX ORDER — LEVOFLOXACIN 500 MG/1
500 TABLET, FILM COATED ORAL DAILY
Status: DISCONTINUED | OUTPATIENT
Start: 2021-08-15 | End: 2021-08-14

## 2021-08-14 RX ORDER — DILTIAZEM HYDROCHLORIDE 120 MG/1
120 CAPSULE, COATED, EXTENDED RELEASE ORAL DAILY
Qty: 30 CAPSULE | Refills: 3 | Status: SHIPPED | OUTPATIENT
Start: 2021-08-15

## 2021-08-14 RX ORDER — PREDNISONE 20 MG/1
40 TABLET ORAL DAILY
Status: DISCONTINUED | OUTPATIENT
Start: 2021-08-15 | End: 2021-08-14

## 2021-08-14 RX ORDER — ASPIRIN 81 MG/1
81 TABLET, CHEWABLE ORAL DAILY
Qty: 30 TABLET | Refills: 0 | Status: SHIPPED | OUTPATIENT
Start: 2021-08-15 | End: 2021-10-06 | Stop reason: ALTCHOICE

## 2021-08-14 RX ADMIN — DILTIAZEM HYDROCHLORIDE 120 MG: 120 CAPSULE, COATED, EXTENDED RELEASE ORAL at 07:43

## 2021-08-14 RX ADMIN — INSULIN GLARGINE 15 UNITS: 100 INJECTION, SOLUTION SUBCUTANEOUS at 07:49

## 2021-08-14 RX ADMIN — IPRATROPIUM BROMIDE AND ALBUTEROL SULFATE 1 AMPULE: .5; 2.5 SOLUTION RESPIRATORY (INHALATION) at 15:14

## 2021-08-14 RX ADMIN — INSULIN LISPRO 5 UNITS: 100 INJECTION, SOLUTION INTRAVENOUS; SUBCUTANEOUS at 20:17

## 2021-08-14 RX ADMIN — PANTOPRAZOLE SODIUM 40 MG: 40 TABLET, DELAYED RELEASE ORAL at 07:45

## 2021-08-14 RX ADMIN — METHYLPREDNISOLONE SODIUM SUCCINATE 30 MG: 40 INJECTION, POWDER, FOR SOLUTION INTRAMUSCULAR; INTRAVENOUS at 07:43

## 2021-08-14 RX ADMIN — INSULIN LISPRO 2 UNITS: 100 INJECTION, SOLUTION INTRAVENOUS; SUBCUTANEOUS at 16:11

## 2021-08-14 RX ADMIN — SODIUM CHLORIDE, PRESERVATIVE FREE 10 ML: 5 INJECTION INTRAVENOUS at 07:36

## 2021-08-14 RX ADMIN — POTASSIUM CHLORIDE 10 MEQ: 1500 TABLET, EXTENDED RELEASE ORAL at 07:45

## 2021-08-14 RX ADMIN — CETIRIZINE HYDROCHLORIDE 10 MG: 10 TABLET ORAL at 07:43

## 2021-08-14 RX ADMIN — BUDESONIDE AND FORMOTEROL FUMARATE DIHYDRATE 2 PUFF: 160; 4.5 AEROSOL RESPIRATORY (INHALATION) at 19:25

## 2021-08-14 RX ADMIN — INSULIN LISPRO 6 UNITS: 100 INJECTION, SOLUTION INTRAVENOUS; SUBCUTANEOUS at 07:30

## 2021-08-14 RX ADMIN — IPRATROPIUM BROMIDE AND ALBUTEROL SULFATE 1 AMPULE: .5; 2.5 SOLUTION RESPIRATORY (INHALATION) at 08:12

## 2021-08-14 RX ADMIN — METFORMIN HYDROCHLORIDE 1000 MG: 500 TABLET ORAL at 07:43

## 2021-08-14 RX ADMIN — LEVOFLOXACIN 500 MG: 5 INJECTION, SOLUTION INTRAVENOUS at 07:47

## 2021-08-14 RX ADMIN — BUMETANIDE 2 MG: 0.25 INJECTION, SOLUTION INTRAMUSCULAR; INTRAVENOUS at 07:54

## 2021-08-14 RX ADMIN — CLOPIDOGREL 75 MG: 75 TABLET, FILM COATED ORAL at 07:43

## 2021-08-14 RX ADMIN — MIDODRINE HYDROCHLORIDE 10 MG: 5 TABLET ORAL at 07:45

## 2021-08-14 RX ADMIN — APIXABAN 5 MG: 5 TABLET, FILM COATED ORAL at 07:42

## 2021-08-14 RX ADMIN — BUDESONIDE AND FORMOTEROL FUMARATE DIHYDRATE 2 PUFF: 160; 4.5 AEROSOL RESPIRATORY (INHALATION) at 08:14

## 2021-08-14 RX ADMIN — APIXABAN 5 MG: 5 TABLET, FILM COATED ORAL at 20:17

## 2021-08-14 RX ADMIN — SODIUM CHLORIDE, PRESERVATIVE FREE 10 ML: 5 INJECTION INTRAVENOUS at 20:20

## 2021-08-14 RX ADMIN — LISINOPRIL 2.5 MG: 2.5 TABLET ORAL at 07:48

## 2021-08-14 RX ADMIN — IPRATROPIUM BROMIDE AND ALBUTEROL SULFATE 1 AMPULE: .5; 2.5 SOLUTION RESPIRATORY (INHALATION) at 19:25

## 2021-08-14 RX ADMIN — IPRATROPIUM BROMIDE AND ALBUTEROL SULFATE 1 AMPULE: .5; 2.5 SOLUTION RESPIRATORY (INHALATION) at 11:02

## 2021-08-14 RX ADMIN — MIDODRINE HYDROCHLORIDE 10 MG: 5 TABLET ORAL at 16:09

## 2021-08-14 RX ADMIN — INSULIN LISPRO 6 UNITS: 100 INJECTION, SOLUTION INTRAVENOUS; SUBCUTANEOUS at 11:29

## 2021-08-14 RX ADMIN — ASPIRIN 81 MG: 81 TABLET, CHEWABLE ORAL at 07:43

## 2021-08-14 RX ADMIN — MIDODRINE HYDROCHLORIDE 10 MG: 5 TABLET ORAL at 11:29

## 2021-08-14 RX ADMIN — SODIUM CHLORIDE, PRESERVATIVE FREE 10 ML: 5 INJECTION INTRAVENOUS at 07:47

## 2021-08-14 RX ADMIN — METOPROLOL TARTRATE 12.5 MG: 25 TABLET ORAL at 07:44

## 2021-08-14 RX ADMIN — METFORMIN HYDROCHLORIDE 1000 MG: 500 TABLET ORAL at 16:09

## 2021-08-14 ASSESSMENT — PAIN SCALES - GENERAL: PAINLEVEL_OUTOF10: 0

## 2021-08-14 NOTE — PROGRESS NOTES
[facility]   Progress Note                   Date:   8/14/2021  Patient name: Rohan Hill  Date of admission:  8/8/2021 11:20 AM  MRN:   [id number]  YOB: 1950  PCP: Eleni Coyle    Reason for Admission: Respiratory failure ([facility] [address] [J96.90]    Subjective:       Clinical Changes / Abnormalities:   Patient seen and examined lying quietly in bed. NO acute CV issues/concerns overnight. Denies chest pain or dizziness. On NC oxygen. Remains Afib with rate 80s. No AM labs .      Medications:   Scheduled Meds:   levofloxacin  500 mg Intravenous Q24H    methylPREDNISolone  30 mg Intravenous Q8H    dilTIAZem  120 mg Oral Daily    lisinopril  2.5 mg Oral Daily    bumetanide  2 mg Intravenous BID    insulin glargine  15 Units Subcutaneous QAM    sodium chloride flush  5-40 mL Intravenous 2 times per day    aspirin  81 mg Oral Daily    clopidogrel  75 mg Oral Daily    midodrine  10 mg Oral TID WC    ipratropium-albuterol  1 ampule Inhalation 4x daily    metoprolol tartrate  12.5 mg Oral BID    insulin lispro  0-12 Units Subcutaneous TID WC    insulin lispro  0-6 Units Subcutaneous Nightly    apixaban  5 mg Oral BID    budesonide-formoterol  2 puff Inhalation BID    cetirizine  10 mg Oral Daily    metFORMIN  1,000 mg Oral BID WC    pantoprazole  40 mg Oral Daily    potassium chloride  10 mEq Oral BID    [Held by provider] spironolactone  25 mg Oral Daily    sodium chloride flush  5-40 mL Intravenous 2 times per day    nicotine  1 patch Transdermal Daily     Continuous Infusions:   sodium chloride      dextrose      dextrose      sodium chloride       CBC:   Recent Labs     08/12/21  0649 08/13/21  0442 08/13/21  0557   WBC 17.9* 17.7* 16.7*   HGB 13.3 12.0* 12.1*    275 198     BMP:    Recent Labs     08/12/21  0649 08/13/21  0442    130*   K 4.9 3.9   CL 88* 85*   CO2 38* 33*   BUN 30* 34*   CREATININE 0.75 0.97   GLUCOSE 131* 154*     Hepatic:   Recent Labs     08/12/21  0649   AST 22   ALT 28   BILITOT 1.13   ALKPHOS 135*     Troponin:   No results for input(s): TROPHS in the last 72 hours. BNP: No results for input(s): BNP in the last 72 hours. Lipids:   No results for input(s): CHOL, HDL in the last 72 hours. Invalid input(s): LDLCALCU  INR: No results for input(s): INR in the last 72 hours. DIAGNOSTIC DATA    ECHO 8/12/2021  Limited Echo  Technically very challenging, not all walls well visualized. Left ventricle appears normal in size with normal systolic function. Estimated LV EF 55%. No obvious shunting seen by color doppler. No significant pericardial effusion is seen. Cardiac Cath 8/11/2021  Findings:  LMCA: has distal 40% stenosis. LAD: has ostial eccentric 60% stenosis. LIMA-LAD is patent. LCx: has proximal 50% stenosis. The OM1 is small with 40% stenosis. The OM2   has proximal 50% stenosis. SVG-OM2 is patent. RCA: has proximal 100% occlusion. SVG-RCA is patent. SVG has proximal 75%   stenosis. During engagement there was dampening with bradycardia. The RPDA   has 60% stenosis and is a small vessel. Graft Lesions     Lesion on Aorta Right to Prox RCA: Proximal body. 75% stenosis 12 mm     length reduced to 0%. Pre procedure TD III flow was noted. Post     Procedure TD III flow was present. Good runoff was present. The lesion     was diagnosed as Moderate Risk (B). The lesion was discrete and     eccentric. The lesion showed with irregular contour, mild angulation and     mild tortuosity.     Devices used     - TVDeck Prowater Flex 180 cm. Number of passes: 1.     - Trek Balloon 2.5mm x 12mm. 1 inflation(s) to a max pressure of: 16     susana.      - Xience Nanda 3.5 x 12 XAVIER. 2 inflation(s) to a max pressure of: 17     susana.     Cardiac Grafts      -  There is a Vein graft that originates at the Aorta Right and attaches       to the Prox RCA.     The LV gram was performed in the MUIR 30 position.    LVEF: 70 %.     Conclusions:   Patent LIMA-LAD, SVG-OM2 and SVG-RCA.    Normal LV systolic function.    Proximal graft stenosis of SVG-RCA. Successful PTCA/XAVIER of proximal RCA.    Recommendations:  1. Post-cath protocol  2. Continue optimal medical therapy  3. Risk factor modification        Objective:   Vitals: /63   Pulse 86   Temp 97.9 °F (36.6 °C) (Oral)   Resp 25   Ht 5' 10\" (1.778 m)   Wt 184 lb 8.4 oz (83.7 kg)   SpO2 100%   BMI 26.48 kg/m²   General appearance: alert and cooperative with exam  HEENT: Head: Normocephalic, no lesions, without obvious abnormality. Neck: no JVD, trachea midline, no adenopathy  Lungs: Diminished throughout with auscultation. No wheezes or rales noted. No increased work or breathing. Heart: Irregular rate and rhythm, s1/s2 auscultated, no murmurs. Atrial fib/flutter 80s. Abdomen: soft, non-tender, bowel sounds active  Extremities: no edema  Neurologic: not done      Assessment / Acute Cardiac Problems:   1. Acute on chronic hypoxic & hypercapnic respiratory failure secondary to COPD exacerbation  2. Acute on chronic congestive heart failure  3. Paroxysmal Atrial fibrillation/Atrial flutter w/ RVR  4. Elevated troponin likely demand ischemia  5. CAD s/p CABG  6. HTN  7. HLD  8. T2DM  9. GERD  10.  Current smoker    Patient Active Problem List:     Neuropathic pain     Chronic bilateral low back pain without sciatica     Lumbosacral spondylosis without myelopathy     DDD (degenerative disc disease), lumbar     Lumbar facet joint syndrome     Panniculitis involving lumbar region     Ventricular tachycardia (HCC)     Acute on chronic respiratory failure with hypoxia and hypercapnia (HCC)     Pneumonia of left lower lobe due to infectious organism     PAF (paroxysmal atrial fibrillation) ([facility] [address]     Pulmonary hypertension with chronic cor pulmonale (HCC)     RODNEY treated with BiPAP     Smoking greater than 40 pack years     Class 1 obesity due to excess calories with serious comorbidity and body mass index (BMI) of 30.0 to 30.9 in adult     Stage 4 very severe COPD by GOLD classification ([address]     Hyponatremia     Uncontrolled type 2 diabetes mellitus with hyperglycemia (HCC)     Acute renal failure (HCC)     LAKESHA (acute kidney injury) ([address]     Respiratory failure (HCC)     Benign essential hypertension     Acute on chronic diastolic congestive heart failure (HCC)     GERD (gastroesophageal reflux disease)     Hyperlipidemia     Type 2 diabetes mellitus, without long-term current use of insulin ([address]     Long term (current) use of anticoagulants     Acute exacerbation of chronic obstructive pulmonary disease (COPD) ([address]     Atrial flutter with rapid ventricular response ([address]     Type 2 myocardial infarction due to arrhythmia ([address]      Plan of Treatment:   1. COPD managed by primary team  2. NSTEMI s/p cath as above. Continue ASA, plavix, BB, Triple therapy with Eliquis for 4 weeks then d/c ASA. 3.  A Flutter with RVR Now rate controlled. Continue PO BB & Cardizem     4. HTN - stable. Continue BB. D/C ACE d/t mild hypotension  5. Keep K > 4.0 and Mag > 2.0 stable  6. No objection to discharge from CV standpoint. Will sign off. F/U with his cardiologist, Dr. Jon Sanders, in 2 weeks upon discharge.           Electronically signed by ALISON Riddle CNP on 8/14/2021 at 9:30 [facility] [address] [facility]      [phone]

## 2021-08-14 NOTE — CARE COORDINATION
Received call from Dr. Efren Forbes inquiring if patient had a bed at [facility]. Spoke with Hollie Elizondo at [place], states her manager will have to review updated notes and she doesn't come in until 8pm.  I will fax updated notes, earliest they can take him is tomorrow. Dr. Efren Forbes updated via PS. Updated vitals & notes faxed.   Patient updated    [phone] confirmed receipt of fax with Hollie Elizondo at [place]

## 2021-08-14 NOTE — PLAN OF CARE
Problem: Falls - Risk of:  Goal: Will remain free from falls  Description: Will remain free from falls  Outcome: Ongoing  Goal: Absence of physical injury  Description: Absence of physical injury  Outcome: Ongoing     Problem: Skin Integrity:  Goal: Will show no infection signs and symptoms  Description: Will show no infection signs and symptoms  Outcome: Ongoing  Goal: Absence of new skin breakdown  Description: Absence of new skin breakdown  Outcome: Ongoing     Problem: Infection:  Goal: Will remain free from infection  Description: Will remain free from infection  Outcome: Ongoing     Problem: Safety:  Goal: Free from accidental physical injury  Description: Free from accidental physical injury  Outcome: Ongoing  Goal: Free from intentional harm  Description: Free from intentional harm  Outcome: Ongoing     Problem: Daily Care:  Goal: Daily care needs are met  Description: Daily care needs are met  Outcome: Ongoing     Problem: Pain:  Goal: Patient's pain/discomfort is manageable  Description: Patient's pain/discomfort is manageable  Outcome: Ongoing     Problem: Discharge Planning:  Goal: Patients continuum of care needs are met  Description: Patients continuum of care needs are met  Outcome: Ongoing

## 2021-08-14 NOTE — DISCHARGE SUMMARY
[facility]  Office: [address], , Viviane Jones, DO, Tenzin Torres, DO, Loco Mayamike Salinas, DO, Ольга Graham MD, Nabeel Abarca MD, Too Mendoza MD, Chris Arellano MD, Tomasz Lyle MD, Kelby Diamond MD, Salome Briggs MD, Leah Ayala, DO, Ivette Agarwal MD, Corinna Lovett DO, Miguelito Pal MD,  Vignesh Telles DO, Jannie Jay MD, Harinder Anderson MD, Todd Jaramillo MD, Paulino Kline MD, Dio Short MD, Yasmine Colunga MD, Sana Parekh MD, Simi Newby, [facility], [facility], CNP, Rachell Rodriguez, CNP, Kerry Anderson, CNS, Omar Diaz, CNP, Samanta Felix, CNP, Faizan Rueda, CNP, Mihir Gu, CNP, Mackenzie Ward, CNP, Catheryn Leventhal, PA-C, Sherrill Newell, [facility], Suman Gutiérrez, CNP, Luciana Armijo, CNP, Eusebio Begum, CNP, Natty Dailey, CNP, Del Linda, CNP, Lisandro Srinivasan, CNP, Lilian Love, [facility], [facility], [address] [place] [address]    Discharge Summary     Patient ID: Nato Lacey  :  1950   MRN: [id number]     ACCOUNT:  [de-identified]   Patient's PCP: Krystal Bright Date: 2021   Discharge Date: 8/15/2021     Length of Stay: 7  Code Status:  Full Code  Admitting Physician: No admitting provider for patient encounter.   Discharge Physician: Duncan Coffman DO     Active Discharge Diagnoses:     Hospital Problem Lists:  Principal Problem:    Acute on chronic respiratory failure with hypoxia and hypercapnia (HCC)  Active Problems:    PAF (paroxysmal atrial fibrillation) (HCC)    RODNEY treated with BiPAP    Smoking greater than 40 pack years    Class 1 obesity due to excess calories with serious comorbidity and body mass index (BMI) of 30.0 to 30.9 in adult    Stage 4 very severe COPD by GOLD classification ([address]    Uncontrolled type 2 diabetes mellitus with hyperglycemia (HCC)    Respiratory failure (HCC)    Benign essential hypertension    Acute on chronic diastolic congestive heart failure ([facility]    GERD (gastroesophageal reflux disease)    Type 2 diabetes mellitus, without long-term current use of insulin ([facility]    Long term (current) use of anticoagulants    Acute exacerbation of chronic obstructive pulmonary disease (COPD) (HCC)    Atrial flutter with rapid ventricular response (HCC)    Type 2 myocardial infarction due to arrhythmia [facility])  Resolved Problems:    * No resolved hospital problems. *      Admission Condition:  poor     Discharged Condition: stable    Hospital Stay:     Hospital Course:  Dale Melo is a 66 y.o. male who initially presented to our hospital with complaints of severe weakness and shortness of breath. Pt does have chronic respiratory failure and is on 4 liters of oxygen for his history COPD/HFpEF. Pt was admitted and found to be in atrial flutter with HR ranging between 160-180. He was started on Cardizem and lopressor with improvement in his symptoms. Echo showed EF of 55%. He was evaluated by Cardiology and underwent cardiac cath for elevated troponin. Cath showed findings listed below. He underwent Successful PTCA/XAVIER of proximal RCA. He was started on Eliquis, ASA and Plavix for one month with plans to discontinue ASA after one month. Patient will need to follow-up with  in two weeks. His aldactone was discontinued due to hyperkalemia and his Cardizem was decreased to 120 mg daily due to hypotension. Pt completed prednisone for tx of COPD exacerbation. He was continued on his metformin. He did get hyperglycemic which was thought to be 2/2 prednisone use. His blood glucose will need to be monitored. Take pt off lantus if blood glucose improves. Pt was started on IV diureses for acute CHF exacerbation but was transitioned to PO. Currently, pt medically stable for discharge to SNF for PT/OT. He will need to follow-up with his cardiologist, primary care physician and pulmonologist on an outpatient basis.   You will need to continue triple therapy for 1 month then stop aspirin and continue with Eliquis/Plavix. Patient's TSH was 0.28 but T4 was 0.96, free T3 1.45. Patient will need follow-up for continue evaluation of his thyroid function panel/subclinical hyperthyroidism. Significant therapeutic interventions:   ECHO 8/12/2021  Limited Echo  Technically very challenging, not all walls well visualized. Left ventricle appears normal in size with normal systolic function. Estimated LV EF 55%. No obvious shunting seen by color doppler. No significant pericardial effusion is seen. Cardiac Cath 8/11/2021  Findings:  LMCA: has distal 40% stenosis. LAD: has ostial eccentric 60% stenosis. LIMA-LAD is patent. LCx: has proximal 50% stenosis. The OM1 is small with 40% stenosis. The OM2   has proximal 50% stenosis. SVG-OM2 is patent. RCA: has proximal 100% occlusion. SVG-RCA is patent. SVG has proximal 75%   stenosis. During engagement there was dampening with bradycardia. The RPDA   has 60% stenosis and is a small vessel. Graft Lesions     Lesion on Aorta Right to Prox RCA: Proximal body. 75% stenosis 12 mm     length reduced to 0%. Pre procedure TD III flow was noted. Post     Procedure TD III flow was present. Good runoff was present. The lesion     was diagnosed as Moderate Risk (B). The lesion was discrete and     eccentric. The lesion showed with irregular contour, mild angulation and     mild tortuosity.     Devices used     - MediWound Prowater Flex 180 cm. Number of passes: 1.     - Trek Balloon 2.5mm x 12mm. 1 inflation(s) to a max pressure of: 16     susana.      - Xience Nanda 3.5 x 12 XAVIER. 2 inflation(s) to a max pressure of: 17     susana.     Cardiac Grafts      -  There is a Vein graft that originates at the Aorta Right and attaches       to the Prox RCA.     The LV gram was performed in the MUIR 30 position. LVEF: 70 %.     Conclusions:   Patent LIMA-LAD, SVG-OM2 and SVG-RCA.    Normal LV systolic function.    Proximal graft stenosis of SVG-RCA. Successful PTCA/XAVIER of proximal RCA.    Recommendations:  Post-cath protocol  Continue optimal medical therapy  Risk factor modification    Significant Diagnostic Studies:   Labs / Micro:  CBC:   Lab Results   Component Value Date    WBC 16.6 08/15/2021    RBC 3.45 08/15/2021    HGB 11.4 08/15/2021    HCT 35.9 08/15/2021    .1 08/15/2021    MCH 33.0 08/15/2021    MCHC 31.8 08/15/2021    RDW 15.0 08/15/2021     08/15/2021     BMP:    Lab Results   Component Value Date    GLUCOSE 176 08/15/2021     08/15/2021    K 5.4 08/15/2021    CL 88 08/15/2021    CO2 36 08/15/2021    ANIONGAP 9 08/15/2021    BUN 40 08/15/2021    CREATININE 0.76 08/15/2021    BUNCRER NOT REPORTED 08/15/2021    CALCIUM 9.5 08/15/2021    LABGLOM >60 08/15/2021    GFRAA >60 08/15/2021    GFR      08/15/2021    GFR NOT REPORTED 08/15/2021        Radiology:  XR CHEST (SINGLE VIEW FRONTAL)    Result Date: 8/12/2021  Left basilar effusion with bibasilar infiltrates. XR CHEST (2 VW)    Result Date: 8/9/2021  Stable findings with similar pleuroparenchymal abnormalities lower 1/2 left hemithorax superimposed on COPD. No acute abnormality or clear cut radiographic CHF. CT CHEST PULMONARY EMBOLISM W CONTRAST    Result Date: 8/13/2021  1. No evidence of acute pulmonary embolism. 2. Moderate scattered bilateral lung interlobular septal thickening and scarring with architectural distortion of the lungs. 3. Adjacent left lower lung lobe peripheral bullae, as discussed above. 4. Left upper lung lobe multifocal nodular soft tissue densities, with largest adjacent to the medial pleura measuring up to 17 mm in diameter. 5. Right posterolateral 7th rib non unified fracture. RECOMMENDATIONS: Multiple pulmonary nodules. Most severe: 17 mm left solid pulmonary nodule within the upper lobe. Recommend a non-contrast Chest CT at 3-6 months.  If patient is high risk for malignancy, recommend an additional non-contrast Chest CT at 18-24 months; if patient is low risk for malignancy a non-contrast Chest CT at 18-24 months is optional. These guidelines do not apply to immunocompromised patients and patients with cancer. Follow up in patients with significant comorbidities as clinically warranted. For lung cancer screening, adhere to Lung-RADS guidelines. Reference: Radiology. 2017; 284(1):228-43. Consultations:    Consults:     Final Specialist Recommendations/Findings:   IP CONSULT TO HEART FAILURE NURSE/COORDINATOR  IP CONSULT TO DIETITIAN  IP CONSULT TO CARDIOLOGY  IP CONSULT TO RESPIRATORY CARE  IP CONSULT TO SOCIAL WORK  IP CONSULT TO CARDIAC REHAB  IP CONSULT TO SOCIAL WORK      The patient was seen and examined on day of discharge and this discharge summary is in conjunction with any daily progress note from day of discharge. Discharge plan:     Disposition: To a non-[facility] facility    Physician Follow Up:     [facility] [address] [place] [address]  [phone]    Schedule an appointment as soon as possible for a visit in 2 weeks  for hospital f/u with cardiology    [address]  [phone]    Schedule an appointment as soon as possible for a visit  managemnt of diabetes, copd, CHF, aflutter and abnormal thyroid labs    Jacinda Lizarraga MD  [phone], [address]  [phone]    Schedule an appointment as soon as possible for a visit  Urinary retention       Requiring Further Evaluation/Follow Up POST HOSPITALIZATION/Incidental Findings:   -Follow-up with your cardiologist within 2 weeks time for hospitalization after having stent placed. Continue taking aspirin, Plavix, Eliquis for 1 month then stop taking the aspirin.  -Repeat labs in 1 week's time to reevaluate potassium level and sodium level.  -Need to monitor blood glucose for treatment of hyperglycemia. Continue Metformin.    -Follow-up with your primary care physician for posthospitalization management and for titration of insulin/diabetes regimen.  -Return to hospital if worsening chest pain, shortness of breath, difficulty breathing, nausea, vomiting, diarrhea.  -Patient will need PT OT  -Followup with your primary care physician to evaluate thyroid function and subclinical hyperthyroidism.  -Continue cardiac diet  -Needs follow-up with urology for management of urinary retention and for TOV  -Follow-up pulmonary nodules.       Diet: cardiac diet    Activity: As tolerated    Instructions to Patient: see above    Discharge Medications:      Medication List      START taking these medications    aspirin 81 MG chewable tablet  Take 1 tablet by mouth daily     clopidogrel 75 MG tablet  Commonly known as: PLAVIX  Take 1 tablet by mouth daily     dilTIAZem 120 MG extended release capsule  Commonly known as: CARDIZEM CD  Take 1 capsule by mouth daily  Replaces: dilTIAZem 180 MG extended release capsule     insulin glargine 100 UNIT/ML injection vial  Commonly known as: LANTUS  Inject 15 Units into the skin every morning  Start taking on: August 16, 2021     metoprolol tartrate 25 MG tablet  Commonly known as: LOPRESSOR  Take 0.5 tablets by mouth 2 times daily        CHANGE how you take these medications    apixaban 5 MG Tabs tablet  Commonly known as: Eliquis  Take 1 tablet by mouth 2 times daily  What changed: how much to take        CONTINUE taking these medications    acetaminophen 325 MG tablet  Commonly known as: TYLENOL     albuterol sulfate  (90 Base) MCG/ACT inhaler     budesonide-formoterol 160-4.5 MCG/ACT Aero  Commonly known as: SYMBICORT     bumetanide 2 MG tablet  Commonly known as: BUMEX  Take 1 tablet by mouth daily     CPAP Machine Misc     dextromethorphan-guaiFENesin  MG/5ML syrup  Commonly known as: ROBITUSSIN-DM  Take 10 mLs by mouth every 4 hours as needed for Cough     lidocaine 5 % ointment  Commonly known as: XYLOCAINE  Apply topically as needed.      loratadine 10 MG tablet  Commonly known as: CLARITIN     magnesium oxide 400 MG tablet  Commonly known as: MAG-OX     metFORMIN 1000 MG tablet  Commonly known as: GLUCOPHAGE  Take 1 tablet by mouth 2 times daily (with meals)     midodrine 5 MG tablet  Commonly known as: PROAMATINE  Take 1 tablet by mouth 3 times daily (with meals)     Milk of Magnesia 400 MG/5ML suspension  Generic drug: magnesium hydroxide     NONFORMULARY     [place] Tabs     pantoprazole 40 MG tablet  Commonly known as: PROTONIX     sennosides-docusate sodium 8.6-50 MG tablet  Commonly known as: SENOKOT-S     Spiriva HandiHaler 18 MCG inhalation capsule  Generic drug: tiotropium     Vitamin B 12 500 MCG Tabs        STOP taking these medications    celecoxib 100 MG capsule  Commonly known as: CELEBREX     dilTIAZem 180 MG extended release capsule  Commonly known as: TIAZAC  Replaced by: dilTIAZem 120 MG extended release capsule      MG tablet  Generic drug: ibuprofen     metoprolol succinate 25 MG extended release tablet  Commonly known as: TOPROL XL     potassium chloride 10 MEQ extended release tablet  Commonly known as: KLOR-CON M     predniSONE 10 MG tablet  Commonly known as: DELTASONE     pregabalin 225 MG capsule  Commonly known as: LYRICA     spironolactone 25 MG tablet  Commonly known as: ALDACTONE           Where to Get Your Medications      These medications were sent to [facility], [address] [place] [address], [place] [address]    Phone: [phone]   apixaban 5 MG Tabs tablet  aspirin 81 MG chewable tablet  bumetanide 2 MG tablet  dilTIAZem 120 MG extended release capsule  metoprolol tartrate 25 MG tablet     Information about where to get these medications is not yet available    Ask your nurse or doctor about these medications  clopidogrel 75 MG tablet  insulin glargine 100 UNIT/ML injection vial         No discharge procedures on file. Time Spent on discharge is  38 mins in patient examination, evaluation, counseling as well as medication reconciliation, prescriptions for required medications, discharge plan and follow up. Electronically signed by   Kermit Dhaliwal DO  8/15/2021  12:48 PM      Thank you Dr. Cecilia Escobar for the opportunity to be involved in this patient's care.

## 2021-08-15 VITALS
OXYGEN SATURATION: 98 % | DIASTOLIC BLOOD PRESSURE: 66 MMHG | RESPIRATION RATE: 24 BRPM | HEIGHT: 70 IN | SYSTOLIC BLOOD PRESSURE: 112 MMHG | WEIGHT: 175.71 LBS | HEART RATE: 64 BPM | BODY MASS INDEX: 25.15 KG/M2 | TEMPERATURE: 98.5 F

## 2021-08-15 LAB
-: NORMAL
ABSOLUTE EOS #: 0 K/UL (ref 0–0.4)
ABSOLUTE IMMATURE GRANULOCYTE: 0.17 K/UL (ref 0–0.3)
ABSOLUTE LYMPH #: 1.66 K/UL (ref 1–4.8)
ABSOLUTE MONO #: 2.32 K/UL (ref 0.1–0.8)
AMORPHOUS: NORMAL
ANION GAP SERPL CALCULATED.3IONS-SCNC: 9 MMOL/L (ref 9–17)
BACTERIA: NORMAL
BASOPHILS # BLD: 0 % (ref 0–2)
BASOPHILS ABSOLUTE: 0 K/UL (ref 0–0.2)
BILIRUBIN URINE: NEGATIVE
BUN BLDV-MCNC: 40 MG/DL (ref 8–23)
BUN/CREAT BLD: ABNORMAL (ref 9–20)
CALCIUM SERPL-MCNC: 9.5 MG/DL (ref 8.6–10.4)
CASTS UA: NORMAL /LPF (ref 0–8)
CHLORIDE BLD-SCNC: 88 MMOL/L (ref 98–107)
CO2: 36 MMOL/L (ref 20–31)
COLOR: YELLOW
COMMENT UA: ABNORMAL
CREAT SERPL-MCNC: 0.76 MG/DL (ref 0.7–1.2)
CRYSTALS, UA: NORMAL /HPF
DIFFERENTIAL TYPE: ABNORMAL
EOSINOPHILS RELATIVE PERCENT: 0 % (ref 1–4)
EPITHELIAL CELLS UA: NORMAL /HPF (ref 0–5)
FERRITIN: 1077 UG/L (ref 30–400)
FOLATE: 9.2 NG/ML
GFR AFRICAN AMERICAN: >60 ML/MIN
GFR NON-AFRICAN AMERICAN: >60 ML/MIN
GFR SERPL CREATININE-BSD FRML MDRD: ABNORMAL ML/MIN/{1.73_M2}
GFR SERPL CREATININE-BSD FRML MDRD: ABNORMAL ML/MIN/{1.73_M2}
GLUCOSE BLD-MCNC: 158 MG/DL (ref 75–110)
GLUCOSE BLD-MCNC: 176 MG/DL (ref 70–99)
GLUCOSE BLD-MCNC: 185 MG/DL (ref 75–110)
GLUCOSE URINE: NEGATIVE
HCT VFR BLD CALC: 35.9 % (ref 40.7–50.3)
HEMOGLOBIN: 11.4 G/DL (ref 13–17)
IMMATURE GRANULOCYTES: 1 %
IRON SATURATION: 43 % (ref 20–55)
IRON: 141 UG/DL (ref 59–158)
KETONES, URINE: NEGATIVE
LEUKOCYTE ESTERASE, URINE: ABNORMAL
LYMPHOCYTES # BLD: 10 % (ref 24–44)
MCH RBC QN AUTO: 33 PG (ref 25.2–33.5)
MCHC RBC AUTO-ENTMCNC: 31.8 G/DL (ref 28.4–34.8)
MCV RBC AUTO: 104.1 FL (ref 82.6–102.9)
MONOCYTES # BLD: 14 % (ref 1–7)
MORPHOLOGY: ABNORMAL
MORPHOLOGY: ABNORMAL
MUCUS: NORMAL
NITRITE, URINE: NEGATIVE
NRBC AUTOMATED: 0 PER 100 WBC
OTHER OBSERVATIONS UA: NORMAL
PDW BLD-RTO: 15 % (ref 11.8–14.4)
PH UA: 7.5 (ref 5–8)
PLATELET # BLD: 271 K/UL (ref 138–453)
PLATELET ESTIMATE: ABNORMAL
PMV BLD AUTO: 10.6 FL (ref 8.1–13.5)
POTASSIUM SERPL-SCNC: 5.4 MMOL/L (ref 3.7–5.3)
PROTEIN UA: NEGATIVE
RBC # BLD: 3.45 M/UL (ref 4.21–5.77)
RBC # BLD: ABNORMAL 10*6/UL
RBC UA: NORMAL /HPF (ref 0–4)
RENAL EPITHELIAL, UA: NORMAL /HPF
SEG NEUTROPHILS: 75 % (ref 36–66)
SEGMENTED NEUTROPHILS ABSOLUTE COUNT: 12.45 K/UL (ref 1.8–7.7)
SODIUM BLD-SCNC: 133 MMOL/L (ref 135–144)
SPECIFIC GRAVITY UA: 1.01 (ref 1–1.03)
T3 FREE: 1.45 PG/ML (ref 2.02–4.43)
THYROXINE, FREE: 0.96 NG/DL (ref 0.93–1.7)
TOTAL IRON BINDING CAPACITY: 330 UG/DL (ref 250–450)
TRICHOMONAS: NORMAL
TURBIDITY: CLEAR
UNSATURATED IRON BINDING CAPACITY: 189 UG/DL (ref 112–347)
URINE HGB: NEGATIVE
UROBILINOGEN, URINE: NORMAL
VITAMIN B-12: 1147 PG/ML (ref 232–1245)
WBC # BLD: 16.6 K/UL (ref 3.5–11.3)
WBC # BLD: ABNORMAL 10*3/UL
WBC UA: NORMAL /HPF (ref 0–5)
YEAST: NORMAL

## 2021-08-15 PROCEDURE — 82947 ASSAY GLUCOSE BLOOD QUANT: CPT

## 2021-08-15 PROCEDURE — 6370000000 HC RX 637 (ALT 250 FOR IP): Performed by: INTERNAL MEDICINE

## 2021-08-15 PROCEDURE — 81001 URINALYSIS AUTO W/SCOPE: CPT

## 2021-08-15 PROCEDURE — 2700000000 HC OXYGEN THERAPY PER DAY

## 2021-08-15 PROCEDURE — 82607 VITAMIN B-12: CPT

## 2021-08-15 PROCEDURE — 94660 CPAP INITIATION&MGMT: CPT

## 2021-08-15 PROCEDURE — 6370000000 HC RX 637 (ALT 250 FOR IP): Performed by: STUDENT IN AN ORGANIZED HEALTH CARE EDUCATION/TRAINING PROGRAM

## 2021-08-15 PROCEDURE — 85025 COMPLETE CBC W/AUTO DIFF WBC: CPT

## 2021-08-15 PROCEDURE — 83550 IRON BINDING TEST: CPT

## 2021-08-15 PROCEDURE — 82728 ASSAY OF FERRITIN: CPT

## 2021-08-15 PROCEDURE — 51702 INSERT TEMP BLADDER CATH: CPT

## 2021-08-15 PROCEDURE — 36415 COLL VENOUS BLD VENIPUNCTURE: CPT

## 2021-08-15 PROCEDURE — 94761 N-INVAS EAR/PLS OXIMETRY MLT: CPT

## 2021-08-15 PROCEDURE — 6370000000 HC RX 637 (ALT 250 FOR IP): Performed by: NURSE PRACTITIONER

## 2021-08-15 PROCEDURE — 99239 HOSP IP/OBS DSCHRG MGMT >30: CPT | Performed by: INTERNAL MEDICINE

## 2021-08-15 PROCEDURE — 2580000003 HC RX 258: Performed by: STUDENT IN AN ORGANIZED HEALTH CARE EDUCATION/TRAINING PROGRAM

## 2021-08-15 PROCEDURE — 83540 ASSAY OF IRON: CPT

## 2021-08-15 PROCEDURE — 94640 AIRWAY INHALATION TREATMENT: CPT

## 2021-08-15 PROCEDURE — 82746 ASSAY OF FOLIC ACID SERUM: CPT

## 2021-08-15 PROCEDURE — 80048 BASIC METABOLIC PNL TOTAL CA: CPT

## 2021-08-15 PROCEDURE — 84439 ASSAY OF FREE THYROXINE: CPT

## 2021-08-15 PROCEDURE — 84481 FREE ASSAY (FT-3): CPT

## 2021-08-15 RX ORDER — CLOPIDOGREL BISULFATE 75 MG/1
75 TABLET ORAL DAILY
Qty: 30 TABLET | Refills: 0 | DISCHARGE
Start: 2021-08-15

## 2021-08-15 RX ORDER — INSULIN GLARGINE 100 [IU]/ML
15 INJECTION, SOLUTION SUBCUTANEOUS EVERY MORNING
Qty: 1 VIAL | Refills: 0 | DISCHARGE
Start: 2021-08-16

## 2021-08-15 RX ADMIN — BUDESONIDE AND FORMOTEROL FUMARATE DIHYDRATE 2 PUFF: 160; 4.5 AEROSOL RESPIRATORY (INHALATION) at 07:41

## 2021-08-15 RX ADMIN — SODIUM CHLORIDE, PRESERVATIVE FREE 10 ML: 5 INJECTION INTRAVENOUS at 07:31

## 2021-08-15 RX ADMIN — MIDODRINE HYDROCHLORIDE 10 MG: 5 TABLET ORAL at 11:23

## 2021-08-15 RX ADMIN — ASPIRIN 81 MG: 81 TABLET, CHEWABLE ORAL at 08:19

## 2021-08-15 RX ADMIN — INSULIN GLARGINE 15 UNITS: 100 INJECTION, SOLUTION SUBCUTANEOUS at 08:21

## 2021-08-15 RX ADMIN — CLOPIDOGREL 75 MG: 75 TABLET, FILM COATED ORAL at 08:20

## 2021-08-15 RX ADMIN — BUMETANIDE 2 MG: 1 TABLET ORAL at 08:19

## 2021-08-15 RX ADMIN — IPRATROPIUM BROMIDE AND ALBUTEROL SULFATE 1 AMPULE: .5; 2.5 SOLUTION RESPIRATORY (INHALATION) at 07:41

## 2021-08-15 RX ADMIN — IPRATROPIUM BROMIDE AND ALBUTEROL SULFATE 1 AMPULE: .5; 2.5 SOLUTION RESPIRATORY (INHALATION) at 11:29

## 2021-08-15 RX ADMIN — APIXABAN 5 MG: 5 TABLET, FILM COATED ORAL at 08:19

## 2021-08-15 RX ADMIN — INSULIN LISPRO 2 UNITS: 100 INJECTION, SOLUTION INTRAVENOUS; SUBCUTANEOUS at 07:28

## 2021-08-15 RX ADMIN — PANTOPRAZOLE SODIUM 40 MG: 40 TABLET, DELAYED RELEASE ORAL at 08:20

## 2021-08-15 RX ADMIN — METOPROLOL TARTRATE 12.5 MG: 25 TABLET ORAL at 08:20

## 2021-08-15 RX ADMIN — CETIRIZINE HYDROCHLORIDE 10 MG: 10 TABLET ORAL at 08:20

## 2021-08-15 RX ADMIN — METFORMIN HYDROCHLORIDE 1000 MG: 500 TABLET ORAL at 08:20

## 2021-08-15 RX ADMIN — DILTIAZEM HYDROCHLORIDE 120 MG: 120 CAPSULE, COATED, EXTENDED RELEASE ORAL at 08:20

## 2021-08-15 RX ADMIN — MIDODRINE HYDROCHLORIDE 10 MG: 5 TABLET ORAL at 08:20

## 2021-08-15 RX ADMIN — INSULIN LISPRO 2 UNITS: 100 INJECTION, SOLUTION INTRAVENOUS; SUBCUTANEOUS at 11:23

## 2021-08-15 NOTE — PLAN OF CARE
Problem: Falls - Risk of:  Goal: Will remain free from falls  Description: Will remain free from falls  8/15/2021 1012 by Jourdan Joshi RN  Outcome: Ongoing  8/15/2021 0443 by Alexandro Sharif RN  Outcome: Ongoing  Goal: Absence of physical injury  Description: Absence of physical injury  8/15/2021 1012 by Jourdan Joshi RN  Outcome: Ongoing  8/15/2021 0443 by Alexandro Sharif RN  Outcome: Ongoing     Problem: Skin Integrity:  Goal: Will show no infection signs and symptoms  Description: Will show no infection signs and symptoms  8/15/2021 1012 by Jourdan Joshi RN  Outcome: Ongoing  8/15/2021 0443 by Alexandro Sharif RN  Outcome: Ongoing  Goal: Absence of new skin breakdown  Description: Absence of new skin breakdown  8/15/2021 1012 by Jourdan Joshi RN  Outcome: Ongoing  8/15/2021 0443 by Alexandro Sharif RN  Outcome: Ongoing     Problem: Infection:  Goal: Will remain free from infection  Description: Will remain free from infection  8/15/2021 1012 by Jourdan Joshi RN  Outcome: Ongoing  8/15/2021 0443 by Alexandro Sharif RN  Outcome: Ongoing     Problem: Safety:  Goal: Free from accidental physical injury  Description: Free from accidental physical injury  8/15/2021 1012 by Jourdan Joshi RN  Outcome: Ongoing  8/15/2021 0443 by Alexandro Sharif RN  Outcome: Ongoing  Goal: Free from intentional harm  Description: Free from intentional harm  8/15/2021 1012 by Jourdan Joshi RN  Outcome: Ongoing  8/15/2021 0443 by Alexandro Sharif RN  Outcome: Ongoing     Problem: Daily Care:  Goal: Daily care needs are met  Description: Daily care needs are met  8/15/2021 1012 by Jourdan Joshi RN  Outcome: Ongoing  8/15/2021 0443 by Alexandro Sharif RN  Outcome: Ongoing     Problem: Pain:  Goal: Patient's pain/discomfort is manageable  Description: Patient's pain/discomfort is manageable  8/15/2021 1012 by Jourdan Joshi RN  Outcome: Ongoing  8/15/2021 0443 by Alexandro Sharif RN  Outcome: Ongoing Problem: Discharge Planning:  Goal: Patients continuum of care needs are met  Description: Patients continuum of care needs are met  8/15/2021 1012 by Jerod Wilson RN  Outcome: Ongoing  8/15/2021 0443 by Macy Bajwa RN  Outcome: Ongoing

## 2021-08-15 NOTE — CARE COORDINATION
Spoke with Nadja Finn at [facility] who states they can take patient today as long as on no IVs, he is not. Asked her if he needed to be swabbed for covid, she says no, they will swab him when he gets there. Neva Hurtado RN updated, I will arrange transportation    21  with Ernie Taylor at [facility], states  time will be 8:30 pm.  She states she will call around to see if she can get earlier    1209 spoke with Nadja Finn at [facility], she will  to see if they can accept him at that time     spoke with Shaista Livingston, they are not able to transport patient to [place]. Spoke with [place] they have no transportation available    21   Call back from Nadja Finn at [facility] they can take patient but are requesting sarah & med rec be faxed beforehand so they can ensure meds will be available. PS Dr. Tristen Dove to request med rec/sarah be done asap    [phone] call from Ernie Taylor at [facility], now CAROL will transport patient at [address] [place] [address] at [facility] updated, ok. Patient and son Keyla Yepez updated, agreeable.   Erica Adama updated, given number for report    [phone] AVS/SARAH faxed to [facility] at [phone]    Discharge [address] [facility] Case Management Department  Written by: Dileep Calderon RN    Patient Name: Chito Issasandra  Attending Provider: David Wild DO  Admit Date: 2021 11:20 AM  MRN: [id number]  Account: [de-identified]                     : 1950  Discharge Date:   8/15/2021      Disposition: [facility]

## 2021-08-15 NOTE — PROGRESS NOTES
Pt being discharged to [facility] via medical transport. All belongings, including glasses, cell phone, and , accounted for.

## 2021-08-15 NOTE — PROGRESS NOTES
Nutrition Assessment     Type and Reason for Visit: Initial (LOS day 7)    Nutrition Recommendations/Plan:   -Continue 4 CHO, cardiac diet     Nutrition Assessment:  Pt stated that his appetite has been very good this week consuming 100% of his meals. No wounds noted. Pt stated UBW of 205 lbs and denies any recent wt loss. Pt denies current wt of 175 lbs and reports that the bed-scale is inaccurate and that he has not lost weight d/t eating most of his meals. Pt deemed to be low-risk. Full nutrition assessment not needed at this time. Will monitor for changes in nutritional status. Malnutrition Assessment:  Malnutrition Status: No malnutrition    Current Nutrition Therapies:    ADULT DIET; Regular; 4 carb choices (60 gm/meal);  Low Fat/Low Chol/High Fiber/PEARL    Nutrition Diagnosis:   No nutrition diagnosis at this time     Nutrition Interventions:   Food and/or Nutrient Delivery:  Continue Current Diet  Nutrition Education/Counseling:  Education not indicated   Coordination of Nutrition Care:  Continue to monitor while inpatient    Nutrition Monitoring and Evaluation:   Food/Nutrient Intake Outcomes:  Food and Nutrient Intake  Physical Signs/Symptoms Outcomes:  Biochemical Data, Nutrition Focused Physical Findings, Skin, Weight, GI Status, Fluid Status or Edema     Discharge Planning:    Continue current diet     Electronically signed by Crescencio García RD, LD on 8/15/21 at 10:59 AM EDT    Contact: [phone]

## 2021-08-15 NOTE — PROGRESS NOTES
[facility]  Office: [address], DO, Niranjan Reveleslouis, DO, Wilson Kayleigh, DO, Kwesi Montgomery Blood, DO, Mariangel Daniels MD, Chris Asif MD, Tori Phelps MD, Theodore Khalil MD, Kiera Farnsworth MD, Marian Galdamez MD, Fermin Candelario MD, Nery Aquino, DO, Nora Lan MD, Vale Spears, DO, Hipolito Beard MD,  Madi Duckworth, DO, Dennys Yung MD, Kinza Conrad MD, Ruthy Sethi MD, Keeley Paez MD, Saadia Mcmahon MD, Franky Cardenas MD, Casi Cook MD, Anahy Dukes, [facility], [facility], CNP, Matilda Silva, CNP, Rafia Washington, [facility], Joaquin Eden, CNP, Alison Hawkins, CNP, Timoteo Estrada, CNP, Armani Lantigua, CNP, Hemalatha Jeong, CNP, Donna Goldman, PA-C, Christine Mcgrath, [facility], Holley Perez, CNP, Godfrey Reeves, CNP, Daxa Zurita, CNP, Paul Arias, CNP, Cody Mccann, CNP, Nubia Webster, CNP, Bernard Herrera, [facility], Magalys Fitzpatrick, [address] [place] [address]    Progress Note    8/15/2021    12:48 PM    Name:   Rohan Hill  MRN:     [id number]     [place]:      [de-identified]   Room:   [address] [place] [address] Day:  7  Admit Date:  8/8/2021 11:20 AM    PCP:   Eleni Coyle  Code Status:  Full Code    Subjective:     C/C: SOB  Interval History Status: improved. Patient seen and examined at bedside today. No acute events overnight. No complaints    Brief History:     Rohan Hill is 66 y.o. male  Who was admitted to the hospital on 8/8/2021 for treatment of Acute on chronic respiratory failure with hypoxia and hypercapnia ([facility] Cells transfer from [facility] where he presented after fall with head injury. Patient was having difficulty breathing. Work-up at [facility] showed decompensated heart failure and was found to be in respiratory failure with hyperkalemia. Patient was treated with BiPAP and insulin with dextrose for hyperkalemia. CT head was negative for acute abnormality.   [facility] Patient has underlying history of bladder cancer, congestive heart failure, COPD, diabetes mellitus, hypertension, obstructive sleep apnea. He had cardiac bypass and stents for CAD. Work-up on admission showed elevated proBNP 1606, elevated troponin 125, leukocytosis 12.2.  Echocardiogram from last month showed preserved ejection fraction, diastolic dysfunction. Review of Systems:     Constitutional:  negative for chills, fevers, sweats  Respiratory:  negative for cough, dyspnea on exertion, shortness of breath, wheezing  Cardiovascular:  negative for chest pain, chest pressure/discomfort, lower extremity edema, palpitations  Gastrointestinal:  negative for abdominal pain, constipation, diarrhea, nausea, vomiting  Neurological:  negative for dizziness, headache    Medications: Allergies:     Allergies   Allergen Reactions    Niacin And Related     Other      Nicotine patch - rash    Statins        Current Meds:   Scheduled Meds:    bumetanide  2 mg Oral Daily    dilTIAZem  120 mg Oral Daily    lisinopril  2.5 mg Oral Daily    insulin glargine  15 Units Subcutaneous QAM    sodium chloride flush  5-40 mL Intravenous 2 times per day    aspirin  81 mg Oral Daily    clopidogrel  75 mg Oral Daily    midodrine  10 mg Oral TID WC    ipratropium-albuterol  1 ampule Inhalation 4x daily    metoprolol tartrate  12.5 mg Oral BID    insulin lispro  0-12 Units Subcutaneous TID WC    insulin lispro  0-6 Units Subcutaneous Nightly    apixaban  5 mg Oral BID    budesonide-formoterol  2 puff Inhalation BID    cetirizine  10 mg Oral Daily    metFORMIN  1,000 mg Oral BID WC    pantoprazole  40 mg Oral Daily    potassium chloride  10 mEq Oral BID    sodium chloride flush  5-40 mL Intravenous 2 times per day    nicotine  1 patch Transdermal Daily     Continuous Infusions:    sodium chloride      dextrose      dextrose      sodium chloride       PRN Meds: sodium chloride flush, sodium chloride, acetaminophen, ondansetron, glucose, dextrose, glucagon (rDNA), dextrose, glucose, dextrose, glucagon (rDNA), dextrose, albuterol sulfate HFA, sodium chloride flush, sodium chloride, ondansetron **OR** ondansetron, polyethylene glycol, acetaminophen **OR** acetaminophen, metoprolol, LORazepam    Data:     Past Medical History:   has a past medical history of Agent orange exposure, Anxiety state, [facility] CHF (congestive heart failure) ([facility] Chronic obstructive pulmonary disease (COPD) ([facility] Chronic post-traumatic stress disorder, Chronic respiratory failure with hypercapnia ([facility] COPD exacerbation ([facility] Coronary artery disease, Degenerative disc disease, lumbar, Depression, Diabetes mellitus ([facility] Essential hypertension, History of acute pancreatitis, Hyperglycemia, Hypokalemia, Lumbosacral disc disease, Obesity, Obstructive sleep apnea, and Personal history of malignant neoplasm of bladder. Social History:   reports that he has been smoking cigarettes. He has a 54.00 pack-year smoking history. He has never used smokeless tobacco. He reports current alcohol use. He reports that he does not use drugs. Family History:   Family History   Problem Relation Age of Onset    Emphysema Mother     Heart Disease Father     Heart Failure Father        Vitals:  /66   Pulse 61   Temp 98.5 °F (36.9 °C) (Oral)   Resp 19   Ht 5' 10\" (1.778 m)   Wt 175 lb 11.3 oz (79.7 kg)   SpO2 98%   BMI 25.21 kg/m²   Temp (24hrs), Av.1 °F (36.7 °C), Min:97.7 °F (36.5 °C), Max:98.5 °F (36.9 °C)    Recent Labs     21  1603 21  1921 08/15/21  0718 08/15/21  1102   POCGLU 189* 367* 185* 158*       I/O (24Hr):     Intake/Output Summary (Last 24 hours) at 8/15/2021 1248  Last data filed at 8/15/2021 1200  Gross per 24 hour   Intake 1214 ml   Output 1950 ml   Net -736 ml       Labs:  Hematology:  Recent Labs     21  0557 21  1539 08/15/21  0535   WBC 16.7* 15.5* 16.6*   RBC 3.64* 3.53* 3.45*   HGB 12.1* 12.0* 11.4*   HCT 36.8* 35.8* 35.9*   .1 101.4 104.1*   MCH 33.2 34.0* 33.0   MCHC 32.9 33.5 31.8   RDW 15.0* 14.9* 15.0*    342 271   MPV 9.8 10.5 10.6     Chemistry:  Recent Labs     08/13/21  0442 08/14/21  1539 08/15/21  0535   * 131* 133*   K 3.9 5.3 5.4*   CL 85* 86* 88*   CO2 33* 32* 36*   GLUCOSE 154* 160* 176*   BUN 34* 36* 40*   CREATININE 0.97 0.87 0.76   MG 2.0 2.0  --    ANIONGAP 12 13 9   LABGLOM >60 >60 >60   GFRAA >60 >60 >60   CALCIUM 8.4* 9.1 9.5   PHOS 2.2* 2.4*  --      Recent Labs     08/13/21  0442 08/13/21  0733 08/14/21  0647 08/14/21  1113 08/14/21  1539 08/14/21  1603 08/14/21  1921 08/15/21  0718 08/15/21  1102   LABALBU 3.0*  --   --   --  3.3*  --   --   --   --    POCGLU  --    < > 259* 268*  --  189* 367* 185* 158*    < > = values in this interval not displayed. ABG:  Lab Results   Component Value Date    POCPH 7.440 08/08/2021    POCPCO2 66.6 08/08/2021    POCPO2 63.8 08/08/2021    POCHCO3 45.2 08/08/2021    NBEA NOT REPORTED 08/08/2021    PBEA 18 08/08/2021    HOR6NTW NOT REPORTED 08/08/2021    CWOK5JRD 92 08/08/2021    FIO2 NOT REPORTED 08/09/2021     Lab Results   Component Value Date/Time    SPECIAL LEFT HAND 20 ML 08/13/2021 05:55 AM     Lab Results   Component Value Date/Time    CULTURE NO GROWTH 2 DAYS 08/13/2021 05:55 AM       Radiology:  XR CHEST (SINGLE VIEW FRONTAL)    Result Date: 8/12/2021  Left basilar effusion with bibasilar infiltrates. XR CHEST (2 VW)    Result Date: 8/9/2021  Stable findings with similar pleuroparenchymal abnormalities lower 1/2 left hemithorax superimposed on COPD. No acute abnormality or clear cut radiographic CHF. CT CHEST PULMONARY EMBOLISM W CONTRAST    Result Date: 8/13/2021  1. No evidence of acute pulmonary embolism. 2. Moderate scattered bilateral lung interlobular septal thickening and scarring with architectural distortion of the lungs.  3. Adjacent left lower lung lobe peripheral bullae, as discussed above. 4. Left upper lung lobe multifocal nodular soft tissue densities, with largest adjacent to the medial pleura measuring up to 17 mm in diameter. 5. Right posterolateral 7th rib non unified fracture. RECOMMENDATIONS: Multiple pulmonary nodules. Most severe: 17 mm left solid pulmonary nodule within the upper lobe. Recommend a non-contrast Chest CT at 3-6 months. If patient is high risk for malignancy, recommend an additional non-contrast Chest CT at 18-24 months; if patient is low risk for malignancy a non-contrast Chest CT at 18-24 months is optional. These guidelines do not apply to immunocompromised patients and patients with cancer. Follow up in patients with significant comorbidities as clinically warranted. For lung cancer screening, adhere to Lung-RADS guidelines. Reference: Radiology. 2017; 284(1):228-43.        Physical Examination:        General appearance:  alert, cooperative and no distress, chronically ill-appearing male on nasal cannula  Mental Status:  oriented to person, place and time and normal affect  Lungs:  clear to auscultation bilaterally, normal effort  Heart:  regular rate and irregular rhythm, no murmur  Abdomen:  soft, nontender, nondistended, normal bowel sounds, no masses, hepatomegaly, splenomegaly  Extremities:  no edema, redness, tenderness in the calves  Skin:  no gross lesions, rashes, induration, ecchymosis noted over sites of previous IVs    Assessment:        Hospital Problems         Last Modified POA    * (Principal) Acute on chronic respiratory failure with hypoxia and hypercapnia ([address] 8/8/2021 Yes    PAF (paroxysmal atrial fibrillation) ([address] 8/8/2021 Yes    RODNEY treated with BiPAP 8/8/2021 Yes    Smoking greater than 40 pack years 8/8/2021 Yes    Class 1 obesity due to excess calories with serious comorbidity and body mass index (BMI) of 30.0 to 30.9 in adult 8/8/2021 Yes    Stage 4 very severe COPD by GOLD classification ([address] 8/8/2021 Yes    Uncontrolled type 2 diabetes mellitus with hyperglycemia ([address] 8/8/2021 Yes    Respiratory failure ([address] 8/8/2021 Yes    Benign essential hypertension 8/8/2021 Yes    Acute on chronic diastolic congestive heart failure ([address] 8/8/2021 Yes    GERD (gastroesophageal reflux disease) 8/8/2021 Yes    Type 2 diabetes mellitus, without long-term current use of insulin ([address] 8/8/2021 Yes    Long term (current) use of anticoagulants 8/8/2021 Yes    Acute exacerbation of chronic obstructive pulmonary disease (COPD) ([address] 8/8/2021 Yes    Atrial flutter with rapid ventricular response ([address] 8/8/2021 Yes    Type 2 myocardial infarction due to arrhythmia ([address] 8/8/2021 Yes          Plan:        1. Acute on chronic hypoxic and  hypercapnic respiratory failure 2/2 Acute on chronic exacerbation of HFpEF and Acute COPD exacerbation: resolved  2. NSTEMI: s/p PTCA/XAVIER of proximal RCA on 8/11/2021: Continue ASA/Plavix/Elliquis for 4 weeks then d/c ASA. Continue BB, ACE. Stop aldactone due to hyperkalemia    3. Atrial Flutter with RVR: Continue BB, Cardizem was decreased. 4. Acute COPD exacerbation: completed treatment. . Continue home regimen. 5. Urinary retention: [address] [facility] urology eval for urodynamic testing. 6. Multiple Pulmonary nodules: Needs outpatient follow up. 7. Acute Toxic metabolic encephalopathy: resolved. 8. Hyperglycemia: 2/2 steroid use. 9. CAD s/p CABG LIMA-LAD, SVG-OM2 and SVG-RCA. Proximal graft stenosis of SVG/RCA noted with successful PTCA/XAVIER of proximal RCA 8/11/2021: Continue ASA 81 mg daily, Eliquis 5 mg BID, Plavix 75 mg daily< lisinopril, lopressor. Stop ASA after 4 weeks. 10. GERD: continue PPI  11. Discharge planning: plan to go SNF in am.  12. DVT ppx  13.  PT/OT    Rickey Tan DO  8/15/2021  12:48 PM

## 2021-08-16 ENCOUNTER — CARE COORDINATION (OUTPATIENT)
Dept: CASE MANAGEMENT | Age: 71
End: 2021-08-16

## 2021-08-19 LAB
CULTURE: NORMAL
Lab: NORMAL
SPECIMEN DESCRIPTION: NORMAL

## 2021-08-25 ENCOUNTER — OFFICE VISIT (OUTPATIENT)
Dept: PAIN MANAGEMENT | Age: 71
End: 2021-08-25
Payer: MEDICARE

## 2021-08-25 VITALS
BODY MASS INDEX: 26.04 KG/M2 | DIASTOLIC BLOOD PRESSURE: 80 MMHG | HEIGHT: 70 IN | SYSTOLIC BLOOD PRESSURE: 115 MMHG | WEIGHT: 181.9 LBS | RESPIRATION RATE: 16 BRPM

## 2021-08-25 DIAGNOSIS — M47.817 LUMBOSACRAL SPONDYLOSIS WITHOUT MYELOPATHY: ICD-10-CM

## 2021-08-25 DIAGNOSIS — M51.36 DDD (DEGENERATIVE DISC DISEASE), LUMBAR: ICD-10-CM

## 2021-08-25 DIAGNOSIS — G89.29 CHRONIC BILATERAL LOW BACK PAIN WITHOUT SCIATICA: ICD-10-CM

## 2021-08-25 DIAGNOSIS — M54.50 CHRONIC BILATERAL LOW BACK PAIN WITHOUT SCIATICA: ICD-10-CM

## 2021-08-25 DIAGNOSIS — M79.2 NEUROPATHIC PAIN: Primary | ICD-10-CM

## 2021-08-25 PROCEDURE — G8427 DOCREV CUR MEDS BY ELIG CLIN: HCPCS | Performed by: NURSE PRACTITIONER

## 2021-08-25 PROCEDURE — 1036F TOBACCO NON-USER: CPT | Performed by: NURSE PRACTITIONER

## 2021-08-25 PROCEDURE — 1123F ACP DISCUSS/DSCN MKR DOCD: CPT | Performed by: NURSE PRACTITIONER

## 2021-08-25 PROCEDURE — 1111F DSCHRG MED/CURRENT MED MERGE: CPT | Performed by: NURSE PRACTITIONER

## 2021-08-25 PROCEDURE — 4040F PNEUMOC VAC/ADMIN/RCVD: CPT | Performed by: NURSE PRACTITIONER

## 2021-08-25 PROCEDURE — G8417 CALC BMI ABV UP PARAM F/U: HCPCS | Performed by: NURSE PRACTITIONER

## 2021-08-25 PROCEDURE — 99214 OFFICE O/P EST MOD 30 MIN: CPT | Performed by: NURSE PRACTITIONER

## 2021-08-25 PROCEDURE — 3017F COLORECTAL CA SCREEN DOC REV: CPT | Performed by: NURSE PRACTITIONER

## 2021-08-25 RX ORDER — TETRACYCLINE HYDROCHLORIDE 500 MG/1
CAPSULE ORAL
COMMUNITY
Start: 2021-08-24

## 2021-08-25 RX ORDER — FLUTICASONE PROPIONATE 50 MCG
1 BLISTER, WITH INHALATION DEVICE INHALATION DAILY
COMMUNITY
End: 2021-10-06

## 2021-08-25 RX ORDER — TRAMADOL HYDROCHLORIDE 50 MG/1
50 TABLET ORAL EVERY 8 HOURS PRN
Qty: 90 TABLET | Refills: 0 | Status: SHIPPED | OUTPATIENT
Start: 2021-08-25 | End: 2021-08-25 | Stop reason: SDUPTHER

## 2021-08-25 RX ORDER — BUMETANIDE 1 MG/1
1 TABLET ORAL DAILY
COMMUNITY

## 2021-08-25 RX ORDER — IPRATROPIUM BROMIDE AND ALBUTEROL SULFATE 2.5; .5 MG/3ML; MG/3ML
1 SOLUTION RESPIRATORY (INHALATION) EVERY 4 HOURS
COMMUNITY

## 2021-08-25 RX ORDER — TAMSULOSIN HYDROCHLORIDE 0.4 MG/1
0.4 CAPSULE ORAL DAILY
COMMUNITY

## 2021-08-25 RX ORDER — TRAMADOL HYDROCHLORIDE 50 MG/1
50 TABLET ORAL EVERY 8 HOURS PRN
Qty: 90 TABLET | Refills: 0 | Status: SHIPPED | OUTPATIENT
Start: 2021-08-25 | End: 2021-09-24

## 2021-08-25 ASSESSMENT — ENCOUNTER SYMPTOMS
BACK PAIN: 1
SHORTNESS OF BREATH: 1
DIARRHEA: 0
CONSTIPATION: 0

## 2021-08-25 NOTE — PROGRESS NOTES
Subjective:      Chief Complaint   Patient presents with    Back Pain     no pain    Tailbone Pain    Peripheral Neuropathy    Other     mychal been in and out of [facility] due to fluid build up; he is now at [facility]      Patient ID: Chito Zapata is a 66 y.o. male. Back Pain  Associated symptoms include numbness and weakness (legs). Pertinent negatives include no chest pain. Foot Pain   Associated symptoms include numbness. Other  Associated symptoms include arthralgias, fatigue, myalgias, numbness and weakness (legs). Pertinent negatives include no chest pain. Here today for routine pain clinic recheck.     Neuropathic pain in feet-previously taking Lyrica, fluid retention, no longer taking     Routine chiropractic care, also acupuncture    Pain Assessment  Location of Pain:  (neuropathy)  Severity of Pain: 3  Quality of Pain:  (heaviness)  Duration of Pain: Persistent  Frequency of Pain: Constant  Aggravating Factors:  (sitting for to long)  Limiting Behavior: Yes    Allergies   Allergen Reactions    Niacin And Related     Other      Nicotine patch - rash    Statins        Outpatient Medications Marked as Taking for the 8/25/21 encounter (Office Visit) with ALISON Boyd - CNP   Medication Sig Dispense Refill    apixaban (ELIQUIS) 2.5 MG TABS tablet Take 2.5 mg by mouth 2 times daily      bumetanide (BUMEX) 1 MG tablet Take 1 mg by mouth daily      tamsulosin (FLOMAX) 0.4 MG capsule Take 0.4 mg by mouth daily      fluticasone propionate (FLOVENT DISKUS) 50 MCG/BLIST AEPB inhaler Inhale 1 puff into the lungs daily      ipratropium-albuterol (DUONEB) 0.5-2.5 (3) MG/3ML SOLN nebulizer solution Inhale 1 vial into the lungs every 4 hours      nicotine (NICODERM CQ) 7 MG/24HR Place 1 patch onto the skin every 24 hours      tetracycline (ACHROMYCIN;SUMYCIN) 500 MG capsule       insulin aspart (NOVOLOG) 100 UNIT/ML injection vial Inject into the skin 3 times daily (before meals)  traMADol (ULTRAM) 50 MG tablet Take 1 tablet by mouth every 8 hours as needed for Pain for up to 30 days. 90 tablet 0    insulin glargine (LANTUS) 100 UNIT/ML injection vial Inject 15 Units into the skin every morning 1 vial 0    clopidogrel (PLAVIX) 75 MG tablet Take 1 tablet by mouth daily 30 tablet 0    aspirin 81 MG chewable tablet Take 1 tablet by mouth daily 30 tablet 0    metoprolol tartrate (LOPRESSOR) 25 MG tablet Take 0.5 tablets by mouth 2 times daily 60 tablet 3    dilTIAZem (CARDIZEM CD) 120 MG extended release capsule Take 1 capsule by mouth daily 30 capsule 3    [DISCONTINUED] bumetanide (BUMEX) 2 MG tablet Take 1 tablet by mouth daily 30 tablet 1    midodrine (PROAMATINE) 5 MG tablet Take 1 tablet by mouth 3 times daily (with meals) 90 tablet 3    dextromethorphan-guaiFENesin (ROBITUSSIN-DM)  MG/5ML syrup Take 10 mLs by mouth every 4 hours as needed for Cough 1 Bottle 0    metFORMIN (GLUCOPHAGE) 1000 MG tablet Take 1 tablet by mouth 2 times daily (with meals) 180 tablet 0    magnesium oxide (MAG-OX) 400 MG tablet Take 400 mg by mouth daily      NONFORMULARY Take 1 tablet by mouth daily ON COR vitamin - to help keep bladder cancer away      acetaminophen (TYLENOL) 325 MG tablet Take 650 mg by mouth every 6 hours as needed for Pain      albuterol sulfate  (90 Base) MCG/ACT inhaler Inhale 2 puffs into the lungs every 6 hours as needed      lidocaine (XYLOCAINE) 5 % ointment Apply topically as needed.  240 g 5    sennosides-docusate sodium (SENOKOT-S) 8.6-50 MG tablet Take 1 tablet by mouth daily      Multiple Vitamins-Minerals (ONCOVITE) TABS Take by mouth      loratadine (CLARITIN) 10 MG tablet Take 10 mg by mouth daily      CPAP Machine MISC by Does not apply route BiPAP      budesonide-formoterol (SYMBICORT) 160-4.5 MCG/ACT AERO Inhale 2 puffs into the lungs 2 times daily      magnesium hydroxide (MILK OF MAGNESIA) 400 MG/5ML suspension Take by mouth daily as needed for Constipation      Cyanocobalamin (VITAMIN B 12) 500 MCG TABS Take by mouth      pantoprazole (PROTONIX) 40 MG tablet Take 40 mg by mouth daily         Past Medical History:   Diagnosis Date    Agent orange exposure     Anxiety state     [facility]     bladder    [facility]est[facility]     Chronic obstructive pulmonary disease (COPD) (HCC)     Chronic post-traumatic stress disorder     Chronic respiratory failure with hypercapnia (HCC)     COPD exacerbation ([facility]     Coronary artery disease     Degenerative disc disease, lumbar     Depression     Diabetes mellitus ([facility]     Essential hypertension     History of acute pancreatitis     Hyperglycemia     Hypokalemia     Lumbosacral disc disease     Obesity     Obstructive sleep apnea     Personal history of malignant neoplasm of bladder        Past Surgical History:   Procedure Laterality Date    BLADDER SURGERY      OTHER SURGICAL HISTORY      heart bypass with stents    OTHER SURGICAL HISTORY Bilateral 02/10/2021    Bilateral L4-5, L5-S1 Facet Block Injection [facility] Ashley PAIN MANAGEMENT PROCEDURE Bilateral 02/10/2021    Facet block injection L4-5, L5-S1, bilateral       Family History   Problem Relation Age of Onset    Emphysema Mother     Heart Disease Father     Heart Failure Father        Social History     Socioeconomic History    Marital status: Legally      Spouse name: None    Number of children: None    Years of education: None    Highest education level: None   Occupational History    None   Tobacco Use    Smoking status: Former Smoker     Packs/day: 1.00     Years: 54.00     Pack years: 54.00     Types: Cigarettes    Smokeless tobacco: Never Used    Tobacco comment: stopped about 3 weeks ago   Vaping Use    Vaping Use: Never used   Substance and Sexual Activity    Alcohol use: Not Currently     Comment: occasional    Drug use: Never    Sexual activity: Not Currently   Other Topics Concern    None   Social History Narrative    None     Social Determinants of Health     Financial Resource Strain:     Difficulty of Paying Living Expenses:    Food Insecurity:     Worried About Running Out of Food in the Last Year:     [address] in the Last Year:    Transportation Needs:     Lack of Transportation (Medical):  Lack of Transportation (Non-Medical):    Physical Activity:     Days of Exercise per Week:     Minutes of Exercise per Session:    Stress:     Feeling of Stress :    Social Connections:     Frequency of Communication with Friends and Family:     Frequency of Social Gatherings with Friends and Family:     Attends Zoroastrianism Services:     Active Member of Clubs or Organizations:     Attends Club or Organization Meetings:     Marital Status:    Intimate Partner Violence:     Fear of Current or Ex-Partner:     Emotionally Abused:     Physically Abused:     Sexually Abused:      Review of Systems   Constitutional: Positive for activity change and fatigue. Respiratory: Positive for shortness of breath (hx COPD, smoker). Cardiovascular: Negative for chest pain. Gastrointestinal: Negative for constipation and diarrhea. Musculoskeletal: Positive for arthralgias, back pain and myalgias. Skin: Negative. Allergic/Immunologic: Negative for environmental allergies and food allergies. Neurological: Positive for weakness (legs) and numbness. Burning pain hands/feet   Psychiatric/Behavioral: Positive for sleep disturbance. Objective:   Physical Exam  Vitals reviewed. Constitutional:       General: He is awake. He is not in acute distress. Appearance: Normal appearance. He is well-developed and well-groomed. He is not ill-appearing, toxic-appearing or diaphoretic. Interventions: He is not intubated. Nasal cannula in place. HENT:      Head: Normocephalic and atraumatic.       Right Ear: External ear normal. Left Ear: External ear normal.      Nose: Nose normal.      Mouth/Throat:      Lips: Pink. Mouth: Mucous membranes are moist.   Eyes:      General: Lids are normal.   Cardiovascular:      Rate and Rhythm: Normal rate. Pulmonary:      Effort: Pulmonary effort is normal. No tachypnea, bradypnea, accessory muscle usage, prolonged expiration, respiratory distress or retractions. He is not intubated. Abdominal:      General: Abdomen is flat and protuberant. Palpations: Abdomen is soft. Musculoskeletal:      Right lower le+ Pitting Edema present. Left lower le+ Pitting Edema present.       Comments: MRI LUMBAR SPINE        HISTORY:  Low back pain and radiculopathy A.  Weakness in legs.     History of bladder cancer           Study:  MRI lumbar spine without contrast dated 2020.  1222 hours           TECHNIQUE: Sagittal and axial T1 and T2 images were obtained in the     lumbar spine without IV contrast.           Findings:  At T11-12 through L1-2, degenerative disc changes are present with     small broad-based posterior disc protrusion but no significant compromise of     the canal or foramina.  The conus medullaris is not compromised           At L2-3, reactive endplate changes are present along with a hemangioma in the     L2 vertebral body.           Broad-based posterior disc protrusion is present along with endplate     osteophytes but no critical compromise of the canal or foramina.           An abdominal aortic aneurysm is present at the L4 level measuring     approximately 3.4 cm in diameter.           At L4-5, degenerative disc space narrowing is present with endplate     osteophytes and a small broad-based posterior and posterior lateral disc     protrusion.  These changes result in moderate right lateral recess and right     foraminal narrowing but the canal is not critically compromised.  Facet     hypertrophic changes are present           At L5-S1, degenerative disc space narrowing is present with a broad-based     posterior and right posterior lateral disc protrusion along with facet and     ligamentum flavum changes.  The right foramen is moderately narrowed.  The     canal remains broad.           IMPRESSION:     Chronic multilevel fairly severe degenerative disc and facet changes as     described above.           Broad-based right posterior lateral disc protrusion L4-5 resulting in     narrowing of the right lateral recess and right proximal foramen.           Degenerative disc changes at L5-S1 along with degenerative facet changes     resulting in moderate bilateral foraminal stenosis right greater than left           Less significant degenerative changes at other levels           Abdominal aortic aneurysm at the level         Skin:     General: Skin is warm and dry. Capillary Refill: Capillary refill takes less than 2 seconds. Findings: No rash. Nails: There is no clubbing. Neurological:      General: No focal deficit present. Mental Status: He is alert and oriented to person, place, and time. Cranial Nerves: No cranial nerve deficit. Sensory: Sensory deficit (bilateral feet) present. Motor: No tremor. Gait: Gait abnormal (antalgic). Psychiatric:         Speech: Speech normal.         Behavior: Behavior is cooperative. Assessment:       Diagnosis Orders   1. Neuropathic pain  traMADol (ULTRAM) 50 MG tablet    DISCONTINUED: traMADol (ULTRAM) 50 MG tablet    DISCONTINUED: traMADol (ULTRAM) 50 MG tablet   2. Chronic bilateral low back pain without sciatica     3. Lumbosacral spondylosis without myelopathy     4. DDD (degenerative disc disease), lumbar           Plan:       Remain off Lyrica  To start tramadol 50mg tid prn pain     Controlled Substance Monitoring:    Acute and Chronic Pain Monitoring:   RX Monitoring 8/26/2021   Periodic Controlled Substance Monitoring No signs of potential drug abuse or diversion identified. Feliciano Robles, APRN - CNP

## 2021-10-06 ENCOUNTER — OFFICE VISIT (OUTPATIENT)
Dept: PAIN MANAGEMENT | Age: 71
End: 2021-10-06
Payer: MEDICARE

## 2021-10-06 VITALS
HEIGHT: 70 IN | SYSTOLIC BLOOD PRESSURE: 110 MMHG | RESPIRATION RATE: 16 BRPM | BODY MASS INDEX: 24.91 KG/M2 | WEIGHT: 174 LBS | DIASTOLIC BLOOD PRESSURE: 64 MMHG

## 2021-10-06 DIAGNOSIS — M47.816 LUMBAR FACET JOINT SYNDROME: ICD-10-CM

## 2021-10-06 DIAGNOSIS — M79.2 NEUROPATHIC PAIN: ICD-10-CM

## 2021-10-06 DIAGNOSIS — M51.36 DDD (DEGENERATIVE DISC DISEASE), LUMBAR: ICD-10-CM

## 2021-10-06 DIAGNOSIS — M47.817 LUMBOSACRAL SPONDYLOSIS WITHOUT MYELOPATHY: ICD-10-CM

## 2021-10-06 DIAGNOSIS — M54.50 CHRONIC BILATERAL LOW BACK PAIN WITHOUT SCIATICA: Primary | ICD-10-CM

## 2021-10-06 DIAGNOSIS — G89.29 CHRONIC BILATERAL LOW BACK PAIN WITHOUT SCIATICA: Primary | ICD-10-CM

## 2021-10-06 PROCEDURE — 1036F TOBACCO NON-USER: CPT | Performed by: NURSE PRACTITIONER

## 2021-10-06 PROCEDURE — 1123F ACP DISCUSS/DSCN MKR DOCD: CPT | Performed by: NURSE PRACTITIONER

## 2021-10-06 PROCEDURE — 99214 OFFICE O/P EST MOD 30 MIN: CPT | Performed by: NURSE PRACTITIONER

## 2021-10-06 PROCEDURE — 3017F COLORECTAL CA SCREEN DOC REV: CPT | Performed by: NURSE PRACTITIONER

## 2021-10-06 PROCEDURE — G8484 FLU IMMUNIZE NO ADMIN: HCPCS | Performed by: NURSE PRACTITIONER

## 2021-10-06 PROCEDURE — 4040F PNEUMOC VAC/ADMIN/RCVD: CPT | Performed by: NURSE PRACTITIONER

## 2021-10-06 PROCEDURE — G8420 CALC BMI NORM PARAMETERS: HCPCS | Performed by: NURSE PRACTITIONER

## 2021-10-06 PROCEDURE — G8427 DOCREV CUR MEDS BY ELIG CLIN: HCPCS | Performed by: NURSE PRACTITIONER

## 2021-10-06 RX ORDER — TRAMADOL HYDROCHLORIDE 50 MG/1
50 TABLET ORAL 3 TIMES DAILY
Qty: 90 TABLET | Refills: 2 | Status: SHIPPED | OUTPATIENT
Start: 2021-10-06 | End: 2021-11-05

## 2021-10-13 ASSESSMENT — ENCOUNTER SYMPTOMS
CONSTIPATION: 0
BACK PAIN: 1
DIARRHEA: 0
SHORTNESS OF BREATH: 1

## 2021-10-13 NOTE — PROGRESS NOTES
daily      acetaminophen (TYLENOL) 325 MG tablet Take 650 mg by mouth every 6 hours as needed for Pain      albuterol sulfate  (90 Base) MCG/ACT inhaler Inhale 2 puffs into the lungs every 6 hours as needed      sennosides-docusate sodium (SENOKOT-S) 8.6-50 MG tablet Take 1 tablet by mouth daily      loratadine (CLARITIN) 10 MG tablet Take 10 mg by mouth daily      budesonide-formoterol (SYMBICORT) 160-4.5 MCG/ACT AERO Inhale 2 puffs into the lungs 2 times daily      magnesium hydroxide (MILK OF MAGNESIA) 400 MG/5ML suspension Take by mouth daily as needed for Constipation      Cyanocobalamin (VITAMIN B 12) 500 MCG TABS Take by mouth      pantoprazole (PROTONIX) 40 MG tablet Take 40 mg by mouth daily         Past Medical History:   Diagnosis Date    Agent orange exposure     Anxiety state     Cancer ([facility] [address]     bladder    [facility]est[facility]     Chronic obstructive pulmonary disease (COPD) ([facility]     Chronic post-traumatic stress disorder     Chronic respiratory failure with hypercapnia ([facility])     COPD exacerbation ([facility])     Coronary artery disease     Degenerative disc disease, lumbar     Depression     Diabetes mellitus ([facility] [address]     Essential hypertension     History of acute pancreatitis     Hyperglycemia     Hypokalemia     Lumbosacral disc disease     Obesity     Obstructive sleep apnea     Personal history of malignant neoplasm of bladder        Past Surgical History:   Procedure Laterality Date    BLADDER SURGERY      CORONARY ANGIOPLASTY WITH STENT PLACEMENT  09/2021    done [address]      heart bypass with stents    OTHER SURGICAL HISTORY Bilateral 02/10/2021    Bilateral L4-5, L5-S1 Facet Block Injection [facility] Dr Rachid Buenrostro PAIN MANAGEMENT PROCEDURE Bilateral 02/10/2021    Facet block injection L4-5, L5-S1, bilateral       Family History   Problem Relation Age of Onset    Emphysema Mother     Heart Disease Father     Heart Failure Father        Social History     Socioeconomic History    Marital status: Legally      Spouse name: None    Number of children: None    Years of education: None    Highest education level: None   Occupational History    None   Tobacco Use    Smoking status: Former Smoker     Packs/day: 1.00     Years: 54.00     Pack years: 54.00     Types: Cigarettes    Smokeless tobacco: Never Used    Tobacco comment: stopped about 3 weeks ago   Vaping Use    Vaping Use: Never used   Substance and Sexual Activity    Alcohol use: Not Currently     Comment: occasional    Drug use: Never    Sexual activity: Not Currently   Other Topics Concern    None   Social History Narrative    None     Social Determinants of Health     Financial Resource Strain:     Difficulty of Paying Living Expenses:    Food Insecurity:     Worried About Running Out of Food in the Last Year:     Ran Out of Food in the Last Year:    Transportation Needs:     Lack of Transportation (Medical):  Lack of Transportation (Non-Medical):    Physical Activity:     Days of Exercise per Week:     Minutes of Exercise per Session:    Stress:     Feeling of Stress :    Social Connections:     Frequency of Communication with Friends and Family:     Frequency of Social Gatherings with Friends and Family:     Attends Judaism Services:     Active Member of Clubs or Organizations:     Attends Club or Organization Meetings:     Marital Status:    Intimate Partner Violence:     Fear of Current or Ex-Partner:     Emotionally Abused:     Physically Abused:     Sexually Abused:      Review of Systems   Constitutional: Positive for activity change and fatigue. Respiratory: Positive for shortness of breath (hx COPD, smoker). Cardiovascular: Negative for chest pain. Gastrointestinal: Negative for constipation and diarrhea. Musculoskeletal: Positive for arthralgias, back pain and myalgias. Skin: Negative. Allergic/Immunologic: Negative for environmental allergies and food allergies. Neurological: Positive for weakness (legs) and numbness. Burning pain hands/feet   Psychiatric/Behavioral: Positive for sleep disturbance. Objective:   Physical Exam  Vitals reviewed. Constitutional:       General: He is awake. He is not in acute distress. Appearance: Normal appearance. He is well-developed and well-groomed. He is not ill-appearing, toxic-appearing or diaphoretic. Interventions: He is not intubated. Nasal cannula in place. HENT:      Head: Normocephalic and atraumatic. Right Ear: External ear normal.      Left Ear: External ear normal.      Nose: Nose normal.      Mouth/Throat:      Lips: Pink. Mouth: Mucous membranes are moist.   Eyes:      General: Lids are normal.   Cardiovascular:      Rate and Rhythm: Normal rate. Pulmonary:      Effort: Pulmonary effort is normal. No tachypnea, bradypnea, accessory muscle usage, prolonged expiration, respiratory distress or retractions. He is not intubated. Abdominal:      General: Abdomen is flat and protuberant. Palpations: Abdomen is soft. Musculoskeletal:      Right lower le+ Pitting Edema present. Left lower le+ Pitting Edema present.       Comments: MRI LUMBAR SPINE        HISTORY:  Low back pain and radiculopathy A.  Weakness in legs.     History of bladder cancer           Study:  MRI lumbar spine without contrast dated 2020.  7480 hours           TECHNIQUE: Sagittal and axial T1 and T2 images were obtained in the     lumbar spine without IV contrast.           Findings:  At T11-12 through L1-2, degenerative disc changes are present with     small broad-based posterior disc protrusion but no significant compromise of     the canal or foramina.  The conus medullaris is not compromised           At L2-3, reactive endplate changes are present along with a hemangioma in the     L2 vertebral body.           Broad-based posterior disc protrusion is present along with endplate     osteophytes but no critical compromise of the canal or foramina.           An abdominal aortic aneurysm is present at the L4 level measuring     approximately 3.4 cm in diameter.           At L4-5, degenerative disc space narrowing is present with endplate     osteophytes and a small broad-based posterior and posterior lateral disc     protrusion.  These changes result in moderate right lateral recess and right     foraminal narrowing but the canal is not critically compromised.  Facet     hypertrophic changes are present           At L5-S1, degenerative disc space narrowing is present with a broad-based     posterior and right posterior lateral disc protrusion along with facet and     ligamentum flavum changes.  The right foramen is moderately narrowed.  The     canal remains broad.           IMPRESSION:     Chronic multilevel fairly severe degenerative disc and facet changes as     described above.           Broad-based right posterior lateral disc protrusion L4-5 resulting in     narrowing of the right lateral recess and right proximal foramen.           Degenerative disc changes at L5-S1 along with degenerative facet changes     resulting in moderate bilateral foraminal stenosis right greater than left           Less significant degenerative changes at other levels           Abdominal aortic aneurysm at the level         Skin:     General: Skin is warm and dry. Capillary Refill: Capillary refill takes less than 2 seconds. Findings: No rash. Nails: There is no clubbing. Neurological:      General: No focal deficit present. Mental Status: He is alert and oriented to person, place, and time. Cranial Nerves: No cranial nerve deficit. Sensory: Sensory deficit (bilateral feet) present. Motor: No tremor. Gait: Gait abnormal (antalgic).    Psychiatric:         Speech: Speech normal.         Behavior: Behavior is cooperative. Assessment:       Diagnosis Orders   1. Chronic bilateral low back pain without sciatica     2. Neuropathic pain  traMADol (ULTRAM) 50 MG tablet   3. Lumbar facet joint syndrome     4. DDD (degenerative disc disease), lumbar     5. Lumbosacral spondylosis without myelopathy           Plan:       Chronic pain diagnoses such as   1. Chronic bilateral low back pain without sciatica    2. Neuropathic pain    3. Lumbar facet joint syndrome    4. DDD (degenerative disc disease), lumbar    5. Lumbosacral spondylosis without myelopathy     controlled on current medication regime, wll continue current pain medications to improve quality of life and function. Controlled Substance Monitoring:    Acute and Chronic Pain Monitoring:   RX Monitoring 10/13/2021   Periodic Controlled Substance Monitoring No signs of potential drug abuse or diversion identified.;Obtaining appropriate analgesic effect of treatment.      RTC 3 months              ALISON Zazueta - CNP

## 2022-01-12 ENCOUNTER — OFFICE VISIT (OUTPATIENT)
Dept: PAIN MANAGEMENT | Age: 72
End: 2022-01-12
Payer: MEDICARE

## 2022-01-12 VITALS — SYSTOLIC BLOOD PRESSURE: 108 MMHG | DIASTOLIC BLOOD PRESSURE: 72 MMHG

## 2022-01-12 DIAGNOSIS — M54.50 CHRONIC BILATERAL LOW BACK PAIN WITHOUT SCIATICA: ICD-10-CM

## 2022-01-12 DIAGNOSIS — M47.817 LUMBOSACRAL SPONDYLOSIS WITHOUT MYELOPATHY: ICD-10-CM

## 2022-01-12 DIAGNOSIS — M79.2 NEUROPATHIC PAIN: Primary | ICD-10-CM

## 2022-01-12 DIAGNOSIS — M51.36 DDD (DEGENERATIVE DISC DISEASE), LUMBAR: ICD-10-CM

## 2022-01-12 DIAGNOSIS — G89.29 CHRONIC BILATERAL LOW BACK PAIN WITHOUT SCIATICA: ICD-10-CM

## 2022-01-12 PROCEDURE — G8427 DOCREV CUR MEDS BY ELIG CLIN: HCPCS | Performed by: NURSE PRACTITIONER

## 2022-01-12 PROCEDURE — 99214 OFFICE O/P EST MOD 30 MIN: CPT | Performed by: NURSE PRACTITIONER

## 2022-01-12 PROCEDURE — 3017F COLORECTAL CA SCREEN DOC REV: CPT | Performed by: NURSE PRACTITIONER

## 2022-01-12 PROCEDURE — G8420 CALC BMI NORM PARAMETERS: HCPCS | Performed by: NURSE PRACTITIONER

## 2022-01-12 PROCEDURE — 1123F ACP DISCUSS/DSCN MKR DOCD: CPT | Performed by: NURSE PRACTITIONER

## 2022-01-12 PROCEDURE — 1036F TOBACCO NON-USER: CPT | Performed by: NURSE PRACTITIONER

## 2022-01-12 PROCEDURE — 4040F PNEUMOC VAC/ADMIN/RCVD: CPT | Performed by: NURSE PRACTITIONER

## 2022-01-12 PROCEDURE — G8484 FLU IMMUNIZE NO ADMIN: HCPCS | Performed by: NURSE PRACTITIONER

## 2022-01-12 RX ORDER — OXYCODONE HYDROCHLORIDE AND ACETAMINOPHEN 5; 325 MG/1; MG/1
1 TABLET ORAL 2 TIMES DAILY PRN
Qty: 60 TABLET | Refills: 0 | Status: SHIPPED | OUTPATIENT
Start: 2022-01-12 | End: 2022-02-09 | Stop reason: SDUPTHER

## 2022-01-12 ASSESSMENT — ENCOUNTER SYMPTOMS
DIARRHEA: 0
SHORTNESS OF BREATH: 1
BACK PAIN: 1
CONSTIPATION: 0

## 2022-01-12 NOTE — PROGRESS NOTES
Subjective:      Chief Complaint   Patient presents with    3 Month Follow-Up     bilateral feet & hand pain    Medication Problem     current pain medication is not working. Patient ID: Geoffrey Coleman is a 67 y.o. male. Back Pain  Associated symptoms include numbness and weakness (legs). Pertinent negatives include no chest pain. Other  Associated symptoms include arthralgias, fatigue, myalgias, numbness and weakness (legs). Pertinent negatives include no chest pain. Foot Pain   Associated symptoms include numbness. Here today for routine pain clinic recheck. Neuropathic pain in feet-previously taking Lyrica, fluid retention, no longer taking. Tramadol without relief    Pain Assessment  Location of Pain: Foot (bilateral hands)  Location Modifiers: Left,Right  Severity of Pain: 6  Quality of Pain: Other (Comment) (tingling, numbness)  Duration of Pain: Persistent  Frequency of Pain: Constant  Aggravating Factors: Standing  Limiting Behavior: Yes  Relieving Factors: Other (Comment) (\"nothing\")    Allergies   Allergen Reactions    Niacin And Related     Other      Nicotine patch - rash    Statins        Outpatient Medications Marked as Taking for the 1/12/22 encounter (Office Visit) with ALISON Balderas - CNP   Medication Sig Dispense Refill    oxyCODONE-acetaminophen (PERCOCET) 5-325 MG per tablet Take 1 tablet by mouth 2 times daily as needed for Pain for up to 30 days.  60 tablet 0    apixaban (ELIQUIS) 2.5 MG TABS tablet Take 2.5 mg by mouth 2 times daily      bumetanide (BUMEX) 1 MG tablet Take 1 mg by mouth daily      tamsulosin (FLOMAX) 0.4 MG capsule Take 0.4 mg by mouth daily      ipratropium-albuterol (DUONEB) 0.5-2.5 (3) MG/3ML SOLN nebulizer solution Inhale 1 vial into the lungs every 4 hours      tetracycline (ACHROMYCIN;SUMYCIN) 500 MG capsule       insulin aspart (NOVOLOG) 100 UNIT/ML injection vial Inject into the skin 3 times daily (before meals)      insulin glargine (LANTUS) 100 UNIT/ML injection vial Inject 15 Units into the skin every morning 1 vial 0    clopidogrel (PLAVIX) 75 MG tablet Take 1 tablet by mouth daily 30 tablet 0    metoprolol tartrate (LOPRESSOR) 25 MG tablet Take 0.5 tablets by mouth 2 times daily 60 tablet 3    dilTIAZem (CARDIZEM CD) 120 MG extended release capsule Take 1 capsule by mouth daily 30 capsule 3    midodrine (PROAMATINE) 5 MG tablet Take 1 tablet by mouth 3 times daily (with meals) 90 tablet 3    metFORMIN (GLUCOPHAGE) 1000 MG tablet Take 1 tablet by mouth 2 times daily (with meals) 180 tablet 0    magnesium oxide (MAG-OX) 400 MG tablet Take 400 mg by mouth daily      NONFORMULARY Take 1 tablet by mouth daily ON COR vitamin - to help keep bladder cancer away      acetaminophen (TYLENOL) 325 MG tablet Take 650 mg by mouth every 6 hours as needed for Pain      albuterol sulfate  (90 Base) MCG/ACT inhaler Inhale 2 puffs into the lungs every 6 hours as needed      lidocaine (XYLOCAINE) 5 % ointment Apply topically as needed.  240 g 5    sennosides-docusate sodium (SENOKOT-S) 8.6-50 MG tablet Take 1 tablet by mouth daily      Multiple Vitamins-Minerals (ONCOVITE) TABS Take by mouth      loratadine (CLARITIN) 10 MG tablet Take 10 mg by mouth daily      budesonide-formoterol (SYMBICORT) 160-4.5 MCG/ACT AERO Inhale 2 puffs into the lungs 2 times daily      magnesium hydroxide (MILK OF MAGNESIA) 400 MG/5ML suspension Take by mouth daily as needed for Constipation      Cyanocobalamin (VITAMIN B 12) 500 MCG TABS Take by mouth      pantoprazole (PROTONIX) 40 MG tablet Take 40 mg by mouth daily         Past Medical History:   Diagnosis Date    Agent orange exposure     Anxiety state     Cancer ([facility] [address]     bladder    CHF (congestive heart failure) ([facility])     Chronic obstructive pulmonary disease (COPD) ([facility])     Chronic post-traumatic stress disorder     Chronic respiratory failure with hypercapnia ([facility])     COPD file    Lack of Transportation (Non-Medical): Not on file   Physical Activity:     Days of Exercise per Week: Not on file    Minutes of Exercise per Session: Not on file   Stress:     Feeling of Stress : Not on file   Social Connections:     Frequency of Communication with Friends and Family: Not on file    Frequency of Social Gatherings with Friends and Family: Not on file    Attends Evangelical Services: Not on file    Active Member of [address] [place] [address] or Organizations: Not on file    Attends Club or Organization Meetings: Not on file    Marital Status: Not on file   Intimate Partner Violence:     Fear of Current or Ex-Partner: Not on file    Emotionally Abused: Not on file    Physically Abused: Not on file    Sexually Abused: Not on file   Housing Stability:     Unable to Pay for Housing in the Last Year: Not on file    Number of [place] in the Last Year: Not on file    Unstable Housing in the Last Year: Not on file     Review of Systems   Constitutional: Positive for activity change and fatigue. Respiratory: Positive for shortness of breath (hx COPD, smoker). Cardiovascular: Negative for chest pain. Gastrointestinal: Negative for constipation and diarrhea. Musculoskeletal: Positive for arthralgias, back pain and myalgias. Skin: Negative. Allergic/Immunologic: Negative for environmental allergies and food allergies. Neurological: Positive for weakness (legs) and numbness. Burning pain hands/feet   Psychiatric/Behavioral: Positive for sleep disturbance. Objective:   Physical Exam  Vitals reviewed. Constitutional:       General: He is awake. He is not in acute distress. Appearance: Normal appearance. He is well-developed and well-groomed. He is not ill-appearing, toxic-appearing or diaphoretic. Interventions: He is not intubated. Nasal cannula in place. HENT:      Head: Normocephalic and atraumatic.       Right Ear: External ear normal.      Left Ear: External ear with a broad-based     posterior and right posterior lateral disc protrusion along with facet and     ligamentum flavum changes.  The right foramen is moderately narrowed.  The     canal remains broad.           IMPRESSION:     Chronic multilevel fairly severe degenerative disc and facet changes as     described above.           Broad-based right posterior lateral disc protrusion L4-5 resulting in     narrowing of the right lateral recess and right proximal foramen.           Degenerative disc changes at L5-S1 along with degenerative facet changes     resulting in moderate bilateral foraminal stenosis right greater than left           Less significant degenerative changes at other levels           Abdominal aortic aneurysm at the level         Skin:     General: Skin is warm and dry. Capillary Refill: Capillary refill takes less than 2 seconds. Findings: No rash. Nails: There is no clubbing. Neurological:      General: No focal deficit present. Mental Status: He is alert and oriented to person, place, and time. Cranial Nerves: No cranial nerve deficit. Sensory: Sensory deficit (bilateral feet) present. Motor: No tremor. Gait: Gait abnormal (antalgic). Psychiatric:         Speech: Speech normal.         Behavior: Behavior is cooperative. Assessment:       Diagnosis Orders   1. Neuropathic pain  oxyCODONE-acetaminophen (PERCOCET) 5-325 MG per tablet   2. Chronic bilateral low back pain without sciatica  oxyCODONE-acetaminophen (PERCOCET) 5-325 MG per tablet   3. Lumbosacral spondylosis without myelopathy     4.  DDD (degenerative disc disease), lumbar           Plan:       Discontinue tramadol  Start percocet 5 bid prn pain    Controlled Substance Monitoring:    Acute and Chronic Pain Monitoring:   RX Monitoring 10/13/2021   Periodic Controlled Substance Monitoring No signs of potential drug abuse or diversion identified.;Obtaining appropriate analgesic effect of treatment.      RTC one months              Perry Devries, APRN - CNP

## 2022-02-09 DIAGNOSIS — M79.2 NEUROPATHIC PAIN: ICD-10-CM

## 2022-02-09 DIAGNOSIS — M54.50 CHRONIC BILATERAL LOW BACK PAIN WITHOUT SCIATICA: ICD-10-CM

## 2022-02-09 DIAGNOSIS — G89.29 CHRONIC BILATERAL LOW BACK PAIN WITHOUT SCIATICA: ICD-10-CM

## 2022-02-09 RX ORDER — OXYCODONE HYDROCHLORIDE AND ACETAMINOPHEN 5; 325 MG/1; MG/1
1 TABLET ORAL 2 TIMES DAILY PRN
Qty: 60 TABLET | Refills: 0 | Status: SHIPPED | OUTPATIENT
Start: 2022-02-11 | End: 2022-03-09

## 2022-02-09 NOTE — TELEPHONE ENCOUNTER
[place], and [place]: 1/12/22 percocet 5-325mg #60. Due 2/11/22.     Last Appt:   1/12/21  Next Appt:   3/9/22  Med verified in Epic

## 2022-03-09 ENCOUNTER — OFFICE VISIT (OUTPATIENT)
Dept: PAIN MANAGEMENT | Age: 72
End: 2022-03-09
Payer: MEDICARE

## 2022-03-09 VITALS — SYSTOLIC BLOOD PRESSURE: 108 MMHG | DIASTOLIC BLOOD PRESSURE: 62 MMHG

## 2022-03-09 DIAGNOSIS — M79.2 NEUROPATHIC PAIN: Primary | ICD-10-CM

## 2022-03-09 DIAGNOSIS — M51.36 DDD (DEGENERATIVE DISC DISEASE), LUMBAR: ICD-10-CM

## 2022-03-09 DIAGNOSIS — M47.817 LUMBOSACRAL SPONDYLOSIS WITHOUT MYELOPATHY: ICD-10-CM

## 2022-03-09 DIAGNOSIS — M47.816 LUMBAR FACET JOINT SYNDROME: ICD-10-CM

## 2022-03-09 DIAGNOSIS — M54.50 CHRONIC BILATERAL LOW BACK PAIN WITHOUT SCIATICA: ICD-10-CM

## 2022-03-09 DIAGNOSIS — G89.29 CHRONIC BILATERAL LOW BACK PAIN WITHOUT SCIATICA: ICD-10-CM

## 2022-03-09 PROCEDURE — 4040F PNEUMOC VAC/ADMIN/RCVD: CPT | Performed by: NURSE PRACTITIONER

## 2022-03-09 PROCEDURE — G8420 CALC BMI NORM PARAMETERS: HCPCS | Performed by: NURSE PRACTITIONER

## 2022-03-09 PROCEDURE — 1036F TOBACCO NON-USER: CPT | Performed by: NURSE PRACTITIONER

## 2022-03-09 PROCEDURE — 99214 OFFICE O/P EST MOD 30 MIN: CPT | Performed by: NURSE PRACTITIONER

## 2022-03-09 PROCEDURE — G8427 DOCREV CUR MEDS BY ELIG CLIN: HCPCS | Performed by: NURSE PRACTITIONER

## 2022-03-09 PROCEDURE — G8484 FLU IMMUNIZE NO ADMIN: HCPCS | Performed by: NURSE PRACTITIONER

## 2022-03-09 PROCEDURE — 1123F ACP DISCUSS/DSCN MKR DOCD: CPT | Performed by: NURSE PRACTITIONER

## 2022-03-09 PROCEDURE — 3017F COLORECTAL CA SCREEN DOC REV: CPT | Performed by: NURSE PRACTITIONER

## 2022-03-09 RX ORDER — OXYCODONE AND ACETAMINOPHEN 7.5; 325 MG/1; MG/1
1 TABLET ORAL EVERY 8 HOURS PRN
Qty: 90 TABLET | Refills: 0 | Status: SHIPPED | OUTPATIENT
Start: 2022-03-09 | End: 2022-04-12 | Stop reason: SDUPTHER

## 2022-03-09 ASSESSMENT — ENCOUNTER SYMPTOMS
BACK PAIN: 1
CONSTIPATION: 0
DIARRHEA: 0
SHORTNESS OF BREATH: 1

## 2022-03-09 NOTE — PROGRESS NOTES
Subjective:      Chief Complaint   Patient presents with    Follow-up     feet and hand pain     Patient ID: Rosalie Watters is a 67 y.o. male. Back Pain  Associated symptoms include numbness and weakness (legs). Pertinent negatives include no chest pain. Other  Associated symptoms include arthralgias, fatigue, myalgias, numbness and weakness (legs). Pertinent negatives include no chest pain. Foot Pain   Associated symptoms include numbness. Here today for routine pain clinic recheck. Neuropathic pain in feet-previously taking Lyrica, fluid retention, no longer taking. Tramadol without relief, Percocet 5, minimal relief. New lung cancer diagnosis, started chemotherapy    Pain Assessment  Location of Pain: Foot (hands)  Location Modifiers: Left,Right  Severity of Pain: 8  Quality of Pain: Other (Comment) (burn)  Duration of Pain: Persistent  Frequency of Pain: Constant  Aggravating Factors: Walking,Standing  Limiting Behavior: Yes  Relieving Factors: Other (Comment) (massage)    Allergies   Allergen Reactions    Niacin And Related     Other      Nicotine patch - rash    Statins        Outpatient Medications Marked as Taking for the 3/9/22 encounter (Office Visit) with ALISON Herring - CNP   Medication Sig Dispense Refill    oxyCODONE-acetaminophen (PERCOCET) 7.5-325 MG per tablet Take 1 tablet by mouth every 8 hours as needed for Pain for up to 30 days.  Intended supply: 30 days 90 tablet 0       Past Medical History:   Diagnosis Date    Agent orange exposure     Anxiety state     Cancer ([facility]     bladder    CHF (congestive heart failure) (HCC)     Chronic obstructive pulmonary disease (COPD) (HCC)     Chronic post-traumatic stress disorder     Chronic respiratory failure with hypercapnia (HCC)     COPD exacerbation (HCC)     Coronary artery disease     Degenerative disc disease, lumbar     Depression     Diabetes mellitus ([facility]     Essential hypertension     History of acute pancreatitis     Hyperglycemia     Hypokalemia     Lumbosacral disc disease     Obesity     Obstructive sleep apnea     Personal history of malignant neoplasm of bladder        Past Surgical History:   Procedure Laterality Date    BLADDER SURGERY      CORONARY ANGIOPLASTY WITH STENT PLACEMENT  09/2021    done [address]      heart bypass with stents    OTHER SURGICAL HISTORY Bilateral 02/10/2021    Bilateral L4-5, L5-S1 Facet Block Injection [facility]ain[facility]greg[facility] PAIN MANAGEMENT PROCEDURE Bilateral 02/10/2021    Facet block injection L4-5, L5-S1, bilateral       Family History   Problem Relation Age of Onset    Emphysema Mother     Heart Disease Father     Heart Failure Father        Social History     Socioeconomic History    Marital status: Legally      Spouse name: None    Number of children: None    Years of education: None    Highest education level: None   Occupational History    None   Tobacco Use    Smoking status: Former Smoker     Packs/day: 1.00     Years: 54.00     Pack years: 54.00     Types: Cigarettes    Smokeless tobacco: Never Used    Tobacco comment: stopped about 3 weeks ago   Vaping Use    Vaping Use: Never used   Substance and Sexual Activity    Alcohol use: Not Currently     Comment: occasional    Drug use: Never    Sexual activity: Not Currently   Other Topics Concern    None   Social History Narrative    None     Social Determinants of Health     Financial Resource Strain:     Difficulty of Paying Living Expenses: Not on file   Food Insecurity:     Worried About Running Out of Food in the Last Year: Not on file    Mirtha of Food in the Last Year: Not on file   Transportation Needs:     Lack of Transportation (Medical): Not on file    Lack of Transportation (Non-Medical):  Not on file   Physical Activity:     Days of Exercise per Week: Not on file    Minutes of Exercise per Session: Not on file   Stress:  Feeling of Stress : Not on file   Social Connections:     Frequency of Communication with Friends and Family: Not on file    Frequency of Social Gatherings with Friends and Family: Not on file    Attends Taoist Services: Not on file    Active Member of Clubs or Organizations: Not on file    Attends Club or Organization Meetings: Not on file    Marital Status: Not on file   Intimate Partner Violence:     Fear of Current or Ex-Partner: Not on file    Emotionally Abused: Not on file    Physically Abused: Not on file    Sexually Abused: Not on file   Housing Stability:     Unable to Pay for Housing in the Last Year: Not on file    Number of [place] in the Last Year: Not on file    Unstable Housing in the Last Year: Not on file     Review of Systems   Constitutional: Positive for activity change and fatigue. Respiratory: Positive for shortness of breath (hx COPD, smoker). Cardiovascular: Negative for chest pain. Gastrointestinal: Negative for constipation and diarrhea. Musculoskeletal: Positive for arthralgias, back pain and myalgias. Skin: Negative. Allergic/Immunologic: Negative for environmental allergies and food allergies. Neurological: Positive for weakness (legs) and numbness. Burning pain hands/feet   Psychiatric/Behavioral: Positive for sleep disturbance. Objective:   Physical Exam  Vitals reviewed. Constitutional:       General: He is awake. He is not in acute distress. Appearance: Normal appearance. He is well-developed and well-groomed. He is not ill-appearing, toxic-appearing or diaphoretic. Interventions: He is not intubated. Nasal cannula in place. HENT:      Head: Normocephalic and atraumatic. Right Ear: External ear normal.      Left Ear: External ear normal.      Nose: Nose normal.      Mouth/Throat:      Lips: Pink.       Mouth: Mucous membranes are moist.   Eyes:      General: Lids are normal.   Cardiovascular:      Rate and Rhythm: Normal rate. Pulmonary:      Effort: Pulmonary effort is normal. No tachypnea, bradypnea, accessory muscle usage, prolonged expiration, respiratory distress or retractions. He is not intubated. Abdominal:      General: Abdomen is flat and protuberant. Palpations: Abdomen is soft. Musculoskeletal:      Right lower le+ Pitting Edema present. Left lower le+ Pitting Edema present.       Comments: MRI LUMBAR SPINE        HISTORY:  Low back pain and radiculopathy A.  Weakness in legs.     History of bladder cancer           Study:  MRI lumbar spine without contrast dated 2020.  6616 hours           TECHNIQUE: Sagittal and axial T1 and T2 images were obtained in the     lumbar spine without IV contrast.           Findings:  At T11-12 through L1-2, degenerative disc changes are present with     small broad-based posterior disc protrusion but no significant compromise of     the canal or foramina.  The conus medullaris is not compromised           At L2-3, reactive endplate changes are present along with a hemangioma in the     L2 vertebral body.           Broad-based posterior disc protrusion is present along with endplate     osteophytes but no critical compromise of the canal or foramina.           An abdominal aortic aneurysm is present at the L4 level measuring     approximately 3.4 cm in diameter.           At L4-5, degenerative disc space narrowing is present with endplate     osteophytes and a small broad-based posterior and posterior lateral disc     protrusion.  These changes result in moderate right lateral recess and right     foraminal narrowing but the canal is not critically compromised.  Facet     hypertrophic changes are present           At L5-S1, degenerative disc space narrowing is present with a broad-based     posterior and right posterior lateral disc protrusion along with facet and     ligamentum flavum changes.  The right foramen is moderately narrowed.  The     canal remains broad.           IMPRESSION:     Chronic multilevel fairly severe degenerative disc and facet changes as     described above.           Broad-based right posterior lateral disc protrusion L4-5 resulting in     narrowing of the right lateral recess and right proximal foramen.           Degenerative disc changes at L5-S1 along with degenerative facet changes     resulting in moderate bilateral foraminal stenosis right greater than left           Less significant degenerative changes at other levels           Abdominal aortic aneurysm at the level         Skin:     General: Skin is warm and dry. Capillary Refill: Capillary refill takes less than 2 seconds. Findings: No rash. Nails: There is no clubbing. Neurological:      General: No focal deficit present. Mental Status: He is alert and oriented to person, place, and time. Cranial Nerves: No cranial nerve deficit. Sensory: Sensory deficit (bilateral feet) present. Motor: No tremor. Gait: Gait abnormal (antalgic). Psychiatric:         Speech: Speech normal.         Behavior: Behavior is cooperative. Assessment:       Diagnosis Orders   1. Neuropathic pain  oxyCODONE-acetaminophen (PERCOCET) 7.5-325 MG per tablet   2. Chronic bilateral low back pain without sciatica     3. Lumbosacral spondylosis without myelopathy     4. DDD (degenerative disc disease), lumbar     5. Lumbar facet joint syndrome           Plan:       Increase Percocet 7.5 tid prn pain    Controlled Substance Monitoring:    Acute and Chronic Pain Monitoring:   RX Monitoring 3/11/2022   Periodic Controlled Substance Monitoring No signs of potential drug abuse or diversion identified.                ALISON Valenzuela - CNP

## 2022-03-09 NOTE — PATIENT INSTRUCTIONS
Pain Management Plan:  1. Increase Percocet 7.5 3x's per day    Pain clinic phone numbers  [facility]  - Call [phone]. Please leave your name, a working phone number, date of birth, the name, dose, quantity, and last refill date of the prescription, and the pharmacy.  Appointment or General Questions - Call  [phone]. This is the direct line the  [facility] .  Isiah Steinberg You can also use [employer]. Is your MyChart Activated? How to dispose of unused pain medications safely:  · Flush all unused pain medications. This is the FDA's recommended way of disposing these medications. · All other medications can be disposed in the trash mixed in undesirable contents (used coffee grounds, used [employer], etc).

## 2022-04-12 DIAGNOSIS — M79.2 NEUROPATHIC PAIN: ICD-10-CM

## 2022-04-12 RX ORDER — OXYCODONE AND ACETAMINOPHEN 7.5; 325 MG/1; MG/1
1 TABLET ORAL EVERY 8 HOURS PRN
Qty: 90 TABLET | Refills: 0 | Status: SHIPPED | OUTPATIENT
Start: 2022-04-12 | End: 2022-05-12

## 2022-04-12 NOTE — TELEPHONE ENCOUNTER
Saadia Anthony called requesting a refill of the below medication which has been pended for you:     Requested Prescriptions     Pending Prescriptions Disp Refills    oxyCODONE-acetaminophen (PERCOCET) 7.5-325 MG per tablet 90 tablet 0     Sig: Take 1 tablet by mouth every 8 hours as needed for Pain for up to 30 days. Intended supply: 30 days       Last Appointment Date: 3/9/2022  Next Appointment Date: 5/4/2022    Allergies   Allergen Reactions    Niacin And Related     Other      Nicotine patch - rash    Statins        [place]: Percocet 7.5 3-9-22  #90. Due NOW.